# Patient Record
Sex: MALE | Race: WHITE | NOT HISPANIC OR LATINO | Employment: OTHER | URBAN - METROPOLITAN AREA
[De-identification: names, ages, dates, MRNs, and addresses within clinical notes are randomized per-mention and may not be internally consistent; named-entity substitution may affect disease eponyms.]

---

## 2020-02-28 ENCOUNTER — HOSPITAL ENCOUNTER (EMERGENCY)
Facility: CLINIC | Age: 40
Discharge: HOME | End: 2020-02-28
Attending: EMERGENCY MEDICINE | Admitting: EMERGENCY MEDICINE
Payer: MEDICAID

## 2020-02-28 VITALS
RESPIRATION RATE: 16 BRPM | BODY MASS INDEX: 29.16 KG/M2 | TEMPERATURE: 97.6 F | HEART RATE: 85 BPM | SYSTOLIC BLOOD PRESSURE: 131 MMHG | WEIGHT: 220 LBS | DIASTOLIC BLOOD PRESSURE: 83 MMHG | OXYGEN SATURATION: 99 % | HEIGHT: 73 IN

## 2020-02-28 DIAGNOSIS — S39.012A BACK STRAIN, INITIAL ENCOUNTER: ICD-10-CM

## 2020-02-28 PROCEDURE — 25000132 ZZH RX MED GY IP 250 OP 250 PS 637: Performed by: EMERGENCY MEDICINE

## 2020-02-28 PROCEDURE — 99283 EMERGENCY DEPT VISIT LOW MDM: CPT

## 2020-02-28 RX ORDER — LIDOCAINE 4 G/G
1 PATCH TOPICAL ONCE
Status: DISCONTINUED | OUTPATIENT
Start: 2020-02-28 | End: 2020-02-28 | Stop reason: HOSPADM

## 2020-02-28 RX ORDER — OXYCODONE HYDROCHLORIDE 5 MG/1
5 TABLET ORAL ONCE
Status: COMPLETED | OUTPATIENT
Start: 2020-02-28 | End: 2020-02-28

## 2020-02-28 RX ORDER — LIDOCAINE 50 MG/G
1 PATCH TOPICAL EVERY 24 HOURS
Qty: 10 PATCH | Refills: 0 | Status: SHIPPED | OUTPATIENT
Start: 2020-02-28 | End: 2020-03-09

## 2020-02-28 RX ORDER — TRAMADOL HYDROCHLORIDE 50 MG/1
50 TABLET ORAL EVERY 6 HOURS PRN
Qty: 10 TABLET | Refills: 0 | Status: SHIPPED | OUTPATIENT
Start: 2020-02-28 | End: 2020-03-02

## 2020-02-28 RX ADMIN — LIDOCAINE 1 PATCH: 560 PATCH PERCUTANEOUS; TOPICAL; TRANSDERMAL at 13:02

## 2020-02-28 RX ADMIN — OXYCODONE HYDROCHLORIDE 5 MG: 5 TABLET ORAL at 13:02

## 2020-02-28 ASSESSMENT — ENCOUNTER SYMPTOMS
ARTHRALGIAS: 1
BACK PAIN: 1
MYALGIAS: 1

## 2020-02-28 ASSESSMENT — MIFFLIN-ST. JEOR: SCORE: 1976.79

## 2020-02-28 NOTE — ED TRIAGE NOTES
Pt report back pain since he hurt it at the gym last week. Pt has hx of dislocated shoulder. Pt tried a new machine and he could feel it was uncomfortable. Pain was worse the next day. Pain is in his upper left back and shoulder and it radiates down his left arm. Pt went to urgent care on Saturday and Sunday. Pt was given a muscle relaxer and Tylenol #3 which did not help. Pt also tried Naproxen with no relief. Nothing taken today. Pt also saw his chiropractor 3 times this week, chiropractor told him to come in and get an MRI.

## 2020-02-28 NOTE — DISCHARGE INSTRUCTIONS
Recommend you take 1000 mg of Tylenol every 6 hours for pain as well as 600 mg of ibuprofen every 6 hours for pain.  You should apply warm pack to your left shoulder.  You can use the tramadol as needed for more severe pain.  Shoulder exercises including hanging over and shoulder circles to  prevent tightness.  He should make an appointment to follow-up in primary care clinic for recheck.  I see no indication for an MRI at this time however should you have worsening pain or weakness this should be revisited.

## 2020-02-28 NOTE — ED AVS SNAPSHOT
[facility] Emergency Department  [address]  [facility] [address]  Phone:  [phone]  Fax:  [phone]                                    Rex Negrete   MRN: [id number]    Department:  [facility] Emergency Department   Date of Visit:  2/28/2020           After Visit Summary Signature Page    I have received my discharge instructions, and my questions have been answered. I have discussed any challenges I see with this plan with the nurse or doctor.    ..........................................................................................................................................  Patient/Patient Representative Signature      ..........................................................................................................................................  Patient Representative Print Name and Relationship to Patient    ..................................................               ................................................  Date                                   Time    ..........................................................................................................................................  Reviewed by Signature/Title    ...................................................              ..............................................  Date                                               Time          22EPIC Rev 08/18

## 2020-02-28 NOTE — ED PROVIDER NOTES
"  History     Chief Complaint:    Back Pain      HPI   Rex Negrete is a 39 year old male who presents with back pain. Patient states that he was at the gym using a squat machine, where he put his arm in a weird position. The exercise felt uncomfortable, so he stopped. However, afterwards he started feeling back pain and left shoulder pain. He notes the shoulder pain shoots down his arm and hurts in his elbow also. Furthermore, he notes the tip of his left pointer finger is numb. He notes the numbness has stayed the same, but the pain has been getting worse. He states that over the past week, the pain has stopped him from getting a full night's sleep. He went to the urgent care 6 days ago and was given flexeril and tylenol, but that hasn't helped him during the night. He came back a day later and was given Vicodin, but that hasn't helped. He also has tried taking Naprosyn, with little to no relief. Additionally, he saw his chiropractor, who recommended he come to the ED.       Allergies:  The patient has no known drug allergies.    Medications:    Elavil  Buspar  Naprosyn  Inderal  Carafate    Past Medical History:    Depression  Alcohol use disorder  Mild TBI  Dysthymic disorder    Past Surgical History:    Shoulder surgery, left  Nasal septum surgery    Family History:    Father: Alcohol/Drug  Mother: Skin cancer    Social History:  Current some day smoker  Denies alcohol and drug use  Marital Status:  Single [1]    Review of Systems   Musculoskeletal: Positive for arthralgias, back pain and myalgias.   All other systems reviewed and are negative.      Physical Exam   First Vitals:  BP: 131/83  Pulse: 85  Temp: 97.6  F (36.4  C)  Resp: 16  Height: 185.4 cm (6' 1\")  Weight: 99.8 kg (220 lb)  SpO2: 99 %      Physical Exam  Constitutional: Alert, attentive, GCS 15  HENT:    Nose: Nose normal.    Mouth/Throat: Oropharynx is clear, mucous membranes are moist  Neck: Midline tenderness, full range of motion, negative " Spurling sign.  Eyes: EOM are normal, anicteric, conjugate gaze  CV: regular rate and rhythm; no murmurs  Chest: Effort normal and breath sounds clear without wheezing or rales, symmetric bilaterally   GI:  non tender. No distension. No guarding or rebound.  Back: Ecchymosis over left medial scapular border with point tenderness of trapezoid and medial subscapular musculature.  MSK: No LE edema, no tenderness to palpation of BLE.  Neurological: Alert, attentive, moving all extremities equally.  5 out of 5  strength, elbow flexion extension, shoulder abduction abduction.  Skin: Skin is warm and dry.     Medications   Lidocaine (LIDOCARE) 4 % Patch 1 patch (1 patch Transdermal Patch/Med Applied 20 130)   oxyCODONE (ROXICODONE) tablet 5 mg (5 mg Oral Given 20)       Impression & Plan    Medical Decision Makin-year-old male presenting for evaluation of left backslash arm plain after weightlifting injury 1 week prior in which he awkwardly reached behind his head to  a squat bar.  He has seen his chiropractor without significant adjustment but has had cupping treatment and was referred to the emergency department for possible MRI.  On exam, he has intact distal sensation and  strength in his hand and his exam is most consistent with musculoskeletal strain likely subscapularis given focal tenderness with reproducibility of his pain on palpation of his mid thoracic back.  I do not see any red flag symptoms including weakness or sensation loss and his pain is far below the nerve roots of his arm.  I recommended continued conservative management heat packs, Tylenol, ibuprofen Lidoderm patch and short course of pain medication.  Recommended follow-up in primary clinic for recheck and return precautions were reviewed.    Diagnosis:    ICD-10-CM    1. Back strain, initial encounter S39.012A        Disposition:  discharged to home    Discharge Medications:  Discharge Medication List as of  2/28/2020  1:13 PM      START taking these medications    Details   lidocaine (LIDODERM) 5 % patch Place 1 patch onto the skin every 24 hours for 10 daysDisp-10 patch, R-0Local Print      traMADol (ULTRAM) 50 MG tablet Take 1 tablet (50 mg) by mouth every 6 hours as needed for severe pain, Disp-10 tablet, R-0, Local Print           Isidro Tariq MD  [employer]  1:48 PM 02/28/20     Scribe Disclosure:  I, Wes Coello, am serving as a scribe on 2/28/2020 at 12:16 PM to personally document services performed by Isidro Tariq MD based on my observations and the provider's statements to me.     Wes Coello  2/28/2020   [facility] EMERGENCY DEPARTMENT       Isidro Tariq MD  02/28/20 1902

## 2020-03-26 ENCOUNTER — NURSE TRIAGE (OUTPATIENT)
Dept: NURSING | Facility: CLINIC | Age: 40
End: 2020-03-26

## 2020-03-27 ENCOUNTER — APPOINTMENT (OUTPATIENT)
Dept: CT IMAGING | Facility: CLINIC | Age: 40
End: 2020-03-27
Attending: EMERGENCY MEDICINE
Payer: COMMERCIAL

## 2020-03-27 ENCOUNTER — HOSPITAL ENCOUNTER (EMERGENCY)
Facility: CLINIC | Age: 40
Discharge: HOME | End: 2020-03-27
Attending: EMERGENCY MEDICINE | Admitting: EMERGENCY MEDICINE
Payer: COMMERCIAL

## 2020-03-27 VITALS
HEART RATE: 83 BPM | BODY MASS INDEX: 28.49 KG/M2 | OXYGEN SATURATION: 97 % | SYSTOLIC BLOOD PRESSURE: 140 MMHG | WEIGHT: 215 LBS | DIASTOLIC BLOOD PRESSURE: 77 MMHG | HEIGHT: 73 IN | RESPIRATION RATE: 20 BRPM | TEMPERATURE: 98.6 F

## 2020-03-27 DIAGNOSIS — K57.32 DIVERTICULITIS OF COLON: ICD-10-CM

## 2020-03-27 LAB
ALBUMIN SERPL-MCNC: 3.4 G/DL (ref 3.4–5)
ALP SERPL-CCNC: 55 U/L (ref 40–150)
ALT SERPL W P-5'-P-CCNC: 34 U/L (ref 0–70)
ANION GAP SERPL CALCULATED.3IONS-SCNC: 1 MMOL/L (ref 3–14)
AST SERPL W P-5'-P-CCNC: 15 U/L (ref 0–45)
BASOPHILS # BLD AUTO: 0 10E9/L (ref 0–0.2)
BASOPHILS NFR BLD AUTO: 0.2 %
BILIRUB SERPL-MCNC: 2.1 MG/DL (ref 0.2–1.3)
BUN SERPL-MCNC: 16 MG/DL (ref 7–30)
CALCIUM SERPL-MCNC: 8.5 MG/DL (ref 8.5–10.1)
CHLORIDE SERPL-SCNC: 105 MMOL/L (ref 94–109)
CO2 SERPL-SCNC: 30 MMOL/L (ref 20–32)
CREAT SERPL-MCNC: 1.18 MG/DL (ref 0.66–1.25)
DIFFERENTIAL METHOD BLD: ABNORMAL
EOSINOPHIL # BLD AUTO: 0 10E9/L (ref 0–0.7)
EOSINOPHIL NFR BLD AUTO: 0.1 %
ERYTHROCYTE [DISTWIDTH] IN BLOOD BY AUTOMATED COUNT: 12.7 % (ref 10–15)
GFR SERPL CREATININE-BSD FRML MDRD: 78 ML/MIN/{1.73_M2}
GLUCOSE SERPL-MCNC: 100 MG/DL (ref 70–99)
HCT VFR BLD AUTO: 45.3 % (ref 40–53)
HGB BLD-MCNC: 14.9 G/DL (ref 13.3–17.7)
IMM GRANULOCYTES # BLD: 0 10E9/L (ref 0–0.4)
IMM GRANULOCYTES NFR BLD: 0.3 %
LIPASE SERPL-CCNC: 81 U/L (ref 73–393)
LYMPHOCYTES # BLD AUTO: 1.8 10E9/L (ref 0.8–5.3)
LYMPHOCYTES NFR BLD AUTO: 14.3 %
MCH RBC QN AUTO: 30.3 PG (ref 26.5–33)
MCHC RBC AUTO-ENTMCNC: 32.9 G/DL (ref 31.5–36.5)
MCV RBC AUTO: 92 FL (ref 78–100)
MONOCYTES # BLD AUTO: 0.8 10E9/L (ref 0–1.3)
MONOCYTES NFR BLD AUTO: 6.8 %
NEUTROPHILS # BLD AUTO: 9.7 10E9/L (ref 1.6–8.3)
NEUTROPHILS NFR BLD AUTO: 78.3 %
NRBC # BLD AUTO: 0 10*3/UL
NRBC BLD AUTO-RTO: 0 /100
PLATELET # BLD AUTO: 270 10E9/L (ref 150–450)
POTASSIUM SERPL-SCNC: 3.9 MMOL/L (ref 3.4–5.3)
PROT SERPL-MCNC: 6.9 G/DL (ref 6.8–8.8)
RBC # BLD AUTO: 4.91 10E12/L (ref 4.4–5.9)
SODIUM SERPL-SCNC: 136 MMOL/L (ref 133–144)
WBC # BLD AUTO: 12.3 10E9/L (ref 4–11)

## 2020-03-27 PROCEDURE — 74177 CT ABD & PELVIS W/CONTRAST: CPT

## 2020-03-27 PROCEDURE — 25000132 ZZH RX MED GY IP 250 OP 250 PS 637: Performed by: EMERGENCY MEDICINE

## 2020-03-27 PROCEDURE — 25000128 H RX IP 250 OP 636: Performed by: EMERGENCY MEDICINE

## 2020-03-27 PROCEDURE — 85025 COMPLETE CBC W/AUTO DIFF WBC: CPT | Performed by: EMERGENCY MEDICINE

## 2020-03-27 PROCEDURE — 83690 ASSAY OF LIPASE: CPT | Performed by: EMERGENCY MEDICINE

## 2020-03-27 PROCEDURE — 96375 TX/PRO/DX INJ NEW DRUG ADDON: CPT

## 2020-03-27 PROCEDURE — 25000125 ZZHC RX 250: Performed by: EMERGENCY MEDICINE

## 2020-03-27 PROCEDURE — 96374 THER/PROPH/DIAG INJ IV PUSH: CPT | Mod: 59

## 2020-03-27 PROCEDURE — 96361 HYDRATE IV INFUSION ADD-ON: CPT

## 2020-03-27 PROCEDURE — 99285 EMERGENCY DEPT VISIT HI MDM: CPT | Mod: 25

## 2020-03-27 PROCEDURE — 25800030 ZZH RX IP 258 OP 636: Performed by: EMERGENCY MEDICINE

## 2020-03-27 PROCEDURE — 80053 COMPREHEN METABOLIC PANEL: CPT | Performed by: EMERGENCY MEDICINE

## 2020-03-27 RX ORDER — IOPAMIDOL 755 MG/ML
500 INJECTION, SOLUTION INTRAVASCULAR ONCE
Status: COMPLETED | OUTPATIENT
Start: 2020-03-27 | End: 2020-03-27

## 2020-03-27 RX ORDER — ONDANSETRON 2 MG/ML
4 INJECTION INTRAMUSCULAR; INTRAVENOUS ONCE
Status: COMPLETED | OUTPATIENT
Start: 2020-03-27 | End: 2020-03-27

## 2020-03-27 RX ORDER — KETOROLAC TROMETHAMINE 15 MG/ML
15 INJECTION, SOLUTION INTRAMUSCULAR; INTRAVENOUS ONCE
Status: COMPLETED | OUTPATIENT
Start: 2020-03-27 | End: 2020-03-27

## 2020-03-27 RX ORDER — OXYCODONE HYDROCHLORIDE 5 MG/1
5 TABLET ORAL ONCE
Status: COMPLETED | OUTPATIENT
Start: 2020-03-27 | End: 2020-03-27

## 2020-03-27 RX ORDER — OXYCODONE HYDROCHLORIDE 5 MG/1
5 TABLET ORAL EVERY 6 HOURS PRN
Qty: 8 TABLET | Refills: 0 | Status: SHIPPED | OUTPATIENT
Start: 2020-03-27 | End: 2020-03-28

## 2020-03-27 RX ORDER — SODIUM CHLORIDE 9 MG/ML
1000 INJECTION, SOLUTION INTRAVENOUS CONTINUOUS
Status: DISCONTINUED | OUTPATIENT
Start: 2020-03-27 | End: 2020-03-27 | Stop reason: HOSPADM

## 2020-03-27 RX ORDER — HYDROMORPHONE HYDROCHLORIDE 1 MG/ML
0.5 INJECTION, SOLUTION INTRAMUSCULAR; INTRAVENOUS; SUBCUTANEOUS
Status: COMPLETED | OUTPATIENT
Start: 2020-03-27 | End: 2020-03-27

## 2020-03-27 RX ADMIN — KETOROLAC TROMETHAMINE 15 MG: 15 INJECTION, SOLUTION INTRAMUSCULAR; INTRAVENOUS at 09:11

## 2020-03-27 RX ADMIN — IOPAMIDOL 100 ML: 755 INJECTION, SOLUTION INTRAVENOUS at 08:26

## 2020-03-27 RX ADMIN — OXYCODONE HYDROCHLORIDE 5 MG: 5 TABLET ORAL at 12:25

## 2020-03-27 RX ADMIN — SODIUM CHLORIDE 65 ML: 9 INJECTION, SOLUTION INTRAVENOUS at 08:27

## 2020-03-27 RX ADMIN — HYDROMORPHONE HYDROCHLORIDE 0.5 MG: 1 INJECTION, SOLUTION INTRAMUSCULAR; INTRAVENOUS; SUBCUTANEOUS at 07:56

## 2020-03-27 RX ADMIN — OXYCODONE HYDROCHLORIDE 5 MG: 5 TABLET ORAL at 09:54

## 2020-03-27 RX ADMIN — SODIUM CHLORIDE 1000 ML: 9 INJECTION, SOLUTION INTRAVENOUS at 07:53

## 2020-03-27 RX ADMIN — ONDANSETRON 4 MG: 2 INJECTION INTRAMUSCULAR; INTRAVENOUS at 07:53

## 2020-03-27 ASSESSMENT — MIFFLIN-ST. JEOR: SCORE: 1954.11

## 2020-03-27 NOTE — ED TRIAGE NOTES
Arrives with abd pain starting yesterday morning.  Has been having stools as usual. Has taken stool softeners and flexeril yesterday.  No blood in urine.  Pain to medial abd down to suprapubic region.  Pain increases to lower abd. ABCD's intact; A/O x 4.

## 2020-03-27 NOTE — ED AVS SNAPSHOT
[facility] Emergency Department  [address]  [facility] [address]  Phone:  [phone]  Fax:  [phone]                                    Rex Negrete   MRN: [id number]    Department:  [facility] Emergency Department   Date of Visit:  3/27/2020           After Visit Summary Signature Page    I have received my discharge instructions, and my questions have been answered. I have discussed any challenges I see with this plan with the nurse or doctor.    ..........................................................................................................................................  Patient/Patient Representative Signature      ..........................................................................................................................................  Patient Representative Print Name and Relationship to Patient    ..................................................               ................................................  Date                                   Time    ..........................................................................................................................................  Reviewed by Signature/Title    ...................................................              ..............................................  Date                                               Time          22EPIC Rev 08/18

## 2020-03-27 NOTE — DISCHARGE INSTRUCTIONS
An Emergency Dept follow up phone appointment has been made for you on 3/31/2020  At 2:25 PM  , if you would like to make any changes, please phone them at:   Telephone Visit with Steve Garcia MD   [facility] [place] ([facility])  [address] [place]   [address]   [place] [address] [phone]          Follow-up:  Please follow-up with your primary care provider in 2-3 days for re-evaluation and discussion of your visit to the emergency department today.    Home treatments:  Recommended home therapies include:    1.  Antibiotics as directed    2.  Pain medications as directed    3.  Clear liquid diet    4.  Avoidance of seeds / nuts    5.  Close follow-up with primary care provider    New prescriptions:  Ciprofloxacin   Metronidazole    Return precautions:  Warning signs which should prompt you to return to the ER include worsening pain, fever, inability to keep down fluids or foods, bloody stool, recurrent vomiting, or any other new or troubling symptoms.  We are always happy to see you again.    Discharge Instructions  Diverticulitis  Your doctor has diagnosed you with diverticulitis.  Diverticulitis is an infection of a diverticulum, which is a tiny sack-like structure that protrudes off the wall of the colon.  These sacks are created over years of increased pressure in the colon - usually as a result of a diet without enough fruits, vegetables and whole grains. Because these sacks are small, bacteria can get trapped inside them and cause an infection.  This infection often causes abdominal pain, fever, nausea and vomiting. Diverticulitis is usually treated at home.  However, sometimes diverticulitis needs treatment in the hospital and may even need surgery.    Return to the Emergency Department if:     You get an oral temperature above 102oF or as directed by your doctor.    You have blood in your stools (bright red or black, tarry stools), or have blood in your  "vomit.    You keep throwing up or can t drink liquids or can t keep your medicine down.    You can t have a bowel movement or you can t pass gas.    Your stomach gets bloated or bigger.    You faint or become very weak.    Your pain is too bad to tolerate.    You have new symptoms or anything that worries you.    What can I do to help myself?    Fill any antibiotic prescriptions the doctor gave you and take them right away. Be sure to finish the whole antibiotic prescription.    For the first day or two at home drink only clear liquids.  This lets your intestines rest.    If your pain has improved after one or two days, you may start eating mild foods. Soda crackers, toast, plain noodles, gelatin, applesauce and bananas are good first choices.  Avoid foods that have acid, are spicy, fatty or fibrous (such as meats, coarse grains, vegetables). You may start eating these foods again in about 3-4 days when you are better.    Once you are back to normal, eat a high fiber diet of fruits, vegetables and whole grains. Some people think you should avoid eating nuts, seeds, and corn, but there is no definite proof this makes any difference in whether you will get diverticulitis again.     Pain and fever can be treated with Tylenol  (acetaminophen) or with the prescription pain medication given to you by your doctor.  If the prescription pain medication has acetaminophen in it, don t use acetaminophen with it. If you have been given a narcotic pain medication, you should not drive for 4 hours after taking it.    Probiotics: If you have been given an antibiotic, you may want to also take a probiotic pill or eat yogurt with live cultures. Probiotics have \"good bacteria\" to help your intestines stay healthy. Studies have shown that probiotics help prevent diarrhea and other intestine problems (including C. diff infection) when you take antibiotics. You can buy these without a prescription in the pharmacy section of the store. "   FOLLOW UP WITH YOUR REGULAR DOCTOR IN 2 - 3 DAYS.  This is important as further testing may be needed to determine how to treat your diverticulitis in the future.  If you were given a prescription for medicine here today, be sure to read all of the information (including the package insert) that comes with your prescription.  This will include important information about the medicine, its side effects, and any warnings that you need to know about.  The pharmacist who fills the prescription can provide more information and answer questions you may have about the medicine.  If you have questions or concerns that the pharmacist cannot address, please call or return to the Emergency Department.     Opioid Medication Information    Pain medications are among the most commonly prescribed medicines, so we are including this information for all our patients. If you did not receive pain medication or get a prescription for pain medicine, you can ignore it.     You may have been given a prescription for an opioid (narcotic) pain medicine and/or have received a pain medicine while here in the Emergency Department. These medicines can make you drowsy or impaired. You must not drive, operate dangerous equipment, or engage in any other dangerous activities while taking these medications. If you drive while taking these medications, you could be arrested for DUI, or driving under the influence. Do not drink any alcohol while you are taking these medications.     Opioid pain medications can cause addiction. If you have a history of chemical dependency of any type, you are at a higher risk of becoming addicted to pain medications.  Only take these prescribed medications to treat your pain when all other options have been tried. Take it for as short a time and as few doses as possible. Store your pain pills in a secure place, as they are frequently stolen and provide a dangerous opportunity for children or visitors in your house to  start abusing these powerful medications. We will not replace any lost or stolen medicine.  As soon as your pain is better, you should flush all your remaining medication.     Many prescription pain medications contain Tylenol  (acetaminophen), including Vicodin , Tylenol #3 , Norco , Lortab , and Percocet .  You should not take any extra pills of Tylenol  if you are using these prescription medications or you can get very sick.  Do not ever take more than 3000 mg of acetaminophen in any 24 hour period.    All opioids tend to cause constipation. Drink plenty of water and eat foods that have a lot of fiber, such as fruits, vegetables, prune juice, apple juice and high fiber cereal.  Take a laxative if you don t move your bowels at least every other day. Miralax , Milk of Magnesia, Colace , or Senna  can be used to keep you regular.      Remember that you can always come back to the Emergency Department if you are not able to see your regular doctor in the amount of time listed above, if you get any new symptoms, or if there is anything that worries you.

## 2020-03-27 NOTE — TELEPHONE ENCOUNTER
"Patient reports abdominal pain 9am this morning.  Patient says he thought it was constipation and took a stool softner.  Patient says he has had BM but still has the pain.  Current pain is 8-9/10 that is constant and tender to touch.  Reviewed care advice with caller per RN triage protocol.  FNA advised that patient should be evaluated at the ED.    FNA advised to call back with any concerns.   Caller verbalized understanding and agrees with plan.      Reason for Disposition    [1] SEVERE pain (e.g., excruciating) AND [2] present > 1 hour    Additional Information    Negative: Shock suspected (e.g., cold/pale/clammy skin, too weak to stand, low BP, rapid pulse)    Negative: Difficult to awaken or acting confused (e.g., disoriented, slurred speech)    Negative: Passed out (i.e., lost consciousness, collapsed and was not responding)    Negative: Sounds like a life-threatening emergency to the triager    Negative: Chest pain    Negative: Pain is mainly in upper abdomen  (if needed ask: \"is it mainly above the belly button?\")    Negative: Followed an abdomen (stomach) injury    Protocols used: ABDOMINAL PAIN - MALE-A-AH      "

## 2020-03-27 NOTE — ED PROVIDER NOTES
History     Chief Complaint:  Abdominal Pain       HPI   Rex Negrete is a 39 year old male who presents with abdominal pain.  Patient reports development of abdominal pain beginning yesterday morning around 9 AM.  Pain is located maximal to his lower abdomen, including both right and left lower quadrants.  He notes associated anorexia.  He initially thought this may be related to constipation so took a stool softener.  After multiple subsequent bowel movements, his pain persisted.  He denies associated fevers, no vomiting.  No hematochezia or bright red blood per rectum.  Denies experiencing pain similar to this in the past.  No previous abdominal surgeries.  He is a non-smoker, denies alcohol use and does not use NSAIDs regularly.  Symptoms do exacerbate slightly with consumption of food/liquids.    Allergies:  No Known Drug Allergies    Medications:    Medications reviewed. No current medications.     Past Medical History:    Medical history reviewed. No pertinent medical history.    Past Surgical History:    Surgical history reviewed. No pertinent surgical history.    Family History:    Family history reviewed. No pertinent family history.      Family History:    Alcohol/drug   Cancer     Social History:  Smoking Status: current  Alcohol Use: No  Drug Use: No    Review of Systems  10 point ROS negative aside from that mentioned in HPI    Physical Exam     Patient Vitals for the past 24 hrs:   BP Temp Temp src Pulse Heart Rate Resp SpO2 Height Weight   03/27/20 1200 (!) 140/77 -- -- 83 -- -- 97 % -- --   03/27/20 1130 (!) 141/78 -- -- 84 -- -- 96 % -- --   03/27/20 1100 (!) 140/78 -- -- 80 -- -- 96 % -- --   03/27/20 1045 -- -- -- -- -- -- 95 % -- --   03/27/20 1030 139/73 -- -- 80 -- -- 100 % -- --   03/27/20 1015 -- -- -- -- -- -- 95 % -- --   03/27/20 1000 94/52 -- -- 84 -- -- 96 % -- --   03/27/20 0945 -- -- -- -- -- -- 97 % -- --   03/27/20 0930 113/74 -- -- 87 -- -- 94 % -- --   03/27/20 0915 120/74 --  "-- 86 -- -- 95 % -- --   03/27/20 0815 113/61 -- -- 85 -- -- 94 % -- --   03/27/20 0800 122/83 -- -- 87 -- -- 99 % -- --   03/27/20 0745 122/80 -- -- 88 -- -- 96 % -- --   03/27/20 0738 137/82 98.6  F (37  C) Oral -- 94 20 97 % 1.854 m (6' 1\") 97.5 kg (215 lb)   03/27/20 0730 137/82 -- -- 85 -- -- 99 % -- --        Physical Exam  General:   Well-nourished   Speaking in full sentences  Eyes:   Conjunctiva without injection or scleral icterus  ENT:   Moist mucous membranes   Nares patent   Pinnae normal  Neck:   Full ROM   No stiffness appreciated  Resp:   Lungs CTAB   No crackles, wheezing or audible rubs   Good air movement  CV:    Normal rate, regular rhythm   S1 and S2 present   No murmur, gallop or rub  GI:   BS present   Abdomen soft without distention   Tenderness to palpation to lower abdomen bilaterally R>L   Mild upper abd pain   No guarding or rebound tenderness  Skin:   Warm, dry, well perfused   No rashes or open wounds on exposed skin  MSK:   Moves all extremities   No focal deformities or swelling  Neuro:   Alert   Answers questions appropriately   Moves all extremities equally   Gait stable  Psych:   Normal affect, normal mood        Emergency Department Course     Imaging:  Radiology findings were communicated with the patient who voiced understanding of the findings.    CT Abdomen Pelvis w Contrast  Final Result  IMPRESSION:   1.  Findings of sigmoid diverticulitis without evidence for abscess  formation or perforation.  2.  Normal appendix.  THERON LANG MD  Reading per radiology       Laboratory:  Laboratory findings were communicated with the patient who voiced understanding of the findings.    CBC:  WBC 12.3 (H), HGB 14.9, , o/w WNL     CMP: Glucose 100 (H), anion gap 1 (L), bilirubin total 2.1 (H), o/w WNL (Creatinine: 1.18)     Lipase: 81      Interventions:  0753 0.9% sodium chloride BOLUS 1000 mL IV   0753 zofran 4 mg IV   0756 Dilaudid 0.5 mg IV  0911 Toradol 15 mg IV "   0954 Oxycodone 5 mg oral     Emergency Department Course:  Past medical records, nursing notes, and vitals reviewed.    0735 I performed an exam of the patient as documented above.    IV was inserted and blood was drawn for laboratory testing, results above.     The patient was sent for imaging while in the emergency department, results above.       0915 Patient rechecked and updated.      1130 Patient rechecked and updated.      Findings and plan explained to the Patient. Patient discharged home with instructions regarding supportive care, medications, and reasons to return. The importance of close follow-up was reviewed. The patient was prescribed Augmentin.       Impression & Plan     Medical Decision Making:  Rex Negrete is a 39 year old male who presents with abdominal pain.  Vital signs on presentation reveal elevated BP, which improved during ED course.  Differential diagnosis is broad, including but not limited to, diverticulitis, ischemic colitis, bacterial colitis, acute appendicitis with perforation, inflammatory bowel disease, IBS, pancreatic disease, renal colic, peptic ulcer disease, referred aortic pathology, among others.  Given presenting history and examination findings, advanced imaging was obtained as above. CT confirms diverticulitis without abscess or perforation.  Laboratory studies reveal WBC of 12.3. He was provided the above interventions, noting some improvement in pain.  On recheck, he has a non-surgical exam. I discussed indications for admission for symptom control versus discharge and patient is preferring discharge home, which I do feel to be reasonable.  He is not exhibiting intractable pain, high fevers, peritonitis, severe disease on CT, nor is the current episode complicated by immunocompromised state, significant comorbid disease (CKD, DM, COPD), nor prior failure of outpatient therapy.   I will prescribe augmentin and supportive medications as per below.  Opiate  precautions were discussed including sedating, constipating, and addictive potential.  Patient is understanding of the need not to drive or operate heavy machinery while taking these stronger pain medications.  We discussed the natural history of this problem, and I discussed dietary precautions. I recommended he return to the ED for worsening pain, fever, vomiting, bloody or black stools, or for any other concerning symptoms. I recommended he follow up with his primary care physician in 2-3 days.        Discharge Diagnosis:    ICD-10-CM    1. Diverticulitis of colon  K57.32        Disposition:  Discharged to home.    Discharge Medications:  New Prescriptions    AMOXICILLIN-CLAVULANATE (AUGMENTIN) 875-125 MG TABLET    Take 1 tablet by mouth 3 times daily for 10 days    OXYCODONE (ROXICODONE) 5 MG TABLET    Take 1 tablet (5 mg) by mouth every 6 hours as needed for pain No driving or operating heavy machinery while taking this medication.  You may take a stool softener with this medication as it may cause constipation.       Scribe Disclosure:  I, Moise Gutierrez, am serving as a scribe at 7:34 AM on 3/27/2020 to document services personally performed by Gavin Nina MD based on my observations and the provider's statements to me.      3/27/2020   Gavin Nina MD Roach, Brian Donald, MD  03/27/20 4157

## 2020-03-28 ENCOUNTER — HOSPITAL ENCOUNTER (EMERGENCY)
Facility: CLINIC | Age: 40
Discharge: HOME | End: 2020-03-28
Attending: EMERGENCY MEDICINE | Admitting: EMERGENCY MEDICINE
Payer: COMMERCIAL

## 2020-03-28 VITALS
OXYGEN SATURATION: 96 % | BODY MASS INDEX: 27.72 KG/M2 | SYSTOLIC BLOOD PRESSURE: 131 MMHG | DIASTOLIC BLOOD PRESSURE: 77 MMHG | RESPIRATION RATE: 16 BRPM | TEMPERATURE: 97.9 F | WEIGHT: 210.1 LBS | HEART RATE: 96 BPM

## 2020-03-28 DIAGNOSIS — N28.9 RENAL INSUFFICIENCY: ICD-10-CM

## 2020-03-28 DIAGNOSIS — K57.32 SIGMOID DIVERTICULITIS: ICD-10-CM

## 2020-03-28 LAB
ANION GAP SERPL CALCULATED.3IONS-SCNC: 3 MMOL/L (ref 3–14)
BASOPHILS # BLD AUTO: 0 10E9/L (ref 0–0.2)
BASOPHILS NFR BLD AUTO: 0.2 %
BUN SERPL-MCNC: 14 MG/DL (ref 7–30)
CALCIUM SERPL-MCNC: 8.6 MG/DL (ref 8.5–10.1)
CHLORIDE SERPL-SCNC: 102 MMOL/L (ref 94–109)
CO2 SERPL-SCNC: 29 MMOL/L (ref 20–32)
CREAT SERPL-MCNC: 1.26 MG/DL (ref 0.66–1.25)
DIFFERENTIAL METHOD BLD: NORMAL
EOSINOPHIL # BLD AUTO: 0 10E9/L (ref 0–0.7)
EOSINOPHIL NFR BLD AUTO: 0 %
ERYTHROCYTE [DISTWIDTH] IN BLOOD BY AUTOMATED COUNT: 12.5 % (ref 10–15)
GFR SERPL CREATININE-BSD FRML MDRD: 72 ML/MIN/{1.73_M2}
GLUCOSE SERPL-MCNC: 99 MG/DL (ref 70–99)
HCT VFR BLD AUTO: 47 % (ref 40–53)
HGB BLD-MCNC: 15.3 G/DL (ref 13.3–17.7)
IMM GRANULOCYTES # BLD: 0 10E9/L (ref 0–0.4)
IMM GRANULOCYTES NFR BLD: 0.4 %
LYMPHOCYTES # BLD AUTO: 1 10E9/L (ref 0.8–5.3)
LYMPHOCYTES NFR BLD AUTO: 10.1 %
MCH RBC QN AUTO: 30.3 PG (ref 26.5–33)
MCHC RBC AUTO-ENTMCNC: 32.6 G/DL (ref 31.5–36.5)
MCV RBC AUTO: 93 FL (ref 78–100)
MONOCYTES # BLD AUTO: 0.8 10E9/L (ref 0–1.3)
MONOCYTES NFR BLD AUTO: 8.5 %
NEUTROPHILS # BLD AUTO: 7.6 10E9/L (ref 1.6–8.3)
NEUTROPHILS NFR BLD AUTO: 80.8 %
NRBC # BLD AUTO: 0 10*3/UL
NRBC BLD AUTO-RTO: 0 /100
PLATELET # BLD AUTO: 264 10E9/L (ref 150–450)
POTASSIUM SERPL-SCNC: 3.6 MMOL/L (ref 3.4–5.3)
RBC # BLD AUTO: 5.05 10E12/L (ref 4.4–5.9)
SODIUM SERPL-SCNC: 134 MMOL/L (ref 133–144)
WBC # BLD AUTO: 9.4 10E9/L (ref 4–11)

## 2020-03-28 PROCEDURE — 25000128 H RX IP 250 OP 636: Performed by: EMERGENCY MEDICINE

## 2020-03-28 PROCEDURE — 25800030 ZZH RX IP 258 OP 636: Performed by: EMERGENCY MEDICINE

## 2020-03-28 PROCEDURE — 85025 COMPLETE CBC W/AUTO DIFF WBC: CPT | Performed by: EMERGENCY MEDICINE

## 2020-03-28 PROCEDURE — 99285 EMERGENCY DEPT VISIT HI MDM: CPT | Mod: 25

## 2020-03-28 PROCEDURE — 96367 TX/PROPH/DG ADDL SEQ IV INF: CPT

## 2020-03-28 PROCEDURE — 96375 TX/PRO/DX INJ NEW DRUG ADDON: CPT

## 2020-03-28 PROCEDURE — 96365 THER/PROPH/DIAG IV INF INIT: CPT

## 2020-03-28 PROCEDURE — 25000132 ZZH RX MED GY IP 250 OP 250 PS 637: Performed by: EMERGENCY MEDICINE

## 2020-03-28 PROCEDURE — 80048 BASIC METABOLIC PNL TOTAL CA: CPT | Performed by: EMERGENCY MEDICINE

## 2020-03-28 RX ORDER — CEFTRIAXONE 2 G/1
2 INJECTION, POWDER, FOR SOLUTION INTRAMUSCULAR; INTRAVENOUS ONCE
Status: COMPLETED | OUTPATIENT
Start: 2020-03-28 | End: 2020-03-28

## 2020-03-28 RX ORDER — OXYCODONE HYDROCHLORIDE 5 MG/1
5 TABLET ORAL EVERY 6 HOURS PRN
Qty: 12 TABLET | Refills: 0 | Status: SHIPPED | OUTPATIENT
Start: 2020-03-28 | End: 2021-07-06

## 2020-03-28 RX ORDER — HYDROMORPHONE HYDROCHLORIDE 1 MG/ML
0.5 INJECTION, SOLUTION INTRAMUSCULAR; INTRAVENOUS; SUBCUTANEOUS
Status: DISCONTINUED | OUTPATIENT
Start: 2020-03-28 | End: 2020-03-28 | Stop reason: HOSPADM

## 2020-03-28 RX ORDER — OXYCODONE HYDROCHLORIDE 5 MG/1
10 TABLET ORAL ONCE
Status: COMPLETED | OUTPATIENT
Start: 2020-03-28 | End: 2020-03-28

## 2020-03-28 RX ADMIN — HYDROMORPHONE HYDROCHLORIDE 0.5 MG: 1 INJECTION, SOLUTION INTRAMUSCULAR; INTRAVENOUS; SUBCUTANEOUS at 14:46

## 2020-03-28 RX ADMIN — CEFTRIAXONE 2 G: 2 INJECTION, POWDER, FOR SOLUTION INTRAMUSCULAR; INTRAVENOUS at 14:43

## 2020-03-28 RX ADMIN — OXYCODONE HYDROCHLORIDE 10 MG: 5 TABLET ORAL at 16:26

## 2020-03-28 RX ADMIN — METRONIDAZOLE 500 MG: 500 INJECTION, SOLUTION INTRAVENOUS at 15:20

## 2020-03-28 RX ADMIN — SODIUM CHLORIDE 1000 ML: 9 INJECTION, SOLUTION INTRAVENOUS at 14:43

## 2020-03-28 ASSESSMENT — ENCOUNTER SYMPTOMS
DIFFICULTY URINATING: 1
ABDOMINAL PAIN: 1
FEVER: 0
VOMITING: 0

## 2020-03-28 NOTE — ED PROVIDER NOTES
History     Chief Complaint:  Abdominal Pain    HPI   Rex Negrete is a 39 year old male who presents to the Emergency Department for evaluation of abdominal pain. The patient was seen here in the ED yesterday for evaluation of lower abdominal pain which started 2 days ago. CT scan revealed sigmoid diverticulitis without evidence for abscess formation or perforation. He was discharged home on Augmentin and prescribed Oxycodone to take as needed for pain. However, the patient reports no improvement in his abdominal discomfort. He has taken 3-4 doses of the Augmentin, and he states he has been taking the Oxycodone every 4-6 hours without significant relief, prompting his presentation to the ED for pain management. The abdominal pain is low, constant and non-radiating. Additionally, he endorses having pressure when urinating. He denies having any recent fevers or episodes of vomiting. He has no history of diverticulitis prior to this bout.    Allergies:  No known drug allergies    Medications:    Augmentin  Carafate  Depakote  Oxycodone  Propranolol  Protonix    Past Medical History:    Alcohol use disorder  Anxiety  Depression  Diverticulitis  Erosive esophagitis  Mild traumatic brain injury   Post-traumatic stress disorder    Past Surgical History:    Left shoulder surgery  Nasal septum surgery    Family History:    Alcohol/drug  Cancer    Social History:  The patient presents to the ED alone.  Marital status: Single  Smoking status: Current Some Day Smoker  Alcohol use: No  Drug use: No  PCP: No Ref-Primary, Physician    Review of Systems   Constitutional: Negative for fever.   Gastrointestinal: Positive for abdominal pain. Negative for vomiting.   Genitourinary: Positive for difficulty urinating (pressure).   All other systems reviewed and are negative.    Physical Exam     Patient Vitals for the past 24 hrs:   BP Temp Temp src Pulse Heart Rate Resp SpO2 Weight   03/28/20 1400 -- -- -- -- -- 16 -- --    20 1359 (!) 142/90 97.9  F (36.6  C) Temporal 88 88 -- 97 % 95.3 kg (210 lb 1.6 oz)         Physical Exam  Constitutional:  Pleasant, age appropriate male.  HEENT:    Oropharynx is moist.  Eyes:    Conjunctiva normal  Neck:    Supple, no meningismus.     CV:     Regular rate and rhythm.      No murmurs, rubs or gallops.     No lower extremity edema.  PULM:    Clear to auscultation bilateral.       No respiratory distress.      Good air exchange.  ABD:    Soft, non-distended.       Moderate tenderness in the lower abdomen.      Mild moderate diffuse upper abdominal tenderness     Bowel sounds normal.     No pulsatile masses.       No rebound, guarding or rigidity.     No CVA tenderness.      No hepatosplenomegaly.  MSK:     No gross deformity to all four extremities.   LYMPH:   No cervical lymphadenopathy.  NEURO:   Alert.  Good muscular tone, no atrophy.   Skin:    Warm, dry and intact.    Psych:    Mood is good and affect is appropriate.    Emergency Department Course     Laboratory:  CBC: WNL (WBC 9.4, HGB 15.3, )  BMP: Creatinine 1.26 (H), o/w WNL     Interventions:  1443: 0.9% Sodium Chloride Bolus 1L IV  1443: Rocephin 2 g IV  1446: Dilaudid 0.5 mg IV  1520: Flagyl 500 mg IV  Oxycodone 10 g x 2 PO    Emergency Department Course:  Past medical records, nursing notes, and vitals reviewed.  1414: I performed an exam of the patient and obtained history, as documented above.   IV inserted and blood samples were collected and sent for laboratory testing, findings above.    1531: I rechecked the patient and explained the findings. I discussed admission versus outpatient management and the patient states he would prefer to go home.    Findings and plan explained to the patient. Patient discharged home with instructions regarding supportive care and reasons to return. The importance of close follow-up was reviewed.    Impression & Plan      Medical Decision Makin-year-old male seen in the ED for  persistent abdominal pain after diagnosis of acute uncomplicated sigmoid diverticulitis by CT scan during ED visit yesterday.  His symptoms have not improved and pain control has been an issue.  He continues to appear well today with no clinical findings to suggest interval development of perforation or abscess.  Laboratory studies reveal mild renal insufficiency but no other significant findings.  Symptoms improved in the ED.  Given his lack of improvement with oral Augmentin and oxycodone, patient was given IV Rocephin and Flagyl in the ED.  I recommendrf observation stay for which the patient declined.  I informed him that he is at increased risk of failure of outpatient management particularly with pain control.  He would like to be discharged home with another trial of outpatient management.  Patient to continue use of Augmentin.  I will give him additional supply of oxycodone and will return to the ED for any worsening symptoms.    Diagnosis:    ICD-10-CM    1. Sigmoid diverticulitis  K57.32        Disposition:  discharged to home    Discharge Medications:  New Prescriptions    OXYCODONE (ROXICODONE) 5 MG TABLET    Take 1 tablet (5 mg) by mouth every 6 hours as needed for pain       Juana Norris  3/28/2020   [facility] EMERGENCY DEPARTMENT    Scribe Disclosure:  I, Juana Norris, am serving as a scribe at 2:14 PM on 3/28/2020 to document services personally performed by Robert Bahena MD based on my observations and the provider's statements to me.        Robert Bahena MD  03/28/20 3366

## 2020-03-28 NOTE — ED AVS SNAPSHOT
[facility] Emergency Department  [address]  [facility] [address]  Phone:  [phone]  Fax:  [phone]                                    Rex Negrete   MRN: [id number]    Department:  [facility] Emergency Department   Date of Visit:  3/28/2020           After Visit Summary Signature Page    I have received my discharge instructions, and my questions have been answered. I have discussed any challenges I see with this plan with the nurse or doctor.    ..........................................................................................................................................  Patient/Patient Representative Signature      ..........................................................................................................................................  Patient Representative Print Name and Relationship to Patient    ..................................................               ................................................  Date                                   Time    ..........................................................................................................................................  Reviewed by Signature/Title    ...................................................              ..............................................  Date                                               Time          22EPIC Rev 08/18

## 2020-03-28 NOTE — DISCHARGE INSTRUCTIONS
Your kidney tests were mildly elevated today likely due to dehydration.  Please have this rechecked through your primary care doctor within the next 1 week.    Discharge Instructions  Diverticulitis  Your provider has diagnosed you with diverticulitis.  Diverticulitis is an infection of a diverticulum, which is a tiny sack-like structure that protrudes off the wall of the colon (large intestine).  These sacks are created over years of increased pressure in the colon (this is diverticulosis), usually as a result of a diet without enough fruits, vegetables, and whole grains. Because these sacks are small, bacteria can get trapped inside them and cause an infection (this is divericulitis).  This infection often causes abdominal (belly) pain, fever, nausea (sick to stomach), and vomiting (throwing up). Diverticulitis is usually treated at home.  However, sometimes diverticulitis needs treatment in the hospital and may even need surgery.    Generally, every Emergency Department visit should have a follow-up clinic visit with either a primary or a specialty clinic/provider. Please follow-up as instructed by your emergency provider today.    Return to the Emergency Department if:   You get an oral temperature above 102oF or as directed by your provider.  You have blood in your stools (bright red or black, tarry stools), or have blood in your vomit.  You keep vomiting or cannot drink liquids or cannot keep your medicine down.  You cannot have a bowel movement or you cannot pass gas.  Your stomach gets bloated or bigger.  You faint or become very weak.  Your pain is too bad to tolerate.  You have new symptoms or anything that worries you.    What can I do to help myself?  Fill any antibiotic prescriptions the provider gave you and take them right away. Be sure to finish the whole antibiotic prescription.  For the first day or two at home drink only clear liquids.  This lets your intestines rest.  If your pain has improved  "after one or two days, you may start eating mild foods. Soda crackers, toast, plain noodles, gelatin, applesauce and bananas are good first choices.  Avoid foods that have acid, are spicy, fatty or fibrous (such as meats, coarse grains, vegetables). You may start eating these foods again in about 3-4 days when you are better.  Once you are back to normal, eat a high fiber diet of fruits, vegetables and whole grains. Some people think you should avoid eating nuts, seeds, and corn, but there is no definite proof this makes any difference in whether you will get diverticulitis again.   Pain and fever can be treated with Tylenol  (acetaminophen) or you might have been prescribed a pain medication.    Probiotics: If you have been given an antibiotic, you may want to also take a probiotic pill or eat yogurt with live cultures. Probiotics have \"good bacteria\" to help your intestines stay healthy. Studies have shown that probiotics help prevent diarrhea and other intestine problems (including C. diff infection) when you take antibiotics. You can buy these without a prescription in the pharmacy section of the store.  If you were given a prescription for medicine here today, be sure to read all of the information (including the package insert) that comes with your prescription.  This will include important information about the medicine, its side effects, and any warnings that you need to know about.  The pharmacist who fills the prescription can provide more information and answer questions you may have about the medicine.  If you have questions or concerns that the pharmacist cannot address, please call or return to the Emergency Department.     Remember that you can always come back to the Emergency Department if you are not able to see your regular provider in the amount of time listed above, if you get any new symptoms, or if there is anything that worries you.    "

## 2020-03-28 NOTE — ED TRIAGE NOTES
Pt diagnosed with diverticulitis yesterday and sent home with pain meds. Pt says meds aren't helping and has used all the pain meds. Pt says he doesn't want to be admitted. ABCs intact. A&Ox3.

## 2020-09-28 ENCOUNTER — OFFICE VISIT (OUTPATIENT)
Dept: FAMILY MEDICINE | Facility: CLINIC | Age: 40
End: 2020-09-28
Payer: COMMERCIAL

## 2020-09-28 VITALS
WEIGHT: 196 LBS | HEART RATE: 102 BPM | HEIGHT: 73 IN | SYSTOLIC BLOOD PRESSURE: 134 MMHG | TEMPERATURE: 95.8 F | DIASTOLIC BLOOD PRESSURE: 84 MMHG | OXYGEN SATURATION: 98 % | BODY MASS INDEX: 25.98 KG/M2

## 2020-09-28 DIAGNOSIS — M77.9 TENDONITIS: Primary | ICD-10-CM

## 2020-09-28 PROCEDURE — 99203 OFFICE O/P NEW LOW 30 MIN: CPT | Performed by: NURSE PRACTITIONER

## 2020-09-28 RX ORDER — NAPROXEN SODIUM 550 MG/1
550 TABLET ORAL 2 TIMES DAILY WITH MEALS
Qty: 14 TABLET | Refills: 0 | Status: SHIPPED | OUTPATIENT
Start: 2020-09-28 | End: 2020-10-05

## 2020-09-28 RX ORDER — HYDROXYZINE PAMOATE 100 MG
50 CAPSULE ORAL
COMMUNITY
End: 2021-07-07

## 2020-09-28 RX ORDER — CYCLOBENZAPRINE HCL 5 MG
5 TABLET ORAL
Qty: 20 TABLET | Refills: 0 | Status: SHIPPED | OUTPATIENT
Start: 2020-09-28 | End: 2021-07-06

## 2020-09-28 RX ORDER — NAPROXEN 500 MG/1
500 TABLET ORAL
COMMUNITY
End: 2021-07-07

## 2020-09-28 ASSESSMENT — MIFFLIN-ST. JEOR: SCORE: 1862.93

## 2020-09-28 NOTE — PROGRESS NOTES
"Subjective   Rex Negrete is a 40 year old male who presents to clinic today for the following health issues:    HPI   Musculoskeletal problem/pain  Onset/Duration: x 2 days   Description  Location: shin  - left  Joint Swelling: YES- mild   Redness: YES- mild   Pain: YES, hard to move foot up and down.   Warmth: no  Intensity:  Moderate to severe   Progression of Symptoms:  worsening  Accompanying signs and symptoms:   Fevers: no  Numbness/tingling/weakness: no  History  Trauma to the area: no  Recent illness:  no  Previous similar problem: no  Previous evaluation:  no  Precipitating or alleviating factors:  Aggravating factors include: standing and walking  Therapies tried and outcome: Ibuprofen and muscle creams    Reviewed and updated as needed this visit by provider:  Problems         Review of Systems   Constitutional, HEENT, cardiovascular, pulmonary, GI, , musculoskeletal, neuro, skin, endocrine and psych systems are negative, except as otherwise noted per HPI.      Objective   /84   Pulse 102   Temp 95.8  F (35.4  C) (Tympanic)   Ht 1.854 m (6' 1\")   Wt 88.9 kg (196 lb)   SpO2 98%   BMI 25.86 kg/m   Body mass index is 25.86 kg/m .  Physical Exam   GENERAL: healthy, alert, well nourished, well hydrated, no distress  MS: extremities- no gross deformities noted, no edema. In area of redness noted below there is some creptius as well as pain with flexion of foot.Pain with waking.  SKIN left shin tender to touch, anterior mid shin, lateral aspect slightly erythematous.     Diagnostic Test Results  Pending      Assessment & Plan   Rex was seen today for musculoskeletal problem.    Diagnoses and all orders for this visit:    Tendonitis  -     naproxen sodium (ANAPROX) 550 MG tablet; Take 1 tablet (550 mg) by mouth 2 times daily (with meals) for 7 days  -     cyclobenzaprine (FLEXERIL) 5 MG tablet; Take 1 tablet (5 mg) by mouth nightly as needed for muscle spasms  -     CRUTCHES, UNDERARM " "WOOD    likely tendonitis. Discussed RICE, naproxen, limit weight bearing as needed. Follow up 2 weeks if not improving.        Tobacco Cessation:   reports that he has been smoking cigarettes. He does not have any smokeless tobacco history on file.        BMI:   Estimated body mass index is 27.72 kg/m  as calculated from the following:    Height as of 3/27/20: 1.854 m (6' 1\").    Weight as of 3/28/20: 95.3 kg (210 lb 1.6 oz).           See Patient Instructions    No follow-ups on file.            CARMEN Nicole     [address] [address]  [email or web address]   Office: [phone]           "

## 2021-05-17 ENCOUNTER — TELEPHONE (OUTPATIENT)
Dept: INTERNAL MEDICINE | Facility: CLINIC | Age: 41
End: 2021-05-17

## 2021-05-17 NOTE — TELEPHONE ENCOUNTER
Lynne at [facility] calls stating pt is admitted there. She states that pt has Barbi Holguin NP listed as their Restricted Provider and they need a referral to see pt.     Advised Lynne that we have never seen this patient in [facility], so would be unable to provide referral. She agrees with this and had no further questions.

## 2021-05-26 ENCOUNTER — OFFICE VISIT (OUTPATIENT)
Dept: FAMILY MEDICINE | Facility: CLINIC | Age: 41
End: 2021-05-26
Payer: COMMERCIAL

## 2021-05-26 ENCOUNTER — TELEPHONE (OUTPATIENT)
Dept: NURSING | Facility: CLINIC | Age: 41
End: 2021-05-26

## 2021-05-26 VITALS
BODY MASS INDEX: 24.01 KG/M2 | DIASTOLIC BLOOD PRESSURE: 62 MMHG | OXYGEN SATURATION: 98 % | RESPIRATION RATE: 16 BRPM | HEART RATE: 82 BPM | TEMPERATURE: 98.7 F | WEIGHT: 182 LBS | SYSTOLIC BLOOD PRESSURE: 110 MMHG

## 2021-05-26 DIAGNOSIS — I46.9 PEA (PULSELESS ELECTRICAL ACTIVITY) (H): Primary | ICD-10-CM

## 2021-05-26 DIAGNOSIS — Z87.820 HX OF TRAUMATIC BRAIN INJURY: ICD-10-CM

## 2021-05-26 DIAGNOSIS — Z86.69 HX OF SEIZURE DISORDER: ICD-10-CM

## 2021-05-26 DIAGNOSIS — F15.20 METHAMPHETAMINE USE DISORDER, SEVERE (H): ICD-10-CM

## 2021-05-26 PROCEDURE — 99204 OFFICE O/P NEW MOD 45 MIN: CPT | Performed by: FAMILY MEDICINE

## 2021-05-26 NOTE — PROGRESS NOTES
Assessment & Plan     PEA (Pulseless electrical activity) (H)  Reviewed his records via care everywhere. He will be having follow up ECHO which is scheduled and may need cardiology follow up pending result.   I am seeing him for the first time but I suspect he has some brain injury vs delirum from above. He can certainly follow up with neurology and as per records he is scheduled at [facility]. Wife understood.     Methamphetamine use disorder, severe (H)  Was advised outpatient addiction medication specialist referral. Offered them. Wife declined - stated they already have resources.     Hx of seizure disorder  As per notes and possible seizures in hospital due to above.     Hx of traumatic brain injury  Old history and I suspect worsening of it due to problem 1. TBI clinic referral info discussed but appropriate to wait for neuro input first. They are planning to continue all speciality care at [facility] for now.     Offered repeat hgb which is low and bmp but patient declined.     Dick Concepcion MD, MD  [facility] DAVID Goodman is a 40 year old who presents for the following health issues     HPI     Hospital Follow-up Visit:    Hospital/Nursing Home/IP Rehab Facility: [facility]  Date of Admission: 5/14/21  Date of Discharge: 5/24/21  Reason(s) for Admission: heart attack    Want to see what plan of care is.    Was your hospitalization related to COVID-19? No   Problems taking medications regularly:  None  Medication changes since discharge: None  Problems adhering to non-medication therapy:  None    Summary of hospitalization:  CareEverywhere information obtained and reviewed  Diagnostic Tests/Treatments reviewed.  Follow up needed: labs  Other Healthcare Providers Involved in Patient s Care:         Specialist appointment -  neurology  Update since discharge: stable.       Post Discharge Medication Reconciliation: discharge medications reconciled, continue medications without  change.  Plan of care communicated with patient and family              Here with his wife.   His parents also lives with him.    Not using any drug since discharge.     Got discharged 2 days ago.     Does also have ECHO scheduled.     As per hospital discharge summary   Rex Negrete is a 40 y.o. male with PMH of daily IV meth use, TBI, and seizures (not on Keppra), who was admitted on 5/14/2021 for out of hospital PEA likely secondary to methamphetamine overdose and catacholamine toxicity now post-cardiac arrest cooling protocol, extubated 5/20 and transferred to floor 5/21/2021, initially planning for TTE to evaluate heart function. On the inpatient medicine floor he had hypoactive delirium, which improved with physical therapy and low-dose Haldol. Patient agitated in AM 5/24 and requesting to remove feeding tube, tolerating p.o. diet, he displayed logical thought process regarding follow-up given his significantly improved cognitive function, ambulatory without assistance, independence with cares he was discharged to his mother's home with his significant other Jarredh    Had hypoactive delirium, encephalopathy, methamphetamine intoxication, acute hear failure, PEA,  - all resolved as per discharge summary.          Review of Systems         Objective    /62 (BP Location: Right arm, Patient Position: Sitting, Cuff Size: Adult Regular)   Pulse 82   Temp 98.7  F (37.1  C) (Tympanic)   Resp 16   Wt 82.6 kg (182 lb)   SpO2 98%   BMI 24.01 kg/m    Body mass index is 24.01 kg/m .  Physical Exam   Sitting and not talking but following all commands.   Nodding head in yes or no.   Wife speaks behalf of him  Heart and lungs clear.   Gait mildly unsteady.

## 2021-05-26 NOTE — TELEPHONE ENCOUNTER
Wanted to establish care in [facility] after being hospitalized with heart issues.  Will establish care and have them refer him to cardiology care as needed.  Sent to .

## 2021-05-26 NOTE — PATIENT INSTRUCTIONS
Schedule your covid vaccine through [employer] if you have not done it already.   Please call [phone] to schedule your covid vaccine if you are unable to schedule it through [employer].           Did you know?      You can schedule a video visit for follow-up appointments as well as future appointments for certain conditions.  Please see the below link.     [email or web address]    If you have not already done so,  I encourage you to sign up for [employer] ([email or web address]).  This will allow you to review your results, securely communicate with a provider, and schedule virtual visits as well.

## 2021-07-07 ENCOUNTER — OFFICE VISIT (OUTPATIENT)
Dept: NEUROLOGY | Facility: CLINIC | Age: 41
End: 2021-07-07
Payer: COMMERCIAL

## 2021-07-07 ENCOUNTER — RECORDS - HEALTHEAST (OUTPATIENT)
Dept: ADMINISTRATIVE | Facility: OTHER | Age: 41
End: 2021-07-07

## 2021-07-07 ENCOUNTER — TRANSCRIBE ORDERS (OUTPATIENT)
Dept: NEUROLOGY | Facility: CLINIC | Age: 41
End: 2021-07-07

## 2021-07-07 ENCOUNTER — AMBULATORY - HEALTHEAST (OUTPATIENT)
Dept: NEUROLOGY | Facility: CLINIC | Age: 41
End: 2021-07-07

## 2021-07-07 VITALS — BODY MASS INDEX: 24.52 KG/M2 | HEIGHT: 72 IN | WEIGHT: 181 LBS

## 2021-07-07 DIAGNOSIS — R41.9 NEUROCOGNITIVE DISORDER: ICD-10-CM

## 2021-07-07 DIAGNOSIS — R20.2 PARESTHESIA: ICD-10-CM

## 2021-07-07 DIAGNOSIS — Z86.74 H/O CARDIAC ARREST: ICD-10-CM

## 2021-07-07 DIAGNOSIS — G44.329 CHRONIC POST-TRAUMATIC HEADACHE, NOT INTRACTABLE: Primary | ICD-10-CM

## 2021-07-07 PROBLEM — S06.9XAA MILD TBI (TRAUMATIC BRAIN INJURY) (H): Status: ACTIVE | Noted: 2017-08-29

## 2021-07-07 PROBLEM — F41.9 ANXIETY: Status: ACTIVE | Noted: 2018-03-07

## 2021-07-07 PROBLEM — F43.10 POSTTRAUMATIC STRESS DISORDER: Status: ACTIVE | Noted: 2019-01-28

## 2021-07-07 PROCEDURE — 99205 OFFICE O/P NEW HI 60 MIN: CPT | Performed by: PSYCHIATRY & NEUROLOGY

## 2021-07-07 SDOH — HEALTH STABILITY: MENTAL HEALTH: HOW OFTEN DO YOU HAVE 6 OR MORE DRINKS ON ONE OCCASION?: NOT ASKED

## 2021-07-07 SDOH — HEALTH STABILITY: MENTAL HEALTH: HOW MANY STANDARD DRINKS CONTAINING ALCOHOL DO YOU HAVE ON A TYPICAL DAY?: NOT ASKED

## 2021-07-07 SDOH — HEALTH STABILITY: MENTAL HEALTH: HOW OFTEN DO YOU HAVE A DRINK CONTAINING ALCOHOL?: NOT ASKED

## 2021-07-07 ASSESSMENT — MIFFLIN-ST. JEOR: SCORE: 1779.01
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
## 2021-07-07 NOTE — NURSING NOTE
"Chief Complaint   Patient presents with     TBI     H/o cardiac arrest, TBI, feels like a \"little kid\"     Numbness     BLE numbness     Elmira Frye, CMA on 7/7/2021 at 10:07 AM    "

## 2021-07-27 ENCOUNTER — TRANSFERRED RECORDS (OUTPATIENT)
Dept: HEALTH INFORMATION MANAGEMENT | Facility: CLINIC | Age: 41
End: 2021-07-27

## 2021-07-31 ENCOUNTER — HEALTH MAINTENANCE LETTER (OUTPATIENT)
Age: 41
End: 2021-07-31

## 2021-08-09 ENCOUNTER — OFFICE VISIT (OUTPATIENT)
Dept: FAMILY MEDICINE | Facility: CLINIC | Age: 41
End: 2021-08-09
Payer: COMMERCIAL

## 2021-08-09 VITALS
HEIGHT: 73 IN | TEMPERATURE: 98.4 F | SYSTOLIC BLOOD PRESSURE: 111 MMHG | OXYGEN SATURATION: 98 % | RESPIRATION RATE: 16 BRPM | WEIGHT: 199.8 LBS | HEART RATE: 85 BPM | BODY MASS INDEX: 26.48 KG/M2 | DIASTOLIC BLOOD PRESSURE: 74 MMHG

## 2021-08-09 DIAGNOSIS — G62.9 NEUROPATHY: ICD-10-CM

## 2021-08-09 DIAGNOSIS — R53.81 PHYSICAL DECONDITIONING: ICD-10-CM

## 2021-08-09 DIAGNOSIS — R40.0 DAYTIME SOMNOLENCE: ICD-10-CM

## 2021-08-09 DIAGNOSIS — Z76.89 ENCOUNTER TO ESTABLISH CARE: Primary | ICD-10-CM

## 2021-08-09 DIAGNOSIS — I50.41 ACUTE COMBINED SYSTOLIC AND DIASTOLIC HEART FAILURE (H): ICD-10-CM

## 2021-08-09 PROCEDURE — 99204 OFFICE O/P NEW MOD 45 MIN: CPT | Mod: GC | Performed by: STUDENT IN AN ORGANIZED HEALTH CARE EDUCATION/TRAINING PROGRAM

## 2021-08-09 RX ORDER — PREGABALIN 25 MG/1
25 CAPSULE ORAL 2 TIMES DAILY
Qty: 60 CAPSULE | Refills: 0 | Status: CANCELLED | OUTPATIENT
Start: 2021-08-09

## 2021-08-09 ASSESSMENT — MIFFLIN-ST. JEOR: SCORE: 1875.17

## 2021-08-09 NOTE — PATIENT INSTRUCTIONS
Patient Education     Here is the plan from today's visit    Sleep study: see below    There are no diagnoses linked to this encounter.    Please call or return to clinic if your symptoms don't go away.    Follow up plan  Return in about 2 weeks (around 8/23/2021) for Medication f/u.      Thank you for coming to [facility] today.  Lab Testing:  **If you had lab testing today and your results are reassuring or normal they will be mailed to you or sent through [employer] within 7 days.   **If the lab tests need quick action we will call you with the results.  The phone number we will call with results is # [phone] (home) . If this is not the best number please call our clinic and change the number.  Medication Refills:  If you need any refills please call your pharmacy and they will contact us.   If you need to  your refill at a new pharmacy, please contact the new pharmacy directly. The new pharmacy will help you get your medications transferred faster.   Scheduling:  If you have any concerns about today's visit or wish to schedule another appointment please call our office during normal business hours [phone] (8-5:00 M-F)  If a referral was made to a [facility] and you don't get a call from central scheduling please call [phone].  If a Mammogram was ordered for you at [facility] call [phone] to schedule or change your appointment.  If you had an XRay/CT/Ultrasound/MRI ordered the number is [phone] to schedule or change your radiology appointment.   Medical Concerns:  If you have urgent medical concerns please call [phone] at any time of the day.    Lionel Bahena MD

## 2021-08-09 NOTE — Clinical Note
Lion Carrizales. Thanks for your help with this one. It wouldn't let me sign the lyrica. I pended it for now if you don't mind signing off on it. Thanks again, Lionel

## 2021-08-09 NOTE — PROGRESS NOTES
"    Assessment & Plan     Encounter to establish care  Patient reports planning to establish with [facility]. Previously seen at [facility].    Daytime somnolence  Patient reports daytime fatigue and falling asleep. Denies snoring or difficulty with sleep. Referral for sleep study sent for further evaluation.  - SLEEP EVALUATION & MANAGEMENT REFERRAL - ADULT -    Neuropathy  Patient with neuropathy of feet after likely anoxic briain injury. Seeing neuro. Labs reviewed with patient, reassuring. Has EMG coming up next month.  Will trial lyrica with re-evaluation in 2 weeks for effectiveness.     Acute combined systolic and diastolic heart failure (H)  Physical deconditioning  Reviewed echo from 5/17 with patient. Follow-up scheduled with cards in 2 weeks. Dyspnea possibly related to heart failure vs deconditioning as patient did not have any rehab after hospitalization.   Will investigate referrals for brain injury vs PM&R for rehabilitation.        Return in about 2 weeks (around 8/23/2021) for Medication f/u.    Lionel Bahena MD  [facility] CHARLEY Goodman is a 41 year old who presents for the following health issues     HPI     Patient reports pain in soles of feet and numbness that starts below knees   when in senior care for few days last month was given Gabapentin 600 TID, but did not notice much difference started.   Tylenol does not help much  Saw neurology last month  EMG scheduled by neurology in Sept    Excessive daytime sleepiness.   Over the last year. Will randomly not be able to keep self awake  Will just fall asleep. Will not just fall over and collapse    Dizzy, fatigued even after going up a few stairs      Review of Systems   Constitutional, HEENT, cardiovascular, pulmonary, gi and gu systems are negative, except as otherwise noted.      Objective    /74   Pulse 85   Temp 98.4  F (36.9  C) (Oral)   Resp 16   Ht 1.854 m (6' 1\")   Wt 90.6 kg (199 lb 12.8 oz)   SpO2 " 98%   BMI 26.36 kg/m    Body mass index is 26.36 kg/m .  Physical Exam  Vitals reviewed.   Constitutional:       General: He is not in acute distress.     Appearance: Normal appearance. He is not ill-appearing.   HENT:      Head: Normocephalic and atraumatic.   Eyes:      Conjunctiva/sclera: Conjunctivae normal.   Cardiovascular:      Rate and Rhythm: Normal rate and regular rhythm.      Heart sounds: No murmur heard.     Pulmonary:      Effort: Pulmonary effort is normal. No respiratory distress.      Breath sounds: No wheezing.   Musculoskeletal:         General: No deformity. Normal range of motion.      Right lower leg: No edema.      Left lower leg: No edema.   Skin:     General: Skin is warm and dry.   Neurological:      General: No focal deficit present.      Mental Status: He is alert and oriented to person, place, and time.      Sensory: Sensory deficit (L>R gross sensation diminished) present.      Motor: No weakness.      Gait: Gait normal.   Psychiatric:         Behavior: Behavior normal.         Thought Content: Thought content normal.

## 2021-08-09 NOTE — PROGRESS NOTES
Preceptor Attestation:   Patient seen and discussed with the resident. Assessment and plan reviewed with resident and agreed upon.   Supervising Physician:  DO [facility]

## 2021-08-10 ENCOUNTER — TELEPHONE (OUTPATIENT)
Dept: FAMILY MEDICINE | Facility: CLINIC | Age: 41
End: 2021-08-10

## 2021-08-10 RX ORDER — PREGABALIN 50 MG/1
50 CAPSULE ORAL 2 TIMES DAILY
Qty: 60 CAPSULE | Refills: 0 | Status: SHIPPED | OUTPATIENT
Start: 2021-08-10 | End: 2021-08-13

## 2021-08-10 ASSESSMENT — ANXIETY QUESTIONNAIRES
5. BEING SO RESTLESS THAT IT IS HARD TO SIT STILL: NOT AT ALL
1. FEELING NERVOUS, ANXIOUS, OR ON EDGE: MORE THAN HALF THE DAYS
2. NOT BEING ABLE TO STOP OR CONTROL WORRYING: NOT AT ALL
3. WORRYING TOO MUCH ABOUT DIFFERENT THINGS: NOT AT ALL
6. BECOMING EASILY ANNOYED OR IRRITABLE: NOT AT ALL
IF YOU CHECKED OFF ANY PROBLEMS ON THIS QUESTIONNAIRE, HOW DIFFICULT HAVE THESE PROBLEMS MADE IT FOR YOU TO DO YOUR WORK, TAKE CARE OF THINGS AT HOME, OR GET ALONG WITH OTHER PEOPLE: VERY DIFFICULT

## 2021-08-10 ASSESSMENT — PATIENT HEALTH QUESTIONNAIRE - PHQ9
SUM OF ALL RESPONSES TO PHQ QUESTIONS 1-9: 10
5. POOR APPETITE OR OVEREATING: SEVERAL DAYS

## 2021-08-10 NOTE — TELEPHONE ENCOUNTER
Dr. Bahena,    We are unable to process the Lyrica prescription for Mr. Negrete due to insurance restrictions thru [facility] Restricted Recipient Program.  According to the plan he is currently locked into the following providers:    Restricted Recipient Program  Please call the [facility] unit for additional information. The telephone number is [phone] or [phone].  Provider number [id number], YANELI CANDELARIA is a provider for a Restricted Recipient for: Physician Services ,  Nurse Practitioner Services  Provider number [id number], [facility] is a provider for a Restricted Recipient for: Physician Services ,  Diagnostic Lab ,  Nurse Practitioner Services  Provider number [id number], [facility] is a provider for a Restricted Recipient for: Inpatient Hospital ,  Outpatient Hospital ,  Physician Services ,  Diagnostic Lab  Provider number [id number], [facility] #[id number] is a provider for a Restricted Recipient for: Pharmacy    Pharmacy provided him with a number to call to get these restrictions updated to a provider of his choice, as he is not familiar with any of the providers listed.  Unfortunately, until he has the providers updated OR gets a prescription from the above providers/pharmacy we will not be able to process rx's for him.      Please reach out to pharmacy with questions.    Thank you,    Anastasia Shannon, PharmD  [facility]  [phone]

## 2021-08-10 NOTE — LETTER
[facility]  [address]  [address]  [facility] [address]  [phone]          August 13, 2021    RE:  Rex Negrete                                                                                                                                                       [address]  [facility] [address]            To whom it may concern:    Rex Negrete has never been seen by me. I cannot provide care for him.             Sincerely,        Barbi Holguin CNP

## 2021-08-10 NOTE — LETTER
[facility]  [address] [place] [address]  [address]  [facility] [address]  [phone]          August 13, 2021    RE:  Rex Negrete                                                                                                                                                       [address]  [facility] [address]            To whom it may concern:    Rex Negrete     Sincerely,        Lionel Bahena {PROVIDER CREDENTIALS:[id number]}

## 2021-08-11 NOTE — TELEPHONE ENCOUNTER
Called restricted recipient program - unable to speak with anyone - message had to be left   Patient should have Yennifer Carrizales DO as primary - saw him 8/9/2021- and prescribed medication   I do not know, and have never seen this patient, and he does not even live in this area, so makes no sense for him to be assigned to me

## 2021-08-11 NOTE — TELEPHONE ENCOUNTER
Patient is calling because the pharmacy will not dispense the rx that Yennifer Carrizales sent yesterday because his insurance has him restricted to Barbi Holguin who he has never seen. Is there anything we can do to help this?

## 2021-08-12 NOTE — TELEPHONE ENCOUNTER
Gisella from [facility] called. She is the coordinator for this patient.  States if provider needs to dismiss patient it needs to be put in a formal dismissal letter and sent via certified mail to patient.  Patient needs to be given 30 days notice.  Copy of letter needs to also be sent to [facility] via fax [phone] same day as certified letter is sent out to patient.  Letter needs to include reason for dismissal.    Any questions Gisella can be reached at [phone]

## 2021-08-12 NOTE — TELEPHONE ENCOUNTER
I do not see how I dismiss a patient I have never seen     He needs to have the restricted provider changed to the provider he saw     Can they do this

## 2021-08-13 ENCOUNTER — HOSPITAL ENCOUNTER (EMERGENCY)
Facility: CLINIC | Age: 41
Discharge: HOME | End: 2021-08-13
Attending: PHYSICIAN ASSISTANT | Admitting: PHYSICIAN ASSISTANT
Payer: COMMERCIAL

## 2021-08-13 VITALS
TEMPERATURE: 97 F | RESPIRATION RATE: 18 BRPM | DIASTOLIC BLOOD PRESSURE: 79 MMHG | SYSTOLIC BLOOD PRESSURE: 127 MMHG | HEART RATE: 84 BPM | OXYGEN SATURATION: 94 %

## 2021-08-13 DIAGNOSIS — S06.9XAA TBI (TRAUMATIC BRAIN INJURY) (H): ICD-10-CM

## 2021-08-13 DIAGNOSIS — R51.9 HEADACHE: ICD-10-CM

## 2021-08-13 DIAGNOSIS — G62.9 NEUROPATHY: ICD-10-CM

## 2021-08-13 DIAGNOSIS — G62.9 PERIPHERAL NEUROPATHY: ICD-10-CM

## 2021-08-13 PROCEDURE — 99283 EMERGENCY DEPT VISIT LOW MDM: CPT

## 2021-08-13 RX ORDER — METOCLOPRAMIDE 10 MG/1
10 TABLET ORAL 4 TIMES DAILY PRN
Qty: 20 TABLET | Refills: 0 | Status: SHIPPED | OUTPATIENT
Start: 2021-08-13 | End: 2021-08-18

## 2021-08-13 RX ORDER — DIPHENHYDRAMINE HCL 50 MG
50 CAPSULE ORAL 4 TIMES DAILY PRN
Qty: 20 CAPSULE | Refills: 0 | Status: SHIPPED | OUTPATIENT
Start: 2021-08-13 | End: 2021-08-18

## 2021-08-13 RX ORDER — PREGABALIN 50 MG/1
50 CAPSULE ORAL 2 TIMES DAILY
Qty: 60 CAPSULE | Refills: 0 | Status: SHIPPED | OUTPATIENT
Start: 2021-08-13 | End: 2021-09-15

## 2021-08-13 ASSESSMENT — ENCOUNTER SYMPTOMS
NECK PAIN: 0
FEVER: 0
DIAPHORESIS: 0
BACK PAIN: 0
HEADACHES: 1

## 2021-08-13 NOTE — TELEPHONE ENCOUNTER
"Called again and left message with Gisella     I have never seen this man  He was \"assigned\" to me   I do not know how I fire a patient I have never seen   It was their error assigning him to me- he does not live near here  [facility] is more in his neighborhood and that provider who saw him could be his assigned provider    This needs to be fixed for this man to get his medication   "

## 2021-08-13 NOTE — ED PROVIDER NOTES
History   Chief Complaint:  Medication Refill       HPI   Rex Negrete is a 41 year old male who presents with nerve damage and headache for a week due to a heart attack and brain injury in May. The patient was prescribed Gabapentin but has not worked. The patient was then supposed to take Lyrica but was not able to get the medication because of  confusion and insurance limitations. The patient had some new headache for the past week that has affected his sleep. He denies fever, diaphoresis, neck pain, new back pain, vision changes, or lose of function of limbs. He is supposed to see a cardiologist on the . Of note, he describes that he  for 30 minutes back in May from a heart attack which then caused brain damage and a hard time talking.     Review of Systems   Constitutional: Negative for diaphoresis and fever.   Eyes: Negative for visual disturbance.   Musculoskeletal: Negative for back pain and neck pain.   Neurological: Positive for headaches.   All other systems reviewed and are negative.    Allergies:  Seafood  Hydrocodone-Acetaminophen    Medications:  Gabapentin  Roxicodone  Riboflavin  Inderal    Past Medical History:    Depression  PEA  Chronic posttraumatic headache  PTSD  Anxiety  Mild TBI  Erosive esophagitis  Dysthymic disorder    Past Surgical History:    Shoulder surgery  Nasal septum surgery    Family History:    Cancer  Alcohol/Drug    Social History:  The patient presents alone.    Physical Exam     Patient Vitals for the past 24 hrs:   BP Temp Temp src Pulse Resp SpO2   21 1052 127/79 97  F (36.1  C) Oral 84 18 94 %     Physical Exam  Vitals and nursing note reviewed.   Constitutional:       General: He is not in acute distress.     Appearance: He is not diaphoretic.   HENT:      Head: Normocephalic and atraumatic.   Eyes:      General: No scleral icterus.     Extraocular Movements: Extraocular movements intact.   Cardiovascular:      Rate and Rhythm: Normal rate.       Pulses: Normal pulses.   Pulmonary:      Effort: Pulmonary effort is normal. No respiratory distress.   Musculoskeletal:         General: No tenderness.   Skin:     General: Skin is warm.      Findings: No rash.   Neurological:      General: No focal deficit present.      Mental Status: He is alert and oriented to person, place, and time. Mental status is at baseline.      Cranial Nerves: No cranial nerve deficit.       Emergency Department Course     Emergency Department Course:    Reviewed:  I reviewed nursing notes, vitals, past medical history and care everywhere    Assessments:  1113 I obtained history and examined the patient as noted above.     1126 I rechecked the patient and explained findings.     Disposition:  The patient was discharged to home.       Impression & Plan     Medical Decision Making:  Presents for evaluation of two complaints. He denies symptoms of meningitis or encephalitis. He denies traumatic injury and low suspicion for intracranial hemorrhage given lack of anticoagulant usage. Denies new neurological symptoms ruling down mass effect. Plan for symptomatic management as an outpatient. He is a restricted patient and we were able to arrange for medications on his behalf. Discharged to outpatient care      Diagnosis:    ICD-10-CM    1. TBI (traumatic brain injury) (H)  S06.9X9A    2. Headache  R51.9    3. Peripheral neuropathy  G62.9    4. Neuropathy  G62.9 pregabalin (LYRICA) 50 MG capsule       Discharge Medications:  New Prescriptions    DIPHENHYDRAMINE (BENADRYL) 50 MG CAPSULE    Take 1 capsule (50 mg) by mouth 4 times daily as needed (take concurrently with Reglan for headache)    METOCLOPRAMIDE (REGLAN) 10 MG TABLET    Take 1 tablet (10 mg) by mouth 4 times daily as needed (headache)       Scribe Disclosure:  Rogerio MARQUIS, am serving as a scribe at 11:14 AM on 8/13/2021 to document services personally performed by Buck Coyne based on my observations and the  provider's statements to me.            Buck Coyne PA-C  08/13/21 [id number]

## 2021-08-13 NOTE — ED TRIAGE NOTES
"Pt arrives with c/o needing a medication refill. Pt is on a restricted plan through his insurance and hasn't been able to get his medications filled. Pt was instructed to follow up with the clinic in the [facility] building but presented to the ER because he couldn't get a hold of them. Pt was using gabapentin but that has been ineffective, a provider prescribed a medication that \"starts with an L\" but I can't seem to get it filled. ABCs intact.   "

## 2021-08-13 NOTE — TELEPHONE ENCOUNTER
Called again and left message    Apparently this man in ER as he could not get medication given him   This needs to be figured out and the person to call is not responding   This nichelle care is being affected      Call received from  Gisella and she said this patient was assigned to me May 2020, though I never have seen him, and we have no record in the chart of the assignment, though she says we were notified by letter   No letter scanned to chart     I need to do letter saying I have never seen him and cannot provide care. This needs to be faxed to her at [phone]. This was done     They will take a verbal authorization for ER medication and provider to prescribe medication today     A letter to be sent to patient, and she will also try calling him to inform him he can choose a provider to see     Letter has to be sent certified mail to patient   This will be done

## 2021-08-24 ENCOUNTER — OFFICE VISIT (OUTPATIENT)
Dept: CARDIOLOGY | Facility: CLINIC | Age: 41
End: 2021-08-24
Attending: PSYCHIATRY & NEUROLOGY
Payer: COMMERCIAL

## 2021-08-24 VITALS
HEIGHT: 73 IN | DIASTOLIC BLOOD PRESSURE: 70 MMHG | RESPIRATION RATE: 12 BRPM | SYSTOLIC BLOOD PRESSURE: 120 MMHG | BODY MASS INDEX: 26.17 KG/M2 | WEIGHT: 197.5 LBS | HEART RATE: 88 BPM

## 2021-08-24 DIAGNOSIS — I42.9 SECONDARY CARDIOMYOPATHY (H): ICD-10-CM

## 2021-08-24 DIAGNOSIS — G44.329 CHRONIC POST-TRAUMATIC HEADACHE, NOT INTRACTABLE: Primary | ICD-10-CM

## 2021-08-24 DIAGNOSIS — Z86.74 H/O CARDIAC ARREST: ICD-10-CM

## 2021-08-24 DIAGNOSIS — R07.9 CHEST PAIN, UNSPECIFIED TYPE: ICD-10-CM

## 2021-08-24 PROCEDURE — 99204 OFFICE O/P NEW MOD 45 MIN: CPT | Performed by: INTERNAL MEDICINE

## 2021-08-24 RX ORDER — METOCLOPRAMIDE 10 MG/1
10 TABLET ORAL
COMMUNITY
End: 2021-11-19

## 2021-08-24 RX ORDER — DIPHENHYDRAMINE HCL 25 MG
25 CAPSULE ORAL EVERY 6 HOURS PRN
COMMUNITY
End: 2021-12-22

## 2021-08-24 RX ORDER — FOLIC ACID/MULTIVIT,IRON,MINER 0.4MG-18MG
1 TABLET ORAL
COMMUNITY
End: 2021-12-22

## 2021-08-24 ASSESSMENT — MIFFLIN-ST. JEOR: SCORE: 1864.73
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
## 2021-08-24 NOTE — PATIENT INSTRUCTIONS
Mr. Rex Negrete,  It certainly was nice to meet you today.  Per our conversation you had a cardiac arrest and the heart weak.  This could represent heart failure or an abnormal heartbeat and the reason I am checking the ultrasound of the heart and the heart monitor and the stress test.  We will call you the results of these tests.   I will plan on seeing you in 1-2 months or sooner if need be.  Ryne Oconnor
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
## 2021-09-03 ENCOUNTER — HOSPITAL ENCOUNTER (OUTPATIENT)
Dept: CARDIOLOGY | Facility: CLINIC | Age: 41
End: 2021-09-03
Attending: INTERNAL MEDICINE
Payer: COMMERCIAL

## 2021-09-03 DIAGNOSIS — Z86.74 H/O CARDIAC ARREST: ICD-10-CM

## 2021-09-03 LAB — LVEF ECHO: NORMAL

## 2021-09-03 PROCEDURE — 93306 TTE W/DOPPLER COMPLETE: CPT

## 2021-09-03 PROCEDURE — 93017 CV STRESS TEST TRACING ONLY: CPT

## 2021-09-03 PROCEDURE — 93350 STRESS TTE ONLY: CPT | Mod: TC

## 2021-09-03 PROCEDURE — 93306 TTE W/DOPPLER COMPLETE: CPT | Mod: 26 | Performed by: INTERNAL MEDICINE

## 2021-09-03 PROCEDURE — 93270 REMOTE 30 DAY ECG REV/REPORT: CPT

## 2021-09-03 PROCEDURE — 93321 DOPPLER ECHO F-UP/LMTD STD: CPT | Mod: 26 | Performed by: INTERNAL MEDICINE

## 2021-09-03 PROCEDURE — 93018 CV STRESS TEST I&R ONLY: CPT | Performed by: INTERNAL MEDICINE

## 2021-09-03 PROCEDURE — 93325 DOPPLER ECHO COLOR FLOW MAPG: CPT | Mod: 26 | Performed by: INTERNAL MEDICINE

## 2021-09-03 PROCEDURE — 93016 CV STRESS TEST SUPVJ ONLY: CPT | Performed by: INTERNAL MEDICINE

## 2021-09-03 PROCEDURE — 93272 ECG/REVIEW INTERPRET ONLY: CPT | Performed by: INTERNAL MEDICINE

## 2021-09-03 PROCEDURE — 93350 STRESS TTE ONLY: CPT | Mod: 26 | Performed by: INTERNAL MEDICINE

## 2021-09-14 ENCOUNTER — TELEPHONE (OUTPATIENT)
Dept: CARDIOLOGY | Facility: CLINIC | Age: 41
End: 2021-09-14

## 2021-09-14 NOTE — TELEPHONE ENCOUNTER
----- Message from GLORIA Waddell sent at 9/14/2021  2:24 PM CDT -----  Regarding: MD notify  MD notify for patients monitor posted in his sharepoint folder for your review. When I spoke with [employer] they have attempted 3 times to reach patient to verify with him as his symptom states he had a syncopal episode.    Thanks!  Lianna        Tried calling patient x2- LVM to assess symptom of fainting on [employer] monitor. Rhythm appeared to have missing some strips and some timelines. Able to get a hold of patient on alternate number. He states that when strip occurred, he was sitting on the edge of his bed writing on his whiteboard for therapy. He did not completely faint but rather felt like his vision was a little spotty and then it passed quickly. He has since felt fine. No one was home with him when it occurred. Pt has a hx of TBI and seizures in the past. He states the lead did come off for a bit today and he was able to speak with [facility] and was reassured. He was encouraged to seek out medical attention if this occurs again. Denied needing ER evaluation at this time. Will route to LBF for official review, but pt was again encouraged to discuss these symptoms of syncope and visual changes with his primary care doctor right away. He has an appt with Neuro on 9/21 to follow up on TBI/seizure disorder. Pt denies any chest pain, palpitations or shortness of breath. He denied dizziness. Will update LBF. -[facility]      Dr. Oconnor,  **Alert included 14 pages- I copied a few into encounter but rest is on [employer]**  Pt alert that was sent to me late this afternoon- episode labeled as fainting in NSR and perhaps artifact? (pt reports patch came off for a bit and he had some difficulties getting it hooked back up correctly). [facility] did call the patient and was able to reach him after a few attempts. They told him if he was not symptomatic, he could await cardiologist input. I called him and he states that he was sitting  "writing on his white board on his bed and his vision went \"black and spotty\" and he felt a little out of it but came to right away and just continued with what he was doing. He denied cardiac symptoms or dizziness. I asked if he thought he had a seizure but he denied being completely out or any other seizure like movements but he was unwitnessed and he says he has since been fine. He refused ER eval as this occurred around 2 pm. There looks like a break in his rhythm but he says that the patch came off for a while and then he had to replace it and it wasn't on right so it took some adjustments. I told him to update PMD and neuro and go to ER should it happen again. Next neuro appt is 9/21 with Dr. Abel. I called him back twice to make sure he really didn't want ER eval and he refused politely again. Said that he appreciates the call which means we are doing our job but he was out doing some work at the moment.  Thanks,  Aleksandr                           "

## 2021-09-15 DIAGNOSIS — G62.9 NEUROPATHY: ICD-10-CM

## 2021-09-15 RX ORDER — PREGABALIN 50 MG/1
50 CAPSULE ORAL 2 TIMES DAILY
Qty: 60 CAPSULE | Refills: 0 | Status: SHIPPED | OUTPATIENT
Start: 2021-09-15 | End: 2021-09-21

## 2021-09-15 NOTE — TELEPHONE ENCOUNTER
Patient presented to clinic- on restricted recipient program (see details below). Was originally assigned to provider in [place] however, was dismissed from their at his request and Dr. Carrizales assigned as new prescribing provider.     Clinic SW working to add Dr. Bahena to patient's approved list as he has follow up scheduled for 9/22/21 with him. Patient states he has 1 lyrica left and takes 2x daily. Routing high priority to Dr. Carrizales to send limited supply if appropriate.   Sarahi Figueroa RN        Restricted Recipient Program (9/15/21 KI)  PCP:  Yennifer Carrizales DO  Primary Clinic:  [facility] ([address] [place] [address])  Hospital: [facility] ([address], [place] [address])  Pharmacy: [id number] ([address] [place] [address])

## 2021-09-15 NOTE — TELEPHONE ENCOUNTER
Verify that the refill encounter hasn't been started Yes    [facility] phone call message- patient requesting a refill:    Full Medication Name: pregabalin (LYRICA) 50 MG capsule pregabalin      Dose: Sig - Route: Take 1 capsule (50 mg) by mouth 2 times daily - Oral     Pharmacy confirmed as   [facility] - [place], [place] - [address] [facility] [address]  Phone: [phone] Fax: [phone]    : Yes    Medication tab checked to see if medication has been sent  Yes - looks like the switch to Dr Kendrick and [facility] did not get switched over    Additional Comments:  Spoke with Sarahi who consulted with Tara TUBBS to leave a message on voice mail? Yes    Advised patient refill may take up to 2 business days? Yes but he is out of the medication    Primary language: English      needed? No    Call taken on September 15, 2021 at 2:51 PM by Rogerio Bunch    Route to P [facility] MED REFILL

## 2021-09-15 NOTE — TELEPHONE ENCOUNTER
Rn went to inform patient but he had already left clinic. LM with call back number- please transfer to any available RN.   Sarahi Figueroa, RN

## 2021-09-15 NOTE — TELEPHONE ENCOUNTER
Call back received from Rex to get a status update. Pt states he is feeling well today and has had no symptoms similar to yesterday. We discussed yesterday's event again and as he thinks back on it, he feels his symptoms were very similar to when he has had seizure-like activity in the past. He reiterated that he was kneeling on his bed and writing on a whiteboard. He felt like his vision went black and spotty but he reports feeling aware of it. He said he just laid over on his back and the symptoms subsided. He has not called his PMD or neuro yet- he was advised to please do so. He requests records from [facility] be released to him as he says they are making it difficult for him. Writer explained that we cannot release records from another institution but will see if we can track down the number for BENJAM[facility] within their system. He also wonders if he can see Dr. Oconnor sooner than 9/28- writer is fairly confident that this will be the soonest appt but will see if writer can find anything else out for him. -[facility]      Dr. Oconnor,  Just an update on Rex- he is feeling well today- no near syncope or fainting/events today. He denies anything out of the ordinary yesterday as far as a trigger- he reports he was well hydrated. Again, I advised that he speak with his PMD or neuro regarding his symptoms and be seen today if able. He thought about it more and believes that his episode yesterday was similar to what happens to him when he is about to have a seizure. I reiterated why it is so important then that he follow up with their offices. Let me know if you have any additional recommendations.   Aleksandr

## 2021-09-15 NOTE — TELEPHONE ENCOUNTER
RN relayed message below: prescription sent to [facility] pharmacy in [place]. Patient confirmed appointment with Dr. Bahena on 9/22. Updated that SW working on updating approved providers with patient's insurance.     Gudelia Davis RN

## 2021-09-15 NOTE — TELEPHONE ENCOUNTER
Called patient to check in on how he is doing today; left direct line to call back to discuss at [facility]. -[facility]

## 2021-09-15 NOTE — TELEPHONE ENCOUNTER
PDMP Reviewed - last filled 30 day supply on 8/14/21.     Refill appropriate. Keep appointment on 9/22 with PCP (Lionel Bahena). We will work to ensure Dr. Bahena is able to be on his restricted list of providers.     Evan was not working, manually faxed to pharmacy.     Yennifer Carrizales, DO

## 2021-09-16 NOTE — TELEPHONE ENCOUNTER
Fax    9/16/2021    KHUSHI faxed [facility] Prescribing Privileges Referral Form for Rex Negrete to [facility] Restricted Recipient Program (Fax# [phone]). A successful send confirmation was received on 9/16/2021 at 8:51am.     LD Pablo  Pronouns: She/Her/Hers  , Care Coordination  [facility]  [phone]

## 2021-09-21 ENCOUNTER — OFFICE VISIT (OUTPATIENT)
Dept: NEUROLOGY | Facility: CLINIC | Age: 41
End: 2021-09-21
Attending: PSYCHIATRY & NEUROLOGY
Payer: COMMERCIAL

## 2021-09-21 VITALS
WEIGHT: 198 LBS | HEIGHT: 73 IN | DIASTOLIC BLOOD PRESSURE: 55 MMHG | SYSTOLIC BLOOD PRESSURE: 113 MMHG | BODY MASS INDEX: 26.24 KG/M2 | HEART RATE: 71 BPM

## 2021-09-21 DIAGNOSIS — R56.9 SEIZURE-LIKE ACTIVITY (H): ICD-10-CM

## 2021-09-21 DIAGNOSIS — G44.329 CHRONIC POST-TRAUMATIC HEADACHE, NOT INTRACTABLE: ICD-10-CM

## 2021-09-21 DIAGNOSIS — M54.16 LUMBAR RADICULOPATHY: ICD-10-CM

## 2021-09-21 DIAGNOSIS — R20.2 PARESTHESIA: Primary | ICD-10-CM

## 2021-09-21 DIAGNOSIS — R20.2 PARESTHESIA: ICD-10-CM

## 2021-09-21 DIAGNOSIS — R41.9 NEUROCOGNITIVE DISORDER: ICD-10-CM

## 2021-09-21 DIAGNOSIS — G62.9 NEUROPATHY: ICD-10-CM

## 2021-09-21 PROBLEM — F15.10 METHAMPHETAMINE USE (H): Status: ACTIVE | Noted: 2021-07-15

## 2021-09-21 PROBLEM — F14.90 COCAINE USE: Status: ACTIVE | Noted: 2021-07-15

## 2021-09-21 PROCEDURE — 99214 OFFICE O/P EST MOD 30 MIN: CPT | Mod: 25 | Performed by: PSYCHIATRY & NEUROLOGY

## 2021-09-21 PROCEDURE — 95910 NRV CNDJ TEST 7-8 STUDIES: CPT | Mod: 26 | Performed by: PSYCHIATRY & NEUROLOGY

## 2021-09-21 PROCEDURE — 95886 MUSC TEST DONE W/N TEST COMP: CPT | Mod: 26 | Performed by: PSYCHIATRY & NEUROLOGY

## 2021-09-21 RX ORDER — PREGABALIN 50 MG/1
50 CAPSULE ORAL 3 TIMES DAILY
Qty: 90 CAPSULE | Refills: 5 | Status: SHIPPED | OUTPATIENT
Start: 2021-09-21 | End: 2021-09-22

## 2021-09-21 ASSESSMENT — MIFFLIN-ST. JEOR: SCORE: 1867

## 2021-09-21 NOTE — PROCEDURES
ELECTROMYOGRAPHY (EMG) REPORT       [facility] NEUROLOGY [place]  [address] [place] [address]  Tel: [phone]  Fax: [phone]  [email or web address]     Rex Negrete,  1980, MRN [id number]  PCP: No Ref-Primary, Physician  Date: 2021     Principal Diagnosis:    Paresthesia  Lumbar radiculopathy     Height: 6 feet 1 inch  Reason for referral: Evaluate bilateral lowers. c/o pain, numbness in both legs/feet > 3 months. h/o cardiac arrest.      Motor NCS      Nerve / Sites Lat Amp Dist Wilbert    ms mV cm m/s   L Peroneal - EDB      Ankle 5.31 2.0 8       Fib head 12.81 1.8 33 44      Pop fossa 15.00 1.7 10 46   R Peroneal - EDB      Ankle 4.74 0.5 8       Fib head 12.50 0.4 31.5 41      Pop fossa 14.90 0.5 10 42   L Tibial - AH      Ankle 4.22 6.9 8       Pop fossa 13.75 6.1 41 43   R Tibial - AH      Ankle 4.38 6.5 8       Pop fossa 13.02 5.0 40 46       F  Wave      Nerve Fmin    ms   L Tibial - AH 55.05   R Tibial - AH 55.78       Sensory NCS      Nerve / Sites Onset Lat Pk Lat Amp.2-3 Dist Wilbert    ms ms  V cm m/s   L Sural - Ankle (Calf)      Calf 2.66 3.54 12.4 14 53   R Sural - Ankle (Calf)      Calf 2.45 3.23 9.6 14 57   L Superficial peroneal - Ankle      Lat leg 2.19 3.28 9.2 12 55   R Superficial peroneal - Ankle      Lat leg 2.14 2.92 8.7 12 56       EMG Summary Table     Spontaneous MUAP Rcmt Note   Muscle Fib PSW Fasc IA # Amp Dur PPP Rate Type   L. Gluteus medius None None None N N N N N N N   L. Gluteus aria None None None N N N N N N N   L. L3 paraspinal None None None N N N N N N N   L. L4 paraspinal None None None N N N N N N N   L. L5 paraspinal None None None N N N N N N N   L. S1 paraspinal  None None None N N N N N N N   L. Adductor hua None None None N N N N N N N   L. Quadriceps None None None N N N N N N N   L. Tibialis anterior None None None N N N N N N N   L. Gastrocnemius (Medial head) 1+ 1+ None N N N N N N N   L. Gastrocnemius (Lateral  head) 1+ 1+ None N N N N N N N   L. Tibialis posterior 1+ 1+ None N N N N N N N   R. Adductor hua None None None N N N N N N N   R. Quadriceps None None None N N N N N N N   R. Tibialis anterior 1+ 1+ None N N N N N N N   R. Peroneus longus 1+ 1+ None N N N N N N N   R. Gastrocnemius (Medial head) 1+ 1+ None N N N N N N N   R. Gastrocnemius (Lateral head) 1+ 1+ None N N N N N N N   R. Tibialis posterior 1+ 1+ None N N N N N N N        Summary: Nerve conduction and EMG study of lower extremity shows:  1. Low amplitude bilateral peroneal and tibial compound motor action potentials with normal latencies and borderline conduction velocities  2. Normal bilateral tibial F latencies  3. Normal bilateral sural peak sensory latencies with normal sensory nerve conduction velocities.  Needle exam showed changes as above    Impression:   This is a abnormal nerve conduction and EMG study of lower extremities that suggests bilateral S1 and left L5 radiculopathy.  Clinical correlation is recommended      Ion Abel MD  [facility]  (Formerly, [facility]      This note was dictated using voice recognition software.  Any grammatical or context distortions are unintentional and inherent to the software.
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
## 2021-09-21 NOTE — PATIENT INSTRUCTIONS
"  Patient Education   Concussion Discharge Instructions  You were seen today for signs of a concussion. The symptoms will vary, depending on the nature of your injury and your health. You may have: headache, confusion, nausea (feel sick to your stomach), vomiting (throwing up) and problems with memory, concentrating or sleep. You may feel dizzy, irritable, and tired.   Children and teens may need help from their parents, teachers and coaches to watch for symptoms as they recover.  Follow-up  It is important for you to see a doctor for follow-up care to see how you are recovering. Please see your primary doctor within the next 5 to 7 days. You may have also been referred to the Concussion  service. They will contact you and arrange a follow-up visit if needed. If you need help sooner, you may call them at [phone].  Warning signs  Call your doctor or come back to Emergency if you suddenly have any of these symptoms:    Headaches that get worse    Feeling more and more drowsy    You keep repeating yourself    Strange behavior    Seizures    Repeat vomiting (throwing up)    Trouble walking    Growing confusion    Feeling more irritable    Neck pain that gets worse    Slurred speech    Weakness or numbness    Loss of consciousness    Fluid or blood coming from ears or nose  Self-care    Get lots of rest and get enough sleep at night. Take daytime naps or rest if you feel tired.    Limit physical activity and \"thinking\" activities. These can make symptoms worse.  ? Physical activity includes gym, sports, weight training, running, exercise and heavy lifting.  ? Thinking activities include homework, class work, job-related work and screen time (phone, computer, tablet, TV and video games).    Stick to a healthy diet and drink lots of fluids.    As symptoms improve, you may slowly return to your daily activities. If symptoms get worse   or return, reduce your activity.    Know that it is normal to feel sad and " frustrated when you do not feel right and are less active.  Going back to work    Your care team will tell you when you are ready to return to work.    Limit the amount of work you do soon after your injury. This may speed healing. Take breaks if your symptoms get worse. You should also reduce your physical activity as well as activities that require a lot of thinking until you see your doctor.    You may need shorter work days and a lighter workload.    Avoid heavy lifting, working with machinery, driving and working at heights until your symptoms are gone or you are cleared by a doctor.  Returning to sports    Never return to play if you have any symptoms. A full recovery will reduce the chances of getting hurt again. Remember, it is better to miss one or two games than a whole season.    You should rest from all physical activity until you see your doctor. Generally, if all symptoms have completely cleared, your doctor can help guide you to slowly return to sports. If symptoms return or worsen, stop the activity and see your doctor.    Important: If you are in an organized sport and under age 18, you will need written consent from a healthcare provider before you return to sports. Typically, this will be your primary care or sports medicine doctor. Please make an appointment.  Going back to school    If you are still having symptoms, you may need extra help at school.    Tell your teachers and school nurse about your injury and symptoms. Ask them to watch for problems with learning, memory and concentrating. Symptoms may get worse when you do schoolwork, and you may become more irritable.    You may need shorter school days, a reduced workload, and to postpone testing.    Do not drive or take gym class (physical activity) until cleared by a doctor.    For informational purposes only. Not to replace the advice of your health care provider.   2009 [employer]. Used with permission.  "This form is adapted from the \"Heads Up: Brain Injury in Your Practice\" tool kit developed by the Centers for Disease Control and Prevention (CDC). All rights reserved. [facility]. Clinically reviewed by [employer]. [employer] 223643 - Rev 11/20.       "

## 2021-09-21 NOTE — Clinical Note
9/21/2021         RE: Rex Negrete  [address]  [place] [address]        Dear Colleague,    Thank you for referring your patient, Rex Negrete, to the [facility] NEUROLOGY CLINIC [place]. Please see a copy of my visit note below.    See procedure note for EMG report      Again, thank you for allowing me to participate in the care of your patient.        Sincerely,        Ion Abel MD
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
## 2021-09-21 NOTE — TELEPHONE ENCOUNTER
Monitor period completed and already sent to EP for reading. Pt previously was strongly encouraged to seek out medical attention for episodes of fainting and what he described as somewhat seizure like activity. He had refused at that time and has neuro follow up. Pt will see [facility]BF on 9/28. EP reviewed monitor strips and noted documented symptoms as sinus tach and nsr. Encounter closed. -[facility]

## 2021-09-21 NOTE — NURSING NOTE
Chief Complaint   Patient presents with     Numbness     Discuss EMG result and lab results     Seizure-like activity     EEG pending     Neurocognitive disorder     Neuropsych pending      Elmira Frye CMA on 9/21/2021 at 9:17 AM

## 2021-09-22 ENCOUNTER — OFFICE VISIT (OUTPATIENT)
Dept: FAMILY MEDICINE | Facility: CLINIC | Age: 41
End: 2021-09-22
Payer: COMMERCIAL

## 2021-09-22 VITALS
DIASTOLIC BLOOD PRESSURE: 74 MMHG | BODY MASS INDEX: 26.88 KG/M2 | TEMPERATURE: 97.7 F | SYSTOLIC BLOOD PRESSURE: 133 MMHG | RESPIRATION RATE: 16 BRPM | WEIGHT: 202.8 LBS | HEART RATE: 77 BPM | OXYGEN SATURATION: 100 % | HEIGHT: 73 IN

## 2021-09-22 DIAGNOSIS — Z87.820 HX OF TRAUMATIC BRAIN INJURY: Primary | ICD-10-CM

## 2021-09-22 DIAGNOSIS — G62.9 NEUROPATHY: ICD-10-CM

## 2021-09-22 DIAGNOSIS — Z02.89 ENCOUNTER FOR COMPLETION OF FORM WITH PATIENT: ICD-10-CM

## 2021-09-22 DIAGNOSIS — F41.9 ANXIETY: ICD-10-CM

## 2021-09-22 PROCEDURE — 99214 OFFICE O/P EST MOD 30 MIN: CPT | Mod: GC | Performed by: STUDENT IN AN ORGANIZED HEALTH CARE EDUCATION/TRAINING PROGRAM

## 2021-09-22 RX ORDER — PREGABALIN 50 MG/1
50 CAPSULE ORAL 3 TIMES DAILY
Qty: 90 CAPSULE | Refills: 5 | Status: ON HOLD | OUTPATIENT
Start: 2021-09-22 | End: 2021-12-17

## 2021-09-22 RX ORDER — PREGABALIN 50 MG/1
50 CAPSULE ORAL 3 TIMES DAILY
Qty: 90 CAPSULE | Refills: 5 | Status: CANCELLED | OUTPATIENT
Start: 2021-09-22

## 2021-09-22 ASSESSMENT — MIFFLIN-ST. JEOR: SCORE: 1892.38

## 2021-09-22 NOTE — PROGRESS NOTES
"Assessment & Plan     Hx of traumatic brain injury  Encounter for completion of form with patient  Patient with several sequela from TBI related to PEA in May. Requesting form completion for potential social security. Patient currently restricted to [facility]. Integrated with neurology and cardiology. Limited functioning as a sequela including issues with concentration, memory, and physcially limiting pain from neuropathy.    Neuropathy  Refill sent. There was an issue due to patient being restricted to [facility] in [place]. Phone # provided to patient to change this to Richard  - pregabalin (LYRICA) 50 MG capsule  Dispense: 90 capsule; Refill: 5    Anxiety  Patient with ongoing anxiety/ depression related to medical conditions and possibility of incarceration. Would like therapy referral, not interested in medication at this time.  - Behavioral Health Referral ([facility] internal and external)      Return in about 4 weeks (around 10/20/2021).    Lionel Bahena MD  [facility] CHARLEY Goodman is a 41 year old who presents for the following health issues     HPI   [facility].    Anxiety. Depression patient reports this has been on ongoign issue, becoming more of an issue and affecting relationships. Lot of it surrounding current health condition, possibility of facing [facility] time.    Patient curious of potential side effect of lyrica of urinary hesitancy?     Review of Systems   Constitutional, HEENT, cardiovascular, pulmonary, gi and gu systems are negative, except as otherwise noted.      Objective    /74   Pulse 77   Temp 97.7  F (36.5  C) (Oral)   Resp 16   Ht 1.86 m (6' 1.23\")   Wt 92 kg (202 lb 12.8 oz)   SpO2 100%   BMI 26.59 kg/m    Body mass index is 26.59 kg/m .  Physical Exam  Vitals reviewed.   Constitutional:       General: He is not in acute distress.     Appearance: Normal appearance. He is not " ill-appearing.   HENT:      Head: Normocephalic and atraumatic.   Eyes:      Conjunctiva/sclera: Conjunctivae normal.   Cardiovascular:      Rate and Rhythm: Normal rate and regular rhythm.   Pulmonary:      Effort: Pulmonary effort is normal. No respiratory distress.      Breath sounds: Normal breath sounds. No wheezing.   Musculoskeletal:         General: No deformity. Normal range of motion.   Skin:     General: Skin is warm and dry.   Neurological:      General: No focal deficit present.      Mental Status: He is alert.      Motor: No weakness.      Gait: Gait normal.   Psychiatric:         Behavior: Behavior normal.         Thought Content: Thought content normal.

## 2021-09-22 NOTE — PATIENT INSTRUCTIONS
[facility] Restricted Recipient Phone Number - [phone]        Patient Education     Here is the plan from today's visit    1. Neuropathy  - pregabalin (LYRICA) 50 MG capsule; Take 1 capsule (50 mg) by mouth 3 times daily  Dispense: 90 capsule; Refill: 5  -potential side effect of urinary hesitancy?       Please call or return to clinic if your symptoms don't go away.    Follow up plan  No follow-ups on file.      Thank you for coming to [facility] today.  Lab Testing:  **If you had lab testing today and your results are reassuring or normal they will be mailed to you or sent through [facility] within 7 days.   **If the lab tests need quick action we will call you with the results.  The phone number we will call with results is # [phone] (home) . If this is not the best number please call our clinic and change the number.  Medication Refills:  If you need any refills please call your pharmacy and they will contact us.   If you need to  your refill at a new pharmacy, please contact the new pharmacy directly. The new pharmacy will help you get your medications transferred faster.   Scheduling:  If you have any concerns about today's visit or wish to schedule another appointment please call our office during normal business hours [phone] (8-5:00 M-F)  If a referral was made to a [facility] and you don't get a call from central scheduling please call [phone].  If a Mammogram was ordered for you at [facility] call [phone] to schedule or change your appointment.  If you had an XRay/CT/Ultrasound/MRI ordered the number is [phone] to schedule or change your radiology appointment.   Medical Concerns:  If you have urgent medical concerns please call [phone] at any time of the day.    Lionel Bahena MD

## 2021-09-23 ENCOUNTER — TELEPHONE (OUTPATIENT)
Dept: PSYCHOLOGY | Facility: CLINIC | Age: 41
End: 2021-09-23

## 2021-09-23 NOTE — TELEPHONE ENCOUNTER
Mental Health Referral:  Please schedule with Reji Curiel, or nury    Please let patient know this is for primary care behavioral health services which means we provide short-term therapy services.  Therapists at our clinic are able to see patients for 8-12 sessions. If further assistance is needed, they will help the patient connect with ongoing services in the community.    Check with the patient if prefer an in person, video or telephone visit.  If they choose a video visit, they will need access to either a smartphone or a laptop with camera/microphone.  If they can do a video visit, please schedule as a video visit.  If they do not have the technology to do a video visit, please schedule as a telephone visit. For either visit, please put the phone number or email in the appt notes that the provider is supposed to call or send the visit invite.  There are some instructions regarding how the video visits work for patients below. This can be copied/pasted into a [facility] message if the patient would like more information.      If you are unable to reach the patient after two phone attempts, please send a letter and close the encounter.      Thank you!    Maricarmen Parker PsyD

## 2021-09-25 ENCOUNTER — HEALTH MAINTENANCE LETTER (OUTPATIENT)
Age: 41
End: 2021-09-25

## 2021-09-27 NOTE — PROGRESS NOTES
Preceptor Attestation:   Patient seen, evaluated and discussed with the resident. I have verified the content of the note, which accurately reflects my assessment of the patient and the plan of care.   Supervising Physician:  Sebastien Forbes MD

## 2021-09-28 ENCOUNTER — OFFICE VISIT (OUTPATIENT)
Dept: CARDIOLOGY | Facility: CLINIC | Age: 41
End: 2021-09-28
Attending: INTERNAL MEDICINE
Payer: COMMERCIAL

## 2021-09-28 ENCOUNTER — ANCILLARY PROCEDURE (OUTPATIENT)
Dept: NEUROLOGY | Facility: CLINIC | Age: 41
End: 2021-09-28
Attending: PSYCHIATRY & NEUROLOGY
Payer: COMMERCIAL

## 2021-09-28 VITALS
WEIGHT: 205.1 LBS | HEIGHT: 73 IN | RESPIRATION RATE: 12 BRPM | DIASTOLIC BLOOD PRESSURE: 80 MMHG | HEART RATE: 67 BPM | BODY MASS INDEX: 27.18 KG/M2 | SYSTOLIC BLOOD PRESSURE: 112 MMHG

## 2021-09-28 DIAGNOSIS — R41.9 NEUROCOGNITIVE DISORDER: ICD-10-CM

## 2021-09-28 DIAGNOSIS — I42.9 SECONDARY CARDIOMYOPATHY (H): ICD-10-CM

## 2021-09-28 DIAGNOSIS — G44.329 CHRONIC POST-TRAUMATIC HEADACHE, NOT INTRACTABLE: ICD-10-CM

## 2021-09-28 DIAGNOSIS — Z86.74 H/O CARDIAC ARREST: Primary | ICD-10-CM

## 2021-09-28 DIAGNOSIS — R07.1 CHEST PAIN ON BREATHING: ICD-10-CM

## 2021-09-28 PROCEDURE — 99214 OFFICE O/P EST MOD 30 MIN: CPT | Performed by: INTERNAL MEDICINE

## 2021-09-28 PROCEDURE — 95816 EEG AWAKE AND DROWSY: CPT | Performed by: PSYCHIATRY & NEUROLOGY

## 2021-09-28 ASSESSMENT — MIFFLIN-ST. JEOR: SCORE: 1899.21

## 2021-09-28 NOTE — PATIENT INSTRUCTIONS
Mr Rex Negrete,  I enjoyed visiting with you again today.  I am not sure what is causing the light headedness nor the chest pain but want to continue seeing you for this.  Per our conversation if they continue I will consider more invasive testing.  I agree with continuing with chemical dependency treatment.  I will plan on seeing you 4 months or so.  Ryne Oconnor

## 2021-09-28 NOTE — LETTER
9/28/2021    Lionel Bahena MD  Residency Prog [address]  [facility] [address]    RE: Rex Negrete       Dear Colleague,    I had the pleasure of seeing Rex Negrete in the [facility].        [facility]  Heart Care Clinic Follow-up Note    Assessment & Plan        (Z86.74) H/O cardiac arrest  (primary encounter diagnosis)  Comment:  Appears to be PEA in the face of a mild cardiomyopathy.    Troponin was significantly elevated at [facility], subsequent stress echo shows no ischemia and subsequent echo shows preserved ejection fraction without any significant valvular issues.      (R07.1) Chest pain on breathing  Comment: Normal stress test as above, uncertain etiology.  If continues may need to pursue angiography.    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    (G44.329) Chronic post-traumatic headache, not intractable  Comment: Has a history of traumatic brain injury as well as seizure disorder as well as substance abuse and is in treatment which I applaud.    Lightheaded spells-suspect this could be low blood pressure.  No true syncope.  Event monitor showed no arrhythmias.  Continue to monitor but stay well-hydrated.    Plan  1.  Stay well-hydrated.  2.  See me in several months and if chest discomfort continues consider invasive testing.    Subjective  CC: 41-year-old white gentleman here to discuss results of test.  He tells me he still has lightheaded spells, most recently when he was in bed writing inspirational message on the white board kneeling, became extremely lightheaded.  He needed to lie down this past.  He states he has had maybe 3 or 4 these episodes he saw me but no true blackouts.  He does tell me when he is just at rest he does get a tightness or discomfort in his chest, it is different from his fractured ribs from his CPR.  It is not associated  "with activity.  He admits to shortness of breath and activity but this is getting better when he is going for walks.  No PND, orthopnea or peripheral edema.  He requests if I could write him a letter that would help him legally, on the basis of heart issues.    Medications  Current Outpatient Medications   Medication Sig Dispense Refill     diphenhydrAMINE (BENADRYL) 25 MG capsule Take 25 mg by mouth every 6 hours as needed       metoclopramide (REGLAN) 10 MG tablet Take 10 mg by mouth 4 times daily (before meals and nightly)       Omega-3 Fatty Acids (FISH OIL MAXIMUM STRENGTH) 1200 MG CPDR Take 1 capsule by mouth        pregabalin (LYRICA) 50 MG capsule Take 1 capsule (50 mg) by mouth 3 times daily 90 capsule 5       Objective  /80 (BP Location: Right arm, Patient Position: Sitting, Cuff Size: Adult Regular)   Pulse 67   Resp 12   Ht 1.854 m (6' 1\")   Wt 93 kg (205 lb 1.6 oz)   BMI 27.06 kg/m      General Appearance:    Alert, cooperative, no distress, appears stated age   Head:    Normocephalic, without obvious abnormality, atraumatic   Throat:   Lips, mucosa, and tongue normal; teeth and gums normal   Neck:   Supple, symmetrical, trachea midline, no adenopathy;        thyroid:  No enlargement/tenderness/nodules; no carotid    bruit or JVD   Back:     Symmetric, no curvature, ROM normal, no CVA tenderness   Lungs:     Clear to auscultation bilaterally, respirations unlabored   Chest wall:    No tenderness or deformity   Heart:    Regular rate and rhythm, S1 and S2 normal, no murmur, rub   or gallop   Abdomen:     Soft, non-tender, bowel sounds active all four quadrants,     no masses, no organomegaly   Extremities:   Normal, atraumatic, no cyanosis or edema   Pulses:   2+ and symmetric all extremities   Skin:   Skin color, texture, turgor normal, no rashes or lesions     Results    Lab Results personally reviewed No results found for: CHOL  No results found for: HDL  No components found for: " LDLCALC  No results found for: TRIG  Lab Results   Component Value Date    WBC 9.4 03/28/2020    HGB 15.3 03/28/2020    HCT 47.0 03/28/2020     03/28/2020     Lab Results   Component Value Date    BUN 14 03/28/2020     03/28/2020    CO2 29 03/28/2020                 Thank you for allowing me to participate in the care of your patient.      Sincerely,     TOPHER OCONNOR MD     [facility]  cc:   Topher Oconnor MD  [address] [place] [id number]
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
## 2021-10-06 ENCOUNTER — TELEPHONE (OUTPATIENT)
Dept: NEUROLOGY | Facility: CLINIC | Age: 41
End: 2021-10-06

## 2021-10-06 DIAGNOSIS — G40.209 PARTIAL SYMPTOMATIC EPILEPSY WITH COMPLEX PARTIAL SEIZURES, NOT INTRACTABLE, WITHOUT STATUS EPILEPTICUS (H): ICD-10-CM

## 2021-10-06 RX ORDER — LEVETIRACETAM 500 MG/1
500 TABLET ORAL 2 TIMES DAILY
Qty: 60 TABLET | Refills: 11 | Status: SHIPPED | OUTPATIENT
Start: 2021-10-06 | End: 2021-10-14

## 2021-10-06 NOTE — TELEPHONE ENCOUNTER
EEG is abnormal due to left temporal sharp activity that suggest a low threshold for seizures.  This likely is due to head trauma in the past.  Recommendation:     #1.  Start Keppra 500 mg twice daily.  I have sent a prescription to patient pharmacy   #2.  Check Keppra level after 2 weeks   #3.  Follow-up as scheduled

## 2021-10-07 NOTE — TELEPHONE ENCOUNTER
Deandra Murillo from [facility] called state patient family is wanting patient to have Hospice care and wanted to know if this was ok with Dr. Marie Godfrey.   Please advise     Adrienne Baez [phone]) Pt contacted and scheduled 1013 at 1:20pm with Dr. Burnham.     LD Montano  Pronouns: She/Her/Hers  , Care Coordination  [facility]  [phone]

## 2021-10-08 ENCOUNTER — HOSPITAL ENCOUNTER (OUTPATIENT)
Dept: MRI IMAGING | Facility: CLINIC | Age: 41
Discharge: HOME | End: 2021-10-08
Attending: PSYCHIATRY & NEUROLOGY | Admitting: PSYCHIATRY & NEUROLOGY
Payer: COMMERCIAL

## 2021-10-08 DIAGNOSIS — M54.16 LUMBAR RADICULOPATHY: ICD-10-CM

## 2021-10-08 DIAGNOSIS — R20.2 PARESTHESIA: ICD-10-CM

## 2021-10-08 PROCEDURE — 72148 MRI LUMBAR SPINE W/O DYE: CPT

## 2021-10-08 NOTE — TELEPHONE ENCOUNTER
Patient informed and verbalized understanding.  Reminder placed in chart to do Keppra level in 2 weeks  Elmira Frye CMA on 10/8/2021 at 2:40 PM

## 2021-10-13 ENCOUNTER — OFFICE VISIT (OUTPATIENT)
Dept: PSYCHOLOGY | Facility: CLINIC | Age: 41
End: 2021-10-13
Payer: COMMERCIAL

## 2021-10-13 DIAGNOSIS — F15.21: ICD-10-CM

## 2021-10-13 DIAGNOSIS — F43.22 ADJUSTMENT DISORDER WITH ANXIOUS MOOD: Primary | ICD-10-CM

## 2021-10-13 PROCEDURE — 90834 PSYTX W PT 45 MINUTES: CPT | Mod: HN | Performed by: PSYCHOLOGIST

## 2021-10-13 NOTE — PATIENT INSTRUCTIONS
[facility]  Behavioral Health and Addiction Services  [address] [place] [address]  [phone]      First Session Patient Information Sheet    My name is Giuliano Burnham, Ph.D. and I look forward to working with you! I earned my Ph.D. in Counseling Psychology at [employer]. I am currently in a postdoctoral fellowship at [facility] to receive specialized training in primary care behavioral health. I am also working to complete my supervised hours to become a licensed psychologist in the [facility].     My direct supervisor at the [facility] is licensed psychologist, Maricarmen Parker Psy.D. She and I meet individually each week to discuss my training and clinical caseload. Just like the residents you may have worked with, the work you and I complete together will be billed under my supervisor's name. Therefore, you will likely see reports from your insurance provider with Dr. Parker's name rather than mine. Dr. Parker can be reached at [phone] if you have any questions or concerns.       Here is some general information about behavioral health services. Please note that your exact experience may be different based on our discussion today.      Your first 1-2 sessions are focused on assessment.This means that I will be exploring what brings you in today and what you are hoping to get out of behavioral health services. I will likely ask you a lot of questions about your current symptoms, health history, as well as information about your relationships, emotions, behaviors, experiences, childhood, family of origin, etc. I may also ask you to complete several questionnaires and checklists as part of that assessment process.      When the assessment is completed, we will review the results and work together to develop a plan for treatment. This discussion will include recommendations for services that are most appropriate for you  based on available research and practice guidelines. Many times, I will continue with your care unless it becomes clear that we need to refer you to another provider or organization better suited to meet your specific needs. We may also discuss other services that you may benefit from engaging with, such as the social work team or the clinic's legal partnership.      Most patients attend appointments once a week or once every other week at the beginning of treatment. However, we will discuss a schedule that best fits your needs.      If you get primary care services here at the clinic, I will also update your provider about your diagnosis and treatment plan to coordinate your care.      Due to confidentiality, I cannot exchange e-mail with patients. If you need to reach me, please leave a message through the  [phone].    I am committed to providing my patients with the best care possible. That means it is important to me that I provider services that fit for your unique needs and preferences. If there is anything you would like me to do differently, please let me know at any time!    Thank you, and I look forward to working with you!    Giuliano Burnham, PhD  She/her/hers  Primary Care Behavioral Health Fellow
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
## 2021-10-14 ENCOUNTER — TELEPHONE (OUTPATIENT)
Dept: FAMILY MEDICINE | Facility: CLINIC | Age: 41
End: 2021-10-14

## 2021-10-14 DIAGNOSIS — G40.209 PARTIAL SYMPTOMATIC EPILEPSY WITH COMPLEX PARTIAL SEIZURES, NOT INTRACTABLE, WITHOUT STATUS EPILEPTICUS (H): ICD-10-CM

## 2021-10-14 RX ORDER — LEVETIRACETAM 500 MG/1
500 TABLET ORAL 2 TIMES DAILY
Qty: 60 TABLET | Refills: 11 | Status: SHIPPED | OUTPATIENT
Start: 2021-10-14 | End: 2021-11-11

## 2021-10-14 NOTE — TELEPHONE ENCOUNTER
[facility] phone call message- patient requesting a refill:    Full Medication Name: Keppra    Dose: 500 mg    Pharmacy confirmed as   [id number] - [place], [place] - [address] [facility] [address]  [address]  [facility] [address]  Phone: [phone] Fax: [phone]  : Yes    Additional Comments: Patient is out of medication  Please call when RX has been sent.    OK to leave a message on voice mail? Yes    Primary language: English      needed? No    Call taken on October 14, 2021 at 1:55 PM by Shanti De CMA

## 2021-10-14 NOTE — PROGRESS NOTES
Preceptor Attestation:  I have reviewed and agree with the behavioral health fellow's documentation for this visit.  I did not see the patient.  Supervising Clinical Psychologist:  Maricarmen Parker PSYD LP

## 2021-10-14 NOTE — TELEPHONE ENCOUNTER
Patient unable to fill Keppra prescription due to provider restrictions. RN will route to approved provider to refill and send prescription.     Gudelia Davis RN

## 2021-10-14 NOTE — TELEPHONE ENCOUNTER
Patient called in requesting assistance coordinating his restricted refills and his neurologist. Patient has a head injury and is having difficulty getting his prescriptions filled in a timely manner due to the restriction to only see Dr. Bahena.    It is ok to leave a detailed message at [phone].    Shanti De [facility]

## 2021-10-14 NOTE — TELEPHONE ENCOUNTER
RN advised patient that prescription has been resent to pharmacy under covered restricted provider. Patient verbalized understanding.     Gudelia Davis RN

## 2021-10-18 ENCOUNTER — VIRTUAL VISIT (OUTPATIENT)
Dept: SLEEP MEDICINE | Facility: CLINIC | Age: 41
End: 2021-10-18
Attending: FAMILY MEDICINE
Payer: COMMERCIAL

## 2021-10-18 DIAGNOSIS — R40.0 DAYTIME SOMNOLENCE: ICD-10-CM

## 2021-10-25 NOTE — TELEPHONE ENCOUNTER
SW submitted the following referrals to the [facility] Restricted Recipient program. Send confirmation was received on 10/25/21 at 2:36pm.    Jose R Tom MD  ([facility])  Ion Abel MD ([facility])  Tika Lassiter, CARLEY ([facility])  Ryne Oconnor MD ([facility]    LD Montano  Pronouns: She/Her/Hers  , Care Coordination  [facility]  [phone]

## 2021-10-27 ENCOUNTER — TRANSFERRED RECORDS (OUTPATIENT)
Dept: HEALTH INFORMATION MANAGEMENT | Facility: CLINIC | Age: 41
End: 2021-10-27

## 2021-10-27 ENCOUNTER — OFFICE VISIT (OUTPATIENT)
Dept: FAMILY MEDICINE | Facility: CLINIC | Age: 41
End: 2021-10-27
Payer: COMMERCIAL

## 2021-10-27 VITALS
SYSTOLIC BLOOD PRESSURE: 100 MMHG | HEART RATE: 119 BPM | TEMPERATURE: 99.4 F | HEIGHT: 73 IN | RESPIRATION RATE: 16 BRPM | WEIGHT: 205.2 LBS | OXYGEN SATURATION: 97 % | BODY MASS INDEX: 27.2 KG/M2 | DIASTOLIC BLOOD PRESSURE: 58 MMHG

## 2021-10-27 DIAGNOSIS — G40.209 PARTIAL SYMPTOMATIC EPILEPSY WITH COMPLEX PARTIAL SEIZURES, NOT INTRACTABLE, WITHOUT STATUS EPILEPTICUS (H): ICD-10-CM

## 2021-10-27 DIAGNOSIS — F43.10 POSTTRAUMATIC STRESS DISORDER: ICD-10-CM

## 2021-10-27 DIAGNOSIS — T88.7XXA MEDICATION SIDE EFFECTS: Primary | ICD-10-CM

## 2021-10-27 DIAGNOSIS — Z87.820 HX OF TRAUMATIC BRAIN INJURY: ICD-10-CM

## 2021-10-27 DIAGNOSIS — R40.0 DAYTIME SOMNOLENCE: ICD-10-CM

## 2021-10-27 DIAGNOSIS — Z02.89 ENCOUNTER FOR COMPLETION OF FORM WITH PATIENT: ICD-10-CM

## 2021-10-27 DIAGNOSIS — F41.9 ANXIETY: ICD-10-CM

## 2021-10-27 LAB
ALBUMIN SERPL-MCNC: 2.6 G/DL (ref 3.4–5)
ALP SERPL-CCNC: 35 U/L (ref 40–150)
ALT SERPL W P-5'-P-CCNC: 64 U/L (ref 0–70)
ANION GAP SERPL CALCULATED.3IONS-SCNC: 10 MMOL/L (ref 3–14)
AST SERPL W P-5'-P-CCNC: 34 U/L (ref 0–45)
BASOPHILS # BLD AUTO: 0 10E3/UL (ref 0–0.2)
BASOPHILS NFR BLD AUTO: 0 %
BILIRUB SERPL-MCNC: 0.9 MG/DL (ref 0.2–1.3)
BUN SERPL-MCNC: 20 MG/DL (ref 7–30)
CALCIUM SERPL-MCNC: 8.4 MG/DL (ref 8.5–10.1)
CHLORIDE BLD-SCNC: 98 MMOL/L (ref 94–109)
CO2 SERPL-SCNC: 24 MMOL/L (ref 20–32)
CREAT SERPL-MCNC: 2.24 MG/DL (ref 0.66–1.25)
EOSINOPHIL # BLD AUTO: 0 10E3/UL (ref 0–0.7)
EOSINOPHIL NFR BLD AUTO: 0 %
ERYTHROCYTE [DISTWIDTH] IN BLOOD BY AUTOMATED COUNT: 12.5 % (ref 10–15)
GFR SERPL CREATININE-BSD FRML MDRD: 36 ML/MIN/1.73M2
GLUCOSE BLD-MCNC: 149 MG/DL (ref 70–99)
HCT VFR BLD AUTO: 33.5 %
HGB BLD-MCNC: 11.1 G/DL (ref 13.3–17.7)
IMM GRANULOCYTES # BLD: 0.1 10E3/UL
IMM GRANULOCYTES NFR BLD: 0 %
LYMPHOCYTES # BLD AUTO: 0.8 10E3/UL (ref 0.8–5.3)
LYMPHOCYTES NFR BLD AUTO: 5 %
MCH RBC QN AUTO: 30.5 PG (ref 26.5–33)
MCHC RBC AUTO-ENTMCNC: 33.1 G/DL (ref 31.5–36.5)
MCV RBC AUTO: 92 FL (ref 78–100)
MONOCYTES # BLD AUTO: 1.4 10E3/UL (ref 0–1.3)
MONOCYTES NFR BLD AUTO: 8 %
NEUTROPHILS # BLD AUTO: 15 10E3/UL (ref 1.6–8.3)
NEUTROPHILS NFR BLD AUTO: 87 %
PLATELET # BLD AUTO: 422 10E3/UL (ref 150–450)
POTASSIUM BLD-SCNC: 3.7 MMOL/L (ref 3.4–5.3)
PROT SERPL-MCNC: 7.5 G/DL (ref 6.8–8.8)
RBC # BLD AUTO: 3.64 10E6/UL (ref 4.4–5.9)
SODIUM SERPL-SCNC: 132 MMOL/L (ref 133–144)
TSH SERPL DL<=0.005 MIU/L-ACNC: 1.97 MU/L (ref 0.4–4)
WBC # BLD AUTO: 17.3 10E3/UL (ref 4–11)

## 2021-10-27 PROCEDURE — 36415 COLL VENOUS BLD VENIPUNCTURE: CPT | Performed by: STUDENT IN AN ORGANIZED HEALTH CARE EDUCATION/TRAINING PROGRAM

## 2021-10-27 PROCEDURE — 99214 OFFICE O/P EST MOD 30 MIN: CPT | Mod: GC | Performed by: STUDENT IN AN ORGANIZED HEALTH CARE EDUCATION/TRAINING PROGRAM

## 2021-10-27 PROCEDURE — 80177 DRUG SCRN QUAN LEVETIRACETAM: CPT | Mod: 90 | Performed by: STUDENT IN AN ORGANIZED HEALTH CARE EDUCATION/TRAINING PROGRAM

## 2021-10-27 PROCEDURE — 80050 GENERAL HEALTH PANEL: CPT | Performed by: STUDENT IN AN ORGANIZED HEALTH CARE EDUCATION/TRAINING PROGRAM

## 2021-10-27 ASSESSMENT — MIFFLIN-ST. JEOR: SCORE: 1899.66

## 2021-10-27 NOTE — PROGRESS NOTES
Assessment & Plan     Medication side effects  Partial symptomatic epilepsy with complex partial seizures, not intractable, without status epilepticus (H)  Hx of traumatic brain injury  Daytime somnolence  Patient with significant fatigue, appetite changes, worsened headaches/ nausea over the last 2 weeks after starting keppra. Also with ED visit to [facility] last week for palpitations and chest discomfort which patient reports felt like a seizure and was found to have negative PE, so was given fluids, pain medication. EEG 10/6 suggestive of low threshold focal seizure. Exam deficits unchanged from prior.  Given timing of this, possible medication side effect, however would be very cautious to stop keppra at this point. Less likely infectious etiology, lower concern for cardiac or pulmonary etiology given reassuring workup in ED last week.   Plan to evaluate with blood work and keppra level and discuss plan with neurologist, who he has an appointment with next week 11/2.  - Keppra (Levetiracetam) Level  - CBC with platelets differential  - Comprehensive metabolic panel  - TSH with free T4 reflex    Encounter for completion of form with patient  Form for summary of residual deficits filled out with patient today.    Anxiety  Posttraumatic stress disorder  Patient established with psychology. Reports this went well. Plans to follow-up with them, just missed last appt due to above sx over the last 2 weeks.      Return in about 2 weeks (around 11/10/2021) for Medication f/u.    Lionel Bahena MD  [facility] CHARLEY Goodman is a 41 year old who presents for the following health issues    HPI   Last 2 weeks:  Started right after first took keppra.   Feeling dehydrated, nauseous, loss of appetite, weak.   Lying in bed.  More headaches.  Denies fever, chills, weight loss.     Reports pain in limbs not as bad. Continues to have same residual numbness in extremities.     Met with psych,  "reports that went well. Missed follow-up due to not feeling well as above.    Review of Systems   Constitutional, HEENT, cardiovascular, pulmonary, GI, , musculoskeletal, neuro, skin, endocrine and psych systems are negative, except as otherwise noted.      Objective    /58   Pulse 119   Temp 99.4  F (37.4  C) (Oral)   Resp 16   Ht 1.854 m (6' 1\")   Wt 93.1 kg (205 lb 3.2 oz)   SpO2 97%   BMI 27.07 kg/m    Body mass index is 27.07 kg/m .  Physical Exam  Vitals reviewed.   Constitutional:       General: He is not in acute distress.     Appearance: Normal appearance. He is not ill-appearing.   HENT:      Head: Normocephalic and atraumatic.      Mouth/Throat:      Mouth: Mucous membranes are moist.   Eyes:      Extraocular Movements: Extraocular movements intact.      Conjunctiva/sclera: Conjunctivae normal.      Pupils: Pupils are equal, round, and reactive to light.   Cardiovascular:      Rate and Rhythm: Tachycardia present.   Pulmonary:      Effort: Pulmonary effort is normal. No respiratory distress.      Breath sounds: No wheezing.   Musculoskeletal:         General: No deformity. Normal range of motion.      Cervical back: Normal range of motion.   Skin:     General: Skin is warm and dry.   Neurological:      Mental Status: He is alert and oriented to person, place, and time.      Cranial Nerves: No cranial nerve deficit.      Sensory: Sensory deficit (decreased sensation of R side of face and L>R arm) present.      Motor: Weakness (4/5 strength in hands, 5/5 throughout shoulders, ankles) present.   Psychiatric:         Behavior: Behavior normal.         Thought Content: Thought content normal.            "

## 2021-10-28 LAB — LEVETIRACETAM SERPL-MCNC: 7 UG/ML

## 2021-11-02 ENCOUNTER — TELEPHONE (OUTPATIENT)
Dept: FAMILY MEDICINE | Facility: CLINIC | Age: 41
End: 2021-11-02

## 2021-11-02 NOTE — TELEPHONE ENCOUNTER
RN spoke to Mary Jo- patient being discharged from [facility] later today. Has colovesical fistula- will need colonoscopy and to see colon-rectal surgeon. RN provided [facility] clinic address and phone number for referral.     Advised Mary Jo that patient can see a few other providers at [facility] (other than Dr. Bahena)- scheduled him for hospital follow up on 11/4/21 at 0920 with Pharm D and 1000 with Dr. Carrizales.   Sarahi Figueroa, RN

## 2021-11-02 NOTE — TELEPHONE ENCOUNTER
Rex will not need a referral to [facility] as it is already approved by restricted Recipient Program. If a referral is needed for the provider please advise SW to submit the referral.    LD Montano  Pronouns: She/Her/Hers  , Care Coordination  [facility]  [phone]

## 2021-11-02 NOTE — TELEPHONE ENCOUNTER
Mary Jo calls back with update- patient being referred to Dr. Pastrana at [facility] for Colorectal surgery follow up.   Sarahi Figueroa RN

## 2021-11-02 NOTE — TELEPHONE ENCOUNTER
"[facility] phone call message - order or referral request for patient:     Order or referral being requested: RN from [place] called. Mary Jo @[phone]  Patient currently hospitalized at [facility] she is in need of help scheduling for patient as he is restricted to \"Dr. Bahena and LORRI Sharp\".  Patient needs labs drawn within 5 days of D/C(his disharged today @ 3pm).  Also needs to be seen by a colon rectal surgeon and needs colonoscopy.       Additional Comments: Please call Mary Jo @ [phone]      Primary language: English      needed? No    Call taken on November 2, 2021 at 1:01 PM by Addis Kelly    "

## 2021-11-04 ENCOUNTER — OFFICE VISIT (OUTPATIENT)
Dept: PHARMACY | Facility: CLINIC | Age: 41
End: 2021-11-04
Payer: COMMERCIAL

## 2021-11-04 ENCOUNTER — OFFICE VISIT (OUTPATIENT)
Dept: FAMILY MEDICINE | Facility: CLINIC | Age: 41
End: 2021-11-04
Payer: COMMERCIAL

## 2021-11-04 VITALS
HEART RATE: 60 BPM | HEIGHT: 73 IN | OXYGEN SATURATION: 100 % | TEMPERATURE: 98.1 F | SYSTOLIC BLOOD PRESSURE: 116 MMHG | RESPIRATION RATE: 16 BRPM | DIASTOLIC BLOOD PRESSURE: 72 MMHG | WEIGHT: 198.4 LBS | BODY MASS INDEX: 26.29 KG/M2

## 2021-11-04 DIAGNOSIS — G44.329 CHRONIC POST-TRAUMATIC HEADACHE, NOT INTRACTABLE: ICD-10-CM

## 2021-11-04 DIAGNOSIS — M79.2 NEUROPATHIC PAIN: ICD-10-CM

## 2021-11-04 DIAGNOSIS — G40.209 PARTIAL SYMPTOMATIC EPILEPSY WITH COMPLEX PARTIAL SEIZURES, NOT INTRACTABLE, WITHOUT STATUS EPILEPTICUS (H): ICD-10-CM

## 2021-11-04 DIAGNOSIS — N17.0 ATN (ACUTE TUBULAR NECROSIS) (H): ICD-10-CM

## 2021-11-04 DIAGNOSIS — N32.1 COLOVESICAL FISTULA: Primary | ICD-10-CM

## 2021-11-04 LAB
ANION GAP SERPL CALCULATED.3IONS-SCNC: 4 MMOL/L (ref 3–14)
BUN SERPL-MCNC: 25 MG/DL (ref 7–30)
CALCIUM SERPL-MCNC: 9 MG/DL (ref 8.5–10.1)
CHLORIDE BLD-SCNC: 105 MMOL/L (ref 94–109)
CO2 SERPL-SCNC: 26 MMOL/L (ref 20–32)
CREAT SERPL-MCNC: 1.5 MG/DL (ref 0.66–1.25)
GFR SERPL CREATININE-BSD FRML MDRD: 58 ML/MIN/1.73M2
GLUCOSE BLD-MCNC: 92 MG/DL (ref 70–99)
POTASSIUM BLD-SCNC: 4.3 MMOL/L (ref 3.4–5.3)
SODIUM SERPL-SCNC: 135 MMOL/L (ref 133–144)

## 2021-11-04 PROCEDURE — 36415 COLL VENOUS BLD VENIPUNCTURE: CPT | Performed by: FAMILY MEDICINE

## 2021-11-04 PROCEDURE — 80048 BASIC METABOLIC PNL TOTAL CA: CPT | Performed by: FAMILY MEDICINE

## 2021-11-04 PROCEDURE — 99605 MTMS BY PHARM NP 15 MIN: CPT | Performed by: PHARMACIST

## 2021-11-04 PROCEDURE — 99496 TRANSJ CARE MGMT HIGH F2F 7D: CPT | Performed by: FAMILY MEDICINE

## 2021-11-04 PROCEDURE — 99607 MTMS BY PHARM ADDL 15 MIN: CPT | Performed by: PHARMACIST

## 2021-11-04 RX ORDER — OXYCODONE HYDROCHLORIDE 5 MG/1
5-10 TABLET ORAL EVERY 6 HOURS PRN
Qty: 80 TABLET | Refills: 0 | Status: SHIPPED | OUTPATIENT
Start: 2021-11-04 | End: 2021-11-11

## 2021-11-04 RX ORDER — OXYCODONE HYDROCHLORIDE 5 MG/1
5-10 TABLET ORAL EVERY 6 HOURS PRN
Qty: 80 TABLET | Refills: 0 | Status: SHIPPED | OUTPATIENT
Start: 2021-11-04 | End: 2021-11-04

## 2021-11-04 RX ORDER — CEFUROXIME AXETIL 500 MG/1
500 TABLET ORAL 2 TIMES DAILY
COMMUNITY
Start: 2021-11-02 | End: 2021-11-22

## 2021-11-04 RX ORDER — METRONIDAZOLE 500 MG/1
500 TABLET ORAL 2 TIMES DAILY
COMMUNITY
Start: 2021-11-02 | End: 2021-11-22

## 2021-11-04 ASSESSMENT — MIFFLIN-ST. JEOR: SCORE: 1868.69

## 2021-11-04 NOTE — PROGRESS NOTES
Medication Therapy Management (MTM) Encounter    ASSESSMENT:                            Medication Adherence/Access: No issues identified    Colovesicular Fistula: Uncontrolled   Rex is taking antibiotics as prescribed and tolerating reasonably well. PharmD discussed taking with food to avoid nausea.   Antibiotic therapy to be completed ~ 11/22 (21 day duration)  Rex is requiring additional pain relief as he was unable to receive oxycodone outpatient due to restrictions from insurance and living facility.  Recommend low dose oxycodone prn until surgery (11/29)    Migraine/Post-TBI: Uncontrolled  Current regimen is not providing relief. Although Rex reports success with treatment in the past (likely triptans), due to risk of triptans exacerbating seizure disorder, recommending to defer treatment to neurology.     Seizures: Unable to assess  Diagnosis unclear after speaking with Rex and reading past medical notes. Reports indicate all historic seizure events may have been drug related.   Defer to neurology for further assessment    Neuropathic Pain: Controlled  Therapy appears safe and effective at this time.     PLAN:                            1. Restart oxycodone 5-10mg q6 hours prn pain. Use sparingly.  2. Continue all other medications as prescribed.  3. Follow-up with neurology regarding seizure diagnosis and headaches    Follow-up: with PCP in 2 weeks      SUBJECTIVE/OBJECTIVE:                          Rex Negrete is a 41 year old male coming in for a transitions of care visit. He was discharged from [facility] on 11/2/21 for a sepsis secondary to colovesicular fistula due to UTI. Patient is seeing provider after our visit today.     Reason for visit: [facility] [place]    Admission Date: 10/27/2021  Discharge Date: 11/2/2021  Diagnosis: Sepsis due to UTI due to colovesicular fistula        Allergies/ADRs: Reviewed in chart  Past Medical History: Reviewed in chart  Tobacco: He reports that he has been smoking  cigarettes. He has never used smokeless tobacco.Tobacco Cessation Action Plan:   Information offered: Patient not interested at this time    Alcohol: not currently using    Medication Adherence/Access: no issues reported    Colovesicular fistula:    Cefuroxime axetil 500mg BID x 21 days (end date: 11/22)    Metronidazole 500mg BID x 21 days (end date: 11/22)    Has lost weight recently due to nausea/loss of appetite. He states he is forcing himself to eat, but he has lost weight recently. He also complains of feeling thirsty all the time and very run-down.     Reports receiving oxycodone 10mg every 4 hours  in the hospital   Rex states he does not need quite as much pain control as before, but does need something for the pain. It sounds like his sober living facility and being restricted to a single provider created a barrier for Rex to get outpatient oxycodone. He states this has been cleared with his housing now.     Migraine/Post TBI:    Diphenhydramine 25mg prn headaches    Metocopramide 10mg prn headaches  Rex reports headaches nearly every day. He takes medications only once per week on average because he doesn't feel they are effective. He reports sensitivity to sounds, denies sensitivity to light. Describes head hurting all over (front/back/both sides).  He reports he was on another medication in the past that was helpful. Thinks it may have been sumatriptan.      Seizures:  Keppra was stopped during recent admission, Rex is unsure why.   He restarted Keppra around the same time his symptoms (fatigue, lethargy) started ~2 weeks ago. Believed these were related to Keppra, but in hindsight, were likely due to bacteremia.   Past medical notes also indicate history of seizures may have been drug related.   Rex has follow-up appt with neurology 11/10    Neuropathic pain:     pregabalin 50mg TID     No issues reported. Feels it is effective.       Today's Vitals: There were no vitals taken for this  visit.  ----------------  Post Discharge Medication Reconciliation Status: discharge medications reconciled and changed, per note/orders.    I spent 30 minutes with this patient today. All changes were made via collaborative practice agreement with Dr. Yennifer Carrizales. A copy of the visit note was provided to the patient's primary care provider.    The patient was given a summary of these recommendations. See Provider note/AVS from today.     Vanessa Holbrook, PharmD, [facility]       Medication Therapy Recommendations  Chronic post-traumatic headache, not intractable    Rationale: More effective medication available - Ineffective medication - Effectiveness   Recommendation: Referral to Service  - Refer to neurology   Status: Accepted - no CPA Needed         Colovesical fistula    Current Medication: oxyCODONE (ROXICODONE) 5 MG tablet   Rationale: Untreated condition - Needs additional medication therapy - Indication   Recommendation: Start Medication   Status: Accepted per Provider

## 2021-11-04 NOTE — PROGRESS NOTES
"      Hospitalization Follow-up Visit         [facility]       Hospital Follow-up Visit:    Hospital:  [facility]   Date of Admission: 10/27/21  Date of Discharge: 11/2/21  Reason(s) for Admission: Fever, Diverticulitis, colovesical fistuala            Problems taking medications regularly:  None       Post Discharge Medication Reconciliation: discharge medications reconciled, continue medications without change.       Problems adhering to non-medication therapy:  None - barriers include restricted insurance.        Medications reviewed by: by PharmD    Summary of hospitalization:  [facility] discharge summary reviewed  Diagnostic Tests/Treatments reviewed.  Follow up needed per 11/2 discharge summary:   \"Recommendations for outpatient provider   Specific recommendations to be addressed at the follow up visit:  1. Patient to remain on antibiotics for now, continue until fistula surgically treated  2. Colorectal surgery to likely arrange colonoscopy prior to surgical procedure  3. BMP within a week to track ATN (cr 1.9 at dc)  4. Follow with neurology as keppra has been discontinued\"    Other Healthcare Providers Involved in Patient s Care:         Surgical follow-up appointment - colorectal surgery - today  Update since discharge: stable. See below.   Plan of care communicated with patient             1) Headaches:   Patient reports history of headaches, has used triptans in the past.   Presents with an intermittent headache that has been present since his hospitalization.   Maybe less frequent, pt attributed it to being bacteremic when hospitalized.   Tylenol/ibuprofen not helping.     2) Colovesicular fistula:   Seeing colorectal today to discuss optimal timing for repair.   Taking antibiotics as prescribed - given 21 day at hospital discharge. Intended to continue until surgery per patient and discharge summary.   Patient has ongoing abdominal pain - was taking oxycodone 10mg every 4 hours in " "the hospital, wasn't able to get this at discharge because of insurance restrictions.   Agrees that he wants to start spacing this out, but also is anticipating being in pain until he is able to have surgery.     3) Seizures:   Keppra was discontinued during his hospital stay.   Video EEG was reviewed then - per patient neurologist said his episodes of seizure like activity were related to low blood pressure.   Pt concerned about this, but also acknowledges he has felt ill since starting Keppra, now wonders if it was his fistula/bacteremia making him ill.            Review of Systems:   Constitutional, HEENT, cardiovascular, pulmonary, gi and gu systems are negative, except as otherwise noted.              Physical Exam:     Vitals:    11/04/21 0945   BP: 116/72   Pulse: 60   Resp: 16   Temp: 98.1  F (36.7  C)   TempSrc: Oral   SpO2: 100%   Weight: 90 kg (198 lb 6.4 oz)   Height: 1.854 m (6' 0.99\")     Body mass index is 26.18 kg/m .    GENERAL:: healthy, alert and no distress  RESP: lungs clear to auscultation - no rales, no rhonchi, no wheezes  MS: extremities- no gross deformities noted, no edema  NEURO: strength and tone- normal, sensory exam- grossly normal, mentation- intact, speech- normal, reflexes- symmetric  PSYCH: Alert and oriented times 3; speech- coherent , normal rate and volume; able to articulate logical thoughts, able to abstract reason, no tangential thoughts, no hallucinations or delusions, affect- normal         Results:     Results from the last 24 hours   Results for orders placed or performed in visit on 11/04/21 (from the past 24 hour(s))   Basic metabolic panel   Result Value Ref Range    Sodium 135 133 - 144 mmol/L    Potassium 4.3 3.4 - 5.3 mmol/L    Chloride 105 94 - 109 mmol/L    Carbon Dioxide (CO2) 26 20 - 32 mmol/L    Anion Gap 4 3 - 14 mmol/L    Urea Nitrogen 25 7 - 30 mg/dL    Creatinine 1.50 (H) 0.66 - 1.25 mg/dL    Calcium 9.0 8.5 - 10.1 mg/dL    Glucose 92 70 - 99 mg/dL    GFR " Estimate 58 (L) >60 mL/min/1.73m2       Assessment and Plan      Rex was seen today for hospital f/u.    Diagnoses and all orders for this visit:    Colovesical fistula  Seeing colorectal today to make surgical plan.   We started oxycodone taper - plan for 5-10mg every 6 hours (previously every 4 hours).   2 weeks provided - appt on 11/18/21 already scheduled with PCP at which point we can determine needs.   Pt lives in supervised environment with medications distributed to him. UDS was negative when hospitalized, did not repeat today.   -     Discontinue: oxyCODONE (ROXICODONE) 5 MG tablet; Take 1-2 tablets (5-10 mg) by mouth every 6 hours as needed for pain  -     oxyCODONE (ROXICODONE) 5 MG tablet; Take 1-2 tablets (5-10 mg) by mouth every 6 hours as needed for pain    Partial symptomatic epilepsy with complex partial seizures, not intractable, without status epilepticus (H)  Will coordinate with neurology for follow-up given he has discontinued Keppra.     Chronic post-traumatic headache, not intractable  Did not prescribe triptans today as this can lower seizure threshold and given he is off his Keppra currently, did not think this would be safe.   No additional medications today, hope that they improve once his overall health improves.     ATN (acute tubular necrosis) (H)  Repeat BMP today to trend renal function.   -     Basic metabolic panel; Future  -     Basic metabolic panel            E&M code to be billed if TCM cannot be: 80652    Type of decision making: Moderate complexity (23833)    Options for treatment and follow-up care were reviewed with the patient  Rex Negrete   engaged in the decision making process and verbalized understanding of the options discussed and agreed with the final plan.      Yennifer Carrizales DO

## 2021-11-04 NOTE — PROGRESS NOTES
Benadryl for headaches   reglan   Almost every day for headaches, takes medications once per week   Sensitivity to sounds, head hurts all over   Migraines 2017 first TBI (maybe sumatriptan )     Keppra stopped discontinued   Didn't like how he felt (fatigued, lethargic)   Hasn't been on Keppra in the past   Has appt with neurology 11/10     Thirsty and run-down     Oxy every 4 hours (10mg) in the hospital   Has been cleared by housing     Has lost weight recently (loss of appetite)

## 2021-11-04 NOTE — Clinical Note
Lion Flowers,    I saw Rex for a hospital follow-up last week (colovesicular fistula) and wanted to keep you in the loop.   He's experiencing chronic headaches and there seems to be some confusion about his seizure disorder diagnosis and whether or not he needs to be on Keppra. Thankfully he already had neurology follow-up scheduled (for today actually!), so these issues were deferred to them for more accurate assessment.     Let me know if you have any questions!  Vanessa Holbrook, PharmD, BCACP

## 2021-11-04 NOTE — PATIENT INSTRUCTIONS
[facility] Restricted Recipient Phone Number - [phone]   - Call in order to ask for your Pharmacy to be changed to somewhere closer to where you are currently living.

## 2021-11-05 ENCOUNTER — HOSPITAL ENCOUNTER (INPATIENT)
Facility: CLINIC | Age: 41
Setting detail: SURGERY ADMIT
End: 2021-11-05
Attending: COLON & RECTAL SURGERY | Admitting: COLON & RECTAL SURGERY
Payer: COMMERCIAL

## 2021-11-05 DIAGNOSIS — N32.1 COLOVESICAL FISTULA: ICD-10-CM

## 2021-11-05 DIAGNOSIS — Z11.59 ENCOUNTER FOR SCREENING FOR OTHER VIRAL DISEASES: ICD-10-CM

## 2021-11-10 ENCOUNTER — OFFICE VISIT (OUTPATIENT)
Dept: NEUROLOGY | Facility: CLINIC | Age: 41
End: 2021-11-10
Attending: PSYCHIATRY & NEUROLOGY
Payer: COMMERCIAL

## 2021-11-10 DIAGNOSIS — R41.9 NEUROCOGNITIVE DISORDER: ICD-10-CM

## 2021-11-10 DIAGNOSIS — F43.10 POSTTRAUMATIC STRESS DISORDER: ICD-10-CM

## 2021-11-10 DIAGNOSIS — F43.23 ADJUSTMENT DISORDER WITH MIXED ANXIETY AND DEPRESSED MOOD: ICD-10-CM

## 2021-11-10 NOTE — PROGRESS NOTES
The patient was seen for a neuropsychological evaluation for the purposes of diagnostic clarification and treatment planning. 55 minutes of face-to-face testing were provided by this writer.The patient was cooperative with testing. No concerns were brought to my attention. Please see Dr. Mckeon's report for a detailed description of the charges and interpretation and integration of the findings.

## 2021-11-10 NOTE — PROGRESS NOTES
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 the pandemic and then he moved, so he left the job. Shortly thereafter he had his cardiac arrest and he has been unable to work since. He has a  who is helping him apply for SSDI. He is waiting to hear back about the decision. Prior to CD treatment, he lived in a town home with his wife.    TESTS ADMINISTERED:   Wechsler Memory Scale-III (WMS-III) select subtests, TOMM, Wechsler Adult Intelligence Scale-IV (WAIS-IV) select subtests, Wide Range Achievement Test-4 (WRAT-4) select subtests, Wechsler Memory Scale-IV (WMS-IV) select subtests, [place] Verbal Learning Test-II (CVLT-II), Trailmaking Test (Trails A & B), Helen Donovan Executive Functioning System (DKEFS) select subtests,  FAS and Animal Fluency, Freeman Naming Test-2 (BNT-2), Crhistophe-Osterrieth Complex Figure Test (RCFT),  [place] Card Sorting Test-1 deck (WCST-64), Generalized Anxiety Disorder-7 Assessment (JAIR-7) and Tapia Depression Inventory-II (BDI-II).    [facility] norms were used for BNT, FAS & Animal Fluency, Trail Making Test A & B     DESCRIPTIVE PERFORMANCE KEY:    Labels for tests with Normal Distributions  Score Label Standard Score %ile Rank   Exceptionally high score  > 130  > 98   Above average score 120-129 91-97   High average score 110-119 75-90   Average score  25-74   Low average score 80-89 9-24   Below average score 70-79 2-8   Exceptionally low score < 70 < 2     Labels for tests with Non-Normal Distributions  Score Label %ile Rank   Within normal expectations/ limits score (WNL) > 24   Low average score 9-24   Below average score 2-8   Exceptionally low score < 2     The following test results utilize score labels as adapted from Jovani Mendoza, Heath Duarte, Faye Sevilla, SHON Pollard, Jessi Lopez, Andrews Benson, Viktor Knight & Conference Participatnts (2020): American Academy of Clinical Neuropsychology consensus conference statement on uniform labeling of performance test scores, The Clinical Neuropsychologist, DOI: [email or web address]. All scores contain some measure of error; scores are reported here as they are obtained by the individual (without reference to the range of error). These are meant as labels and not interpretation of performance. While other relevant comments regarding task performance are provided below, please see the Summary, Impressions, and Diagnosis sections of this report for interpretation of the scores and the cognitive profile as a whole, including what does and does not constitute impairment for this particular individual.    COVID-19-ERA NEUROPSYCHOLOGY LIMITATIONS:  Due to the ongoing COVID-19 pandemic, this assessment was conducted with the examiner wearing [employer]-designated PPE and the patient wearing a face mask. The standard administration of these procedures involves direct face-to-face methods. The impact of applying non-standard administration methods has been evaluated only in part by scientific research. While every effort was made to simulate standard assessment practices, the diagnostic conclusions and recommendations for treatment provided in this report are being  advanced with these limitations in mind.    BEHAVIORAL OBSERVATIONS:   Mr. Negrete arrived on time and accompanied by his wife to today's appointment. He was appropriately dressed and groomed. He appeared alert and engaged. No vision or hearing difficulties were observed. Conversational speech was marked by an occasional stutter but otherwise of normal rate, volume, and prosody. No word-finding pauses or paraphasias were noted. His thought process appeared linear and goal-directed. No hallucinations or delusions were apparent. Judgment and insight appeared intact. His mood was dysthymic and anxious and his affect was appropriately reactive. Rapport was easily established and eye contact was appropriate.     During the testing session, Mr. Negrete was pleasant and cooperative throughout the evaluation. He became increasingly fatigued and the testing was ultimately discontinued for the day. He returned two days later to complete the testing. On both testing sessions, he understood test instructions without difficulty. No frontal signs were observed behaviorally. Mr. Negrete appeared adequately motivated and engaged easily during testing. His scores on both stand-alone and embedded measures of performance validity were in the valid range. Overall, the following results are considered a reasonably valid estimation of his current cognitive abilities.    OPTIMAL PREMORBID INTELLECT:  Optimal premorbid intellectual abilities were estimated as falling in the average range based on Mr. Negrete's educational and occupational histories and performance on tasks least likely to be affected by acquired brain dysfunction.    SUMMARY OF TEST RESULTS:  ORIENTATION. Performance on a mental status exam, assessing orientation to personal and current information, resulted in a within normal limits score. He was oriented to person, place, time, and date and was able to correctly name the current and previous  presidents.    INTELLECTUAL ABILITY. The patient performed in the average range of overall intellectual ability on the WAIS-IV (pro-rated FSIQ = 90), with evenly developed verbal vs. visuospatial skills.     ATTENTION/WORKING MEMORY. On the WAIS-IV Working Memory Index, the patient's score was average (WMI = 95). Specifically, his score on a measure sensitive to sustained auditory-verbal attention and concentration (WAIS-IV Digit Span) was classified as average, as he was able to recite up to 8 digits forward (a high average score), up to 4 digits backward (an average score), and up to 6 digits in sequence (an average score). On a test of mental arithmetic, his score was average (WAIS-IV Arithmetic).     PROCESSING SPEED. On the WAIS-IV Processing Speed Index, the patient's score was average (PSI = 94), with low average speeded digit-symbol coding (Coding), and average speeded visual scanning/cancellation (Symbol Search). On a test of complex concentration that requires speeded numeric sequencing (Trails A), the patient's score was low average for completion time and without error. On the DKEFS Color-Word Interference Test, speeded color naming was low average, and speeded word reading was also low average.     LANGUAGE PROCESSING. Language comprehension appeared intact. The WAIS-IV Verbal Comprehension Index was average (pro-rated VCI = 95), with an average score on a measure of verbal abstract reasoning (Similarities), and an average score on a fund of information task (Information). Confrontation naming was measured as a within normal limits score (55/60; BNT-2). The patient's performance on a test of phonemic fluency resulted in a low average score (FAS), and his semantic fluency was exceptionally low (Animals).     VISUOSPATIAL/CONSTRUCTIONAL SKILLS. The WAIS-IV Perceptual Reasoning Index was measured as an average score (pro-rated STEPHANIE = 94), with average nonverbal abstract reasoning (Matrix Reasoning), and  average visuoconstruction with three-dimensional blocks (Block Design). Copy of a complex geometric figure was below expectation (due to very minor imprecision) but well-organized in approach (RCFT Copy).       LEARNING/MEMORY. On the WMS-IV Logical Memory subtest, immediate memory for two paragraph-length stories resulted in a below average score. After a 20-minute delay, the patient's score on the delayed recall trial was exceptionally low with a 25% retention rate. On the recognition trial, the patient's score was classified as exceptionally low.    On a 16-item verbal list-learning task (CVLT-II), the patient acquired up to 11 words (69%) of the word list by the 5th and final learning trial (raw scores over trials = 6, 7, 9, 11, 9). Total learning acquisition was measured as a low average score. Following a distractor list, the patient recalled 6 items, which was below average. The patient benefited only slightly from semantic cues (8 items; below average). After a 20-minute delay, the patient recalled 3 items, which was exceptionally low. He did not benefit from semantic cues at that point (3 items; exceptionally low). Recognition discriminability was measured as  below average, as he recognized 11/16 items (exceptionally low) and made only 2 false-positive errors (average).    On a visual memory measure (WMS-IV Visual Reproduction), immediate recall of simple geometric figures was low average, while delayed recall of the figures was average (with a 79% retention rate). Delayed recognition of the figures was average.    EXECUTIVE FUNCTIONS. Concept identification and the ability to generate alternative problem solving strategies was within normal limits for age on the [place] Card Sorting Test. He completed 5 out of 3 categories (intact) with a total of only 12 errors, which is average. Only six of his errors were perseverative (average) and there were 0 failures to maintain set. On a test that requires  speeded alpha-numeric sequencing/cognitive set-shifting (Trails B), performance was low average and without error. Verbal and nonverbal abstract reasoning were both average (WAIS-IV Similarities and Matrix Reasoning). Phonemic fluency was low average (FAS).      MOOD. On the BDI-II a self report measure of depressive symptomatology, he obtained a score of 18, placing him in the range of mild depression. He denied suicidal ideation. On the JAIR-7, a self-report measure of anxiety, he obtained a score of 3,  placing him in the range of minimal anxiety.    ____________________________________________________________________________________    SERVICES PROVIDED & TIME:  On-site Office Visit Details   A clinical interview/neurobehavioral status examination was conducted with the patient and documented. I thoroughly reviewed the medical record, selected the neuropsychological test battery, provided supervision to the trained examiner/technician, interpreted/integrated patient data and test results, and engaged in clinical decision making, treatment planning, report writing/preparation and provision of interactive feedback of test results on 12/6/2021. A trained examiner/technician administered and scored the neuropsychological tests (2+ tests).  Please see below for a breakdown of time spent and the associated codes billed for these services.  Services   Time Spent  CPT Codes   Neurobehavioral Status Exam:  (e.g., clinical interview and neurobehavioral status exam, interpretation, report)   92 minutes   1 x 96116  1 x 96121   Neuropsychological Evaluation Services:   (e.g., integration, interpretation, treatment planning, clinical decision making, feedback via telehealth)   300 minutes   1 x 96132  4 x 96133   Neuropsychological Testing by Trained Examiner/Technician:  (e.g., test administration, scoring, 2+ tests administered)   225 minutes   1 x 96138  6 x 96139   For diagnostic and coding purposes, Mr. Negrete has a  history of complicated TBI in 2017, cardiac arrest with anoxia in May 2021, secondary cardiomyopathy, syncope, radiculopathy, colovesical fistula, and history of polysubstance abuse and seizures. He was referred for an evaluation of mild neurocognitive disorder. Please note, all charges are filed at the completion of the Episode of Care and associated with the final encounter date (feedback session on 12/6/2021).

## 2021-11-10 NOTE — LETTER
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 were denied. He worked various jobs, including HVAC, brick laying, and as a . From 2018 to 2020 he worked as a  for an [employer] company. Unfortunately, work slowed down during the pandemic and then he moved, so he left the job. Shortly thereafter he had his cardiac arrest and he has been unable to work since. He has a  who is helping him apply for SSDI. He is waiting to hear back about the decision. Prior to CD treatment, he lived in a town home with his wife.    TESTS ADMINISTERED:   Wechsler Memory Scale-III (WMS-III) select subtests, TOMM, Wechsler Adult Intelligence Scale-IV (WAIS-IV) select subtests, Wide Range Achievement Test-4 (WRAT-4) select subtests, Wechsler Memory Scale-IV (WMS-IV) select subtests, [place] Verbal Learning Test-II (CVLT-II), Trailmaking Test (Trails A & B), Helen Donovan Executive Functioning System (DKEFS) select subtests,  FAS and Animal Fluency, Morton Naming Test-2 (BNT-2), Christophe-Osterrieth Complex Figure Test (RCFT),  [place] Card Sorting Test-1 deck (WCST-64), Generalized Anxiety Disorder-7 Assessment (JAIR-7) and Tapia " Depression Inventory-II (BDI-II).    [facility] norms were used for BNT, FAS & Animal Fluency, Trail Making Test A & B     DESCRIPTIVE PERFORMANCE KEY:    Labels for tests with Normal Distributions  Score Label Standard Score %ile Rank   Exceptionally high score  > 130 > 98   Above average score 120-129 91-97   High average score 110-119 75-90   Average score  25-74   Low average score 80-89 9-24   Below average score 70-79 2-8   Exceptionally low score < 70 < 2     Labels for tests with Non-Normal Distributions  Score Label %ile Rank   Within normal expectations/ limits score (WNL) > 24   Low average score 9-24   Below average score 2-8   Exceptionally low score < 2     The following test results utilize score labels as adapted from Jovani Mendoza, Heath Duarte, Faye Sevilla, SHON Pollard, Jessi Lopez, Andrews Benson, Viktor Knight & Conference Participatnts (2020): American Academy of Clinical Neuropsychology consensus conference statement on uniform labeling of performance test scores, The Clinical Neuropsychologist, DOI: 10.1080/[email or web address]. All scores contain some measure of error; scores are reported here as they are obtained by the individual (without reference to the range of error). These are meant as labels and not interpretation of performance. While other relevant comments regarding task performance are provided below, please see the Summary, Impressions, and Diagnosis sections of this report for interpretation of the scores and the cognitive profile as a whole, including what does and does not constitute impairment for this particular individual.    COVID-19-ERA NEUROPSYCHOLOGY LIMITATIONS:  Due to the ongoing COVID-19 pandemic, this assessment was conducted with the examiner wearing Urlist Gilman-designated PPE and the patient wearing a face mask. The standard administration of these procedures involves direct face-to-face methods. The impact of  applying non-standard administration methods has been evaluated only in part by scientific research. While every effort was made to simulate standard assessment practices, the diagnostic conclusions and recommendations for treatment provided in this report are being advanced with these limitations in mind.    BEHAVIORAL OBSERVATIONS:   Mr. Negrete arrived on time and accompanied by his wife to today's appointment. He was appropriately dressed and groomed. He appeared alert and engaged. No vision or hearing difficulties were observed. Conversational speech was marked by an occasional stutter but otherwise of normal rate, volume, and prosody. No word-finding pauses or paraphasias were noted. His thought process appeared linear and goal-directed. No hallucinations or delusions were apparent. Judgment and insight appeared intact. His mood was dysthymic and anxious and his affect was appropriately reactive. Rapport was easily established and eye contact was appropriate.     During the testing session, Mr. Negrete was pleasant and cooperative throughout the evaluation. He became increasingly fatigued and the testing was ultimately discontinued for the day. He returned two days later to complete the testing. On both testing sessions, he understood test instructions without difficulty. No frontal signs were observed behaviorally. Mr. Negrete appeared adequately motivated and engaged easily during testing. His scores on both stand-alone and embedded measures of performance validity were in the valid range. Overall, the following results are considered a reasonably valid estimation of his current cognitive abilities.    OPTIMAL PREMORBID INTELLECT:  Optimal premorbid intellectual abilities were estimated as falling in the average range based on Mr. Negrete's educational and occupational histories and performance on tasks least likely to be affected by acquired brain dysfunction.    SUMMARY OF TEST RESULTS:  ORIENTATION.  Performance on a mental status exam, assessing orientation to personal and current information, resulted in a within normal limits score. He was oriented to person, place, time, and date and was able to correctly name the current and previous presidents.    INTELLECTUAL ABILITY. The patient performed in the average range of overall intellectual ability on the WAIS-IV (pro-rated FSIQ = 90), with evenly developed verbal vs. visuospatial skills.     ATTENTION/WORKING MEMORY. On the WAIS-IV Working Memory Index, the patient's score was average (WMI = 95). Specifically, his score on a measure sensitive to sustained auditory-verbal attention and concentration (WAIS-IV Digit Span) was classified as average, as he was able to recite up to 8 digits forward (a high average score), up to 4 digits backward (an average score), and up to 6 digits in sequence (an average score). On a test of mental arithmetic, his score was average (WAIS-IV Arithmetic).     PROCESSING SPEED. On the WAIS-IV Processing Speed Index, the patient's score was average (PSI = 94), with low average speeded digit-symbol coding (Coding), and average speeded visual scanning/cancellation (Symbol Search). On a test of complex concentration that requires speeded numeric sequencing (Trails A), the patient's score was low average for completion time and without error. On the DKEFS Color-Word Interference Test, speeded color naming was low average, and speeded word reading was also low average.     LANGUAGE PROCESSING. Language comprehension appeared intact. The WAIS-IV Verbal Comprehension Index was average (pro-rated VCI = 95), with an average score on a measure of verbal abstract reasoning (Similarities), and an average score on a fund of information task (Information). Confrontation naming was measured as a within normal limits score (55/60; BNT-2). The patient's performance on a test of phonemic fluency resulted in a low average score (FAS), and his semantic  fluency was exceptionally low (Animals).     VISUOSPATIAL/CONSTRUCTIONAL SKILLS. The WAIS-IV Perceptual Reasoning Index was measured as an average score (pro-rated STEPHANIE = 94), with average nonverbal abstract reasoning (Matrix Reasoning), and average visuoconstruction with three-dimensional blocks (Block Design). Copy of a complex geometric figure was below expectation (due to very minor imprecision) but well-organized in approach (RCFT Copy).       LEARNING/MEMORY. On the WMS-IV Logical Memory subtest, immediate memory for two paragraph-length stories resulted in a below average score. After a 20-minute delay, the patient's score on the delayed recall trial was exceptionally low with a 25% retention rate. On the recognition trial, the patient's score was classified as exceptionally low.    On a 16-item verbal list-learning task (CVLT-II), the patient acquired up to 11 words (69%) of the word list by the 5th and final learning trial (raw scores over trials = 6, 7, 9, 11, 9). Total learning acquisition was measured as a low average score. Following a distractor list, the patient recalled 6 items, which was below average. The patient benefited only slightly from semantic cues (8 items; below average). After a 20-minute delay, the patient recalled 3 items, which was exceptionally low. He did not benefit from semantic cues at that point (3 items; exceptionally low). Recognition discriminability was measured as  below average, as he recognized 11/16 items (exceptionally low) and made only 2 false-positive errors (average).    On a visual memory measure (WMS-IV Visual Reproduction), immediate recall of simple geometric figures was low average, while delayed recall of the figures was average (with a 79% retention rate). Delayed recognition of the figures was average.    EXECUTIVE FUNCTIONS. Concept identification and the ability to generate alternative problem solving strategies was within normal limits for age on the  [place] Card Sorting Test. He completed 5 out of 3 categories (intact) with a total of only 12 errors, which is average. Only six of his errors were perseverative (average) and there were 0 failures to maintain set. On a test that requires speeded alpha-numeric sequencing/cognitive set-shifting (Trails B), performance was low average and without error. Verbal and nonverbal abstract reasoning were both average (WAIS-IV Similarities and Matrix Reasoning). Phonemic fluency was low average (FAS).      MOOD. On the BDI-II a self report measure of depressive symptomatology, he obtained a score of 18, placing him in the range of mild depression. He denied suicidal ideation. On the JAIR-7, a self-report measure of anxiety, he obtained a score of 3,  placing him in the range of minimal anxiety.    ____________________________________________________________________________________    SERVICES PROVIDED & TIME:  On-site Office Visit Details   A clinical interview/neurobehavioral status examination was conducted with the patient and documented. I thoroughly reviewed the medical record, selected the neuropsychological test battery, provided supervision to the trained examiner/technician, interpreted/integrated patient data and test results, and engaged in clinical decision making, treatment planning, report writing/preparation and provision of interactive feedback of test results on 12/6/2021. A trained examiner/technician administered and scored the neuropsychological tests (2+ tests).  Please see below for a breakdown of time spent and the associated codes billed for these services.  Services   Time Spent  CPT Codes   Neurobehavioral Status Exam:  (e.g., clinical interview and neurobehavioral status exam, interpretation, report)   92 minutes   1 x 96116  1 x 96121   Neuropsychological Evaluation Services:   (e.g., integration, interpretation, treatment planning, clinical decision making, feedback via telehealth)   300 minutes    1 x 96132  4 x 96133   Neuropsychological Testing by Trained Examiner/Technician:  (e.g., test administration, scoring, 2+ tests administered)   225 minutes   1 x 96138  6 x 96139   For diagnostic and coding purposes, Mr. Negrete has a history of complicated TBI in 2017, cardiac arrest with anoxia in May 2021, secondary cardiomyopathy, syncope, radiculopathy, colovesical fistula, and history of polysubstance abuse and seizures. He was referred for an evaluation of mild neurocognitive disorder. Please note, all charges are filed at the completion of the Episode of Care and associated with the final encounter date (feedback session on 12/6/2021).         The patient was seen for a neuropsychological evaluation for the purposes of diagnostic clarification and treatment planning. 55 minutes of face-to-face testing were provided by this writer.The patient was cooperative with testing. No concerns were brought to my attention. Please see Dr. Mckeon's report for a detailed description of the charges and interpretation and integration of the findings.    The patient was seen for a neuropsychological evaluation for the purposes of diagnostic clarification and treatment planning. An additional 120 minutes were spent scoring and compiling test results. Please see Dr. Mckeon's report for a detailed description of the charges and interpretation and integration of the findings.      Again, thank you for allowing me to participate in the care of your patient.        Sincerely,        Carola Mckeon Psy.D, LP

## 2021-11-11 ENCOUNTER — OFFICE VISIT (OUTPATIENT)
Dept: NEUROLOGY | Facility: CLINIC | Age: 41
End: 2021-11-11
Payer: COMMERCIAL

## 2021-11-11 ENCOUNTER — TELEPHONE (OUTPATIENT)
Dept: NEUROLOGY | Facility: CLINIC | Age: 41
End: 2021-11-11

## 2021-11-11 VITALS
BODY MASS INDEX: 26.82 KG/M2 | WEIGHT: 198 LBS | HEIGHT: 72 IN | DIASTOLIC BLOOD PRESSURE: 67 MMHG | HEART RATE: 63 BPM | SYSTOLIC BLOOD PRESSURE: 102 MMHG

## 2021-11-11 DIAGNOSIS — N32.1 COLOVESICAL FISTULA: ICD-10-CM

## 2021-11-11 DIAGNOSIS — G40.209 PARTIAL SYMPTOMATIC EPILEPSY WITH COMPLEX PARTIAL SEIZURES, NOT INTRACTABLE, WITHOUT STATUS EPILEPTICUS (H): ICD-10-CM

## 2021-11-11 DIAGNOSIS — G62.9 NEUROPATHY: ICD-10-CM

## 2021-11-11 DIAGNOSIS — M54.16 LUMBAR RADICULOPATHY: Primary | ICD-10-CM

## 2021-11-11 PROBLEM — F19.11 HX OF SUBSTANCE ABUSE (H): Status: ACTIVE | Noted: 2021-10-27

## 2021-11-11 PROBLEM — N17.9 AKI (ACUTE KIDNEY INJURY) (H): Status: ACTIVE | Noted: 2021-10-27

## 2021-11-11 PROCEDURE — 99214 OFFICE O/P EST MOD 30 MIN: CPT | Performed by: PSYCHIATRY & NEUROLOGY

## 2021-11-11 RX ORDER — OXYCODONE HYDROCHLORIDE 5 MG/1
5-10 TABLET ORAL EVERY 6 HOURS PRN
Qty: 56 TABLET | Refills: 0 | Status: SHIPPED | OUTPATIENT
Start: 2021-11-11 | End: 2021-11-22

## 2021-11-11 RX ORDER — DIVALPROEX SODIUM 500 MG/1
500 TABLET, EXTENDED RELEASE ORAL AT BEDTIME
Qty: 30 TABLET | Refills: 11 | Status: SHIPPED | OUTPATIENT
Start: 2021-11-11 | End: 2021-11-11

## 2021-11-11 RX ORDER — PREGABALIN 50 MG/1
50 CAPSULE ORAL 3 TIMES DAILY
Qty: 90 CAPSULE | Refills: 5 | Status: CANCELLED | OUTPATIENT
Start: 2021-11-11

## 2021-11-11 RX ORDER — DIVALPROEX SODIUM 500 MG/1
500 TABLET, EXTENDED RELEASE ORAL AT BEDTIME
Qty: 30 TABLET | Refills: 11 | Status: SHIPPED | OUTPATIENT
Start: 2021-11-11 | End: 2022-01-13

## 2021-11-11 ASSESSMENT — MIFFLIN-ST. JEOR: SCORE: 1851.12

## 2021-11-11 NOTE — TELEPHONE ENCOUNTER
Rx sent for additional 7 day supply to last until appointment.   My hope is patient is self-weaning and not using full 8 tablets per day (as was our plan last visit).     Will route to PCP who is seeing patient on 11/18 to clarify and confirm Rex's opiate needs at next appointment.     Yennifer Carrizales,

## 2021-11-11 NOTE — TELEPHONE ENCOUNTER
New prescription for Depakote sent to pharmacy as patient is restricted to me as prescriber.     Yennifer Carrizales DO

## 2021-11-11 NOTE — TELEPHONE ENCOUNTER
----- Message from Ion Abel MD sent at 11/4/2021  6:46 PM CDT -----  Regarding: FW: [place] patient, anti-epiletpic discontinued during hospitalization  Please add him on to my schedule in the next 1 week. Thanks  ----- Message -----  From: Yennifer Carrizales DO  Sent: 11/4/2021   5:03 PM CDT  To: Ion Abel MD  Subject: [place] patient, anti-epiletpic discontinued #    Hi Dr. Abel     Rex Negrete is a mutual patient whom  you recently started on Keppra.   This was discontinued by neurology during a recent hospital stay at [facility] for acute renal failure and bacteremia due to colovesical fistula.    Can he get sooner follow-up than December with you or your team to discuss this further?     Thanks,   [facility]
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
## 2021-11-11 NOTE — TELEPHONE ENCOUNTER
RN attempted to reach patient to discuss request- LM with call back number. Please transfer to any available RN when he calls back.   Sarahi Figueroa, RN      Patient should have multiple refills of Lyrica at [facility] as Dr. Forbes sent in prescription on 9/22/21 with 5 refills. Patient was prescribed 80 tablets of oxycodone by Dr. Carrizales on 11/4/21- if taking as directed 1-2 tabs every 6 hours should not be out until 11/14/21. Has follow up scheduled with PCP 11/18/21.

## 2021-11-11 NOTE — TELEPHONE ENCOUNTER
Patient called back and stated that insurance only filled the oxycodone RX for 7 days. Patient also stated he needs the Depakote ER 500mg tablets filled by Dr. Carrizales. The patient would like a call back.  Shanti De CMA

## 2021-11-11 NOTE — TELEPHONE ENCOUNTER
Message reviewed.   We had decided together that he would self-wean his oxycodone and planned for the 80 tabs to last until his PCP appointment on 11/18/21.     Yennifer Carrizales DO

## 2021-11-11 NOTE — TELEPHONE ENCOUNTER
Rex Briceño Dr. was here today and Dr. Abel switched him to Depakote XR 500mg at bedtime but Rex states that this needs to be cleared from you. Is there anything you can do to help with this process so he can pick it up from [facility]?

## 2021-11-11 NOTE — PATIENT INSTRUCTIONS
Patient Education     Back Care Tips     Caring for your back  These are things you can do to prevent a recurrence of acute back pain and to reduce symptoms from chronic back pain:    Stay at a healthy weight. If you are overweight, losing weight will help most types of back pain.    Exercise is an important part of recovery from most types of back pain. The muscles behind and in front of the spine support the back. This means strengthening both the back muscles and the abdominal muscles will provide better support for your spine.     Swimming and brisk walking are good overall exercises to improve your fitness level.    Practice safe lifting methods (see below).    Practice good posture when sitting, standing, and walking. Don't sit for a long time. This puts more stress on the lower back than standing or walking.    Wear quality shoes with good arch support. Foot and ankle alignment can affect back symptoms. Don't wear high heels.    Therapeutic massage can help relax the back muscles without stretching them.    During the first 24 to 72 hours after an acute injury or flare-up of chronic back pain, put an ice pack on the painful area for 20 minutes and then remove it for 20 minutes. Do thisover a period of 60 to 90 minutes, or several times a day. As a safety precaution, don't use a heating pad at bedtime. Sleeping on a heating pad can lead to skin burns or tissue damage.    You can alternate using ice and heat.  Medicines  Talk with your healthcare provider before using medicines, especially if you have other health problems or are taking other medicines.    You may use over-the-counter medicines, such as acetaminophen, ibuprofen, or naprosyn to control pain, unless your healthcare provider prescribed other pain medicine. Talk with your healthcare provider before taking any medicines if you have a chronic condition such as diabetes, liver or kidney disease, stomach ulcers, or digestive bleeding, or are taking  blood thinners.    Be careful if you are given prescription pain medicines, opioids, or medicine for muscle spasm. They can cause drowsiness, and affect your coordination, reflexes, and judgment. Don't drive or operate heavy machinery while taking these types of medicines. Take prescription pain medicine only as prescribed by your healthcare provider.  Lumbar stretch  This simple stretch will help relax muscle spasm and keep your back more limber. If exercise makes your back pain worse, don t do it.    Lie on your back with your knees bent and both feet on the ground.    Slowly raise your left knee to your chest as you flatten your lower back against the floor. Hold for 5 seconds.    Relax and repeat the exercise with your right knee.    Do 10 of these exercises for each leg.  Safe lifting method    Don t bend over at the waist to lift an object off the floor.  Instead, bend your knees and hips in a squat.     Keep your back and head upright    Hold the object close to your body, directly in front of you.    Straighten your legs to lift the object.     Lower the object to the floor in the reverse fashion.    If you must slide something across the floor, push it.    Posture tips  Sitting  Sit in chairs with straight backs or low-back support. Keep your knees lower than your hips, with your feet flat on the floor.  When driving, sit up straight. Adjust the seat forward so you are not leaning toward the steering wheel.  A small pillow or rolled towel behind your lower back may help if you are driving long distances.   Standing  When standing for long periods, shift most of your weight to one leg at a time. Switch legs every few minutes.   Sleeping  The best way to sleep is on your side with your knees bent. Put a low pillow under your head to support your neck in a neutral spine position. Don't use thick pillows that bend your neck to one side. Put a pillow between your legs to further relax your lower back. If you  sleep on your back, put pillows under your knees to support your legs in a slightly flexed position. Use a firm mattress. If your mattress sags, replace it, or use a 1/2-inch plywood board under the mattress to add support.  Follow-up care  Follow up with your healthcare provider, or as advised.  If X-rays, a CT scan or an MRI scan were taken, they may be reviewed by a radiologist. You will be told of any new findings that may affect your care.  Call 911  Call 911 if any of the following occur:    Trouble breathing    Confusion    Very drowsy    Fainting or loss of consciousness    Rapid or very slow heart rate    Loss of  bowel or bladder control  When to seek medical advice  Call your healthcare provider right away if any of the following occur:    Pain becomes worse or spreads to your arms or legs    Weakness or numbness in one or both arms or legs    Numbness in the groin area  [employer] last reviewed this educational content on 11/1/2019 2000-2021 [employer]. All rights reserved. This information is not intended as a substitute for professional medical care. Always follow your healthcare professional's instructions.
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
## 2021-11-11 NOTE — NURSING NOTE
Chief Complaint   Patient presents with     Seizure-like activity     Discuss Keppra as was discontinued in hospital      Neurocognitive disorder     Neuropsych pending      Elmira Frye CMA on 11/11/2021 at 1:33 PM

## 2021-11-11 NOTE — TELEPHONE ENCOUNTER
RN updated patient regarding refill status, patient appreciative of callback..     Gudelia Davis RN

## 2021-11-11 NOTE — TELEPHONE ENCOUNTER
[facility] phone call message- patient requesting a refill:    Full Medication Name: Lyrica and oxycodone        Pharmacy confirmed as   [id number] - [place], [place] - [address]  [address]  [facility] [address]  Phone: [phone] Fax: [phone]  : Yes    Additional Comments: Patient is out of medication and would like to request the oxycodone script for 7 days due to restrictions on insurance. RX needs to be signed by Dr Carrizales due to insurance restrictions     OK to leave a message on voice mail? Yes    Primary language: English      needed? No    Call taken on November 11, 2021 at 1:22 PM by Shanti De CMA

## 2021-11-11 NOTE — TELEPHONE ENCOUNTER
Dr. Bahena verified in  patient only given 1 week supply of oxycodone per insurance. Dr. Carrizales has refilled the Depakote 500 mg. Lyrica should have multiple refill son file with pharmacy. Will route back to Dr. Carrizales to send additional 1 week supply of oxycodone to last unitl appointment on 11/18/21 and then RN can contact patient to inform him.   Sarahi Figueroa RN

## 2021-11-12 ENCOUNTER — OFFICE VISIT (OUTPATIENT)
Dept: NEUROLOGY | Facility: CLINIC | Age: 41
End: 2021-11-12
Payer: COMMERCIAL

## 2021-11-12 DIAGNOSIS — G40.209 PARTIAL SYMPTOMATIC EPILEPSY WITH COMPLEX PARTIAL SEIZURES, NOT INTRACTABLE, WITHOUT STATUS EPILEPTICUS (H): Primary | ICD-10-CM

## 2021-11-12 NOTE — LETTER
11/12/2021         RE: Rex Negrete  [address]  [place] [address]        Dear Colleague,    Thank you for referring your patient, Rex Negrete, to the [facility]. Please see a copy of my visit note below.    The patient was seen for a neuropsychological evaluation for the purposes of diagnostic clarification and treatment planning. 80 minutes of face-to-face testing were provided by this writer.The patient was cooperative with testing. No concerns were brought to my attention. Please see Dr. Mckeon's report for a detailed description of the charges and interpretation and integration of the findings.     NEUROPSYCHOLOGICAL PROGRESS NOTE    NAME: Rex Negrete  YOB: 1980     DATE OF EVALUATION: 11/12/2021      Rex Negrete is a 41 year old male who was referred for a cognitive evaluation by his neurologist. Mr. Negrete already completed the clinical interview and a portion of the neuropsychological testing on 11/10/2021. He returns for another visit today in order to complete the testing portion of the appointment. He was administered a reasonably comprehensive neuropsychological battery. He was cooperative during testing and test results appear valid. Please refer to the note by trained examiner/technician from today's date for further details of today's visit. Test results are currently still being compiled and scored; therefore, impressions and findings will be provided in a forthcoming report.(typically posted within 1-2 weeks from today's date).       Carola Mckeon PsyD, LP  Licensed Clinical Neuropsychologist  [facility] Neurology [address], [address]  Phone: [phone]          Again, thank you for allowing me to participate in the care of your patient.        Sincerely,        Tika Friedman.ALVIN, CARLEY

## 2021-11-12 NOTE — PROGRESS NOTES
The patient was seen for a neuropsychological evaluation for the purposes of diagnostic clarification and treatment planning. 80 minutes of face-to-face testing were provided by this writer.The patient was cooperative with testing. No concerns were brought to my attention. Please see Dr. Mckeon's report for a detailed description of the charges and interpretation and integration of the findings.

## 2021-11-15 NOTE — PROGRESS NOTES
NEUROPSYCHOLOGICAL PROGRESS NOTE    NAME: Rex Negrete  YOB: 1980     DATE OF EVALUATION: 11/12/2021      Rex Negrete is a 41 year old male who was referred for a cognitive evaluation by his neurologist. Mr. Negrete already completed the clinical interview and a portion of the neuropsychological testing on 11/10/2021. He returns for another visit today in order to complete the testing portion of the appointment. He was administered a reasonably comprehensive neuropsychological battery. He was cooperative during testing and test results appear valid. Please refer to the note by trained examiner/technician from today's date for further details of today's visit. Test results are currently still being compiled and scored; therefore, impressions and findings will be provided in a forthcoming report.(typically posted within 1-2 weeks from today's date).       Carola Mckeon PsyD, LP  Licensed Clinical Neuropsychologist  [facility] Neurology [place] [address]  Phone: [phone]

## 2021-11-18 ENCOUNTER — TELEPHONE (OUTPATIENT)
Dept: PSYCHOLOGY | Facility: CLINIC | Age: 41
End: 2021-11-18
Payer: COMMERCIAL

## 2021-11-18 ENCOUNTER — TELEPHONE (OUTPATIENT)
Dept: FAMILY MEDICINE | Facility: CLINIC | Age: 41
End: 2021-11-18
Payer: COMMERCIAL

## 2021-11-18 DIAGNOSIS — N32.1 COLOVESICAL FISTULA: Primary | ICD-10-CM

## 2021-11-18 RX ORDER — ONDANSETRON 4 MG/1
4 TABLET, ORALLY DISINTEGRATING ORAL EVERY 6 HOURS PRN
Qty: 30 TABLET | Refills: 0 | Status: ON HOLD | OUTPATIENT
Start: 2021-11-18 | End: 2021-12-17

## 2021-11-18 RX ORDER — NEOMYCIN SULFATE 500 MG/1
TABLET ORAL
Qty: 6 TABLET | Refills: 0 | Status: ON HOLD | OUTPATIENT
Start: 2021-11-26 | End: 2021-12-17

## 2021-11-18 NOTE — TELEPHONE ENCOUNTER
Placed outreach call to patient because he never rescheduled cancelled appt (due to conflict) with this provider on 10/20. He said he was interested in coming back in for another appointment, and noted that he has had a lot of health concerns and will have a surgery on 11/29/21. Despite health issues, Rex notes wife and [facility] as supportive and he expressed gratitude.     F/u with this provider scheduled for 12/15 at 9:40AM.

## 2021-11-18 NOTE — TELEPHONE ENCOUNTER
Pt has surgery at [facility] on 11/29. Received call from colo/recto nurse    Pt is restricted to Dr. Bahena and they need medication sent by him for pt before surgery    Need:  Zofran 4mg 1pill PO Q4-6hrs PRN during bowel prep dispense #6  Neomycin 500mg take 2 tables at 1pm 2pm and 11pm the day before surgery. Dispense #6    Sofiya Oquendo RN

## 2021-11-22 NOTE — PROGRESS NOTES
The patient was seen for a neuropsychological evaluation for the purposes of diagnostic clarification and treatment planning. An additional 120 minutes were spent scoring and compiling test results. Please see Dr. Mckeon's report for a detailed description of the charges and interpretation and integration of the findings.

## 2021-11-23 ENCOUNTER — TELEPHONE (OUTPATIENT)
Dept: FAMILY MEDICINE | Facility: CLINIC | Age: 41
End: 2021-11-23
Payer: COMMERCIAL

## 2021-11-23 DIAGNOSIS — N32.1 COLOVESICAL FISTULA: Primary | ICD-10-CM

## 2021-11-23 NOTE — TELEPHONE ENCOUNTER
[facility] phone call message - order or referral request for patient:     Order or referral being requested: Patient is on a restricted insurance and needs a referral for Dr. Carola Pastrana at [facility] for diagnosis and treatment before his surgery. Please fax to [phone]      Additional Comments: no    OK to leave a message on voice mail? No    Primary language: English      needed? No    Call taken on November 23, 2021 at 12:03 PM by Shanti De CMA

## 2021-11-23 NOTE — TELEPHONE ENCOUNTER
Referral faxed to the number listed below. Fax successful.     Amanda Barragan, Care Coordinator

## 2021-11-24 ENCOUNTER — OFFICE VISIT (OUTPATIENT)
Dept: FAMILY MEDICINE | Facility: CLINIC | Age: 41
End: 2021-11-24
Payer: COMMERCIAL

## 2021-11-24 VITALS
OXYGEN SATURATION: 99 % | RESPIRATION RATE: 16 BRPM | HEART RATE: 96 BPM | HEIGHT: 74 IN | DIASTOLIC BLOOD PRESSURE: 64 MMHG | WEIGHT: 204 LBS | SYSTOLIC BLOOD PRESSURE: 116 MMHG | TEMPERATURE: 98.1 F | BODY MASS INDEX: 26.18 KG/M2

## 2021-11-24 DIAGNOSIS — Z86.74 HISTORY OF CARDIAC ARREST: ICD-10-CM

## 2021-11-24 DIAGNOSIS — N32.1 COLOVESICAL FISTULA: ICD-10-CM

## 2021-11-24 DIAGNOSIS — Z01.818 PREOP GENERAL PHYSICAL EXAM: Primary | ICD-10-CM

## 2021-11-24 PROCEDURE — 93000 ELECTROCARDIOGRAM COMPLETE: CPT | Mod: GC | Performed by: STUDENT IN AN ORGANIZED HEALTH CARE EDUCATION/TRAINING PROGRAM

## 2021-11-24 PROCEDURE — 99214 OFFICE O/P EST MOD 30 MIN: CPT | Mod: GC | Performed by: STUDENT IN AN ORGANIZED HEALTH CARE EDUCATION/TRAINING PROGRAM

## 2021-11-24 ASSESSMENT — MIFFLIN-ST. JEOR: SCORE: 1905.96
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
## 2021-11-24 NOTE — PATIENT INSTRUCTIONS
Patient Education   Here is the plan from today's visit    1. Pre-op exam  2. Colovesical fistula  3. History of cardiac arrest  Overall your exam today was reassuring and your EKG was unchanged from the last one obtained in October. You should take all your medications the day of surgery, other than your oxycodone, which you should not take. Also avoid any aspirin or ibuprofen in the week prior to your surgery. Call your surgeon's office (Dr. Carola Pastrana) to have them send your colonoscopy prep and the surgical wash to your pharmacy.   - EKG 12-lead complete w/read - Clinics      Please call or return to clinic if your symptoms don't go away.    Follow up plan  No follow-ups on file.    Thank you for coming to [facility] today.  Lab Testing:  **If you had lab testing today and your results are reassuring or normal they will be mailed to you or sent through [employer] within 7 days.   **If the lab tests need quick action we will call you with the results.  **If you are having labs done on a different day, please call [phone] to schedule at [facility] or [phone] for other [facility] locations. Labs do not offer walk-in appointments.  The phone number we will call with results is # [phone] (home) . If this is not the best number please call our clinic and change the number.  Medication Refills:  If you need any refills please call your pharmacy and they will contact us.   If you need to  your refill at a new pharmacy, please contact the new pharmacy directly. The new pharmacy will help you get your medications transferred faster.   Scheduling:  If you have any concerns about today's visit or wish to schedule another appointment please call our office during normal business hours [phone] (8-5:00 M-F)  If a referral was made to a [facility] and you don't get a call from central scheduling please call [phone].  If a Mammogram was ordered for you at  [facility] call [phone] to schedule or change your appointment.  If you had an EKG/XRay/CT/Ultrasound/MRI ordered the number is [phone] to schedule or change your radiology appointment.   [facility] has limited ultrasound appointments available on , if you would like your ultrasound at [facility], please call [phone] to schedule.   Medical Concerns:  If you have urgent medical concerns please call [phone] at any time of the day.    Maria Isabel Duenas MD       Preparing for Your Surgery  Getting started  A nurse will call you to review your health history and instructions. They will give you an arrival time based on your scheduled surgery time.  Please be ready to share the following:    Your doctor's clinic name and phone number    Your medical, surgical and anesthesia history    A list of allergies and sensitivities    A list of medicines, including herbal treatments and over-the-counter drugs    Whether the patient has a legal guardian (ask how to send us the papers in advance)  If you have a child who's having surgery, please ask for a copy of Preparing for Your Child's Surgery.    Preparing for surgery    Within 30 days of surgery: Have a pre-op exam (sometimes called an H&P, or History and Physical). This can be done at a clinic or pre-operative center.  ? If you're having a , you may not need this exam. Talk to your care team    At your pre-op exam, talk to your care team about all medicines you take. If you need to stop any medicines before surgery, ask when to start taking them again.  ? We do this for your safety. Many medicines can make you bleed too much during surgery. Some change how well surgery (anesthesia) drugs work.    Call your insurance company to let them know you're having surgery. (If you don't have insurance, call [phone].)    Call your clinic if there's any change in your health. This includes signs of a cold or flu (sore throat,  runny nose, cough, rash, fever). It also includes a scrape or scratch near the surgery site.    If you have questions on the day of surgery, call your hospital or surgery center.  Eating and drinking guidelines  For your safety: Unless your surgeon tells you otherwise, follow the guidelines below.    Eat and drink as usual until 8 hours before surgery. After that, no food or milk.    Drink clear liquids until 2 hours before surgery. These are liquids you can see through, like water, Gatorade and Propel Water. You may also have black coffee and tea (no cream or milk).    Nothing by mouth within 2 hours of surgery. This includes gum, candy and breath mints.    If you drink, stop drinking alcohol the night before surgery.    If your care team tells you to take medicine on the morning of surgery, it's okay to take it with a sip of water.  Preventing infection    Shower or bathe the night before and morning of your surgery. Follow the instructions your clinic gave you. (If no instructions, use regular soap.)    Don't shave or clip hair near your surgery site. We'll remove the hair if needed.    Don't smoke or vape the morning of surgery. You may chew nicotine gum up to 2 hours before surgery. A nicotine patch is okay.  ? Note: Some surgeries require you to completely quit smoking and nicotine. Check with your surgeon.    Your care team will make every effort to keep you safe from infection. We will:  ? Clean our hands often with soap and water (or an alcohol-based hand rub).  ? Clean the skin at your surgery site with a special soap that kills germs.  ? Give you a special gown to keep you warm. (Cold raises the risk of infection.)  ? Wear special hair covers, masks, gowns and gloves during surgery.  ? Give antibiotic medicine, if prescribed. Not all surgeries need antibiotics.  What to bring on the day of surgery    Photo ID and insurance card    Copy of your health care directive, if you have one    Glasses and hearing  aides (bring cases)  ? You can't wear contacts during surgery    Inhaler and eye drops, if you use them (tell us about these when you arrive)    CPAP machine or breathing device, if you use them    A few personal items, if spending the night    If you have . . .  ? A pacemaker or ICD (cardiac defibrillator): Bring the ID card.  ? An implanted stimulator: Bring the remote control.  ? A legal guardian: Bring a copy of the certified (court-stamped) guardianship papers.  Please remove any jewelry, including body piercings. Leave jewelry and other valuables at home.  If you're going home the day of surgery  Important: If you don't follow the rules below, we must cancel your surgery.     Arrange for someone to drive you home after surgery. You may not drive, take a taxi or take public transportation by yourself (unless you'll have local anesthesia only).    Arrange for a responsible adult to stay with you overnight. If you don't, we may keep you in the hospital overnight, and you may need to pay the costs yourself.  Questions?   If you have any questions for your care team, list them here: _________________________________________________________________________________________________________________________________________________________________________________________________________________________________________________________________________________________________________________________  For informational purposes only. Not to replace the advice of your health care provider. Copyright   2003, 2019 [facility]. All rights reserved. Clinically reviewed by Mireya Ibrahim MD. [employer] [id number] - REV 4/20.

## 2021-11-24 NOTE — PHARMACY-ADMISSION MEDICATION HISTORY
Medication history and patient interview completed by pharmacy intern/student or pre-admitting RN.  Reviewed by pharmacist, including [employer] dispense records, [facility] Everywhere, and chart review.       Beltran Gonzalez, Pharm.D., BCPS      Nurse Complete Set By: Gisella Paul RN at 11/19/2021 4:00 PM      Prior to Admission medications    Medication Sig Last Dose Taking? Auth Provider   diphenhydrAMINE (BENADRYL) 25 MG capsule Take 25 mg by mouth every 6 hours as needed  Yes Reported, Patient   divalproex sodium extended-release (DEPAKOTE ER) 500 MG 24 hr tablet Take 1 tablet (500 mg) by mouth At Bedtime  Yes Yennifer Carrizales DO   neomycin (MYCIFRADIN) 500 MG tablet Take 2 tablets (1000mg) at 1pm, 2pm, and 11pm the day before surgery  Yes Sebastien Forbes MD   Omega-3 Fatty Acids (FISH OIL MAXIMUM STRENGTH) 1200 MG CPDR Take 1 capsule by mouth   Yes Reported, Patient   pregabalin (LYRICA) 50 MG capsule Take 1 capsule (50 mg) by mouth 3 times daily  Yes Sebastien Forbes MD   cefuroxime (CEFTIN) 500 MG tablet Take 1 tablet (500 mg) by mouth 2 times daily for 7 days   Lionel Bahena MD   metroNIDAZOLE (FLAGYL) 500 MG tablet Take 1 tablet (500 mg) by mouth 2 times daily for 7 days   Lionel Bahena MD   ondansetron (ZOFRAN-ODT) 4 MG ODT tab Take 1 tablet (4 mg) by mouth every 6 hours as needed for nausea  Patient not taking: Reported on 11/24/2021   Sebastien Forbes MD   oxyCODONE (ROXICODONE) 5 MG tablet Take 1-2 tablets (5-10 mg) by mouth every 6 hours as needed for pain   Yennifer Carrizales DO

## 2021-11-26 RX ORDER — CEFAZOLIN SODIUM 2 G/100ML
2 INJECTION, SOLUTION INTRAVENOUS
Status: CANCELLED | OUTPATIENT
Start: 2021-11-26

## 2021-11-26 RX ORDER — HEPARIN SODIUM 5000 [USP'U]/.5ML
5000 INJECTION, SOLUTION INTRAVENOUS; SUBCUTANEOUS
Status: CANCELLED | OUTPATIENT
Start: 2021-11-26

## 2021-11-26 RX ORDER — ACETAMINOPHEN 325 MG/1
975 TABLET ORAL ONCE
Status: CANCELLED | OUTPATIENT
Start: 2021-11-26 | End: 2021-11-26

## 2021-11-26 RX ORDER — CEFAZOLIN SODIUM 2 G/100ML
2 INJECTION, SOLUTION INTRAVENOUS SEE ADMIN INSTRUCTIONS
Status: CANCELLED | OUTPATIENT
Start: 2021-11-26

## 2021-11-27 ENCOUNTER — LAB (OUTPATIENT)
Dept: URGENT CARE | Facility: URGENT CARE | Age: 41
End: 2021-11-27
Payer: COMMERCIAL

## 2021-11-27 DIAGNOSIS — Z11.59 ENCOUNTER FOR SCREENING FOR OTHER VIRAL DISEASES: ICD-10-CM

## 2021-11-27 LAB — SARS-COV-2 RNA RESP QL NAA+PROBE: POSITIVE

## 2021-11-27 PROCEDURE — U0003 INFECTIOUS AGENT DETECTION BY NUCLEIC ACID (DNA OR RNA); SEVERE ACUTE RESPIRATORY SYNDROME CORONAVIRUS 2 (SARS-COV-2) (CORONAVIRUS DISEASE [COVID-19]), AMPLIFIED PROBE TECHNIQUE, MAKING USE OF HIGH THROUGHPUT TECHNOLOGIES AS DESCRIBED BY CMS-2020-01-R: HCPCS

## 2021-11-27 PROCEDURE — U0005 INFEC AGEN DETEC AMPLI PROBE: HCPCS

## 2021-11-28 ENCOUNTER — TELEPHONE (OUTPATIENT)
Dept: EMERGENCY MEDICINE | Facility: CLINIC | Age: 41
End: 2021-11-28
Payer: COMMERCIAL

## 2021-11-28 NOTE — TELEPHONE ENCOUNTER
"-Coronavirus (COVID-19) Notification    Caller Name (Patient, parent, daughter/son, grandparent, etc)  Patient   Patient has a planned procedure tomorrow and needs to know if he should continue with his prep for the procedure stating I need this surgery I am septic right now.  Patient was transferred to triage to address follow up plan.     Reason for call  Notify of Positive Coronavirus (COVID-19) lab results, assess symptoms,  review  [facility] recommendations    Lab Result    Lab test:  2019-nCoV rRt-PCR or SARS-CoV-2 PCR    Oropharyngeal AND/OR nasopharyngeal swabs is POSITIVE for 2019-nCoV RNA/SARS-COV-2 PCR (COVID-19 virus)    RN Recommendations/Instructions per [facility] Coronavirus COVID-19 recommendations    Brief introduction script  Introduce self then review script:  \"I am calling on behalf of [employer].  We were notified that your Coronavirus test (COVID-19) for was POSITIVE for the virus.  I have some information to relay to you but first I wanted to mention that the [place] Dept of Health will be contacting you shortly [it's possible MD already called Patient] to talk to you more about how you are feeling and other people you have had contact with who might now also have the virus.  Also, [facility] is Partnering with the [facility] for Covid-19 research, you may be contacted directly by research staff.\"    Assessment (Inquire about Patient's current symptoms)   Assessment   Current Symptoms at time of phone call: (if no symptoms, document No symptoms] No sx   Symptoms onset (if applicable) Tested 11/27/2021     If at time of call, Patients symptoms hare worsened, the Patient should contact 911 or have someone drive them to Emergency Dept promptly:      If Patient calling 911, inform 911 personal that you have tested positive for the Coronavirus (COVID-19).  Place mask on and await 911 to arrive.    If Emergency Dept, If possible, please have another adult drive you to " "the Emergency Dept but you need to wear mask when in contact with other people.      Monoclonal Antibody Administration    You may be eligible to receive a new treatment with a monoclonal antibody for preventing hospitalization in patients at high risk for complications from COVID-19.   This medication is still experimental and available on a limited basis; it is given through an IV and must be given at an infusion center. Please note that not all people who are eligible will receive the medication since it is in limited supply.     Are you interested in being considered for this medication?  No.   Does the patient fit the criteria: No    If patient qualifies based on above criteria:  \"You will be contacted if you are selected to receive this treatment in the next 1-2 business days.   This is time sensitive and if you are not selected in the next 1-2 business days, you will not receive the medication.  If you do not receive a call to schedule, you have not been selected.\"      Review information with Patient    Your result was positive. This means you have COVID-19 (coronavirus).  We have sent you a letter that reviews the information that I'll be reviewing with you now.    How can I protect others?    If you have symptoms: stay home and away from others (self-isolate) until:    You've had no fever--and no medicine that reduces fever--for 1 full day (24 hours). And       Your other symptoms have gotten better. For example, your cough or breathing has improved. And     At least 10 days have passed since your symptoms started. (If you've been told by a doctor that you have a weak immune system, wait 20 days.)     If you don't have symptoms: Stay home and away from others (self-isolate) until at least 10 days have passed since your first positive COVID-19 test. (Date test collected)    During this time:    Stay in your own room, including for meals. Use your own bathroom if you can.    Stay away from others in your " home. No hugging, kissing or shaking hands. No visitors.     Don't go to work, school or anywhere else.     Clean  high touch  surfaces often (doorknobs, counters, handles, etc.). Use a household cleaning spray or wipes. You'll find a full list on the EPA website at www.epa.gov/pesticide-registration/list-n-disinfectants-use-against-sars-cov-2.     Cover your mouth and nose with a mask, tissue or other face covering to avoid spreading germs.    Wash your hands and face often with soap and water.    Make a list of people you have been in close contact with recently, even if either of you wore a face covering.   ; Start your list from 2 days before you became ill or had a positive test.  ; Include anyone that was within 6 feet of you for a cumulative total of 15 minutes or more in 24 hours. (Example: if you sat next to Isidro for 5 minutes in the morning and 10 minutes in the afternoon, then you were in close contact for 15 minutes total that day. Isidro would be added to your list.)    A public health worker will call or text you. It is important that you answer. They will ask you questions about possible exposures to COVID-19, such as people you have been in direct contact with and places you have visited.    Tell the people on your list that you have COVID-19; they should stay away from others for 14 days starting from the last time they were in contact with you (unless you are told something different from a public health worker).     Caregivers in these groups are at risk for severe illness due to COVID-19:  o People 65 years and older  o People who live in a nursing home or long-term care facility  o People with chronic disease (lung, heart, cancer, diabetes, kidney, liver, immunologic)  o People who have a weakened immune system, including those who:  - Are in cancer treatment  - Take medicine that weakens the immune system, such as corticosteroids  - Had a bone marrow or organ transplant  - Have an immune  deficiency  - Have poorly controlled HIV or AIDS  - Are obese (body mass index of 40 or higher)  - Smoke regularly    Caregivers should wear gloves while washing dishes, handling laundry and cleaning bedrooms and bathrooms.    Wash and dry laundry with special caution. Don't shake dirty laundry, and use the warmest water setting you can.    If you have a weakened immune system, ask your doctor about other actions you should take.    For more tips, go to www.cdc.gov/coronavirus/2019-ncov/downloads/10Things.pdf.    You should not go back to work until you meet the guidelines above for ending your home isolation. You don't need to be retested for COVID-19 before going back to work--studies show that you won't spread the virus if it's been at least 10 days since your symptoms started (or 20 days, if you have a weak immune system).    Employers: This document serves as formal notice of your employee's medical guidelines for going back to work. They must meet the above guidelines before going back to work in person.    How can I take care of myself?    1. Get lots of rest. Drink extra fluids (unless a doctor has told you not to).    2. Take Tylenol (acetaminophen) for fever or pain. If you have liver or kidney problems, ask your family doctor if it's okay to take Tylenol.     Take either:     650 mg (two 325 mg pills) every 4 to 6 hours, or     1,000 mg (two 500 mg pills) every 8 hours as needed.     Note: Don't take more than 3,000 mg in one day. Acetaminophen is found in many medicines (both prescribed and over-the-counter medicines). Read all labels to be sure you don't take too much.    For children, check the Tylenol bottle for the right dose (based on their age or weight).    3. If you have other health problems (like cancer, heart failure, an organ transplant or severe kidney disease): Call your specialty clinic if you don't feel better in the next 2 days.    4. Know when to call 911: Emergency warning signs  include:    Trouble breathing or shortness of breath    Pain or pressure in the chest that doesn't go away    Feeling confused like you haven't felt before, or not being able to wake up    Bluish-colored lips or face    5. Sign up for [employer]. We know it's scary to hear that you have COVID-19. We want to track your symptoms to make sure you're okay over the next 2 weeks. Please look for an email from [employer]--this is a free, online program that we'll use to keep in touch. To sign up, follow the link in the email. Learn more at [email or web address].    Where can I get more information?    [facility]: [email or web address]    Coronavirus Basics: www.health.[facility].[place]/diseases/coronavirus/basics.html    What to Do If You're Sick: www.cdc.gov/coronavirus/2019-ncov/about/steps-when-sick.html    Ending Home Isolation: www.cdc.gov/coronavirus/2019-ncov/hcp/disposition-in-home-patients.html     Caring for Someone with COVID-19: www.cdc.gov/coronavirus/2019-ncov/if-you-are-sick/care-for-someone.html     [facility] clinical trials (COVID-19 research studies): [email or web address]     A Positive COVID-19 letter will be sent via [employer] or the mail. (Exception, no letters sent to Presurgerical/Preprocedure Patients)    Toyin Mckeon LPN

## 2021-11-29 ENCOUNTER — MEDICAL CORRESPONDENCE (OUTPATIENT)
Dept: SURGERY | Facility: CLINIC | Age: 41
End: 2021-11-29
Payer: COMMERCIAL

## 2021-11-29 ENCOUNTER — TELEPHONE (OUTPATIENT)
Dept: FAMILY MEDICINE | Facility: CLINIC | Age: 41
End: 2021-11-29
Payer: COMMERCIAL

## 2021-11-29 DIAGNOSIS — Z11.59 ENCOUNTER FOR SCREENING FOR OTHER VIRAL DISEASES: ICD-10-CM

## 2021-12-06 ENCOUNTER — VIRTUAL VISIT (OUTPATIENT)
Dept: NEUROLOGY | Facility: CLINIC | Age: 41
End: 2021-12-06
Payer: COMMERCIAL

## 2021-12-06 DIAGNOSIS — F43.10 POSTTRAUMATIC STRESS DISORDER: ICD-10-CM

## 2021-12-06 DIAGNOSIS — F43.23 ADJUSTMENT DISORDER WITH MIXED ANXIETY AND DEPRESSED MOOD: ICD-10-CM

## 2021-12-06 PROCEDURE — 96138 PSYCL/NRPSYC TECH 1ST: CPT | Performed by: CLINICAL NEUROPSYCHOLOGIST

## 2021-12-06 PROCEDURE — 96133 NRPSYC TST EVAL PHYS/QHP EA: CPT | Mod: 95 | Performed by: CLINICAL NEUROPSYCHOLOGIST

## 2021-12-06 PROCEDURE — 96121 NUBHVL XM PHY/QHP EA ADDL HR: CPT | Performed by: CLINICAL NEUROPSYCHOLOGIST

## 2021-12-06 PROCEDURE — 96116 NUBHVL XM PHYS/QHP 1ST HR: CPT | Performed by: CLINICAL NEUROPSYCHOLOGIST

## 2021-12-06 PROCEDURE — 96132 NRPSYC TST EVAL PHYS/QHP 1ST: CPT | Mod: 95 | Performed by: CLINICAL NEUROPSYCHOLOGIST

## 2021-12-06 PROCEDURE — 96139 PSYCL/NRPSYC TST TECH EA: CPT | Performed by: CLINICAL NEUROPSYCHOLOGIST

## 2021-12-06 NOTE — LETTER
"    12/6/2021         RE: Rex Negrete  [address]  [place] [address]        Dear Colleague,    Thank you for referring your patient, Rex Negrete, to the [facility]. Please see a copy of my visit note below.    NEUROPSYCHOLOGY TELE-HEALTH FEEDBACK VISIT  [facility]    Telephone Visit  Rex Negrete is a 41 year old male who is being evaluated via a billable telephone visit.     The patient has been notified of the following:      \"This telephone visit will be conducted via a call between you and your provider. We have found that certain health care needs can be provided without the need for a physical exam.  This service lets us provide the care you need with a phone conversation. If during the course of the call the provider feels a telephone visit is not appropriate, you will not be charged for this service.\"     Patient has given verbal consent to a Telephone visit? Yes  Consent has been obtained for this service by 1 care team member: yes.    Visit Summary  The purpose of today s appointment was to provide feedback regarding Mr. Negrete recent neuropsychological consult completed on 11/10 and 11/12/2021. We began the session by discussing his experience during the evaluation. I provided Mr. Negrete with detailed feedback regarding his performance on cognitive testing and his pattern of cognitive strengths and weaknesses.  I discussed my overall impressions and recommendations and provided the opportunity for Mr. Negrete to ask any questions that he had about the evaluation. At the end of the session, he indicated that he understood the results and that I had answered all of his questions.       Carola Mckeon PsyD, LP  Licensed Clinical Neuropsychologist  [facility]  [address], [address]  [place] [address]  Phone: [phone]      Telephone Visit Details    Type of service:  " Telephone Visit  Start Time: 3:37 PM  End Time: 4:33 PM    Originating Location (pt. Location): Home    Distant Location (provider location):  Remote work for [facility]    Mode of Communication:  Telephone    Rex Negrete was evaluated via a billable telephone visit. For diagnostic and coding purposes, Mr. Negrete was referred for an evaluation of Mild Neurocognitive Disorder. As this is the final date for this Episode of Care (initiated on 11/10/2021) all charges for the entire Episode of Care will be filed today. Please see the 11/10/2021 evaluation for a detailed description of codes and services, including services provided today.      In brief:   1 x 96116  1 x 96121  1 x 96132  4 x 96133  1 x 96138  6 x 96139        Again, thank you for allowing me to participate in the care of your patient.        Sincerely,        Carola Mckeon Psy.D, LP

## 2021-12-08 NOTE — PATIENT INSTRUCTIONS
SUMMARY OF FINDINGS:   Due to the ongoing COVID-19 pandemic, this assessment was conducted using PPE worn by the examiner and a face-mask for the patient. The standard administration of these tests involves direct face-to-face methods. The full impact of applying non-standard administration methods with PPE is not fully appreciated at this time. As such, the diagnostic conclusions and recommendations for treatment provided in this report are being advanced with caution.    With these limitations in mind, results of testing are considered valid and indicate that Mr. Negrete is of estimated average premorbid intellectual functioning; however, some of Mr. Negrete's performances fall well below that estimate. Specifically, he exhibits moderately impaired verbal memory encoding, along with mildly impaired semantic fluency (which is significantly lower than phonemic fluency). In addition, his nonverbal (visual) learning efficiency is a relative weakness, falling in the borderline impaired range. However, his memory for nonverbal material is fully intact.    With regard to verbal learning/memory more specifically, the patient's learning efficiency is slightly variable, with mildly impaired learning of two detailed stories but low average learning of a lengthy word list. This discrepancy may be due to the fact that the word list is repeated 5 times, while the stories are only presented once. His memory of the stories after a 20-minute delay is exceptionally low with a 25% retention rate over time, and his delayed recall of the word list is also exceptionally low with a 28% retention rate after 20 minutes. His recall does not benefit as much as expected from prompts/cues on recognition testing. Overall, his pattern of scores on verbal memory testing is suggestive of an encoding deficit due to hippocampal compromise.    All other cognitive domains are within normal limits, including attention/concentration, processing  speed, visusopatial/constructional abilities, executive functions (including novel problem solving/concept identification, mental flexibilty/set-shifting, and abstract reasoning), and all other aspects of language processing.    Emotionally, the patient endorses mild depressive symptoms and minimal anxiety symptoms on self-report questionnaires. This is fairly consistent with his reports during the clinical interview. Of note, he also endorses symptoms suggestive of posttraumatic stress disorder (PTSD) during the interview, including experiencing flashbacks and nightmares. Further evaluation for potential PTSD appears warranted.    IMPRESSIONS:    Overall, these results are mostly suggestive of fairly focal dysfunction in the dominant (presumably left) temporal lobe, including mesial temporal structures.     These results are consistent with the left temporal slowing and sharp activity noted on recent EEGs, which is likely secondary to the TBI the patient sustained in 2017. That TBI resulted in a left temporo-parietal skull fracture, left temporal parenchymal contusion, left frontotemporal hematoma, and a right frontal subdural hygroma (as noted on neuroimaging at that time).    Cognitive sequelae from cardiac arrest in May 2021 cannot be fully ruled out, but is less compelling given that his nonverbal memory remains fully intact. In addition, MRI after his cardiac arrest showed no obvious signs of an anoxic brain injury.    Despite his history of IV methamphetamine and other drug abuse, there is no compelling evidence of drug-induced cognitive impairment in the current profile, particularly given his intact executive/frontal functions.    There is also no evidence for medication side effects in the current profile.    DIAGNOSIS:  Mild Neurocognitive Disorder due to Traumatic Brain Injury     R/O Mild Neurocognitive Disorder due to Multiple Etiologies (TBI and potential left temporal seizures)    Adjustment Disorder  with Depressed Mood    Posttraumatic Stress Disorder (per history)    RECOMMENDATIONS:  1) Ongoing neurologic care and monitoring is recommended.     2) Mr. Negrete is encouraged to adhere closely to his medication regimen to help keep his potential seizure disorder well controlled.    3) Mr. Negrete is also strongly encouraged to maintain sobriety and engage in activities to help him continue to do so (e.g., comply with chemical dependency treatment, regular AA attendance, etc.). A relapse of substance abuse puts him at significant risk for additional and irreversible cognitive decline.     4) Given his history of sleep disturbance, Mr. Negrete may benefit from evaluating his current sleep hygiene behaviors and if need be, make changes to help facilitate sleep. I will provide him with a handout that discusses various sleep hygiene strategies. Relaxation exercises (e.g., listening to soothing music, deep breathing, progressive muscle relaxation) while trying to fall asleep might also help. If he tends to have difficulty returning to sleep in the night or in falling asleep due to worrying, other strategies could be discussed with his psychoherapist. If these techniques do not improve his sleep, he may wish to consult his physician to assess for any underlying physical issues that may be impacting his sleep and to determine if a formal sleep study is warranted.    5) Mr. Negrete is strongly encouraged to continue with psychotherapeutic interventions to help manage his mood symptoms. Further evaluation for PTSD is also indicated given his endorsement of some symptoms. A trial of antidepressant medication might also be warranted if not medically contraindicated.     6) Mr. Negrete may benefit from a referral to speech therapy for cognitive rehabilitation, in order to help him develop strategies to aid his memory. This recommendation will be deferred to his PCP or neurologist.    7) The patient is encouraged to  utilize cognitive compensatory strategies in daily life, including utilizing note pads, checklists, to-do lists, a calendar/planner, labeled alarm reminders, a GPS, a pillbox, and maintaining a daily morning and nighttime routine and an organized living/work environment.    8) It will be increasingly important for Mr. Negrete to have a strong support network in place. The [place] [employer] (https://www.braininjurymn.org/) is a great resource for finding support groups, other community resources, as well as offering a breadth of informational materials: https://www.braininjurymn.org/resource-facilitation/index.php    9) When and if Mr. Negrete returns to work, he should be aware that he will do best in a position that involves familiar tasks and a lot of routine. He will not do well in a job that requires him to frequently learn new skills or that requires a great deal of social interaction/communication. Remembering conversations and verbal information would be quite difficult without a great deal of compensatory strategies and supports in place. He will learn best by watching demonstrations, referring to illustrations/diagrams, and hands-on approaches. If he is able to return to work in the future, working with a vocational rehabilitation counselor may be beneficial ([place]://[place]deed/job-seekers/disabilities/counseling/)    10) Mr. Negrete is encouraged to remain physically, socially, and mentally active in order to optimize his brain health.    11) Neuropsychological follow-up is recommended in 18-24 months (or as clinically indicated) in order to monitor his cognitive status, help to clarify etiology, and update recommendations. The current test data can be used as a baseline to which future comparisons can be made.      Thank you for allowing me to participate in your care. Please contact me with any questions regarding the content of this report.        Carola Mckeon PsyD, LP  Licensed  Clinical Neuropsychologist  [facility]  [address], [address]  Phone: [phone]

## 2021-12-08 NOTE — PROGRESS NOTES
"NEUROPSYCHOLOGY TELE-HEALTH FEEDBACK VISIT  [facility]    Telephone Visit  Rex Negrete is a 41 year old male who is being evaluated via a billable telephone visit.     The patient has been notified of the following:      \"This telephone visit will be conducted via a call between you and your provider. We have found that certain health care needs can be provided without the need for a physical exam.  This service lets us provide the care you need with a phone conversation. If during the course of the call the provider feels a telephone visit is not appropriate, you will not be charged for this service.\"     Patient has given verbal consent to a Telephone visit? Yes  Consent has been obtained for this service by 1 care team member: yes.    Visit Summary  The purpose of today s appointment was to provide feedback regarding Mr. Negrete recent neuropsychological consult completed on 11/10 and 11/12/2021. We began the session by discussing his experience during the evaluation. I provided Mr. Negrete with detailed feedback regarding his performance on cognitive testing and his pattern of cognitive strengths and weaknesses.  I discussed my overall impressions and recommendations and provided the opportunity for Mr. Negrete to ask any questions that he had about the evaluation. At the end of the session, he indicated that he understood the results and that I had answered all of his questions.       Carola Mckeon PsyD, LP  Licensed Clinical Neuropsychologist  [address], [address] [place] [address]  Phone: [phone]      Telephone Visit Details    Type of service:  Telephone Visit  Start Time: 3:37 PM  End Time: 4:33 PM    Originating Location (pt. Location): Home    Distant Location (provider location):  Remote work for [facility]    Mode of Communication:  Telephone    Rex Negrete was " evaluated via a billable telephone visit. For diagnostic and coding purposes, Mr. Negrete was referred for an evaluation of Mild Neurocognitive Disorder. As this is the final date for this Episode of Care (initiated on 11/10/2021) all charges for the entire Episode of Care will be filed today. Please see the 11/10/2021 evaluation for a detailed description of codes and services, including services provided today.      In brief:   1 x 96116  1 x 96121  1 x 96132  4 x 96133  1 x 96138  6 x 96139

## 2021-12-09 RX ORDER — MAGNESIUM CARB/ALUMINUM HYDROX 105-160MG
TABLET,CHEWABLE ORAL
Status: ON HOLD | COMMUNITY
Start: 2021-11-26 | End: 2021-12-17

## 2021-12-09 NOTE — OR NURSING
Late entry 1720 pm Dr. Maxime FOURNIER notified of patient will be coming from a treatment facility for substance abuse and currently using opioids, hx of seizures.  Pt scheduled for surgery 12/13/2021. No new orders or follow up by RN needed.

## 2021-12-13 ENCOUNTER — ANESTHESIA EVENT (OUTPATIENT)
Dept: SURGERY | Facility: CLINIC | Age: 41
End: 2021-12-13
Payer: COMMERCIAL

## 2021-12-13 ENCOUNTER — ANESTHESIA (OUTPATIENT)
Dept: SURGERY | Facility: CLINIC | Age: 41
End: 2021-12-13
Payer: COMMERCIAL

## 2021-12-13 ENCOUNTER — HOSPITAL ENCOUNTER (INPATIENT)
Facility: CLINIC | Age: 41
LOS: 4 days | Discharge: PSYCHIATRIC OR SUBSTANCE USE FACILITY | End: 2021-12-17
Attending: COLON & RECTAL SURGERY | Admitting: COLON & RECTAL SURGERY
Payer: COMMERCIAL

## 2021-12-13 DIAGNOSIS — G89.18 POSTOPERATIVE PAIN: ICD-10-CM

## 2021-12-13 DIAGNOSIS — G62.9 NEUROPATHY: ICD-10-CM

## 2021-12-13 DIAGNOSIS — K59.00 CONSTIPATION, UNSPECIFIED CONSTIPATION TYPE: Primary | ICD-10-CM

## 2021-12-13 DIAGNOSIS — N32.1 COLOVESICAL FISTULA: ICD-10-CM

## 2021-12-13 LAB
ABO/RH(D): NORMAL
ABO/RH(D): NORMAL
ANTIBODY SCREEN: NEGATIVE
CREAT SERPL-MCNC: 1.07 MG/DL (ref 0.66–1.25)
CREAT SERPL-MCNC: 1.08 MG/DL (ref 0.66–1.25)
GFR SERPL CREATININE-BSD FRML MDRD: 86 ML/MIN/1.73M2
GFR SERPL CREATININE-BSD FRML MDRD: 87 ML/MIN/1.73M2
HGB BLD-MCNC: 12.5 G/DL (ref 13.3–17.7)
PLATELET # BLD AUTO: 329 10E3/UL (ref 150–450)
SPECIMEN EXPIRATION DATE: NORMAL
SPECIMEN EXPIRATION DATE: NORMAL

## 2021-12-13 PROCEDURE — 258N000003 HC RX IP 258 OP 636: Performed by: NURSE ANESTHETIST, CERTIFIED REGISTERED

## 2021-12-13 PROCEDURE — 0DJD8ZZ INSPECTION OF LOWER INTESTINAL TRACT, VIA NATURAL OR ARTIFICIAL OPENING ENDOSCOPIC: ICD-10-PCS | Performed by: COLON & RECTAL SURGERY

## 2021-12-13 PROCEDURE — 82565 ASSAY OF CREATININE: CPT | Performed by: ANESTHESIOLOGY

## 2021-12-13 PROCEDURE — 272N000001 HC OR GENERAL SUPPLY STERILE: Performed by: COLON & RECTAL SURGERY

## 2021-12-13 PROCEDURE — 250N000009 HC RX 250: Performed by: NURSE ANESTHETIST, CERTIFIED REGISTERED

## 2021-12-13 PROCEDURE — 85018 HEMOGLOBIN: CPT | Performed by: ANESTHESIOLOGY

## 2021-12-13 PROCEDURE — 250N000009 HC RX 250: Performed by: ANESTHESIOLOGY

## 2021-12-13 PROCEDURE — 360N000077 HC SURGERY LEVEL 4, PER MIN: Performed by: COLON & RECTAL SURGERY

## 2021-12-13 PROCEDURE — 250N000011 HC RX IP 250 OP 636: Performed by: NURSE ANESTHETIST, CERTIFIED REGISTERED

## 2021-12-13 PROCEDURE — 250N000011 HC RX IP 250 OP 636: Performed by: COLON & RECTAL SURGERY

## 2021-12-13 PROCEDURE — 86900 BLOOD TYPING SEROLOGIC ABO: CPT | Performed by: ANESTHESIOLOGY

## 2021-12-13 PROCEDURE — 258N000001 HC RX 258: Performed by: COLON & RECTAL SURGERY

## 2021-12-13 PROCEDURE — 120N000001 HC R&B MED SURG/OB

## 2021-12-13 PROCEDURE — 999N000141 HC STATISTIC PRE-PROCEDURE NURSING ASSESSMENT: Performed by: COLON & RECTAL SURGERY

## 2021-12-13 PROCEDURE — 36415 COLL VENOUS BLD VENIPUNCTURE: CPT | Performed by: COLON & RECTAL SURGERY

## 2021-12-13 PROCEDURE — 250N000009 HC RX 250: Performed by: COLON & RECTAL SURGERY

## 2021-12-13 PROCEDURE — 88307 TISSUE EXAM BY PATHOLOGIST: CPT | Mod: TC | Performed by: COLON & RECTAL SURGERY

## 2021-12-13 PROCEDURE — 86901 BLOOD TYPING SEROLOGIC RH(D): CPT | Performed by: COLON & RECTAL SURGERY

## 2021-12-13 PROCEDURE — 250N000013 HC RX MED GY IP 250 OP 250 PS 637: Performed by: ANESTHESIOLOGY

## 2021-12-13 PROCEDURE — 0D1M4ZP BYPASS DESCENDING COLON TO RECTUM, PERCUTANEOUS ENDOSCOPIC APPROACH: ICD-10-PCS | Performed by: COLON & RECTAL SURGERY

## 2021-12-13 PROCEDURE — 36415 COLL VENOUS BLD VENIPUNCTURE: CPT | Performed by: ANESTHESIOLOGY

## 2021-12-13 PROCEDURE — 370N000017 HC ANESTHESIA TECHNICAL FEE, PER MIN: Performed by: COLON & RECTAL SURGERY

## 2021-12-13 PROCEDURE — 82565 ASSAY OF CREATININE: CPT | Performed by: COLON & RECTAL SURGERY

## 2021-12-13 PROCEDURE — 250N000011 HC RX IP 250 OP 636: Performed by: ANESTHESIOLOGY

## 2021-12-13 PROCEDURE — 0DTN4ZZ RESECTION OF SIGMOID COLON, PERCUTANEOUS ENDOSCOPIC APPROACH: ICD-10-PCS | Performed by: COLON & RECTAL SURGERY

## 2021-12-13 PROCEDURE — 85049 AUTOMATED PLATELET COUNT: CPT | Performed by: COLON & RECTAL SURGERY

## 2021-12-13 PROCEDURE — 710N000009 HC RECOVERY PHASE 1, LEVEL 1, PER MIN: Performed by: COLON & RECTAL SURGERY

## 2021-12-13 PROCEDURE — 258N000003 HC RX IP 258 OP 636: Performed by: COLON & RECTAL SURGERY

## 2021-12-13 PROCEDURE — 250N000013 HC RX MED GY IP 250 OP 250 PS 637: Performed by: COLON & RECTAL SURGERY

## 2021-12-13 RX ORDER — ONDANSETRON 4 MG/1
4 TABLET, ORALLY DISINTEGRATING ORAL EVERY 6 HOURS PRN
Status: DISCONTINUED | OUTPATIENT
Start: 2021-12-13 | End: 2021-12-17 | Stop reason: HOSPADM

## 2021-12-13 RX ORDER — NALOXONE HYDROCHLORIDE 0.4 MG/ML
0.4 INJECTION, SOLUTION INTRAMUSCULAR; INTRAVENOUS; SUBCUTANEOUS
Status: DISCONTINUED | OUTPATIENT
Start: 2021-12-13 | End: 2021-12-17 | Stop reason: HOSPADM

## 2021-12-13 RX ORDER — KETOROLAC TROMETHAMINE 15 MG/ML
15 INJECTION, SOLUTION INTRAMUSCULAR; INTRAVENOUS EVERY 6 HOURS
Status: COMPLETED | OUTPATIENT
Start: 2021-12-13 | End: 2021-12-14

## 2021-12-13 RX ORDER — HEPARIN SODIUM 5000 [USP'U]/.5ML
5000 INJECTION, SOLUTION INTRAVENOUS; SUBCUTANEOUS
Status: COMPLETED | OUTPATIENT
Start: 2021-12-13 | End: 2021-12-13

## 2021-12-13 RX ORDER — ONDANSETRON 2 MG/ML
4 INJECTION INTRAMUSCULAR; INTRAVENOUS EVERY 30 MIN PRN
Status: DISCONTINUED | OUTPATIENT
Start: 2021-12-13 | End: 2021-12-13 | Stop reason: HOSPADM

## 2021-12-13 RX ORDER — ONDANSETRON 2 MG/ML
INJECTION INTRAMUSCULAR; INTRAVENOUS PRN
Status: DISCONTINUED | OUTPATIENT
Start: 2021-12-13 | End: 2021-12-13

## 2021-12-13 RX ORDER — CEFAZOLIN SODIUM/WATER 2 G/20 ML
2 SYRINGE (ML) INTRAVENOUS
Status: COMPLETED | OUTPATIENT
Start: 2021-12-13 | End: 2021-12-13

## 2021-12-13 RX ORDER — PROPOFOL 10 MG/ML
INJECTION, EMULSION INTRAVENOUS CONTINUOUS PRN
Status: DISCONTINUED | OUTPATIENT
Start: 2021-12-13 | End: 2021-12-13

## 2021-12-13 RX ORDER — ACETAMINOPHEN 325 MG/1
975 TABLET ORAL ONCE
Status: COMPLETED | OUTPATIENT
Start: 2021-12-13 | End: 2021-12-13

## 2021-12-13 RX ORDER — OXYCODONE HYDROCHLORIDE 5 MG/1
15 TABLET ORAL EVERY 4 HOURS PRN
Status: DISCONTINUED | OUTPATIENT
Start: 2021-12-13 | End: 2021-12-15

## 2021-12-13 RX ORDER — ACETAMINOPHEN 325 MG/1
975 TABLET ORAL
Status: DISCONTINUED | OUTPATIENT
Start: 2021-12-13 | End: 2021-12-17 | Stop reason: HOSPADM

## 2021-12-13 RX ORDER — BUPIVACAINE HYDROCHLORIDE AND EPINEPHRINE 2.5; 5 MG/ML; UG/ML
INJECTION, SOLUTION EPIDURAL; INFILTRATION; INTRACAUDAL; PERINEURAL PRN
Status: DISCONTINUED | OUTPATIENT
Start: 2021-12-13 | End: 2021-12-13 | Stop reason: HOSPADM

## 2021-12-13 RX ORDER — GLYCOPYRROLATE 0.2 MG/ML
INJECTION, SOLUTION INTRAMUSCULAR; INTRAVENOUS PRN
Status: DISCONTINUED | OUTPATIENT
Start: 2021-12-13 | End: 2021-12-13

## 2021-12-13 RX ORDER — PROPOFOL 10 MG/ML
INJECTION, EMULSION INTRAVENOUS PRN
Status: DISCONTINUED | OUTPATIENT
Start: 2021-12-13 | End: 2021-12-13

## 2021-12-13 RX ORDER — HYDROMORPHONE HYDROCHLORIDE 1 MG/ML
.5-1 INJECTION, SOLUTION INTRAMUSCULAR; INTRAVENOUS; SUBCUTANEOUS
Status: DISCONTINUED | OUTPATIENT
Start: 2021-12-13 | End: 2021-12-14

## 2021-12-13 RX ORDER — BUPIVACAINE HYDROCHLORIDE AND EPINEPHRINE 2.5; 5 MG/ML; UG/ML
30 INJECTION, SOLUTION EPIDURAL; INFILTRATION; INTRACAUDAL; PERINEURAL ONCE
Status: COMPLETED | OUTPATIENT
Start: 2021-12-13 | End: 2021-12-13

## 2021-12-13 RX ORDER — LIDOCAINE 40 MG/G
CREAM TOPICAL
Status: DISCONTINUED | OUTPATIENT
Start: 2021-12-13 | End: 2021-12-17 | Stop reason: HOSPADM

## 2021-12-13 RX ORDER — DEXAMETHASONE SODIUM PHOSPHATE 4 MG/ML
INJECTION, SOLUTION INTRA-ARTICULAR; INTRALESIONAL; INTRAMUSCULAR; INTRAVENOUS; SOFT TISSUE PRN
Status: DISCONTINUED | OUTPATIENT
Start: 2021-12-13 | End: 2021-12-13

## 2021-12-13 RX ORDER — NEOSTIGMINE METHYLSULFATE 1 MG/ML
VIAL (ML) INJECTION PRN
Status: DISCONTINUED | OUTPATIENT
Start: 2021-12-13 | End: 2021-12-13

## 2021-12-13 RX ORDER — SODIUM CHLORIDE, SODIUM LACTATE, POTASSIUM CHLORIDE, CALCIUM CHLORIDE 600; 310; 30; 20 MG/100ML; MG/100ML; MG/100ML; MG/100ML
INJECTION, SOLUTION INTRAVENOUS CONTINUOUS
Status: DISCONTINUED | OUTPATIENT
Start: 2021-12-13 | End: 2021-12-14

## 2021-12-13 RX ORDER — OXYCODONE HYDROCHLORIDE 5 MG/1
5 TABLET ORAL EVERY 4 HOURS PRN
Status: DISCONTINUED | OUTPATIENT
Start: 2021-12-13 | End: 2021-12-13 | Stop reason: HOSPADM

## 2021-12-13 RX ORDER — FENTANYL CITRATE 50 UG/ML
INJECTION, SOLUTION INTRAMUSCULAR; INTRAVENOUS PRN
Status: DISCONTINUED | OUTPATIENT
Start: 2021-12-13 | End: 2021-12-13

## 2021-12-13 RX ORDER — BUPIVACAINE HYDROCHLORIDE AND EPINEPHRINE 2.5; 5 MG/ML; UG/ML
30 INJECTION, SOLUTION EPIDURAL; INFILTRATION; INTRACAUDAL; PERINEURAL ONCE
Status: CANCELLED | OUTPATIENT
Start: 2021-12-13 | End: 2021-12-13

## 2021-12-13 RX ORDER — HYDROMORPHONE HYDROCHLORIDE 1 MG/ML
0.5 INJECTION, SOLUTION INTRAMUSCULAR; INTRAVENOUS; SUBCUTANEOUS
Status: DISCONTINUED | OUTPATIENT
Start: 2021-12-13 | End: 2021-12-13

## 2021-12-13 RX ORDER — SODIUM CHLORIDE, SODIUM LACTATE, POTASSIUM CHLORIDE, CALCIUM CHLORIDE 600; 310; 30; 20 MG/100ML; MG/100ML; MG/100ML; MG/100ML
INJECTION, SOLUTION INTRAVENOUS CONTINUOUS PRN
Status: DISCONTINUED | OUTPATIENT
Start: 2021-12-13 | End: 2021-12-13

## 2021-12-13 RX ORDER — DIVALPROEX SODIUM 500 MG/1
500 TABLET, EXTENDED RELEASE ORAL AT BEDTIME
Status: DISCONTINUED | OUTPATIENT
Start: 2021-12-13 | End: 2021-12-17 | Stop reason: HOSPADM

## 2021-12-13 RX ORDER — ONDANSETRON 4 MG/1
4 TABLET, ORALLY DISINTEGRATING ORAL EVERY 30 MIN PRN
Status: DISCONTINUED | OUTPATIENT
Start: 2021-12-13 | End: 2021-12-13 | Stop reason: HOSPADM

## 2021-12-13 RX ORDER — NALOXONE HYDROCHLORIDE 0.4 MG/ML
0.2 INJECTION, SOLUTION INTRAMUSCULAR; INTRAVENOUS; SUBCUTANEOUS
Status: DISCONTINUED | OUTPATIENT
Start: 2021-12-13 | End: 2021-12-17 | Stop reason: HOSPADM

## 2021-12-13 RX ORDER — ONDANSETRON 2 MG/ML
4 INJECTION INTRAMUSCULAR; INTRAVENOUS EVERY 6 HOURS PRN
Status: DISCONTINUED | OUTPATIENT
Start: 2021-12-13 | End: 2021-12-17 | Stop reason: HOSPADM

## 2021-12-13 RX ORDER — PREGABALIN 50 MG/1
50 CAPSULE ORAL 3 TIMES DAILY
Status: DISCONTINUED | OUTPATIENT
Start: 2021-12-13 | End: 2021-12-15

## 2021-12-13 RX ORDER — FENTANYL CITRATE 50 UG/ML
25 INJECTION, SOLUTION INTRAMUSCULAR; INTRAVENOUS EVERY 5 MIN PRN
Status: DISCONTINUED | OUTPATIENT
Start: 2021-12-13 | End: 2021-12-13 | Stop reason: HOSPADM

## 2021-12-13 RX ORDER — SODIUM CHLORIDE, SODIUM LACTATE, POTASSIUM CHLORIDE, CALCIUM CHLORIDE 600; 310; 30; 20 MG/100ML; MG/100ML; MG/100ML; MG/100ML
INJECTION, SOLUTION INTRAVENOUS CONTINUOUS
Status: DISCONTINUED | OUTPATIENT
Start: 2021-12-13 | End: 2021-12-13 | Stop reason: HOSPADM

## 2021-12-13 RX ORDER — LIDOCAINE 40 MG/G
CREAM TOPICAL
Status: DISCONTINUED | OUTPATIENT
Start: 2021-12-13 | End: 2021-12-13 | Stop reason: HOSPADM

## 2021-12-13 RX ORDER — CEFAZOLIN SODIUM/WATER 2 G/20 ML
2 SYRINGE (ML) INTRAVENOUS SEE ADMIN INSTRUCTIONS
Status: DISCONTINUED | OUTPATIENT
Start: 2021-12-13 | End: 2021-12-13 | Stop reason: HOSPADM

## 2021-12-13 RX ORDER — ALVIMOPAN 12 MG/1
12 CAPSULE ORAL ONCE
Status: DISCONTINUED | OUTPATIENT
Start: 2021-12-13 | End: 2021-12-13 | Stop reason: HOSPADM

## 2021-12-13 RX ORDER — OXYCODONE HYDROCHLORIDE 5 MG/1
10 TABLET ORAL EVERY 4 HOURS PRN
Status: DISCONTINUED | OUTPATIENT
Start: 2021-12-13 | End: 2021-12-15

## 2021-12-13 RX ORDER — IBUPROFEN 600 MG/1
600 TABLET, FILM COATED ORAL EVERY 6 HOURS
Status: DISCONTINUED | OUTPATIENT
Start: 2021-12-14 | End: 2021-12-15

## 2021-12-13 RX ORDER — HYDROMORPHONE HYDROCHLORIDE 1 MG/ML
0.5 INJECTION, SOLUTION INTRAMUSCULAR; INTRAVENOUS; SUBCUTANEOUS EVERY 5 MIN PRN
Status: DISCONTINUED | OUTPATIENT
Start: 2021-12-13 | End: 2021-12-13 | Stop reason: HOSPADM

## 2021-12-13 RX ADMIN — HYDROMORPHONE HYDROCHLORIDE 0.5 MG: 1 INJECTION, SOLUTION INTRAMUSCULAR; INTRAVENOUS; SUBCUTANEOUS at 18:57

## 2021-12-13 RX ADMIN — PREGABALIN 50 MG: 50 CAPSULE ORAL at 20:09

## 2021-12-13 RX ADMIN — HYDROMORPHONE HYDROCHLORIDE 1 MG: 1 INJECTION, SOLUTION INTRAMUSCULAR; INTRAVENOUS; SUBCUTANEOUS at 20:57

## 2021-12-13 RX ADMIN — PHENYLEPHRINE HYDROCHLORIDE 150 MCG: 10 INJECTION INTRAVENOUS at 13:12

## 2021-12-13 RX ADMIN — ROCURONIUM BROMIDE 50 MG: 50 INJECTION, SOLUTION INTRAVENOUS at 12:37

## 2021-12-13 RX ADMIN — FENTANYL CITRATE 25 MCG: 50 INJECTION INTRAMUSCULAR; INTRAVENOUS at 16:30

## 2021-12-13 RX ADMIN — HYDROMORPHONE HYDROCHLORIDE 1 MG: 1 INJECTION, SOLUTION INTRAMUSCULAR; INTRAVENOUS; SUBCUTANEOUS at 15:40

## 2021-12-13 RX ADMIN — DIVALPROEX SODIUM 500 MG: 500 TABLET, FILM COATED, EXTENDED RELEASE ORAL at 22:26

## 2021-12-13 RX ADMIN — ROCURONIUM BROMIDE 20 MG: 50 INJECTION, SOLUTION INTRAVENOUS at 15:06

## 2021-12-13 RX ADMIN — PHENYLEPHRINE HYDROCHLORIDE 200 MCG: 10 INJECTION INTRAVENOUS at 15:21

## 2021-12-13 RX ADMIN — PHENYLEPHRINE HYDROCHLORIDE 200 MCG: 10 INJECTION INTRAVENOUS at 13:42

## 2021-12-13 RX ADMIN — Medication 2 G: at 12:49

## 2021-12-13 RX ADMIN — OXYCODONE HYDROCHLORIDE 5 MG: 5 TABLET ORAL at 16:40

## 2021-12-13 RX ADMIN — KETOROLAC TROMETHAMINE 15 MG: 15 INJECTION, SOLUTION INTRAMUSCULAR; INTRAVENOUS at 18:30

## 2021-12-13 RX ADMIN — LIDOCAINE HYDROCHLORIDE 50 MG: 10 INJECTION, SOLUTION EPIDURAL; INFILTRATION; INTRACAUDAL; PERINEURAL at 12:37

## 2021-12-13 RX ADMIN — ACETAMINOPHEN 975 MG: 325 TABLET, FILM COATED ORAL at 20:12

## 2021-12-13 RX ADMIN — PHENYLEPHRINE HYDROCHLORIDE 150 MCG: 10 INJECTION INTRAVENOUS at 13:23

## 2021-12-13 RX ADMIN — DEXAMETHASONE SODIUM PHOSPHATE 4 MG: 4 INJECTION, SOLUTION INTRA-ARTICULAR; INTRALESIONAL; INTRAMUSCULAR; INTRAVENOUS; SOFT TISSUE at 15:37

## 2021-12-13 RX ADMIN — FENTANYL CITRATE 150 MCG: 50 INJECTION, SOLUTION INTRAMUSCULAR; INTRAVENOUS at 12:37

## 2021-12-13 RX ADMIN — HEPARIN SODIUM 5000 UNITS: 10000 INJECTION, SOLUTION INTRAVENOUS; SUBCUTANEOUS at 12:21

## 2021-12-13 RX ADMIN — PROPOFOL 25 MCG/KG/MIN: 10 INJECTION, EMULSION INTRAVENOUS at 12:46

## 2021-12-13 RX ADMIN — FENTANYL CITRATE 25 MCG: 50 INJECTION INTRAMUSCULAR; INTRAVENOUS at 16:40

## 2021-12-13 RX ADMIN — HYDROMORPHONE HYDROCHLORIDE 0.5 MG: 1 INJECTION, SOLUTION INTRAMUSCULAR; INTRAVENOUS; SUBCUTANEOUS at 17:01

## 2021-12-13 RX ADMIN — PHENYLEPHRINE HYDROCHLORIDE 200 MCG: 10 INJECTION INTRAVENOUS at 13:32

## 2021-12-13 RX ADMIN — ONDANSETRON HYDROCHLORIDE 4 MG: 2 INJECTION, SOLUTION INTRAVENOUS at 15:37

## 2021-12-13 RX ADMIN — FENTANYL CITRATE 25 MCG: 50 INJECTION INTRAMUSCULAR; INTRAVENOUS at 16:35

## 2021-12-13 RX ADMIN — MIDAZOLAM 2 MG: 1 INJECTION INTRAMUSCULAR; INTRAVENOUS at 12:33

## 2021-12-13 RX ADMIN — ROCURONIUM BROMIDE 20 MG: 50 INJECTION, SOLUTION INTRAVENOUS at 13:09

## 2021-12-13 RX ADMIN — GLYCOPYRROLATE 0.6 MG: 0.2 INJECTION, SOLUTION INTRAMUSCULAR; INTRAVENOUS at 15:49

## 2021-12-13 RX ADMIN — HYDROMORPHONE HYDROCHLORIDE 0.5 MG: 1 INJECTION, SOLUTION INTRAMUSCULAR; INTRAVENOUS; SUBCUTANEOUS at 16:50

## 2021-12-13 RX ADMIN — SODIUM CHLORIDE, POTASSIUM CHLORIDE, SODIUM LACTATE AND CALCIUM CHLORIDE: 600; 310; 30; 20 INJECTION, SOLUTION INTRAVENOUS at 14:27

## 2021-12-13 RX ADMIN — FENTANYL CITRATE 100 MCG: 50 INJECTION, SOLUTION INTRAMUSCULAR; INTRAVENOUS at 13:47

## 2021-12-13 RX ADMIN — HYDROMORPHONE HYDROCHLORIDE 1 MG: 1 INJECTION, SOLUTION INTRAMUSCULAR; INTRAVENOUS; SUBCUTANEOUS at 14:11

## 2021-12-13 RX ADMIN — GLYCOPYRROLATE 0.2 MG: 0.2 INJECTION, SOLUTION INTRAMUSCULAR; INTRAVENOUS at 12:37

## 2021-12-13 RX ADMIN — SODIUM CHLORIDE, POTASSIUM CHLORIDE, SODIUM LACTATE AND CALCIUM CHLORIDE: 600; 310; 30; 20 INJECTION, SOLUTION INTRAVENOUS at 11:53

## 2021-12-13 RX ADMIN — FENTANYL CITRATE 25 MCG: 50 INJECTION INTRAMUSCULAR; INTRAVENOUS at 16:22

## 2021-12-13 RX ADMIN — NEOSTIGMINE METHYLSULFATE 3 MG: 1 INJECTION, SOLUTION INTRAVENOUS at 15:49

## 2021-12-13 RX ADMIN — METRONIDAZOLE 500 MG: 500 INJECTION, SOLUTION INTRAVENOUS at 12:10

## 2021-12-13 RX ADMIN — PROPOFOL 200 MG: 10 INJECTION, EMULSION INTRAVENOUS at 12:37

## 2021-12-13 RX ADMIN — HYDROMORPHONE HYDROCHLORIDE 1 MG: 1 INJECTION, SOLUTION INTRAMUSCULAR; INTRAVENOUS; SUBCUTANEOUS at 23:29

## 2021-12-13 RX ADMIN — ACETAMINOPHEN 975 MG: 325 TABLET, FILM COATED ORAL at 12:18

## 2021-12-13 RX ADMIN — SODIUM CHLORIDE, POTASSIUM CHLORIDE, SODIUM LACTATE AND CALCIUM CHLORIDE: 600; 310; 30; 20 INJECTION, SOLUTION INTRAVENOUS at 13:11

## 2021-12-13 RX ADMIN — SODIUM CHLORIDE, POTASSIUM CHLORIDE, SODIUM LACTATE AND CALCIUM CHLORIDE: 600; 310; 30; 20 INJECTION, SOLUTION INTRAVENOUS at 20:10

## 2021-12-13 RX ADMIN — ROCURONIUM BROMIDE 30 MG: 50 INJECTION, SOLUTION INTRAVENOUS at 13:47

## 2021-12-13 RX ADMIN — ROCURONIUM BROMIDE 20 MG: 50 INJECTION, SOLUTION INTRAVENOUS at 14:24

## 2021-12-13 ASSESSMENT — ACTIVITIES OF DAILY LIVING (ADL)
ADLS_ACUITY_SCORE: 12
ADLS_ACUITY_SCORE: 3
ADLS_ACUITY_SCORE: 12
ADLS_ACUITY_SCORE: 3
ADLS_ACUITY_SCORE: 12
ADLS_ACUITY_SCORE: 12

## 2021-12-13 ASSESSMENT — ENCOUNTER SYMPTOMS
SEIZURES: 1
DYSRHYTHMIAS: 1

## 2021-12-13 ASSESSMENT — MIFFLIN-ST. JEOR: SCORE: 1888.27

## 2021-12-13 NOTE — ANESTHESIA PREPROCEDURE EVALUATION
Anesthesia Pre-Procedure Evaluation    Patient: Rex Negrete   MRN: [id number] : 1980        Preoperative Diagnosis: Colovesical fistula [N32.1]    Procedure : Procedure(s):  COLONOSCOPY intraoperative  Laparoscopic sigmoid resection with takedown of colovesical fistula          Past Medical History:   Diagnosis Date     Colovesical fistula      Depression     in the past (not being medicated for sx's)     Diverticulitis of sigmoid colon      Dysthymic disorder      Hx of substance abuse (H)     meth, cocaine     NONSPECIFIC MEDICAL HISTORY      PEA (Pulseless electrical activity) (H) cardiac arrest 2021    felt to be secondary to methamphetamine overdose and catacholamine toxicity     Seizures (H)       Past Surgical History:   Procedure Laterality Date     C ORAL SURGERY PROCEDURE      wisdom teeth     COLONOSCOPY       ENT SURGERY      sinus surgery     GI SURGERY      upper GI     ORTHOPEDIC SURGERY Right 2019    knee scope torn meniscus     OTHER SURGICAL HISTORY      scope to the left shoulder      No Known Allergies   Social History     Tobacco Use     Smoking status: Current Some Day Smoker     Packs/day: 0.50     Types: Cigarettes     Smokeless tobacco: Never Used     Tobacco comment: less than a pack   Substance Use Topics     Alcohol use: Not Currently      Wt Readings from Last 1 Encounters:   21 90.8 kg (200 lb 1.6 oz)        Anesthesia Evaluation            ROS/MED HX  ENT/Pulmonary:  - neg pulmonary ROS     Neurologic:     (+) seizures,     Cardiovascular: Comment: Name: REX NEGRETE  MRN: [id number]  : 1980  Study Date: 2021 09:51 AM  Age: 41 yrs  Gender: Male  Patient Location: [facility]  Reason For Study: H/O cardiac arrest  Ordering Physician: TOPHER GUTIERREZ  Referring Physician: TOPHER GUTIERREZ  Performed By: VIJI     BSA: 2.2 m2  Height: 73 in  Weight: 200 lb  HR: 80  BP: 133/84  mmHg  ______________________________________________________________________________  Procedure  Complete Echo Adult.  ______________________________________________________________________________  Interpretation Summary     Left ventricular size, wall motion and function are normal. The ejection  fraction is 55-60%.  Normal right ventricle size and systolic function.  No hemodynamically significant valvular abnormalities on 2D or color flow  imaging.  ______________________________________________________________________________    (+) -----dysrhythmias (Cardiac Arrest due to Meth OD), Previous cardiac testing   Echo: Date: Results:    Stress Test: Date: 8/21 Results:  Neg  ECG Reviewed: Date: Results:    Cath: Date: Results:   (-) hypertension, CAD, CHF and dyslipidemia   METS/Exercise Tolerance:     Hematologic:       Musculoskeletal:       GI/Hepatic:    (-) GERD and hepatitis   Renal/Genitourinary:     (+) renal disease,     Endo:  - neg endo ROS     Psychiatric/Substance Use:     (+) psychiatric history depression Recreational drug usage: Cocaine and Meth.    Infectious Disease:  - neg infectious disease ROS     Malignancy:       Other:            Physical Exam    Airway        Mallampati: II   TM distance: > 3 FB   Neck ROM: full   Mouth opening: > 3 cm    Respiratory Devices and Support         Dental           Cardiovascular   cardiovascular exam normal          Pulmonary   pulmonary exam normal            Other findings: Lab Test        10/27/21     03/28/20     03/27/20                       0949          1440          0757          WBC          17.3*        9.4          12.3*         HGB          11.1*        15.3         14.9          MCV          92           93           92            PLT          422          264          270            Lab Test        11/04/21     10/27/21     03/28/20                       1100          0949          1440          NA           135          132*         134            POTASSIUM    4.3          3.7          3.6           CHLORIDE     105          98           102           CO2          26           24           29            BUN          25           20           14            CR           1.50*        2.24*        1.26*         ANIONGAP     4            10           3             RANDALL          9.0          8.4*         8.6           GLC          92           149*         99              OUTSIDE LABS:  CBC:   Lab Results   Component Value Date    WBC 17.3 (H) 10/27/2021    WBC 9.4 03/28/2020    HGB 11.1 (L) 10/27/2021    HGB 15.3 03/28/2020    HCT 33.5 10/27/2021    HCT 47.0 03/28/2020     10/27/2021     03/28/2020     BMP:   Lab Results   Component Value Date     11/04/2021     (L) 10/27/2021    POTASSIUM 4.3 11/04/2021    POTASSIUM 3.7 10/27/2021    CHLORIDE 105 11/04/2021    CHLORIDE 98 10/27/2021    CO2 26 11/04/2021    CO2 24 10/27/2021    BUN 25 11/04/2021    BUN 20 10/27/2021    CR 1.50 (H) 11/04/2021    CR 2.24 (H) 10/27/2021    GLC 92 11/04/2021     (H) 10/27/2021     COAGS: No results found for: PTT, INR, FIBR  POC: No results found for: BGM, HCG, HCGS  HEPATIC:   Lab Results   Component Value Date    ALBUMIN 2.6 (L) 10/27/2021    PROTTOTAL 7.5 10/27/2021    ALT 64 10/27/2021    AST 34 10/27/2021    ALKPHOS 35 (L) 10/27/2021    BILITOTAL 0.9 10/27/2021     OTHER:   Lab Results   Component Value Date    RANDALL 9.0 11/04/2021    LIPASE 81 03/27/2020    TSH 1.97 10/27/2021       Anesthesia Plan    ASA Status:  3      Anesthesia Type: General.     - Airway: ETT   Induction: Propofol.   Maintenance: Balanced.        Consents    Anesthesia Plan(s) and associated risks, benefits, and realistic alternatives discussed. Questions answered and patient/representative(s) expressed understanding.    - Discussed:     - Discussed with:  Patient      - Extended Intubation/Ventilatory Support Discussed: No.      - Patient is DNR/DNI Status: No    Use of  blood products discussed: No .     Postoperative Care    Pain management: IV analgesics, Oral pain medications.   PONV prophylaxis: Ondansetron (or other 5HT-3), Background Propofol Infusion, Dexamethasone or Solumedrol     Comments:                Isidro Sorto MD

## 2021-12-13 NOTE — ANESTHESIA POSTPROCEDURE EVALUATION
Patient: Rex Negrete    Procedure: Procedure(s):  COLONOSCOPY intraoperative  Laparoscopic sigmoid resection with takedown of colovesical fistula       Diagnosis:Colovesical fistula [N32.1]  Diagnosis Additional Information: No value filed.    Anesthesia Type:  General    Note:  Disposition: Inpatient   Postop Pain Control: Uneventful            Sign Out: Well controlled pain   PONV: No   Neuro/Psych: Uneventful            Sign Out: Acceptable/Baseline neuro status   Airway/Respiratory: Uneventful            Sign Out: Acceptable/Baseline resp. status   CV/Hemodynamics: Uneventful            Sign Out: Acceptable CV status; No obvious hypovolemia; No obvious fluid overload   Other NRE: NONE   DID A NON-ROUTINE EVENT OCCUR? No           Last vitals:  Vitals Value Taken Time   /83 12/13/21 1640   Temp 96.5  F (35.8  C) 12/13/21 1615   Pulse 71 12/13/21 1642   Resp 10 12/13/21 1642   SpO2 98 % 12/13/21 1642   Vitals shown include unvalidated device data.    Electronically Signed By: Isidro Sorto MD  December 13, 2021  4:44 PM

## 2021-12-13 NOTE — BRIEF OP NOTE
[facility]    Brief Operative Note    Pre-operative diagnosis: Colovesical fistula [N32.1]  Post-operative diagnosis Same as pre-operative diagnosis    Procedure: Procedure(s):  COLONOSCOPY intraoperative  Laparoscopic sigmoid resection with takedown of colovesical fistula  Surgeon: Surgeon(s) and Role:     * Carloa Pastrana MD - Primary   nAahi Ye MD - Fellow  Anesthesia: General   Estimated Blood Loss: Less than 10 ml    Drains: None  Specimens:   ID Type Source Tests Collected by Time Destination   1 : Sigmoid colon and anastomotic rings Tissue Large Intestine, Colon, Sigmoid SURGICAL PATHOLOGY EXAM Carola Pastrana MD 12/13/2021  3:34 PM      Findings:   sigmoid diverticulitis with loop adherent to bladder.  Complications: None.  Implants: * No implants in log *    ADDENDUM:    PATIENT DATA  Indicate Y or N:  Home O2 No  Hemodialysis No  Transplant patient No  Cirrhosis No  Steroids in last 30 days No  Immunomodulators in last 30 days No  Anticoagulation at time of surgery No   List medication na  Prior abdominal surgery No  Pelvic irradiation No    Albumin within 30 days if known na  Hgb within 30 days if known 12.5  Cr within 30 days if known 1.08  Body mass index is 25.87 kg/m .    OR DATA  Emergent No   <24 hours No   <1 week No  Bowel Prep (Y or N) Yes  Antibiotics (Y or N) Yes  DVT prophylaxis    Heparin Yes   SCD Yes   None No  Drain No  ASA (1,2,3,4,5 if unknown) 3  OR time (min) 135  Stents No  Transfuse >/= 2U No  Anastomosis   Stapled Yes   Handsewn No  Leak Test (pos, neg, not done) negative

## 2021-12-13 NOTE — ANESTHESIA CARE TRANSFER NOTE
Patient: Rex Negrete    Procedure: Procedure(s):  COLONOSCOPY intraoperative  Laparoscopic sigmoid resection with takedown of colovesical fistula       Diagnosis: Colovesical fistula [N32.1]  Diagnosis Additional Information: No value filed.    Anesthesia Type:   General     Note:      Level of Consciousness: awake  Oxygen Supplementation: face mask    Independent Airway: airway patency satisfactory and stable        Patient transferred to: PACU    Handoff Report: Identifed the Patient, Identified the Reponsible Provider, Reviewed the pertinent medical history, Discussed the surgical course, Reviewed Intra-OP anesthesia mangement and issues during anesthesia, Set expectations for post-procedure period and Allowed opportunity for questions and acknowledgement of understanding      Vitals:  Vitals Value Taken Time   /83 12/13/21 1640   Temp 96.5  F (35.8  C) 12/13/21 1615   Pulse 76 12/13/21 1643   Resp 12 12/13/21 1643   SpO2 96 % 12/13/21 1643   Vitals shown include unvalidated device data.    Electronically Signed By: Isidro Sorto MD  December 13, 2021  4:44 PM

## 2021-12-14 LAB
ANION GAP SERPL CALCULATED.3IONS-SCNC: 6 MMOL/L (ref 3–14)
BUN SERPL-MCNC: 9 MG/DL (ref 7–30)
CALCIUM SERPL-MCNC: 8.2 MG/DL (ref 8.5–10.1)
CHLORIDE BLD-SCNC: 105 MMOL/L (ref 94–109)
CO2 SERPL-SCNC: 25 MMOL/L (ref 20–32)
CREAT SERPL-MCNC: 0.93 MG/DL (ref 0.66–1.25)
GFR SERPL CREATININE-BSD FRML MDRD: >90 ML/MIN/1.73M2
GLUCOSE BLD-MCNC: 156 MG/DL (ref 70–99)
GLUCOSE BLDC GLUCOMTR-MCNC: 117 MG/DL (ref 70–99)
HGB BLD-MCNC: 11.3 G/DL (ref 13.3–17.7)
PLATELET # BLD AUTO: 313 10E3/UL (ref 150–450)
POTASSIUM BLD-SCNC: 3.5 MMOL/L (ref 3.4–5.3)
SODIUM SERPL-SCNC: 136 MMOL/L (ref 133–144)

## 2021-12-14 PROCEDURE — 250N000013 HC RX MED GY IP 250 OP 250 PS 637: Performed by: COLON & RECTAL SURGERY

## 2021-12-14 PROCEDURE — 36415 COLL VENOUS BLD VENIPUNCTURE: CPT | Performed by: COLON & RECTAL SURGERY

## 2021-12-14 PROCEDURE — 85049 AUTOMATED PLATELET COUNT: CPT | Performed by: COLON & RECTAL SURGERY

## 2021-12-14 PROCEDURE — 85018 HEMOGLOBIN: CPT | Performed by: COLON & RECTAL SURGERY

## 2021-12-14 PROCEDURE — 120N000001 HC R&B MED SURG/OB

## 2021-12-14 PROCEDURE — 250N000011 HC RX IP 250 OP 636: Performed by: COLON & RECTAL SURGERY

## 2021-12-14 PROCEDURE — 82374 ASSAY BLOOD CARBON DIOXIDE: CPT | Performed by: COLON & RECTAL SURGERY

## 2021-12-14 PROCEDURE — 258N000003 HC RX IP 258 OP 636: Performed by: COLON & RECTAL SURGERY

## 2021-12-14 RX ORDER — OXYCODONE HYDROCHLORIDE 5 MG/1
7.5 TABLET ORAL
Status: ON HOLD | COMMUNITY
End: 2021-12-17

## 2021-12-14 RX ORDER — HYDROMORPHONE HYDROCHLORIDE 1 MG/ML
0.5 INJECTION, SOLUTION INTRAMUSCULAR; INTRAVENOUS; SUBCUTANEOUS
Status: DISCONTINUED | OUTPATIENT
Start: 2021-12-14 | End: 2021-12-15

## 2021-12-14 RX ADMIN — KETOROLAC TROMETHAMINE 15 MG: 15 INJECTION, SOLUTION INTRAMUSCULAR; INTRAVENOUS at 06:32

## 2021-12-14 RX ADMIN — HYDROMORPHONE HYDROCHLORIDE 1 MG: 1 INJECTION, SOLUTION INTRAMUSCULAR; INTRAVENOUS; SUBCUTANEOUS at 09:43

## 2021-12-14 RX ADMIN — OXYCODONE HYDROCHLORIDE 15 MG: 5 TABLET ORAL at 01:48

## 2021-12-14 RX ADMIN — HYDROMORPHONE HYDROCHLORIDE 1 MG: 1 INJECTION, SOLUTION INTRAMUSCULAR; INTRAVENOUS; SUBCUTANEOUS at 04:54

## 2021-12-14 RX ADMIN — OXYCODONE HYDROCHLORIDE 15 MG: 5 TABLET ORAL at 17:10

## 2021-12-14 RX ADMIN — PREGABALIN 50 MG: 50 CAPSULE ORAL at 17:10

## 2021-12-14 RX ADMIN — PREGABALIN 50 MG: 50 CAPSULE ORAL at 21:17

## 2021-12-14 RX ADMIN — OXYCODONE HYDROCHLORIDE 15 MG: 5 TABLET ORAL at 21:17

## 2021-12-14 RX ADMIN — OXYCODONE HYDROCHLORIDE 15 MG: 5 TABLET ORAL at 12:11

## 2021-12-14 RX ADMIN — HYDROMORPHONE HYDROCHLORIDE 0.5 MG: 1 INJECTION, SOLUTION INTRAMUSCULAR; INTRAVENOUS; SUBCUTANEOUS at 20:31

## 2021-12-14 RX ADMIN — HYDROMORPHONE HYDROCHLORIDE 0.5 MG: 1 INJECTION, SOLUTION INTRAMUSCULAR; INTRAVENOUS; SUBCUTANEOUS at 23:17

## 2021-12-14 RX ADMIN — ACETAMINOPHEN 975 MG: 325 TABLET, FILM COATED ORAL at 07:45

## 2021-12-14 RX ADMIN — OXYCODONE HYDROCHLORIDE 15 MG: 5 TABLET ORAL at 07:46

## 2021-12-14 RX ADMIN — HYDROMORPHONE HYDROCHLORIDE 0.5 MG: 1 INJECTION, SOLUTION INTRAMUSCULAR; INTRAVENOUS; SUBCUTANEOUS at 18:29

## 2021-12-14 RX ADMIN — SODIUM CHLORIDE, POTASSIUM CHLORIDE, SODIUM LACTATE AND CALCIUM CHLORIDE: 600; 310; 30; 20 INJECTION, SOLUTION INTRAVENOUS at 06:03

## 2021-12-14 RX ADMIN — KETOROLAC TROMETHAMINE 15 MG: 15 INJECTION, SOLUTION INTRAMUSCULAR; INTRAVENOUS at 12:10

## 2021-12-14 RX ADMIN — IBUPROFEN 600 MG: 600 TABLET ORAL at 23:59

## 2021-12-14 RX ADMIN — IBUPROFEN 600 MG: 600 TABLET ORAL at 17:10

## 2021-12-14 RX ADMIN — ACETAMINOPHEN 975 MG: 325 TABLET, FILM COATED ORAL at 20:01

## 2021-12-14 RX ADMIN — PREGABALIN 50 MG: 50 CAPSULE ORAL at 09:43

## 2021-12-14 RX ADMIN — ACETAMINOPHEN 975 MG: 325 TABLET, FILM COATED ORAL at 01:49

## 2021-12-14 RX ADMIN — ENOXAPARIN SODIUM 40 MG: 40 INJECTION SUBCUTANEOUS at 09:43

## 2021-12-14 RX ADMIN — ONDANSETRON 4 MG: 4 TABLET, ORALLY DISINTEGRATING ORAL at 06:07

## 2021-12-14 RX ADMIN — ACETAMINOPHEN 975 MG: 325 TABLET, FILM COATED ORAL at 14:37

## 2021-12-14 RX ADMIN — HYDROMORPHONE HYDROCHLORIDE 0.5 MG: 1 INJECTION, SOLUTION INTRAMUSCULAR; INTRAVENOUS; SUBCUTANEOUS at 14:37

## 2021-12-14 RX ADMIN — KETOROLAC TROMETHAMINE 15 MG: 15 INJECTION, SOLUTION INTRAMUSCULAR; INTRAVENOUS at 00:36

## 2021-12-14 RX ADMIN — DIVALPROEX SODIUM 500 MG: 500 TABLET, FILM COATED, EXTENDED RELEASE ORAL at 21:17

## 2021-12-14 ASSESSMENT — ACTIVITIES OF DAILY LIVING (ADL)
ADLS_ACUITY_SCORE: 3
ADLS_ACUITY_SCORE: 5
ADLS_ACUITY_SCORE: 3
ADLS_ACUITY_SCORE: 5
ADLS_ACUITY_SCORE: 3
ADLS_ACUITY_SCORE: 5
ADLS_ACUITY_SCORE: 3
ADLS_ACUITY_SCORE: 5
DEPENDENT_IADLS:: TRANSPORTATION;MEDICATION MANAGEMENT;MEAL PREPARATION
ADLS_ACUITY_SCORE: 3
ADLS_ACUITY_SCORE: 5
ADLS_ACUITY_SCORE: 3
ADLS_ACUITY_SCORE: 5

## 2021-12-14 NOTE — CONSULTS
CLINICAL NUTRITION SERVICES - EDUCATION NOTE    Received diet education consult for Low residue diet teaching     NUTRITION HISTORY:  Information obtained from patient and chart:    Diet:  Full Liquid  ?  Living situation:   Treatment Facility  ?  Grocery shopping:  Self-when can afford food  ?  Meal preparation:  Self-when can afford food  ?  Patient stated he tries to eat healthier than the high carb meals the treatment facility serves. Prefers white rice, tuna, chicken, broccoli and asparagus.  ?  ?  NUTRITION DIAGNOSIS:  Food- and nutrition-related knowledge deficit related to s/p lap sigmoid resection with takedown of colovesical fistula as evidenced by unaware of fiber content of foods.      INTERVENTIONS:  Provided instruction on Low Fiber diet    Provided  the following handouts: Low Fiber Nutrition Therapy    Goals:  Patient verbalizes understanding of diet by adhering to diet recommendations     Follow Up:  Patient to ask any further nutrition-related questions before discharge. In addition, pt may request outpatient RD appointment.     Please place additional consult if further nutrition concerns arise

## 2021-12-14 NOTE — PHARMACY-ADMISSION MEDICATION HISTORY
Admission medication history interview status for this patient is complete. See [facility] admission navigator for allergy information, prior to admission medications and immunization status.     Medication history interview done, indicate source(s): Patient  Medication history resources (including written lists, pill bottles, clinic record):Sure Scripts  Pharmacy: [facility]    Changes made to PTA medication list:  Added: none  Changed: oxycodone to 7.5 mg TID scheduled  Reported as Not Taking: none  Removed: none    Actions taken by pharmacist (provider contacted, etc):None     Additional medication history information:None    Medication reconciliation/reorder completed by provider prior to medication history?  Y   (Y/N)     Prior to Admission medications    Medication Sig Last Dose Taking? Auth Provider   diphenhydrAMINE (BENADRYL) 25 MG capsule Take 25 mg by mouth every 6 hours as needed More than a month at Unknown time Yes Reported, Patient   divalproex sodium extended-release (DEPAKOTE ER) 500 MG 24 hr tablet Take 1 tablet (500 mg) by mouth At Bedtime 12/9/2021 at Unknown time Yes Yennifer Carrizales,    Omega-3 Fatty Acids (FISH OIL MAXIMUM STRENGTH) 1200 MG CPDR Take 1 capsule by mouth  Past Week at Unknown time Yes Reported, Patient   ondansetron (ZOFRAN-ODT) 4 MG ODT tab Take 1 tablet (4 mg) by mouth every 6 hours as needed for nausea Past Week at Unknown time Yes Sebastien Forbes MD   oxyCODONE (ROXICODONE) 5 MG tablet Take 7.5 mg by mouth 3 times daily 12/13/2021 Yes Unknown, Entered By History   pregabalin (LYRICA) 50 MG capsule Take 1 capsule (50 mg) by mouth 3 times daily 12/13/2021 at Unknown time Yes Sebastien Forbes MD   magnesium citrate 1.745 GM/30ML solution 5 HOURS BEFORE PROCEDURE DRINK THE ENTIRE BOTTLE.   Reported, Patient   neomycin (MYCIFRADIN) 500 MG tablet Take 2 tablets (1000mg) at 1pm, 2pm, and 11pm the day before surgery   Sebastien Forbes MD

## 2021-12-14 NOTE — PLAN OF CARE
End of Shift Summary  For vital signs and complete assessments, please see documentation flowsheets.     Pertinent assessments:A&O x4. VSS. Reports constant abdominal/incisional pain 8-910, dilaudid, oxy, tylenol & cold therapy used for pain management. Reports nausea, zofran given. BS hypo, denies passing gas. Pandya patent, adequate UOP. Declined to dangle/ambulate till later in the morning. Reports LUE & LLE numbness - baseline per pt. B. Pt refused seizure pads & capno monitoring.     Major Shift Events: Increased pain, on call surgeon notified - orders rcvd for dilaudid 0.5 -1 mg every 2 hrs &  Tylenol 975 mg added.     Treatment Plan: Pain management, await ROBF, diet tolerance.

## 2021-12-14 NOTE — OP NOTE
Procedure Date: 12/13/2021    PREOPERATIVE DIAGNOSIS:  Colovesical fistula with diverticular disease.    POSTOPERATIVE DIAGNOSIS:  Colovesical fistula with diverticular disease.    PROCEDURE:  Intraoperative colonoscopy and laparoscopic sigmoid colectomy with takedown colovesical fistula and coloproctostomy at 16 cm from the anal verge with a 28 EEA stapler.    SURGEON:  Carola Pastrana MD.    ASSISTANT:  Dr. Anahi Ye, [facility] Colorectal Surgery Fellow.    INDICATIONS FOR PROCEDURE:  Rex is a 41-year-old man who had an admission to the hospital in early 11/2021 with sepsis.  He had a CT scan demonstrating inflammation of the sigmoid colon abutting the bladder, and there was some air in the bladder.  He began having symptoms of pneumatosis.  His renal function improved, and he has been treated with antibiotics with a presumed plan for takedown of the colovesical fistula.  He was previously scheduled for 2 weeks ago, however tested positive on his preoperative COVID, so he now returns for his scheduled procedure.  He has several factors that might increase his risk for complications, including smoking and a history of polysubstance use.  He also has a history of a cardiac arrest earlier this year.    I had an opportunity to meet the patient in the office along with his wife, and we reviewed the plan for a laparoscopic-assisted, possible open sigmoid resection with intraoperative colonoscopy.  We reviewed the risks of bleeding, infection, anastomotic leak, possible need for stoma, and other risks associated with major abdominal surgery and general anesthesia, including but not limited to DVT, PE, heart attack, stroke, urinary tract infections, need for further surgery, damage to surrounding structures, and even death.  He understood and wished to proceed.    FINDINGS:  He had a colonoscopy consistent with diverticular disease.  There was slight angulation of the sigmoid colon, but no evidence of  obstruction or active inflammation.  The terminal ileum was intubated several centimeters and did not reveal any evidence of inflammation there.  Circumferential evaluation on withdrawal did not reveal any abnormality on direct or retroflexed view other than the sigmoid diverticulum.    DESCRIPTION OF PROCEDURE:  After informed consent was obtained, the patient was taken to the operating room.  He was positioned on the operating table in supine position and was intubated.  A Pandya catheter was placed without difficulty, as was an orogastric tube.  The timeout was undertaken.  The colonoscope was advanced in through the anal opening and traversed to the cecum without difficulty.  Terminal ileum was intubated several centimeters and was found to be normal.  Circumferential evaluation on withdrawal did not reveal any abnormalities on direct or retroflex view, other than the diverticulum.  He was then positioned in modified lithotomy position, and the right arm was tucked.  He was secured to the bed with tape across his chest, and the abdomen was shaved, prepped and draped in the usual fashion.  After appropriate timeout, we began by making a 1 cm incision below the umbilicus.  Dissection was carried down through the fascia and the peritoneal cavity was entered without difficulty.  A 0 Vicryl suture was placed at the level of fascia, and a Kyle port was inserted.  Inspection did reveal the sigmoid colon adherent anteriorly, as suspected on the CT scan.  No other abnormality was noted within the abdomen.  Two 5 mm ports were placed in the right abdomen and one in the left lower quadrant.  With this, we mobilized the line of Toldt up to and around the splenic flexure, carrying the dissection down so that the mesentery was up off the retroperitoneum.  The ureter could be seen in the left pelvic sidewall, and this allowed us to bring the sigmoid colon up, and I used a combination of electrocautery and LigaSure to free up  the colon circumferentially off of the bladder.  There was about a 2 cm abscess, indicating infection present at the time of the procedure.  This was suctioned free of debris and was densely adherent to the bladder, really quite consistent with the CT scan.  Once we had freed this up, we could straighten out the rectum and identify where the rectum and sigmoid met.  A window was made in the mesentery, and this window was enlarged using the LigaSure.  We then switched to a 5/30 scope in the right upper quadrant and brought the stapling device through the umbilical port.  We were able to fire across the upper rectum.  There were a few millimeters left over, so a second fire was used.  We then divided the mesentery up towards the descending colon, staying somewhat close to the bowel, leaving the superior hemorrhoidal intact.  We then came to soft, compliant bowel in the descending colon and divided the mesentery up to the bowel wall.  We could see that this easily reached down to the upper rectum.  We placed a locking grasper on the stapled end and inspected for hemostasis, which was excellent.  We opened a roughly 4 cm Pfannenstiel incision, incising the skin and fascia transversely, raising flaps, and then entering the peritoneum by splitting the muscle in the midline.  A 5-9 Alfredo was placed, and the specimen was easily brought out.  We placed a Livan and Kocher across our bowel and divided.  There was minimal bleeding, but suggestive of nice perfusion.  A Prolene pursestring was placed, and the anvil of the 28 EEA stapler was secured.  A second Vicryl tie was used to secure this to the post.  The bowel itself appeared pink and well-perfused and nicely hemostatic.  This was dropped back into the abdomen.  We established pneumoperitoneum by occluding the Alfredo.  Dr. Ye then went below and advanced the sizer, then finally the stapling device with the spike deployed slightly posterior to the staple line in the  upper rectum.  The anvil was secured, and orientation was ensured.  The stapling device was closed and fired without difficulty.  Two intact anastomotic rings were retrieved.  We filled the pelvis with saline and occluded the bowel proximal, and a pneumatic leak test was performed and was negative.  The anastomosis was widely patent, well-perfused, tension-free, hemostatic and airtight at about 16 cm from the anal verge.  The air was desufflated from there, and the effluent was suctioned free, and we inspected for hemostasis, which was excellent.  A TAP block was then performed, injecting 10 mL of 0.25% Marcaine into each of the 4 quadrants.  The ports were removed under direct vision and were nicely hemostatic, as was the Alfredo.  We switched to a clean closure tray and reapproximated the fascia at the umbilicus with the previously placed 0 Vicryl.  The peritoneum was reapproximated with 3-0 Vicryl, and several simple interrupted sutures were placed to reapproximate the muscle.  The wounds were irrigated, and the fascia of the Pfannenstiel incision was reapproximated transversely using 0 looped PDS.  The skin edges of the 4 port sites and the extraction site were reapproximated with 4-0 Monocryl and Exofin.    ESTIMATED BLOOD LOSS:  10 mL.    SPECIMENS:  Sigmoid colon, anastomotic rings.    Carola Pastrana MD        D: 2021   T: 2021   MT: [facility]    Name:     MELANIE CRAMER  MRN:      [id number]        Account:        [id number]   :      1980           Procedure Date: 2021     Document: [id number]    cc:  Caorla Pastrana MD

## 2021-12-14 NOTE — PROGRESS NOTES
[facility]  PROGRESS NOTE    December 14, 2021  Post-op Day # 1    SUBJECTIVE:  Patient reports pain.  Somewhat improved with analgesics.  Tolerating clears.  He is hungry for more.  Not out of bed yet.  Clear straw colored urine in alvarez.    OBJECTIVE:  Temp:  [96.5  F (35.8  C)-98.2  F (36.8  C)] 97.2  F (36.2  C)  Pulse:  [] 72  Resp:  [7-24] 20  BP: (109-136)/(59-87) 132/72  SpO2:  [59 %-100 %] 97 %    Intake/Output Summary (Last 24 hours) at 12/14/2021 0901  Last data filed at 12/14/2021 0858  Gross per 24 hour   Intake 4054 ml   Output 4210 ml   Net -156 ml       GENERAL:  Awake, alert, no acute distress, lying in bed.  HEAD: Nomocephalic atraumatic  SCLERA: anicteric  EXTREMITIES: warm and well perfused  ABDOMEN:  Soft, appropriately tender, non-distended, no rebound or guarding, no peritoneal signs.  INCISION:  C/d/i    LABS:  Lab Results   Component Value Date    WBC 17.3 10/27/2021    WBC 9.4 03/28/2020     Lab Results   Component Value Date    HGB 11.3 12/14/2021    HGB 15.3 03/28/2020     Lab Results   Component Value Date    HCT 33.5 10/27/2021    HCT 47.0 03/28/2020     Lab Results   Component Value Date     12/14/2021     03/28/2020     Last Basic Metabolic Panel:  Lab Results   Component Value Date     11/04/2021     03/28/2020      Lab Results   Component Value Date    POTASSIUM 4.3 11/04/2021    POTASSIUM 3.6 03/28/2020     Lab Results   Component Value Date    CHLORIDE 105 11/04/2021    CHLORIDE 102 03/28/2020     Lab Results   Component Value Date    RANDALL 9.0 11/04/2021    RANDALL 8.6 03/28/2020     Lab Results   Component Value Date    CO2 26 11/04/2021    CO2 29 03/28/2020     Lab Results   Component Value Date    BUN 25 11/04/2021    BUN 14 03/28/2020     Lab Results   Component Value Date    CR 1.07 12/13/2021    CR 1.26 03/28/2020     Lab Results   Component Value Date     12/14/2021    GLC 92 11/04/2021    GLC 99 03/28/2020        ASSESSMENT/PLAN:  41 year old man POD#1 s/p lap sigmoid resection with takedown of colovesical fistula.  Afebrile, vitals stable.    - Full liquid diet  - Saline lock IV  - Pain management as needed, minimize narcotics  - Continue Pandya catheter  - Cystogram on Thursday  - Encourage ambulation, at least 5x per day  - Lovenox for ppx, will not need at discharge      Disposition: Expected discharge in 2-3 days.  Barriers to discharge: Tolerating low fiber diet, pain controlled with oral meds, return of bowel function.      For questions/paging, please contact the CRS office at [phone].    Lilliam Joseph PA-C  [facility]  Phone: [phone]

## 2021-12-14 NOTE — DISCHARGE INSTRUCTIONS
You can set up rides to medical appointments using [employer] (through your [facility] [place] insurance). Call them 3 days prior to your appointment to schedule the ride at [phone]. Have your insurance card available when you call.

## 2021-12-14 NOTE — CONSULTS
Care Management Initial Consult    General Information  Assessment completed with: Patient,    Type of CM/SW Visit: Initial Assessment    Primary Care Provider verified and updated as needed:     Readmission within the last 30 days:        Reason for Consult: discharge planning  Advance Care Planning:            Communication Assessment  Patient's communication style: spoken language (English or Bilingual)    Hearing Difficulty or Deaf: no   Wear Glasses or Blind: no    Cognitive  Cognitive/Neuro/Behavioral: WDL                      Living Environment:   People in home: other (see comments) (treatment center)     Current living Arrangements: residential facility  Name of Facility: [facility]   Able to return to prior arrangements: yes       Family/Social Support:  Care provided by: self  Provides care for: no one  Marital Status:   Wife          Description of Support System: Supportive,Involved    Support Assessment: Adequate family and caregiver support,Adequate social supports    Current Resources:   Patient receiving home care services: No     Community Resources: Chemical Dependency Services  Equipment currently used at home: none  Supplies currently used at home: None      Lifestyle & Psychosocial Needs:  Social Determinants of Health     Tobacco Use: High Risk     Smoking Tobacco Use: Current Some Day Smoker     Smokeless Tobacco Use: Never Used   Alcohol Use: Not on file   Financial Resource Strain: Not on file   Food Insecurity: Not on file   Transportation Needs: Not on file   Physical Activity: Not on file   Stress: Not on file   Social Connections: Not on file   Intimate Partner Violence: Not on file   Depression: Not at risk     PHQ-2 Score: 1   Housing Stability: Not on file       Functional Status:  Prior to admission patient needed assistance:   Dependent ADLs:: Independent  Dependent IADLs:: Transportation,Medication Management,Meal Preparation       Chemical Dependency Status:  Chemical  Dependency Status: Other (see comment)  Chemical Dependency Management: Other (see comment) (In treatment currently)          Additional Information:  SW met with pt for initial assessment. He confirms that he currently lives at [facility], a chemical dependency treatment facility. He reports that he will be there for an extended time, likely another nine months. He reports plan to return there at discharge. As SW began asking questions pt informs SW that he has had two brain injuries and so needs SW to ask specific questions. Pt reports he is typically independent with all ADLs. He reports that he does not drive and utilizes the facility transport to get where he needs to go. He reports that the facility prepares his meals and administers his medications. Pt confirms that he is [facility] and was on oxycodone prior to admission. He confirms his primary care provider is Yennifer Carrizales.     Pt reports that he will need to do his recovery from surgery at the hospital because there is not nursing care at [facility] and they make one meal which cannot be adjusted for his diet needs. He reports that he picks up his medications from his pharmacy and provides them to the facility to administer. Discussed that pt will be discharged from the hospital when the doctor feels he is medically stable. Pt reports understanding. SW asked if pt can have his own food at the facility and he reports that he can but then he has to pay for it and sometimes it gets taken. Discussed rides and pt reports he has not used [employer] ride in the past. SW offered to provide number to assist him with getting to medical appointments if he is unable to use the facility for transport. Pt reports that he would like this information. SW added this to pts AVS. SW offered to coordinate care with [facility] for pts needs at his return and to assist in advocating for him. Pt reports that he does not feel SW needs to contact the facility at  this time and does not want SW to reach out to them currently.    Pt denies any discharge needs but is agreeable to SW following his care for increased needs and discussing with him closer to discharge. He reports he may be more aware of what he needs at that time. He also requests SW check back when his wife is visiting as she may have other questions. SW will continue to follow.    TAMMIE Joseph

## 2021-12-14 NOTE — PROGRESS NOTES
Pt is restricted:    Provider number [id number], [id number] is a provider for a Restricted Recipient for: Pharmacy  Provider number [id number], DIPTI ROSALES is a provider for a Restricted Recipient for: Physician Services ,  Nurse Practitioner Services  Provider number [id number], [facility] is a provider for a Restricted Recipient for: Physician Services ,  Diagnostic Lab ,  Nurse Practitioner Services  If you are providing services other than the restricted services listed above, you may bill [employer]. If you have questions, please call [phone] or [phone].    Khadijah Alva RN BSN   Inpatient Care Coordination  [facility]  [phone]

## 2021-12-15 ENCOUNTER — TELEPHONE (OUTPATIENT)
Dept: PSYCHOLOGY | Facility: CLINIC | Age: 41
End: 2021-12-15
Payer: COMMERCIAL

## 2021-12-15 PROCEDURE — 250N000013 HC RX MED GY IP 250 OP 250 PS 637: Performed by: COLON & RECTAL SURGERY

## 2021-12-15 PROCEDURE — 120N000001 HC R&B MED SURG/OB

## 2021-12-15 PROCEDURE — 250N000013 HC RX MED GY IP 250 OP 250 PS 637: Performed by: PHYSICIAN ASSISTANT

## 2021-12-15 PROCEDURE — 250N000011 HC RX IP 250 OP 636: Performed by: NURSE PRACTITIONER

## 2021-12-15 PROCEDURE — 99223 1ST HOSP IP/OBS HIGH 75: CPT | Performed by: NURSE PRACTITIONER

## 2021-12-15 PROCEDURE — 250N000011 HC RX IP 250 OP 636: Performed by: COLON & RECTAL SURGERY

## 2021-12-15 PROCEDURE — 250N000013 HC RX MED GY IP 250 OP 250 PS 637: Performed by: NURSE PRACTITIONER

## 2021-12-15 RX ORDER — KETOROLAC TROMETHAMINE 15 MG/ML
15 INJECTION, SOLUTION INTRAMUSCULAR; INTRAVENOUS EVERY 6 HOURS
Status: COMPLETED | OUTPATIENT
Start: 2021-12-15 | End: 2021-12-16

## 2021-12-15 RX ORDER — CYCLOBENZAPRINE HCL 5 MG
5 TABLET ORAL 3 TIMES DAILY PRN
Status: DISCONTINUED | OUTPATIENT
Start: 2021-12-15 | End: 2021-12-16

## 2021-12-15 RX ORDER — PREGABALIN 50 MG/1
50 CAPSULE ORAL ONCE
Status: COMPLETED | OUTPATIENT
Start: 2021-12-15 | End: 2021-12-15

## 2021-12-15 RX ORDER — LIDOCAINE 4 G/G
1-2 PATCH TOPICAL
Status: DISCONTINUED | OUTPATIENT
Start: 2021-12-15 | End: 2021-12-15

## 2021-12-15 RX ORDER — LIDOCAINE 4 G/G
1 PATCH TOPICAL
Status: DISCONTINUED | OUTPATIENT
Start: 2021-12-15 | End: 2021-12-15

## 2021-12-15 RX ORDER — HYDROXYZINE HYDROCHLORIDE 10 MG/1
20 TABLET, FILM COATED ORAL 3 TIMES DAILY PRN
Status: DISCONTINUED | OUTPATIENT
Start: 2021-12-15 | End: 2021-12-16

## 2021-12-15 RX ORDER — PREGABALIN 75 MG/1
75 CAPSULE ORAL 3 TIMES DAILY
Status: DISCONTINUED | OUTPATIENT
Start: 2021-12-15 | End: 2021-12-17

## 2021-12-15 RX ORDER — AMOXICILLIN 250 MG
1 CAPSULE ORAL 2 TIMES DAILY
Status: DISCONTINUED | OUTPATIENT
Start: 2021-12-15 | End: 2021-12-17 | Stop reason: HOSPADM

## 2021-12-15 RX ORDER — AMOXICILLIN 250 MG
2 CAPSULE ORAL 2 TIMES DAILY
Status: DISCONTINUED | OUTPATIENT
Start: 2021-12-15 | End: 2021-12-17 | Stop reason: HOSPADM

## 2021-12-15 RX ORDER — HYDROXYZINE HYDROCHLORIDE 10 MG/1
10 TABLET, FILM COATED ORAL 3 TIMES DAILY PRN
Status: DISCONTINUED | OUTPATIENT
Start: 2021-12-15 | End: 2021-12-16

## 2021-12-15 RX ORDER — POLYETHYLENE GLYCOL 3350 17 G/17G
17 POWDER, FOR SOLUTION ORAL DAILY PRN
Status: DISCONTINUED | OUTPATIENT
Start: 2021-12-15 | End: 2021-12-17 | Stop reason: HOSPADM

## 2021-12-15 RX ORDER — OXYCODONE HYDROCHLORIDE 5 MG/1
15 TABLET ORAL
Status: DISCONTINUED | OUTPATIENT
Start: 2021-12-15 | End: 2021-12-17

## 2021-12-15 RX ORDER — LIDOCAINE 4 G/G
1-2 PATCH TOPICAL
Status: DISCONTINUED | OUTPATIENT
Start: 2021-12-16 | End: 2021-12-17 | Stop reason: HOSPADM

## 2021-12-15 RX ORDER — OXYCODONE HYDROCHLORIDE 5 MG/1
20 TABLET ORAL
Status: DISCONTINUED | OUTPATIENT
Start: 2021-12-15 | End: 2021-12-16

## 2021-12-15 RX ADMIN — ACETAMINOPHEN 975 MG: 325 TABLET, FILM COATED ORAL at 15:30

## 2021-12-15 RX ADMIN — KETOROLAC TROMETHAMINE 15 MG: 15 INJECTION, SOLUTION INTRAMUSCULAR; INTRAVENOUS at 20:07

## 2021-12-15 RX ADMIN — IBUPROFEN 600 MG: 600 TABLET ORAL at 13:41

## 2021-12-15 RX ADMIN — OXYCODONE HYDROCHLORIDE 15 MG: 5 TABLET ORAL at 09:42

## 2021-12-15 RX ADMIN — IBUPROFEN 600 MG: 600 TABLET ORAL at 06:34

## 2021-12-15 RX ADMIN — SENNOSIDES AND DOCUSATE SODIUM 1 TABLET: 50; 8.6 TABLET ORAL at 20:07

## 2021-12-15 RX ADMIN — ACETAMINOPHEN 975 MG: 325 TABLET, FILM COATED ORAL at 09:22

## 2021-12-15 RX ADMIN — OXYCODONE HYDROCHLORIDE 15 MG: 5 TABLET ORAL at 01:16

## 2021-12-15 RX ADMIN — PREGABALIN 50 MG: 50 CAPSULE ORAL at 09:22

## 2021-12-15 RX ADMIN — HYDROMORPHONE HYDROCHLORIDE 0.5 MG: 1 INJECTION, SOLUTION INTRAMUSCULAR; INTRAVENOUS; SUBCUTANEOUS at 04:33

## 2021-12-15 RX ADMIN — PREGABALIN 50 MG: 50 CAPSULE ORAL at 17:53

## 2021-12-15 RX ADMIN — PREGABALIN 50 MG: 50 CAPSULE ORAL at 15:30

## 2021-12-15 RX ADMIN — DIVALPROEX SODIUM 500 MG: 500 TABLET, FILM COATED, EXTENDED RELEASE ORAL at 21:34

## 2021-12-15 RX ADMIN — CYCLOBENZAPRINE HYDROCHLORIDE 5 MG: 5 TABLET, FILM COATED ORAL at 10:43

## 2021-12-15 RX ADMIN — HYDROMORPHONE HYDROCHLORIDE 0.5 MG: 1 INJECTION, SOLUTION INTRAMUSCULAR; INTRAVENOUS; SUBCUTANEOUS at 06:53

## 2021-12-15 RX ADMIN — ACETAMINOPHEN 975 MG: 325 TABLET, FILM COATED ORAL at 20:08

## 2021-12-15 RX ADMIN — LIDOCAINE 1 PATCH: 246 PATCH TOPICAL at 10:43

## 2021-12-15 RX ADMIN — ENOXAPARIN SODIUM 40 MG: 40 INJECTION SUBCUTANEOUS at 09:46

## 2021-12-15 RX ADMIN — PREGABALIN 75 MG: 75 CAPSULE ORAL at 21:34

## 2021-12-15 RX ADMIN — OXYCODONE HYDROCHLORIDE 15 MG: 5 TABLET ORAL at 05:42

## 2021-12-15 RX ADMIN — HYDROMORPHONE HYDROCHLORIDE 0.5 MG: 1 INJECTION, SOLUTION INTRAMUSCULAR; INTRAVENOUS; SUBCUTANEOUS at 02:16

## 2021-12-15 RX ADMIN — OXYCODONE HYDROCHLORIDE 20 MG: 5 TABLET ORAL at 21:35

## 2021-12-15 RX ADMIN — OXYCODONE HYDROCHLORIDE 20 MG: 5 TABLET ORAL at 17:53

## 2021-12-15 RX ADMIN — ACETAMINOPHEN 975 MG: 325 TABLET, FILM COATED ORAL at 02:16

## 2021-12-15 RX ADMIN — OXYCODONE HYDROCHLORIDE 15 MG: 5 TABLET ORAL at 13:41

## 2021-12-15 ASSESSMENT — ACTIVITIES OF DAILY LIVING (ADL)
ADLS_ACUITY_SCORE: 5
ADLS_ACUITY_SCORE: 5
ADLS_ACUITY_SCORE: 7
ADLS_ACUITY_SCORE: 5
ADLS_ACUITY_SCORE: 7
ADLS_ACUITY_SCORE: 5
ADLS_ACUITY_SCORE: 7
ADLS_ACUITY_SCORE: 5
ADLS_ACUITY_SCORE: 7
ADLS_ACUITY_SCORE: 5
ADLS_ACUITY_SCORE: 5
ADLS_ACUITY_SCORE: 7

## 2021-12-15 NOTE — PROGRESS NOTES
Care Management Follow Up    Length of Stay (days): 2    Expected Discharge Date: 12/17/2021     Concerns to be Addressed:       Patient plan of care discussed at interdisciplinary rounds: Yes      Additional Information:  SW met with patient and wife at bedside. Patient wanted SW to speak with his wife about what was explained to him yesterday as he has trouble retaining information. SW explained that he will be provided education on nutrition and then once he his medically ready, he will discharge. SW also explained how to access medical transportation. Patient's wife had no other questions and declined other needs.     ANTONIETA Diego, [facility]  , ED Care Management   [phone]      Amanda Vieira

## 2021-12-15 NOTE — PROGRESS NOTES
COLON & RECTAL SURGERY  PROGRESS NOTE    December 15, 2021  Post-op Day # 2    SUBJECTIVE:  Patient complains of abdominal pain.  Tolerating full liquids.  No BM or flatus yet.  Up walking already this morning.    OBJECTIVE:  Temp:  [97.7  F (36.5  C)-98.5  F (36.9  C)] 98.4  F (36.9  C)  Pulse:  [68-74] 68  Resp:  [16-18] 18  BP: (122-123)/(64-67) 122/64  Cuff Mean (mmHg):  [79] 79  SpO2:  [95 %-96 %] 95 %    Intake/Output Summary (Last 24 hours) at 12/15/2021 0856  Last data filed at 12/15/2021 0830  Gross per 24 hour   Intake 640 ml   Output 7800 ml   Net -7160 ml       GENERAL:  Awake, alert, no acute distress, lying in bed.  HEAD: Nomocephalic atraumatic  SCLERA: anicteric  EXTREMITIES: warm and well perfused  ABDOMEN:  Soft, appropriately tender, non-distended, no rebound or guarding, no peritoneal signs.  INCISION:  C/d/i    LABS:  Lab Results   Component Value Date    WBC 17.3 10/27/2021    WBC 9.4 03/28/2020     Lab Results   Component Value Date    HGB 11.3 12/14/2021    HGB 15.3 03/28/2020     Lab Results   Component Value Date    HCT 33.5 10/27/2021    HCT 47.0 03/28/2020     Lab Results   Component Value Date     12/14/2021     03/28/2020     Last Basic Metabolic Panel:  Lab Results   Component Value Date     12/14/2021     03/28/2020      Lab Results   Component Value Date    POTASSIUM 3.5 12/14/2021    POTASSIUM 3.6 03/28/2020     Lab Results   Component Value Date    CHLORIDE 105 12/14/2021    CHLORIDE 102 03/28/2020     Lab Results   Component Value Date    RANDALL 8.2 12/14/2021    RANDALL 8.6 03/28/2020     Lab Results   Component Value Date    CO2 25 12/14/2021    CO2 29 03/28/2020     Lab Results   Component Value Date    BUN 9 12/14/2021    BUN 14 03/28/2020     Lab Results   Component Value Date    CR 0.93 12/14/2021    CR 1.26 03/28/2020     Lab Results   Component Value Date     12/14/2021     12/14/2021    GLC 99 03/28/2020       ASSESSMENT/PLAN:  41 year old  man POD#2 s/p lap sigmoid resection with takedown of colovesical fistula.  Afebrile, vitals stable.     - Low fiber diet  - Pain management as needed, minimize narcotics  - Continue Pandya catheter  - Cystogram tomorrow  - Encourage ambulation, at least 5x per day  - Lovenox for ppx, will not need at discharge        Disposition: Expected discharge in 1-2 days.  Barriers to discharge: Tolerating low fiber diet, pain controlled with oral meds, return of bowel function.        For questions/paging, please contact the CRS office at [phone].    Lilliam Joseph PA-C  [facility]  Phone: [phone]    [facility] Attending Note    Patient seen and examined independently.  Agree with above assessment and plan.  Feeling better. No BM or flatus. Tolerating fulls  abd soft, incision CDI  Plan  Low fiber.   Abdominal binder.   Cystogram tomorrow.  Oral multimodal pain management.   Okay shower.     Carola Pastrana MD  [facility]  [address], [address]  [place] [address]  T: [phone]  F: [phone]   [email or web address]

## 2021-12-15 NOTE — TELEPHONE ENCOUNTER
Called Rex at 9:50AM when he did not arrive for 9:40AM in person counseling session. This is his first no show. Rex reported  he was in the hospital, just having had surgery yesterday. He said he was doing better now that he had had the surgery but was unsure of his recovery time.     Plan   Provider will give call in 2 weeks to see where things are at and possibly reschedule.     Giuliano Burnham, PhD  she/her/hers  Primary Care Behavioral Health Fellow

## 2021-12-15 NOTE — PLAN OF CARE
To Do:  End of Shift Summary  For vital signs and complete assessments, please see documentation flowsheets.     Pertinent assessments: A&O x4. VSS. Reports constant abdominal/incisional pain 8-9/10, dilaudid, oxy, and tylenol therapy used for pain management. Reports nausea, zofran given. BS hypo, denies passing gas. Pandya patent, adequate UOP.     Major Shift Events: Advanced to full liquid diet. Tolerating well. IVF discontinued. Out of bed for first time since surgery, SBA.      Treatment Plan: Pain management, await ROBF, and diet tolerance.     Bedside Nurse: Alexandra Jorge RN

## 2021-12-15 NOTE — PLAN OF CARE
End of Shift Summary  For vital signs and complete assessments, please see documentation flowsheets.     Pertinent assessments: VSS. Some mild cold symptoms noted. Reports constant abdominal/incisional pain 7-10/10, dilaudid, oxy, tylenol & cold therapy used for pain management. BS faint/hypo, denies passing gas, feels bloated. Pandya patent, adequate UOP. Ambulated in the hallways, Reports LUE & LLE numbness - baseline.Trace edema to BLE. Tolerating diet. Activity encouraged.    Major Shift Events: Uneventful.     Treatment Plan: Pain management, await KENNETH, and diet tolerance.

## 2021-12-16 ENCOUNTER — APPOINTMENT (OUTPATIENT)
Dept: CT IMAGING | Facility: CLINIC | Age: 41
End: 2021-12-16
Attending: PHYSICIAN ASSISTANT
Payer: COMMERCIAL

## 2021-12-16 ENCOUNTER — APPOINTMENT (OUTPATIENT)
Dept: PHYSICAL THERAPY | Facility: CLINIC | Age: 41
End: 2021-12-16
Attending: PHYSICIAN ASSISTANT
Payer: COMMERCIAL

## 2021-12-16 LAB
PATH REPORT.COMMENTS IMP SPEC: NORMAL
PATH REPORT.COMMENTS IMP SPEC: NORMAL
PATH REPORT.FINAL DX SPEC: NORMAL
PATH REPORT.GROSS SPEC: NORMAL
PATH REPORT.MICROSCOPIC SPEC OTHER STN: NORMAL
PATH REPORT.RELEVANT HX SPEC: NORMAL
PHOTO IMAGE: NORMAL

## 2021-12-16 PROCEDURE — 99233 SBSQ HOSP IP/OBS HIGH 50: CPT | Performed by: NURSE PRACTITIONER

## 2021-12-16 PROCEDURE — 250N000011 HC RX IP 250 OP 636: Performed by: COLON & RECTAL SURGERY

## 2021-12-16 PROCEDURE — BT101ZZ FLUOROSCOPY OF BLADDER USING LOW OSMOLAR CONTRAST: ICD-10-PCS | Performed by: RADIOLOGY

## 2021-12-16 PROCEDURE — 250N000013 HC RX MED GY IP 250 OP 250 PS 637: Performed by: NURSE PRACTITIONER

## 2021-12-16 PROCEDURE — 97116 GAIT TRAINING THERAPY: CPT | Mod: GP | Performed by: PHYSICAL THERAPIST

## 2021-12-16 PROCEDURE — 250N000011 HC RX IP 250 OP 636: Performed by: NURSE PRACTITIONER

## 2021-12-16 PROCEDURE — 97161 PT EVAL LOW COMPLEX 20 MIN: CPT | Mod: GP | Performed by: PHYSICAL THERAPIST

## 2021-12-16 PROCEDURE — 250N000009 HC RX 250: Performed by: COLON & RECTAL SURGERY

## 2021-12-16 PROCEDURE — 97530 THERAPEUTIC ACTIVITIES: CPT | Mod: GP | Performed by: PHYSICAL THERAPIST

## 2021-12-16 PROCEDURE — 72193 CT PELVIS W/DYE: CPT

## 2021-12-16 PROCEDURE — 120N000001 HC R&B MED SURG/OB

## 2021-12-16 PROCEDURE — 250N000013 HC RX MED GY IP 250 OP 250 PS 637: Performed by: COLON & RECTAL SURGERY

## 2021-12-16 RX ORDER — IBUPROFEN 400 MG/1
400 TABLET, FILM COATED ORAL EVERY 6 HOURS PRN
Status: DISCONTINUED | OUTPATIENT
Start: 2021-12-16 | End: 2021-12-17 | Stop reason: HOSPADM

## 2021-12-16 RX ORDER — HYDROXYZINE HYDROCHLORIDE 10 MG/1
20 TABLET, FILM COATED ORAL EVERY 6 HOURS
Status: DISCONTINUED | OUTPATIENT
Start: 2021-12-16 | End: 2021-12-17 | Stop reason: HOSPADM

## 2021-12-16 RX ORDER — IOPAMIDOL 755 MG/ML
25 INJECTION, SOLUTION INTRAVASCULAR ONCE
Status: COMPLETED | OUTPATIENT
Start: 2021-12-16 | End: 2021-12-16

## 2021-12-16 RX ORDER — HYDROXYZINE HYDROCHLORIDE 10 MG/1
10 TABLET, FILM COATED ORAL EVERY 6 HOURS
Status: DISCONTINUED | OUTPATIENT
Start: 2021-12-16 | End: 2021-12-17 | Stop reason: HOSPADM

## 2021-12-16 RX ORDER — METHOCARBAMOL 500 MG/1
500 TABLET, FILM COATED ORAL 4 TIMES DAILY
Status: DISCONTINUED | OUTPATIENT
Start: 2021-12-16 | End: 2021-12-17

## 2021-12-16 RX ADMIN — PREGABALIN 75 MG: 75 CAPSULE ORAL at 21:31

## 2021-12-16 RX ADMIN — IOPAMIDOL 25 ML: 755 INJECTION, SOLUTION INTRAVENOUS at 09:01

## 2021-12-16 RX ADMIN — ACETAMINOPHEN 975 MG: 325 TABLET, FILM COATED ORAL at 20:03

## 2021-12-16 RX ADMIN — SENNOSIDES AND DOCUSATE SODIUM 2 TABLET: 50; 8.6 TABLET ORAL at 08:11

## 2021-12-16 RX ADMIN — METHOCARBAMOL 500 MG: 500 TABLET ORAL at 10:16

## 2021-12-16 RX ADMIN — OXYCODONE HYDROCHLORIDE 20 MG: 5 TABLET ORAL at 01:02

## 2021-12-16 RX ADMIN — METHOCARBAMOL 500 MG: 500 TABLET ORAL at 14:17

## 2021-12-16 RX ADMIN — OXYCODONE HYDROCHLORIDE 20 MG: 5 TABLET ORAL at 08:14

## 2021-12-16 RX ADMIN — PREGABALIN 75 MG: 75 CAPSULE ORAL at 16:06

## 2021-12-16 RX ADMIN — KETOROLAC TROMETHAMINE 15 MG: 15 INJECTION, SOLUTION INTRAMUSCULAR; INTRAVENOUS at 16:06

## 2021-12-16 RX ADMIN — OXYCODONE HYDROCHLORIDE 15 MG: 5 TABLET ORAL at 11:49

## 2021-12-16 RX ADMIN — ENOXAPARIN SODIUM 40 MG: 40 INJECTION SUBCUTANEOUS at 10:16

## 2021-12-16 RX ADMIN — OXYCODONE HYDROCHLORIDE 15 MG: 5 TABLET ORAL at 17:51

## 2021-12-16 RX ADMIN — SENNOSIDES AND DOCUSATE SODIUM 1 TABLET: 50; 8.6 TABLET ORAL at 20:04

## 2021-12-16 RX ADMIN — SODIUM CHLORIDE 65 ML: 9 INJECTION, SOLUTION INTRAVENOUS at 09:01

## 2021-12-16 RX ADMIN — OXYCODONE HYDROCHLORIDE 15 MG: 5 TABLET ORAL at 14:53

## 2021-12-16 RX ADMIN — DIVALPROEX SODIUM 500 MG: 500 TABLET, FILM COATED, EXTENDED RELEASE ORAL at 21:31

## 2021-12-16 RX ADMIN — ACETAMINOPHEN 975 MG: 325 TABLET, FILM COATED ORAL at 14:17

## 2021-12-16 RX ADMIN — KETOROLAC TROMETHAMINE 15 MG: 15 INJECTION, SOLUTION INTRAMUSCULAR; INTRAVENOUS at 02:07

## 2021-12-16 RX ADMIN — METHOCARBAMOL 500 MG: 500 TABLET ORAL at 20:04

## 2021-12-16 RX ADMIN — LIDOCAINE 1 PATCH: 560 PATCH PERCUTANEOUS; TOPICAL; TRANSDERMAL at 20:07

## 2021-12-16 RX ADMIN — KETOROLAC TROMETHAMINE 15 MG: 15 INJECTION, SOLUTION INTRAMUSCULAR; INTRAVENOUS at 10:16

## 2021-12-16 RX ADMIN — ACETAMINOPHEN 975 MG: 325 TABLET, FILM COATED ORAL at 02:06

## 2021-12-16 RX ADMIN — OXYCODONE HYDROCHLORIDE 15 MG: 5 TABLET ORAL at 21:31

## 2021-12-16 RX ADMIN — PREGABALIN 75 MG: 75 CAPSULE ORAL at 10:16

## 2021-12-16 RX ADMIN — HYDROXYZINE HYDROCHLORIDE 10 MG: 10 TABLET ORAL at 08:17

## 2021-12-16 ASSESSMENT — ACTIVITIES OF DAILY LIVING (ADL)
ADLS_ACUITY_SCORE: 6
ADLS_ACUITY_SCORE: 7
ADLS_ACUITY_SCORE: 6
ADLS_ACUITY_SCORE: 7
ADLS_ACUITY_SCORE: 7
ADLS_ACUITY_SCORE: 6
ADLS_ACUITY_SCORE: 7
ADLS_ACUITY_SCORE: 6
ADLS_ACUITY_SCORE: 7
ADLS_ACUITY_SCORE: 7
ADLS_ACUITY_SCORE: 6
ADLS_ACUITY_SCORE: 7
ADLS_ACUITY_SCORE: 6
ADLS_ACUITY_SCORE: 7

## 2021-12-16 NOTE — PROGRESS NOTES
"   12/16/21 1320   Quick Adds   Type of Visit Initial PT Evaluation   Living Environment   People in home facility resident   Current Living Arrangements residential facility   Home Accessibility stairs to enter home;stairs within home   Living Environment Comments  Per SW \"He confirms that he currently lives at [facility], a chemical dependency treatment facility. He reports that he will be there for an extended time, likely another nine months. He reports plan to return there at discharge. As SW began asking questions pt informs SW that he has had two brain injuries and so needs SW to ask specific questions. Pt reports he is typically independent with all ADLs. He reports that he does not drive and utilizes the facility transport to get where he needs to go. He reports that the facility prepares his meals and administers his medications. \"   General Information   Onset of Illness/Injury or Date of Surgery 12/13/21   Referring Physician Lilliam Joseph, STARR   Patient/Family Therapy Goals Statement (PT) No stated goals   Pertinent History of Current Problem (include personal factors and/or comorbidities that impact the POC) Pt is status post Intraoperative colonoscopy and laparoscopic sigmoid colectomy with takedown colovesical fistula and coloproctostomy at 16 cm from the anal verge with a 28 EEA stapler. Pt also reports hx of CHI and COVID.    Existing Precautions/Restrictions abdominal  (abdominal binder)   Cognition   Orientation Status (Cognition) oriented x 4   Affect/Mental Status (Cognition) agitated   Follows Commands (Cognition) increased processing time needed   Behavioral Issues overwhelmed easily   Safety Deficit (Cognition) problem-solving;judgment   Pain Assessment   Patient Currently in Pain Yes, see Vital Sign flowsheet   Posture    Posture Comments WFL   Range of Motion (ROM)   ROM Quick Adds ROM WNL   Strength   Manual Muscle Testing Quick Adds Strength WNL   Bed Mobility   Comment " (Bed Mobility) needing cues for abd prec, SBA   Transfers   Transfer Safety Comments needing cues for safe abd prec, SBA   Gait/Stairs (Locomotion)   Comment (Gait/Stairs) light FWW use, good gait pattern with good speed, no veering, no LOB, declining trial without AD, dizziness reported   Balance   Balance Comments no LOB, But light use of AD   Clinical Impression   Criteria for Skilled Therapeutic Intervention yes, treatment indicated   PT Diagnosis (PT) decreased functional mobility   Influenced by the following impairments abdominal prec, use of FWW at this time/dizziness   Functional limitations due to impairments decreased ambulation   Clinical Presentation Stable/Uncomplicated   Clinical Presentation Rationale improving   Clinical Decision Making (Complexity) low complexity   Therapy Frequency (PT) Daily   Predicted Duration of Therapy Intervention (days/wks) 2 days   Planned Therapy Interventions (PT) bed mobility training;gait training;stair training;transfer training;progressive activity/exercise;risk factor education;home program guidelines   Anticipated Equipment Needs at Discharge (PT) walker, rolling  (pending progress with PT)   Risk & Benefits of therapy have been explained evaluation/treatment results reviewed;care plan/treatment goals reviewed;risks/benefits reviewed;current/potential barriers reviewed;participants voiced agreement with care plan;participants included;patient;spouse/significant other   PT Discharge Planning    PT Discharge Recommendation (DC Rec) home   PT Rationale for DC Rec Pt inde with bed mob, mod I with transfers and ambulation, likely to meet all inpt PT goals   PT Brief overview of current status  Pt able to tolerate 150 feet of ambulation, sit<>stand with mod I, Inde with cues for bed mob. Will attempt stairs and wean from walker tomorrow as declining both today.   Total Evaluation Time   Total Evaluation Time (Minutes) 5

## 2021-12-16 NOTE — PLAN OF CARE
End of Shift Summary  For vital signs and complete assessments, please see documentation flowsheets.     Pertinent assessments: A&O x4. VSS. Reports pain 6-7/10, managed per POC, encouraged to minimize opioids per POC. BS hypo, reports passing some gas, no BM yet, bowel med given. Up Ind w/walker, ambulation encouraged, walked x2 in the halls. Tolerating diet. Pandya patent, adequate UOP.     Major Shift Events: Uneventful    Treatment Plan: Pain management, await ROBF, and diet tolerance. CT cystogram planned for today.

## 2021-12-16 NOTE — PROGRESS NOTES
[facility]  Pain Management Progress Note  Text Page     Assessment & Plan   Rex Negrete is a 41 year old male who was admitted on 12/13/2021. I was asked by Lilliam Joseph PA-C  to see the patient for acute postoperative pain management in the setting of recent cocaine addiction, now in recovery program and recent excalation of oral opioids prior to admission.   Patient is restricted recipient to [facility]     Would then start taper of Oxycodone to discontinue. Would not recommend opioids at discharge.  Maximize multi modal analgesia, CAM therapies.  Aggressive bowel management. NO suppositories with colovesical fistula repair per general surgery team     PLAN:   1)  Opioids:  Oxycodone 15 mg every 3 hrs prn, re evaluate in am, plan to reduce to 10 mg every 4 hrs    No IV opioids   Opioids Treatment Goal:   -Improvement in function  -Participate in PT  -Management of acute pain during hospitalization only, not intended for outpatient     2)Non-opioid multimodal medication therapy  -Topical:Lidocaine Patch 4% ,menthol patch to abdomen tid cut to fit.   -N-SAIDS:Ibuprofen stop for now, Toradol IV 15 mg x 4 doses   -Muscle Relaxants: methocarbamol ( Robaxin) 500 mg qid   -Adjuvants:Acetaminophen 975 mg every 8 hrs, Pregabalin increase to 75 mg tid ( consider increase 12/17, Hydroxyzine 10-20 mg every 6 hrs    -Antidepresants/anxiolytics:Divalproate 500 mg ER at hs      3)  Non-medication interventions  Positioning, ICE, Relaxation, Meditation, Distraction, Essential oils per nursing     4)  Constipation Prophylaxis    Continue to Monitor, Bowel Meds PRN per Constipation Order Set, Senna-S 1-2 bid prn , Polyethylene Glycol every day prn      5) Medication Risk reduction strategies   -monitor for sedation   -Capnography per protocol   -narcan for opioid reversal      6)  Pain Education  -Opioid safe use, storage and disposal information included in DC AVS     7)  DC Planning   Discussed  goal of Opioid therapy as above with patient and spouse   taper prior to discharge.  Continued outpatient management of pain per primary care   Disposition: to treatment center   Support systems:  Spouse   Outpatient Referrals:  The following risk factors have been identified for unintentional overdose: patient is on multiple sedating medications , patient is taking a high amount of opioids in 24 hour period and patient has a history of substance abuse disorder. Discharge with intra-nasal naloxone if discharged to home with opioids  >40 mg MME/day.  Plan for education prior to discharge.     ASSESSMENT  1)  Acute on chronic abdominal pain post op Intraoperative colonoscopy and laparoscopic sigmoid colectomy with takedown colovesical fistula and coloproctostomy on 12/13/21  Appreciate case discussion with Carola Pastrana and pain management strategies.       2)  Patient with chronic pain, on chronic opioid therapy managed by Amrita Carrizales, DO   Baseline 42 mg Daily Morphine Equivalent as dispensed and as reported daily use. ( 5.5 tabs  Oxycodone 5 mg /day)   Patient has an expected opioid tolerance.      Patient's opioid use in past 24 hours: Oxycodone 90 mg  =  135 mg Daily Morphine Equivalent     3)  Risk factors for opioid related harms  -History of overdose  -History of substance abuse disorder, now noting medication reliant behavior   -High opioid dose (>50 MME/day)  -Anxiety/depression     4)  Opioid induced side-effects:  -Constipation  Yes   -Nausea/Vomit no   -Sedation no   -Urinary Retention alvarez      5)  Other/Related:    -Depression/anxiety  -Disease of addiction partner wife per care every where? also uses  -Seizure     Lary Chavira-Jun LEW CNP  Pain Management and Palliative Care   [facility]  Pgr: [phone]    Time Spent on this Encounter   Total unit/floor time 35  minutes, time consisted of the following, examination of the patient, reviewing the record and  completing documentation. >50% of time spent in counseling and coordination of care.  Time spend counseling with patient consisted of the following topics, care planning for discharge and symptom management.  Time spent in coordination of care with Bedside Nurse Alexandra Jorge RN , Carola Pastrana MD       Interval History    Tense, angry with talk of reducing opioids  Constipation,  CT for alvarez removal ok no fistula present  Pain 8/10   No nausea, tolerating low fiber diet     Review of Systems    CONSTITUTIONAL: NEGATIVE for fever, chills, change in weight  ENT/MOUTH: NEGATIVE for ear, mouth and throat problems  RESP: NEGATIVE for significant cough or SOB  CV: NEGATIVE for chest pain, palpitations or peripheral edema    Physical Exam   Temp:  [97.3  F (36.3  C)-98.2  F (36.8  C)] 97.3  F (36.3  C)  Pulse:  [72-82] 82  Resp:  [18-20] 20  BP: (125-129)/(62-70) 125/70  SpO2:  [95 %] 95 %  200 lbs 1.6 oz  GEN:  Alert, oriented x 3, appears comfortable, NAD.  HEENT:  Normocephalic/atraumatic, no scleral icterus, no nasal discharge, mouth moist.  CV:  RRR, S1, S2; no murmurs or other irregularities noted.  +3 DP/PT pulses bilatererally; no edema BLE.  RESP:  Clear to auscultation bilaterally without rales/rhonchi/wheezing/retractions.  Symmetric chest rise on inhalation noted.  Normal respiratory effort.  ABD:  Rounded, soft,generalized tenderness/non-distended.  +BS  EXT:  Edema & pulses as noted above.  CMS intact x 4.     M/S:   Nontender to palpation .    SKIN:  Dry to touch, no exanthems noted in the visualized areas.    NEURO: Symmetric strength +5/5.  Sensation to touch intact all extremities.   There is no area of allodynia or hyperesthesia.  PAIN BEHAVIOR: Cooperative, but angry poor eye contact, tense muscles throughuout   Psych:  Angry affect, cooperative, conversant appropriately.  Short with staff     Medications       acetaminophen  975 mg Oral Q6H     divalproex sodium extended-release  500 mg Oral At  Bedtime     enoxaparin ANTICOAGULANT  40 mg Subcutaneous Q24H     hydrOXYzine  10 mg Oral Q6H    Or     hydrOXYzine  20 mg Oral Q6H     lidocaine  1-2 patch Transdermal Q24H     lidocaine   Transdermal Q8H     methocarbamol  500 mg Oral 4x Daily     pregabalin  75 mg Oral TID     senna-docusate  1 tablet Oral BID    Or     senna-docusate  2 tablet Oral BID     sodium chloride (PF)  3 mL Intracatheter Q8H       Data   Results for orders placed or performed during the hospital encounter of 12/13/21 (from the past 24 hour(s))   CT Cystogram wo & w Contrast    Narrative    CT CYSTOGRAM WITHOUT AND WITH CONTRAST 12/16/2021 9:14 AM    CLINICAL HISTORY: Diverticulitis, complication suspected.    TECHNIQUE: CT images of the pelvis performed before and after  instillation of dilute contrast into the urinary bladder. No  intravenous contrast. Post emptying images also obtained. Multiplanar  reformats were obtained. Dose reduction techniques were used.    CONTRAST: None.    COMPARISON: None.    FINDINGS:   No extravasation of contrast from the urinary bladder following  instillation via Pandya catheter. No bladder diverticula or fistulas  are demonstrated.    There is a large volume of retained colonic stool. An anastomosis in  the sigmoid colon is visualized and is patent. There is no free  intraperitoneal air. There is trace ascites in the pelvis. Gas is  noted in the soft tissues in the lower anterior subcutaneous fat. No  destructive bone lesions, likely related to recent surgical procedure.      Impression    IMPRESSION:   No evidence of bladder extravasation or colovesicular fistula.    ERIC FUENTES MD         SYSTEM ID:  [id number]

## 2021-12-16 NOTE — CONSULTS
[facility]  Pain Service Consultation   Text Page    Date of Admission:  12/13/2021    Assessment & Plan   Rex Negrete is a 41 year old male who was admitted on 12/13/2021. I was asked by Lilliam Joseph PA-C  to see the patient for acute postoperative pain management in the setting of recent cocaine addiction, now in recovery program and recent excalation of oral opioids prior to admission.   Patient is restricted recipient to [facility]     It is reasonable in this setting that opioid need may be higher in this setting for day would recommend increasing opioids, if not helpful would then start taper it discontinue. Would not recommend opioids at discharge  Maximize multi modal analgesia, CAM therapies.  Reviewed mindfulness applications on phone.  Aggressive bowel management. NO suppositories with colovesical fistula per general surgery team      PLAN:   1)  Opioids:  Oxycodone 15- 20 mg every 3 hrs prn, re evaluate in am   No IV opioids   Opioids Treatment Goal:   -Improvement in function  -Participate in PT  -Management of acute pain during hospitalization only, not intended for outpatient    2)Non-opioid multimodal medication therapy  -Topical:Lidocaine Patch 4% ,menthol patch to abdomen tid cut to fit.   -N-SAIDS:Ibuprofen stop for now, Toradol IV 15 mg x 4 doses   -Muscle Relaxants:Cyclobenzaprine 7.5 mg tid   -Adjuvants:Acetaminophen 975 mg every 8 hrs, Pregabalin increase to 75 mg tid , Hydroxyzine 10-20 mg tid prn     -Antidepresants/anxiolytics:Divalproate 500 mg ER at hs     3)  Non-medication interventions  Positioning, ICE, Relaxation, Meditation, Distraction, Essential oils per nursing    4)  Constipation Prophylaxis    Continue to Monitor, Bowel Meds PRN per Constipation Order Set, Senna-S 1-2 bid prn , Polyethylene Glycol every day prn     5) Medication Risk reduction strategies   -monitor for sedation   -Capnography per protocol   -narcan for opioid reversal     6)   Pain Education  -Opioid safe use, storage and disposal information included in DC AVS    7)  DC Planning   Discussed goal of Opioid therapy as above with patient and spouse   taper prior to discharge.  Continued outpatient management of pain per primary care   Disposition: to treatment center   Support systems:  Spouse   Outpatient Referrals:  The following risk factors have been identified for unintentional overdose: patient is on multiple sedating medications , patient is taking a high amount of opioids in 24 hour period and patient has a history of substance abuse disorder. Discharge with intra-nasal naloxone if discharged to home with opioids  >40 mg MME/day.  Plan for education prior to discharge.    ASSESSMENT  1)  Acute on chronic abdominal pain post op Intraoperative colonoscopy and laparoscopic sigmoid colectomy with takedown colovesical fistula and coloproctostomy on 12/13/21    2)  Patient with chronic pain, on chronic opioid therapy managed by Amrita Carrizales, DO   Baseline 42 mg Daily Morphine Equivalent as dispensed and as reported daily use. ( 5.5 tabs  Oxycodone 5 mg /day)   Patient has an expected opioid tolerance.     Patient's opioid use in past 24 hours: Oxycodone 90 mg  =  135 mg Daily Morphine Equivalent    3)  Risk factors for opioid related harms  -History of overdose  -History of substance abuse disorder  -High opioid dose (>50 MME/day)  -Anxiety/depression    4)  Opioid induced side-effects:  -Constipation  Yes   -Nausea/Vomit no   -Sedation no   -Urinary Retention no     5)  Other/Related:    -Depression/anxiety  -Disease of addiction partner wife per care every where? also uses  -Seizure     Time Spent on this Encounter   Total unit/floor time 70  minutes, time consisted of the following, examination of the patient, reviewing the record and completing documentation. >50% of time spent in counseling and coordination of care.  Time spend counseling with patient and family consisted of the  following topics, symptom management and education of pain plan .  Time spent in coordination of care with Bedside Nurse Alexandra Jorge RN .     Lary Chavira-Jun LEW CNP  Pain Management and Palliative Care  [facility]  Pgr: [phone]      Reason for Consult   Reason for consult: I was  to evaluate this patient for acute postoperative pain management .    Primary Care Physician   Primary Care Physician:Lionel Bahena  Pain Specialist:  Amrita Carrizales DO     Chief Complaint    acute post operative pain     History is obtained from the patient, electronic health record and patient's significant other    History of Present Illness   Rex Negrete is a 41 year old male who presents with a past medical history of depression, diverticulitis of sigmoid colon, pulseless electrical activity ( 5/2021) from methamphetamine use, OLIVIA, now in treatment center   (Nov 2021) with most recent documented meth use  08/21, and seizure d/o. Presents with colovesicular fistula since Oct 21.21. He was followed by Dr. Carola Pastrana as out patient and now s/p sigmoid colectomy with takedown colovesical fistula and coloproctostomy on 12/13.  We have been consulted for concern with pain management optimization in the setting of OLIVIA.   He denies headache, fever, chills, chest pain, SOB, has not had BM since 12/12, passing some flatus, and tolerating full liquids. He has a alvarez catheter in place due to dysuria/ psot fistula repair.    He significant other is at the bedside, per chart review also use meth too.  She is helpful in contributing to assist isn non opioid and CAM therapies.        CURRENT PAIN:  His pain is located in the  Incisional pain at laparoscopic site x 4.,lower quadrant bilateral    It is described as Sharp, Stabbing, Tender and Unbearable  He rates it as ranging between 7/10 and 9/10  The average is 8/10 on a scale of 0-10  Currently it is rated as 8/10  It improves by   Medications, ice   It worsens by  Medications wearing off,movment   He  been compliant with the recommendations while in the hospital.      PAIN HISTORY:  The pain is mainly located in the  abdominal pain  It is described as Penetrating, Sharp, Shooting, Tender and Unbearable  Rates it as ranging between 8/10 and 10/10  Currently it is rated as 8/10  It improves by medication   It worsens by  Medication wearing off   He  been compliant with the recommendations while in the hospital.      PAST PAIN TREATMENT:   Medications: gabapentin, Lyrica, Oxycodone, Lidocaine patch   Non-phamacologic modalities: ice   Previous interventions/surgeries:     [place] Board of Pharmacy Data Base Reviewed:    YES; As expected, no concern for misuse/abuse of controlled medications based on this report.  OPIOID RISK WDHHI=145           D.I.R.E Score: Patient Selection for Chronic Opioid Analgesia    For each factor, rate the patient's score from 1 - 3 based on the explanations on the right.       Diagnosis             1         1 = Benign chronic condition with minimal objective findings or no definite medical diagnosis.  Examples:  fibromyalgia, migraine, headaches, non-specific back pain.  2 = Slowly progressive condition concordant with moderate pain, or fixed condition with moderate objective findings.  Examples: failed back surgery syndrome, back pain with moderate degenerative changes, neuropathic pain.  3 = Advanced condition concordant with severe pain with objective findings.  Examples: severe ischemic vascular disease, advanced neuropathy, severe spinal stenosis.    Intractability             2         1 = Few therapies have been tried and the patient takes a passive role in his/her pain management process.   2 = Most costomary treatments have been tried but the patient is not fully engaged in the pain management process, or barriers prevent (insurance, transportation, medical illness)  3 = Patient fully engaged in a  spectrum of appropriate treatments but with inadequate response.    Risk   (Risk = Total of P+C+R+S below)       Psychological             2         1 = Serious personality dysfunction or mental illness interfering with care.  Examples: personality disorder, severe affective disorder, significant personality issues.  2 = Personality or mental health interferes moderately.  Example: depression or anxiety disorder.  3 = Good communication with the clinic.  No significant personality dysfunction or mental illness.       Chemical      Health             1         1 = Active or very recent use of illicit drugs, excessive alcohol, or prescription drug abuse.  2 = Chemical coper (uses medications to cope with stress) or history of chemical dependency in remission.  3 = No CD history.  Not drug-focused or chemically reliant       Reliability             2         1 = History of numerous problems: medication misuse, missed appointments, rarely follows through.  2 = Occasional difficulties with compliance, but generally reliable.  3 = Highly reliable patient with medications, appointments and treatment.       Social      Support             2         1= Life in chaos.  Little family support and few close relationships.  Loss of most normal life roles.  2 = Reduction in some relationships and life roles.  3 = Supportive family/close relationships.  Involved in work or school and no social isolation.    Efficacy score             1         1 = Poor function or minimal pain relief despite moderate to high doses.  2 = Moderate benefit with function improved in a number of ways (or insufficient info - hasn't tried opioid yet or very low doses or too short a trial.  3 = Good improvement in pain and function and quality of life with stable doses over time.                                    11    Total score = D + I + R + E    Score 7-13: Not a suitable candidate for long-term opioid analgesia  Score 14-21: May be a good candidate  for long-term opioid analgesia    Copyright 2013 Danial Mcgarry MD, The DIRE Score: Predicting Outcomes of Opioid Prescribing for Chronic Pain. The Journal of Pain. 7(9) (September), 2006:671-681    Past Medical History   I have reviewed this patient's medical history and updated it with pertinent information if needed.   Past Medical History:   Diagnosis Date     Colovesical fistula      Depression     in the past (not being medicated for sx's)     Diverticulitis of sigmoid colon      Dysthymic disorder      Hx of substance abuse (H)     meth, cocaine     NONSPECIFIC MEDICAL HISTORY      PEA (Pulseless electrical activity) (H) cardiac arrest 05/2021    felt to be secondary to methamphetamine overdose and catacholamine toxicity     Seizures (H)        Past Surgical History   I have reviewed this patient's surgical history and updated it with pertinent information if needed.  Past Surgical History:   Procedure Laterality Date     C ORAL SURGERY PROCEDURE      wisdom teeth     COLONOSCOPY       COLONOSCOPY N/A 12/13/2021    Procedure: COLONOSCOPY intraoperative;  Surgeon: Carola Pastrana MD;  Location: [facility] OR     ENT SURGERY      sinus surgery     GI SURGERY      upper GI     LAPAROSCOPIC ASSISTED SIGMOID COLECTOMY N/A 12/13/2021    Procedure: Intraoperative colonoscopy and laparoscopic sigmoid colectomy with takedown colovesical fistula and coloproctostomy at 16 cm from the anal verge with a 28 EEA stapler.;  Surgeon: Carola Pastrana MD;  Location: [facility] OR     ORTHOPEDIC SURGERY Right 2019    knee scope torn meniscus     OTHER SURGICAL HISTORY      scope to the left shoulder         Prior to Admission Medications   Prior to Admission Medications   Prescriptions Last Dose Informant Patient Reported? Taking?   Omega-3 Fatty Acids (FISH OIL MAXIMUM STRENGTH) 1200 MG CPDR Past Week at Unknown time  Yes Yes   Sig: Take 1 capsule by mouth    cefuroxime (CEFTIN) 500 MG tablet 12/13/2021 at Unknown time  No Yes    Sig: Take 1 tablet (500 mg) by mouth 2 times daily for 14 days   diphenhydrAMINE (BENADRYL) 25 MG capsule More than a month at Unknown time  Yes Yes   Sig: Take 25 mg by mouth every 6 hours as needed   divalproex sodium extended-release (DEPAKOTE ER) 500 MG 24 hr tablet 2021 at Unknown time  No Yes   Sig: Take 1 tablet (500 mg) by mouth At Bedtime   magnesium citrate 1.745 GM/30ML solution   Yes No   Si HOURS BEFORE PROCEDURE DRINK THE ENTIRE BOTTLE.   metroNIDAZOLE (FLAGYL) 500 MG tablet 2021 at Unknown time  No Yes   Sig: Take 1 tablet (500 mg) by mouth 2 times daily for 14 days   neomycin (MYCIFRADIN) 500 MG tablet   No No   Sig: Take 2 tablets (1000mg) at 1pm, 2pm, and 11pm the day before surgery   ondansetron (ZOFRAN-ODT) 4 MG ODT tab Past Week at Unknown time  No Yes   Sig: Take 1 tablet (4 mg) by mouth every 6 hours as needed for nausea   oxyCODONE (ROXICODONE) 5 MG tablet 2021  Yes Yes   Sig: Take 7.5 mg by mouth 3 times daily   pregabalin (LYRICA) 50 MG capsule 2021 at Unknown time  No Yes   Sig: Take 1 capsule (50 mg) by mouth 3 times daily      Facility-Administered Medications: None     Allergies   No Known Allergies    Social History   I have reviewed this patient's social history and updated it with pertinent information if needed. Rex PELAYO Caden  reports that he has been smoking cigarettes. He has been smoking about 0.50 packs per day. He has never used smokeless tobacco. He reports previous alcohol use. He reports previous drug use. Drugs: Methamphetamines and Cocaine.    Family History   I have reviewed this patient's family history and updated it with pertinent information if needed.   Family History   Problem Relation Age of Onset     Cancer Mother         mole removed (cancerous)     Alcohol/Drug Father      Diabetes Maternal Grandmother      Arthritis Maternal Grandmother      Family history of addiction yes father     Review of Systems   The 10 point Review of  Systems is negative other than noted in the HPI or here.    Positive for Bowel and  bladder dysfunction    Physical Exam   Temp:  [97.6  F (36.4  C)-98.5  F (36.9  C)] 97.6  F (36.4  C)  Pulse:  [68-76] 76  Resp:  [16-18] 16  BP: (116-122)/(48-65) 116/48  Cuff Mean (mmHg):  [79] 79  SpO2:  [95 %-96 %] 96 %  200 lbs 1.6 oz  GEN:  Alert, oriented x 3, appears comfortable, No apparent distress. Pleasant   HEENT:  Normocephalic/atraumatic, no scleral icterus, no nasal discharge, mouth moist.  CV:  RRR, S1, S2; no murmurs or other irregularities noted.  +3 DP/PT pulses bilaterally; no edema bilateral lower extremeties.  RESP:  Clear to auscultation bilaterally without rales/rhonchi/wheezing/retractions.  Symmetric chest rise on inhalation noted.  Normal respiratory effort.  ABD:  Rounded, soft, tender/non-distended.  +BS, diminished  EXT:  Edema & pulses as noted above.  Color, moisture and sensation intact x 4.     M/S:   Tender to palpation throughout.    SKIN:  Dry to touch, no exanthems noted in the visualized areas.    NEURO: Symmetric strength +5/5.  Sensation to touch intact all extremities.   There is no area of allodynia or hyperesthesia.  PAIN BEHAVIOR: Cooperative  Psych:  Normal affect.  Calm, cooperative, conversant appropriately.       Data   No results found for this or any previous visit (from the past 24 hour(s)).

## 2021-12-16 NOTE — PROGRESS NOTES
COLON & RECTAL SURGERY  PROGRESS NOTE    December 16, 2021  Post-op Day # 3    SUBJECTIVE:  Tolerating low fiber diet.  No nausea/vomiting.  Abdominal pain controlled.  +Flatus.  No BM yet.  Hesitant to return to treatment facility.    OBJECTIVE:  Temp:  [97.3  F (36.3  C)-98.2  F (36.8  C)] 97.3  F (36.3  C)  Pulse:  [72-82] 82  Resp:  [16-20] 20  BP: (116-129)/(48-70) 125/70  SpO2:  [95 %-96 %] 95 %    Intake/Output Summary (Last 24 hours) at 12/16/2021 0925  Last data filed at 12/16/2021 0807  Gross per 24 hour   Intake 450 ml   Output 4500 ml   Net -4050 ml       GENERAL:  Awake, alert, no acute distress, sitting in chair  HEAD: Nomocephalic atraumatic  SCLERA: anicteric  EXTREMITIES: warm and well perfused  ABDOMEN:  Soft, appropriately tender, non-distended, no rebound or guarding, no peritoneal signs.  INCISION:  C/d/i, no sign of infection.    LABS:  Lab Results   Component Value Date    WBC 17.3 10/27/2021    WBC 9.4 03/28/2020     Lab Results   Component Value Date    HGB 11.3 12/14/2021    HGB 15.3 03/28/2020     Lab Results   Component Value Date    HCT 33.5 10/27/2021    HCT 47.0 03/28/2020     Lab Results   Component Value Date     12/14/2021     03/28/2020     Last Basic Metabolic Panel:  Lab Results   Component Value Date     12/14/2021     03/28/2020      Lab Results   Component Value Date    POTASSIUM 3.5 12/14/2021    POTASSIUM 3.6 03/28/2020     Lab Results   Component Value Date    CHLORIDE 105 12/14/2021    CHLORIDE 102 03/28/2020     Lab Results   Component Value Date    RANDALL 8.2 12/14/2021    RANDALL 8.6 03/28/2020     Lab Results   Component Value Date    CO2 25 12/14/2021    CO2 29 03/28/2020     Lab Results   Component Value Date    BUN 9 12/14/2021    BUN 14 03/28/2020     Lab Results   Component Value Date    CR 0.93 12/14/2021    CR 1.26 03/28/2020     Lab Results   Component Value Date     12/14/2021     12/14/2021    GLC 99 03/28/2020        ASSESSMENT/PLAN:  41 year old man POD#3 s/p lap sigmoid resection with takedown of colovesical fistula.  Cystogram with no evidence of bladder extravasation or colovesicular fistula.  Afebrile, vitals stable.     - Low fiber diet  - Discontinue alvarez catheter  - Miralax  - Pain management as needed, minimize narcotics  - Appreciate pain team assistance   - Encourage ambulation, at least 5x per day  - PT consult  - Lovenox for ppx, will not need at discharge      Disposition: Expected discharge later today vs tomorrow.  Barriers to discharge: Pain controlled with oral meds, return of bowel function.    For questions/paging, please contact the CRS office at [phone].    Lilliam Joseph PA-C  [facility]  Phone: [phone]    Colon and Rectal Surgery Attending Note    Patient seen and examined independently.  Agree with above assessment and plan.  No nausea, moving around.   + bm and flatus. Voiding after alvarez removal.  Tolerating LRD  abd soft, round, incisions CDI  Plan  Possible discharge tomorrow  Minimize narcotics.  Path pending     Carola Pastrana MD  [facility]  [address], [address]  [place] [address]  T: [phone]  F: [phone]   [email or web address]

## 2021-12-16 NOTE — PLAN OF CARE
To Do:  End of Shift Summary  For vital signs and complete assessments, please see documentation flowsheets.     Pertinent assessments: VSS. Denies nausea. Pain remains between 7-9/10. Ice pack applied. Incisions CDI. Pandya patent, adequate output. Cystogram 12/16.    Major Shift Events: Pain team consulted. Pain management regimen adjusted. Passing gas. Walking in halls.    Treatment Plan: Pain management, await KENNETH, and diet tolerance.       Bedside Nurse: Alexandra Jorge RN

## 2021-12-17 ENCOUNTER — TELEPHONE (OUTPATIENT)
Dept: FAMILY MEDICINE | Facility: CLINIC | Age: 41
End: 2021-12-17

## 2021-12-17 ENCOUNTER — APPOINTMENT (OUTPATIENT)
Dept: PHYSICAL THERAPY | Facility: CLINIC | Age: 41
End: 2021-12-17
Attending: COLON & RECTAL SURGERY
Payer: COMMERCIAL

## 2021-12-17 VITALS
BODY MASS INDEX: 25.68 KG/M2 | DIASTOLIC BLOOD PRESSURE: 69 MMHG | HEART RATE: 61 BPM | HEIGHT: 74 IN | OXYGEN SATURATION: 93 % | SYSTOLIC BLOOD PRESSURE: 108 MMHG | WEIGHT: 200.1 LBS | RESPIRATION RATE: 18 BRPM | TEMPERATURE: 98.1 F

## 2021-12-17 PROBLEM — G89.18 POSTOPERATIVE PAIN: Status: ACTIVE | Noted: 2021-12-17

## 2021-12-17 LAB — PLATELET # BLD AUTO: 263 10E3/UL (ref 150–450)

## 2021-12-17 PROCEDURE — 99232 SBSQ HOSP IP/OBS MODERATE 35: CPT | Performed by: NURSE PRACTITIONER

## 2021-12-17 PROCEDURE — 97116 GAIT TRAINING THERAPY: CPT | Mod: GP | Performed by: PHYSICAL THERAPIST

## 2021-12-17 PROCEDURE — 88307 TISSUE EXAM BY PATHOLOGIST: CPT | Mod: 26

## 2021-12-17 PROCEDURE — 250N000013 HC RX MED GY IP 250 OP 250 PS 637: Performed by: NURSE PRACTITIONER

## 2021-12-17 PROCEDURE — 250N000013 HC RX MED GY IP 250 OP 250 PS 637: Performed by: COLON & RECTAL SURGERY

## 2021-12-17 PROCEDURE — 36415 COLL VENOUS BLD VENIPUNCTURE: CPT | Performed by: COLON & RECTAL SURGERY

## 2021-12-17 PROCEDURE — 85049 AUTOMATED PLATELET COUNT: CPT | Performed by: COLON & RECTAL SURGERY

## 2021-12-17 PROCEDURE — 250N000011 HC RX IP 250 OP 636: Performed by: COLON & RECTAL SURGERY

## 2021-12-17 RX ORDER — POLYETHYLENE GLYCOL 3350 17 G/17G
17 POWDER, FOR SOLUTION ORAL DAILY PRN
Qty: 510 G | Refills: 1 | Status: SHIPPED | OUTPATIENT
Start: 2021-12-17 | End: 2021-12-17

## 2021-12-17 RX ORDER — PREGABALIN 100 MG/1
100 CAPSULE ORAL 2 TIMES DAILY
Qty: 60 CAPSULE | Refills: 0 | Status: SHIPPED | OUTPATIENT
Start: 2021-12-17 | End: 2022-01-12

## 2021-12-17 RX ORDER — OXYCODONE HYDROCHLORIDE 5 MG/1
10 TABLET ORAL EVERY 4 HOURS PRN
Status: DISCONTINUED | OUTPATIENT
Start: 2021-12-17 | End: 2021-12-17 | Stop reason: HOSPADM

## 2021-12-17 RX ORDER — OXYCODONE HYDROCHLORIDE 5 MG/1
7.5 TABLET ORAL EVERY 6 HOURS PRN
Qty: 8 TABLET | Refills: 0 | Status: SHIPPED | OUTPATIENT
Start: 2021-12-23 | End: 2022-01-12 | Stop reason: ALTCHOICE

## 2021-12-17 RX ORDER — ACETAMINOPHEN 500 MG
1000 TABLET ORAL EVERY 6 HOURS PRN
Qty: 100 TABLET | Refills: 0 | Status: SHIPPED | OUTPATIENT
Start: 2021-12-17 | End: 2021-12-17

## 2021-12-17 RX ORDER — OXYCODONE HYDROCHLORIDE 10 MG/1
TABLET ORAL
Qty: 30 TABLET | Refills: 0 | Status: SHIPPED | OUTPATIENT
Start: 2021-12-17 | End: 2021-12-23

## 2021-12-17 RX ORDER — METHOCARBAMOL 500 MG/1
500 TABLET, FILM COATED ORAL 4 TIMES DAILY PRN
Qty: 28 TABLET | Refills: 0 | Status: SHIPPED | OUTPATIENT
Start: 2021-12-17 | End: 2021-12-17

## 2021-12-17 RX ORDER — POLYETHYLENE GLYCOL 3350 17 G/17G
17 POWDER, FOR SOLUTION ORAL DAILY PRN
Qty: 510 G | Refills: 1 | Status: SHIPPED | OUTPATIENT
Start: 2021-12-17 | End: 2022-01-01

## 2021-12-17 RX ORDER — PREGABALIN 100 MG/1
100 CAPSULE ORAL 2 TIMES DAILY
Qty: 60 CAPSULE | Refills: 0 | Status: SHIPPED | OUTPATIENT
Start: 2021-12-17 | End: 2021-12-17

## 2021-12-17 RX ORDER — METHOCARBAMOL 500 MG/1
500 TABLET, FILM COATED ORAL 4 TIMES DAILY PRN
Status: DISCONTINUED | OUTPATIENT
Start: 2021-12-17 | End: 2021-12-17 | Stop reason: HOSPADM

## 2021-12-17 RX ORDER — METHOCARBAMOL 500 MG/1
500 TABLET, FILM COATED ORAL 4 TIMES DAILY PRN
Qty: 28 TABLET | Refills: 0 | Status: SHIPPED | OUTPATIENT
Start: 2021-12-17 | End: 2022-01-01

## 2021-12-17 RX ORDER — OXYCODONE HYDROCHLORIDE 10 MG/1
TABLET ORAL
Refills: 0 | Status: CANCELLED | OUTPATIENT
Start: 2021-12-17 | End: 2021-12-23

## 2021-12-17 RX ORDER — AMOXICILLIN 250 MG
1 CAPSULE ORAL 2 TIMES DAILY
Qty: 60 TABLET | Refills: 1 | Status: SHIPPED | OUTPATIENT
Start: 2021-12-17 | End: 2021-12-17

## 2021-12-17 RX ORDER — AMOXICILLIN 250 MG
1 CAPSULE ORAL 2 TIMES DAILY
Qty: 60 TABLET | Refills: 1 | Status: SHIPPED | OUTPATIENT
Start: 2021-12-17 | End: 2022-01-01

## 2021-12-17 RX ORDER — OXYCODONE HYDROCHLORIDE 5 MG/1
7.5 TABLET ORAL EVERY 6 HOURS PRN
Qty: 8 TABLET | Refills: 0 | Status: SHIPPED | OUTPATIENT
Start: 2021-12-23 | End: 2021-12-17

## 2021-12-17 RX ORDER — ACETAMINOPHEN 500 MG
1000 TABLET ORAL EVERY 6 HOURS PRN
Qty: 100 TABLET | Refills: 0 | Status: SHIPPED | OUTPATIENT
Start: 2021-12-17 | End: 2022-01-01

## 2021-12-17 RX ORDER — OXYCODONE HYDROCHLORIDE 10 MG/1
TABLET ORAL
Qty: 30 TABLET | Refills: 0 | Status: SHIPPED | OUTPATIENT
Start: 2021-12-17 | End: 2021-12-17

## 2021-12-17 RX ORDER — PREGABALIN 50 MG/1
100 CAPSULE ORAL 2 TIMES DAILY
Status: DISCONTINUED | OUTPATIENT
Start: 2021-12-17 | End: 2021-12-17 | Stop reason: HOSPADM

## 2021-12-17 RX ADMIN — OXYCODONE HYDROCHLORIDE 10 MG: 5 TABLET ORAL at 15:55

## 2021-12-17 RX ADMIN — METHOCARBAMOL 500 MG: 500 TABLET ORAL at 08:44

## 2021-12-17 RX ADMIN — OXYCODONE HYDROCHLORIDE 15 MG: 5 TABLET ORAL at 04:30

## 2021-12-17 RX ADMIN — OXYCODONE HYDROCHLORIDE 15 MG: 5 TABLET ORAL at 00:34

## 2021-12-17 RX ADMIN — SENNOSIDES AND DOCUSATE SODIUM 1 TABLET: 50; 8.6 TABLET ORAL at 08:44

## 2021-12-17 RX ADMIN — PREGABALIN 75 MG: 75 CAPSULE ORAL at 08:45

## 2021-12-17 RX ADMIN — ACETAMINOPHEN 975 MG: 325 TABLET, FILM COATED ORAL at 04:30

## 2021-12-17 RX ADMIN — OXYCODONE HYDROCHLORIDE 15 MG: 5 TABLET ORAL at 08:44

## 2021-12-17 RX ADMIN — ENOXAPARIN SODIUM 40 MG: 40 INJECTION SUBCUTANEOUS at 10:55

## 2021-12-17 RX ADMIN — ACETAMINOPHEN 975 MG: 325 TABLET, FILM COATED ORAL at 15:54

## 2021-12-17 RX ADMIN — ACETAMINOPHEN 975 MG: 325 TABLET, FILM COATED ORAL at 08:44

## 2021-12-17 RX ADMIN — OXYCODONE HYDROCHLORIDE 10 MG: 5 TABLET ORAL at 12:15

## 2021-12-17 ASSESSMENT — ACTIVITIES OF DAILY LIVING (ADL)
ADLS_ACUITY_SCORE: 6

## 2021-12-17 NOTE — PLAN OF CARE
Pertinent assessments: A&O x4. Up ind, VSS. RA. LS clear. BS active. Passing gas and stool. Soft BM this evening. Tolerating low fiber diet. Lap sites CDI. Taking oxy for pain q3 hours. Rates pain 9/10 but appears comfortable.     Major Shift Events: Uneventful    Treatment Plan: Pain management,     Bedside Nurse: Darlyn Frankel RN

## 2021-12-17 NOTE — PROGRESS NOTES
Care Management Discharge Note    Discharge Date: 12/17/2021       Discharge Disposition: Inpatient Chemical Dependency    Discharge Services: Chemical Dependency Resources    Discharge DME: None    Discharge Transportation: other (see comments) (IP CD treatment center's transportation)      Additional Information:  Pt identified as restricted recepient program. Spoke with Yennifer Carrizales (NPI [id number]) from [facility] [phone], she will write discharge medications and send them to the [facility] in [place] on [address].  Yennifer said she spoke with Lary from pain and palliative and they have a plan.  CM confirmed with Lary that she has been in communication with Yennifer and Dr. Pastrana regarding discharge medications.      Joyce Stevens RN, BSN, PHN, [facility]  Care Coordinator  [facility]  [phone]    Addendum 1300  CM received a call from Sarahi nurse at [facility] stating that they were only going to fill 7 days worth of medications and wanted to arrange a hospital follow up appointment.  VAL provided Sarahi with pt phone number to coordinate directly with him

## 2021-12-17 NOTE — PROGRESS NOTES
COLON & RECTAL SURGERY  PROGRESS NOTE    December 17, 2021  Post-op Day # 4    SUBJECTIVE:  Tolerating low fiber diet.  +flatus, +BM.  Taking oxycodone every 3-4 hours along with non opioid therapy.      OBJECTIVE:  Temp:  [97.1  F (36.2  C)-98.1  F (36.7  C)] 98.1  F (36.7  C)  Pulse:  [61-71] 61  Resp:  [16-20] 16  BP: (108-137)/(45-69) 108/69  SpO2:  [93 %-94 %] 93 %    Intake/Output Summary (Last 24 hours) at 12/17/2021 0921  Last data filed at 12/16/2021 1200  Gross per 24 hour   Intake --   Output 750 ml   Net -750 ml       GENERAL:  Awake, alert, no acute distress, lying in bed.  HEAD: Nomocephalic atraumatic  SCLERA: anicteric  EXTREMITIES: warm and well perfused  ABDOMEN:  Soft, appropriately tender, non-distended, no rebound or guarding, no peritoneal signs.  INCISION:  C/d/i    LABS:  Lab Results   Component Value Date    WBC 17.3 10/27/2021    WBC 9.4 03/28/2020     Lab Results   Component Value Date    HGB 11.3 12/14/2021    HGB 15.3 03/28/2020     Lab Results   Component Value Date    HCT 33.5 10/27/2021    HCT 47.0 03/28/2020     Lab Results   Component Value Date     12/14/2021     03/28/2020     Last Basic Metabolic Panel:  Lab Results   Component Value Date     12/14/2021     03/28/2020      Lab Results   Component Value Date    POTASSIUM 3.5 12/14/2021    POTASSIUM 3.6 03/28/2020     Lab Results   Component Value Date    CHLORIDE 105 12/14/2021    CHLORIDE 102 03/28/2020     Lab Results   Component Value Date    RANDALL 8.2 12/14/2021    RANDALL 8.6 03/28/2020     Lab Results   Component Value Date    CO2 25 12/14/2021    CO2 29 03/28/2020     Lab Results   Component Value Date    BUN 9 12/14/2021    BUN 14 03/28/2020     Lab Results   Component Value Date    CR 0.93 12/14/2021    CR 1.26 03/28/2020     Lab Results   Component Value Date     12/14/2021     12/14/2021    GLC 99 03/28/2020       ASSESSMENT/PLAN:  41 year old man POD#4 s/p lap sigmoid resection with  takedown of colovesical fistula.  Cystogram with no evidence of bladder extravasation or colovesicular fistula.  Pandya removed 12/16.  Afebrile, vitals stable.     - Low fiber diet  - Miralax  - Pain management as needed, minimize narcotics  - Appreciate pain team assistance   - Encourage ambulation, at least 5x per day  - PT  - Lovenox for ppx, will not need at discharge  - Ok to discharge from our perspective        Disposition: Expected discharge later today.  Barriers to discharge: Pain control.        For questions/paging, please contact the CRS office at [phone].    Lilliam Joseph PA-C  [facility]  Phone: [phone]    Colon and Rectal Surgery Attending Note    Patient seen and examined independently.  Agree with above assessment and plan.  Doing well. + bm and flatus.  Pandya removed and he is voiding.   Abd: soft, incisions CDI  Plan   Discharge form hospital on low fiber diet.   Pain plan per pain team.  No refills from from the colon and rectal team planned.     Carola Pastrana MD  [facility]  [address], [address]  [place] [address]  T: [phone]  F: [phone]   [email or web address]

## 2021-12-17 NOTE — TELEPHONE ENCOUNTER
P Family Medicine phone call message- general phone call:    Reason for call: cheyenne from [facility] RN calling want to speak  A nurse regarding states jacqueline was discharge hospital  today she want know since he is restrict patient haw they get his medication to discuss a nurse would you please contact  Action Desired:     Return call needed: Yes    OK to leave a message on voice mail? Yes    Advised patient response may take up to 2 business days: No asap    Primary language: English      needed? No    Call taken on December 17, 2021 at 11:10 AM by LD Franco    Route to [facility] TRIAGE     no

## 2021-12-17 NOTE — PROGRESS NOTES
[facility]  Pain Management Progress Note  Text Page     Assessment & Plan   Rex Negrete is a 41 year old male who was admitted on 12/13/2021. I was asked by Lilliam Joseph PA-C  to see the patient for acute postoperative pain management in the setting of recent cocaine addiction, now in recovery program and recent excalation of oral opioids prior to admission.   Patient is restricted recipient to [facility]     Would then start taper of Oxycodone to discontinue. Would not recommend opioids at discharge.  Maximize multi modal analgesia, CAM therapies.  Aggressive bowel management. NO suppositories with colovesical fistula repair per general surgery team     Discussed by phone with Amrita Carrizales DO.  She is write prescriptions at discharge and plans a taper     PLAN:   1)  Opioids:  Oxycodone 15 mg every 3 hrs prn, re evaluate in am, plan to reduce to 10 mg every 4 hrs    No IV opioids   Opioids Treatment Goal:   -Improvement in function  -Participate in PT  -Management of acute pain during hospitalization only, not intended for outpatient     2)Non-opioid multimodal medication therapy  -Topical:Lidocaine Patch 4% ,menthol patch to abdomen tid cut to fit.   -N-SAIDS:Ibuprofen 400 mg every 6 prn Toradol IV 15 mg x 4 doses   -Muscle Relaxants: methocarbamol ( Robaxin) 500 mg qid prn   -Adjuvants:Acetaminophen 975 mg every 8 hrs, Pregabalin increase to 100 mg bid Hydroxyzine 10-20 mg every 6 hrs   -Antidepresants/anxiolytics:Divalproate 500 mg ER at hs      3)  Non-medication interventions  Positioning, ICE, Relaxation, Meditation, Distraction, Essential oils per nursing     4)  Constipation Prophylaxis    Continue to Monitor, Bowel Meds PRN per Constipation Order Set, Senna-S 1-2 bid prn , Polyethylene Glycol every day prn      5) Medication Risk reduction strategies   -monitor for sedation   -Capnography per protocol   -narcan for opioid reversal      6)  Pain Education  -Opioid safe  use, storage and disposal information included in DC AVS     7)  DC Planning   Discussed goal of Opioid therapy as above with patient and spouse   taper prior to discharge.  Continued outpatient management of pain per primary care   Disposition: to treatment center   Support systems:  Spouse   Outpatient Referrals:  The following risk factors have been identified for unintentional overdose: patient is on multiple sedating medications , patient is taking a high amount of opioids in 24 hour period and patient has a history of substance abuse disorder. Discharge with intra-nasal naloxone if discharged to home with opioids  >40 mg MME/day.  Plan for education prior to discharge.   dicussed at length pain management plan and taper of opioids. Advised pain management referral once recovery program near completion and 6 months sobriety     ASSESSMENT  1)  Acute on chronic abdominal pain post op Intraoperative colonoscopy and laparoscopic sigmoid colectomy with takedown colovesical fistula and coloproctostomy on 12/13/21  Appreciate case discussion with Carola Pastrana and pain management strategies.       2)  Patient with chronic pain, on chronic opioid therapy managed by Amrita Carrizales, DO   Baseline 42 mg Daily Morphine Equivalent as dispensed and as reported daily use. ( 5.5 tabs  Oxycodone 5 mg /day)   Patient has an expected opioid tolerance.      Patient's opioid use in past 24 hours: Oxycodone 110 mg  =  170  mg Daily Morphine Equivalent ( increase form yesterday     3)  Risk factors for opioid related harms  -History of overdose  -History of substance abuse disorder, now noting medication reliant behavior   -High opioid dose (>50 MME/day)  -Anxiety/depression     4)  Opioid induced side-effects:  -Constipation  Yes   -Nausea/Vomit no   -Sedation no   -Urinary Retention alvarez      5)  Other/Related:    -Depression/anxiety  -Disease of addiction partner wife per care every where? also uses, concerns for crossover addiction    -Seizure     Lary LEW CNP  Pain Management and Palliative Care   [facility]  Pgr: [phone]    Time Spent on this Encounter   Total unit/floor time 35  minutes, time consisted of the following, examination of the patient, reviewing the record and completing documentation. >50% of time spent in counseling and coordination of care.  Time spend counseling with patient consisted of the following topics, care planning for discharge and symptom management.  Time spent in coordination of care with Bedside Nurse Samantha Correa RN,      Interval History    Constipation,  Improving   Pain 8/10   No nausea, tolerating low fiber diet voiding ok     Review of Systems    CONSTITUTIONAL: NEGATIVE for fever, chills, change in weight  ENT/MOUTH: NEGATIVE for ear, mouth and throat problems  RESP: NEGATIVE for significant cough or SOB  CV: NEGATIVE for chest pain, palpitations or peripheral edema    Physical Exam   Temp:  [97.1  F (36.2  C)-98.1  F (36.7  C)] 98.1  F (36.7  C)  Pulse:  [61-71] 61  Resp:  [16-20] 16  BP: (108-137)/(45-69) 108/69  SpO2:  [93 %-94 %] 93 %  200 lbs 1.6 oz  GEN:  Alert, oriented x 3, appears comfortable, NAD.  HEENT:  Normocephalic/atraumatic, no scleral icterus, no nasal discharge, mouth moist.  CV:  RRR, S1, S2; no murmurs or other irregularities noted.  +3 DP/PT pulses bilatererally; no edema BLE.  RESP:  Clear to auscultation bilaterally without rales/rhonchi/wheezing/retractions.  Symmetric chest rise on inhalation noted.  Normal respiratory effort.  ABD:  Rounded, soft,generalized tenderness/non-distended.  +BS  EXT:  Edema & pulses as noted above.  CMS intact x 4.     M/S:   Nontender to palpation .    SKIN:  Dry to touch, no exanthems noted in the visualized areas.    NEURO: Symmetric strength +5/5.  Sensation to touch intact all extremities.   There is no area of allodynia or hyperesthesia.+ cognitive processing delays   PAIN BEHAVIOR:  Cooperative,  Eye contact improved.   Psych:  Angry affect, cooperative, conversant appropriately.  Short with staff     Medications       acetaminophen  975 mg Oral Q6H     divalproex sodium extended-release  500 mg Oral At Bedtime     enoxaparin ANTICOAGULANT  40 mg Subcutaneous Q24H     hydrOXYzine  10 mg Oral Q6H    Or     hydrOXYzine  20 mg Oral Q6H     lidocaine  1-2 patch Transdermal Q24H     lidocaine   Transdermal Q8H     pregabalin  100 mg Oral BID     senna-docusate  1 tablet Oral BID    Or     senna-docusate  2 tablet Oral BID     sodium chloride (PF)  3 mL Intracatheter Q8H       Data   Results for orders placed or performed during the hospital encounter of 12/13/21 (from the past 24 hour(s))   Platelet count   Result Value Ref Range    Platelet Count 263 150 - 450 10e3/uL

## 2021-12-17 NOTE — PROGRESS NOTES
Care Management Discharge Note    Discharge Date: 12/17/2021       Discharge Disposition: Inpatient Chemical Dependency    Discharge Services: Chemical Dependency Resources    Discharge DME: None    Discharge Transportation: other (see comments) ( CD treatment center's transportation)    Private pay costs discussed: Not applicable    PAS Confirmation Code:  (N/A)  Patient/family educated on Medicare website which has current facility and service quality ratings:  (N/A)    Education Provided on the Discharge Plan:  yes  Persons Notified of Discharge Plans: Pt, pt's spouse, pt's PCP  Patient/Family in Agreement with the Plan: yes    Handoff Referral Completed: Yes    Additional Information:  Lloyd met with the pt and his wife Forrest who was at the bedside, to discuss the pt's discharge plan.  The pt confirmed that he was admitted from [facility].  He said that he was brought to the hospital by staff at [facility].  The staff at [facility] will be transporting the pt back when he is discharge ready.  He calls [facility] and they will come and get him.  He said that he needs enough notice to call them and arrange for the pickup.  Lloyd offered to call [facility] and confirm the pt's return and identify any needs, but he declined to have Sw call.  The pt said that any new meds that he will have at discharge need to be filled at the [facility] in [place].  He said that his medications cannot be simply filled through the hospital provider, they need to go through his PCP since he is on the restricted recipient program.  Once the pt's meds are sent to [facility], his ride will take him to the pharmacy to get the meds prior to taking him back to [facility].    Sw will continue to be available as needed until discharge.    ANTONIETA Brantley, [facility]  Inpatient Care Coordination  [facility]  [phone]

## 2021-12-17 NOTE — DISCHARGE SUMMARY
[facility] Discharge Summary      Rex Negrete MRN# [id number]   Age: 41 year old YOB: 1980     Date of Admission:  12/13/2021  Date of Discharge::  12/17/2021  Admitting Physician:  Carola Pastrana MD  Discharge Physician:  Carola Pastrana MD     PCP:  Lionel Bahena    Disposition: Patient discharged from [facility] to home in stable condition.        Primary Diagnosis:     Diverticular disease with colovesical fistula          Discharge Medications:     Current Discharge Medication List      START taking these medications    Details   acetaminophen (TYLENOL) 500 MG tablet Take 2 tablets (1,000 mg) by mouth every 6 hours as needed for mild pain  Qty: 100 tablet, Refills: 0    Associated Diagnoses: Colovesical fistula      methocarbamol (ROBAXIN) 500 MG tablet Take 1 tablet (500 mg) by mouth 4 times daily as needed for muscle spasms  Qty: 28 tablet, Refills: 0    Associated Diagnoses: Neuropathy; Postoperative pain      polyethylene glycol (MIRALAX) 17 GM/Dose powder Take 17 g by mouth daily as needed (for constipation)  Qty: 510 g, Refills: 1    Associated Diagnoses: Constipation, unspecified constipation type      senna-docusate (SENOKOT-S/PERICOLACE) 8.6-50 MG tablet Take 1 tablet by mouth 2 times daily  Qty: 60 tablet, Refills: 1    Associated Diagnoses: Constipation, unspecified constipation type         CONTINUE these medications which have CHANGED    Details   !! oxyCODONE (ROXICODONE) 5 MG tablet Take 1.5 tablets (7.5 mg) by mouth every 6 hours as needed for pain This prescription starts on 12/23/21.  Qty: 8 tablet, Refills: 0    Associated Diagnoses: Postoperative pain      !! oxyCODONE IR (ROXICODONE) 10 MG tablet Take 1 tablet (10 mg) by mouth every 4 hours as needed for moderate to severe pain for 3 days, THEN 1 tablet (10 mg) every 6 hours as needed for moderate to severe pain. For 12/17-12/22.  Qty: 30 tablet, Refills: 0    Associated Diagnoses:  Postoperative pain      pregabalin (LYRICA) 100 MG capsule Take 1 capsule (100 mg) by mouth 2 times daily  Qty: 60 capsule, Refills: 0    Associated Diagnoses: Neuropathy       !! - Potential duplicate medications found. Please discuss with provider.      CONTINUE these medications which have NOT CHANGED    Details   diphenhydrAMINE (BENADRYL) 25 MG capsule Take 25 mg by mouth every 6 hours as needed      divalproex sodium extended-release (DEPAKOTE ER) 500 MG 24 hr tablet Take 1 tablet (500 mg) by mouth At Bedtime  Qty: 30 tablet, Refills: 11    Comments: Restricted patient to me as prescriber; this Rx is to replace one sent by Dr. Abel earlier today. Yennifer Carrizales DO  Associated Diagnoses: Partial symptomatic epilepsy with complex partial seizures, not intractable, without status epilepticus (H)      Omega-3 Fatty Acids (FISH OIL MAXIMUM STRENGTH) 1200 MG CPDR Take 1 capsule by mouth          STOP taking these medications       cefuroxime (CEFTIN) 500 MG tablet Comments:   Reason for Stopping:         magnesium citrate 1.745 GM/30ML solution Comments:   Reason for Stopping:         metroNIDAZOLE (FLAGYL) 500 MG tablet Comments:   Reason for Stopping:         neomycin (MYCIFRADIN) 500 MG tablet Comments:   Reason for Stopping:         ondansetron (ZOFRAN-ODT) 4 MG ODT tab Comments:   Reason for Stopping:                      Follow Up, Special Instructions:     Discharge diet: Low residue diet    Discharge activity: No heavy lifting or straining.    Discharge follow-up: Follow up in 3 weeks in our clinic.              Procedures:     Procedure(s): Intraoperative colonoscopy and laparoscopic sigmoid colectomy with takedown colovesical fistula and coloproctostomy at 16 cm from the anal verge with a 28 EEA stapler.       Pathology:   Sigmoid colon and anastomotic rings, sigmoid colectomy:  -Sigmoid diverticulosis; focal features of perforation with abscess formation, pulse granulomas, serositis, and  adhesions.  -Colovesical fistula (clinical).  -Unremarkable anastomotic rings.  -Unremarkable resection margins.  -Negative for malignancy.           Brief Hospital Summary:     Patient is a 41 year old male who underwent a laparoscopic sigmoid colectomy with takedown of colovesical fistula on December 13, 2021 by Dr. Pastrana.   There were no immediate complications during this procedure.  Please refer to the full operative summary for details.  The patient's hospital course was unremarkable.  He did have some issues with pain control for which the Palliative Care team evaluated him and provided further recommendations for pain control. He recovered as anticipated and experienced no post-operative complications.  Cystogram was done prior to removal of Pandya and this was negative. Diet was advanced and he had return of bowel function.           Mary Jo Stone PA-C  [facility]  [phone]        ADDENDUM:  Length of stay: 4 days  Indicate Y or N for the following:  UTI n   C diff n  PNA n  SSI n  DVT n  PE n  CVA n  MI n  Enterocutaneous fistula n  Peripheral nerve injury n  Abscess (not adjacent to anastomosis) n  Leak n     Death within 30 days n  Reintubation n  Reoperation n

## 2021-12-17 NOTE — PROGRESS NOTES
[facility]  Primary Care Note    Rex Negrete MRN# [id number]   Age: 41 year old YOB: 1980/2021 11:33 AM    Patient admitted: 12/13/2021 10:28 AM  Reason for admission: Colovesical fistula [N32.1]  Primary inpatient team: Colorectal surgery    Home clinic: [facility]  Primary care provider: Yennifer Carrizales DO         Primary provider communication:     1. I have reviewed the patient s admission History & Physical: Yes  2. I have reviewed associated daily notes: Yes  3. Collaborated via phone with inpatient pain team provider (Lary) and am in agreement with opiate taper plan.   4. Given his restricted status, I will prescribe his discharge medications.   - Sent new RX for Senna, Methocarbamol and acetaminophen.   - Oxycodone - sent 10mg q4 hrs x3 days, then 10mg q6 hours x3 days, then 7.5mg q6 hrs x 3 days.     [facility] RN will work to get patient scheduled at [facility] in next 1 week to continue taper plan (goal is to taper off entirely as there is not a clear indication for ongoing opiate use).     I appreciate the contributions of the inpatient team to the care of my patient.  If there are questions regarding this patient, the best way to contact me is via pager at [phone]. If on evening or weekend, can speak to On-call [facility] resident by calling clinic at [phone].     Yennifer Carrizales DO      1:05 PM  Addendum  Prescriptions resent to 's restricted pharmacy. Confirmed with [facility] pharmacy that they will delete ones sent there in error.     Yennifer Carrizales DO

## 2021-12-17 NOTE — TELEPHONE ENCOUNTER
Rn reached out to patient- still in the hospital as of now. Advised that Dr. Carrizales was working with pain team to get his prescriptions ordered in compliance with the restricted recipient program. Advised she would be sending 1 week supply and we needed to get him in for follow up next week. Scheduled with Pharm D on 12/22/21 at 1020 followed by Dr. Torres at  1120.   Sarahi Figueroa RN

## 2021-12-17 NOTE — PLAN OF CARE
Patient discharged at 4pm via ride from [facility] living [facility] ([place]). Patient will  medications (oxycodone, lyrica, miralax, senna, robaxin and tylenol from the [facility] in [place] d/t restricted access with pain management program. Discharge instructions reviewed with patient including Oxycodone taper plan over the next week. Patient has PCP appointment on 12/22 and appt with Dr. Pastrana 01/13. Advised to follow low fiber diet. Bowel sounds active, passing gas &  Voiding. Oxycodone given x2 for abdominal pain. Incisions open to air, minimal bruising. VSS. Patient left hospital in stable condition, DME walker was given to patient by PT to use for long distances.

## 2021-12-17 NOTE — TELEPHONE ENCOUNTER
RN spoke to Joyce DAVE CC at [facility]- reports patient will be discharging soon after surgery and they want to make sure that discharged medications will be covered due to restricted recipient program. States they have the [facility] in [place] set up as pharmacy. Surgeon is Dr. Carola Patel NPI [id number].     RN spoke to  briefly who advised patient's designated hospital is [facility] and therefore if provider's NPI is tied to that hospital, medications should be covered. Joyce requesting that we check on this and call her back once we know for sure. RN routing to  to verify.   Sarahi Figueroa, RN

## 2021-12-17 NOTE — PLAN OF CARE
Physical Therapy Discharge Summary    Reason for therapy discharge:    Discharged to home.    Progress towards therapy goal(s). See goals on Care Plan in [facility] electronic health record for goal details.  Goals partially met.  Barriers to achieving goals:   discharge from facility.    Therapy recommendation(s):    No further therapy is recommended. Patient demonstrating safe mobility to return to home. Patient endorsing use of FWW for increased stability with ambulation.

## 2021-12-17 NOTE — PLAN OF CARE
To Do:  End of Shift Summary  For vital signs and complete assessments, please see documentation flowsheets.     Pertinent assessments: A&O x4. VSS. Pain management regimen in place. Abdominal incisions CDI w/dermabond.     Major Shift Events: Cystogram completed. Removed patients alvarez. Voiding, passing gas, and had bowel movement. Tolerating low fiber diet. Possible discharge back to sober house tomorrow.    Treatment Plan: Pain management, await ROBF, and diet tolerance.    Bedside Nurse: Alexandra Jorge RN

## 2021-12-17 NOTE — TELEPHONE ENCOUNTER
Received page from Pain team at [facility].   I will prescribe his discharge medications (this has been problematic in the past and since it is a Friday I want to be sure he gets his medication).     Yennifer Carrizales, DO

## 2021-12-17 NOTE — TELEPHONE ENCOUNTER
Patient is being discharged from [facility] today.     Plan for oxycodone taper (with goal of discontinuing).     Sent Rx for:   10mg q 4 hours x 3 days (12/17-12/19)  10mg q6 hours x 3 days (12/20-12/22)  7.5mg q6 hours x 3 day.  (12/23-12/25)    Will need clinic follow-up within 7 days for next fill and to continue taper plan. At that appointment, patient could choose to either go to 7.5mg q8 hours or 5mg q6 hours.     Message routed to triage RN to start post-discharge follow-up.     Yennifer Carrizales DO    Note addended to include the following clarification:  I did not directly discuss oxycodone taper with the patient but did discuss with Pain team CNP and this is in alignment with her plan for discharge (which I agree with). Per chart review, patient was upset over decision to wean pain medications.     Yennifer Carrizales DO

## 2021-12-18 DIAGNOSIS — Z71.89 OTHER SPECIFIED COUNSELING: ICD-10-CM

## 2021-12-20 ENCOUNTER — PATIENT OUTREACH (OUTPATIENT)
Dept: CARE COORDINATION | Facility: CLINIC | Age: 41
End: 2021-12-20
Payer: COMMERCIAL

## 2021-12-20 ENCOUNTER — TELEPHONE (OUTPATIENT)
Dept: NEUROLOGY | Facility: CLINIC | Age: 41
End: 2021-12-20
Payer: COMMERCIAL

## 2021-12-20 NOTE — PROGRESS NOTES
Clinic Care Coordination Contact  [facility]: Post-Discharge Note  SITUATION                                                      Admission:    Admission Date: 12/13/21   Reason for Admission: Diverticular disease with colovesical fistula  Discharge:   Discharge Date: 12/17/21  Discharge Diagnosis: Diverticular disease with colovesical fistula    BACKGROUND                                                      Patient is a 41 year old male who underwent a laparoscopic sigmoid colectomy with takedown of colovesical fistula on December 13, 2021 by Dr. Pastrana.   There were no immediate complications during this procedure.  Please refer to the full operative summary for details.  The patient's hospital course was unremarkable.  He did have some issues with pain control for which the Palliative Care team evaluated him and provided further recommendations for pain control    ASSESSMENT      Enrollment  Primary Care Care Coordination Status: Not a Candidate    Discharge Assessment  How are you doing now that you are home?: doing ok.  How are your symptoms? (Red Flag symptoms escalate to triage hotline per guidelines): Improved  Do you feel your condition is stable enough to be safe at home until your provider visit?: Yes  Does the patient have their discharge instructions? : Yes  Does the patient have questions regarding their discharge instructions? : No  Were you started on any new medications or were there changes to any of your previous medications? : No  Do you have all of your needed medical supplies or equipment (DME)?  (i.e. oxygen tank, CPAP, cane, etc.): Yes  Discharge follow-up appointment scheduled within 14 calendar days? : Yes  Discharge Follow Up Appointment Date: 12/22/21  Discharge Follow Up Appointment Scheduled with?: Specialty Care Provider    Post-op (CHW CTA Only)  If the patient had a surgery or procedure, do they have any questions for a nurse?: No         PLAN                                                       Outpatient Plan:   Discharge follow-up: Follow up in 3 weeks in our clinic         Future Appointments   Date Time Provider Department Center   12/22/2021 10:20 AM Minh Bartlett [facility]   12/22/2021 11:20 AM Radha Torres MD [facility]   2/15/2022 11:00 AM Ion Abel MD [facility]         For any urgent concerns, please contact our 24 hour nurse triage line: [phone] ([phone])         SONY Echeverria  [phone]  [facility]

## 2021-12-20 NOTE — TELEPHONE ENCOUNTER
Patient  called with requesting to talk with Dr Mckeon. He has some questions about the Feedback information.   ( Rex phone [phone])   Thanks

## 2021-12-20 NOTE — PROGRESS NOTES
Clinic Care Coordination Contact  Community Health Worker Initial Outreach     CHW called Pt to complete post discharge assessment, Pt requested to call him back after 2pm, today.        SONY Echeverria  [phone]  [facility]

## 2021-12-22 ENCOUNTER — OFFICE VISIT (OUTPATIENT)
Dept: PHARMACY | Facility: CLINIC | Age: 41
End: 2021-12-22
Payer: COMMERCIAL

## 2021-12-22 ENCOUNTER — OFFICE VISIT (OUTPATIENT)
Dept: FAMILY MEDICINE | Facility: CLINIC | Age: 41
End: 2021-12-22
Payer: COMMERCIAL

## 2021-12-22 VITALS
WEIGHT: 204.8 LBS | SYSTOLIC BLOOD PRESSURE: 104 MMHG | RESPIRATION RATE: 16 BRPM | DIASTOLIC BLOOD PRESSURE: 71 MMHG | OXYGEN SATURATION: 98 % | TEMPERATURE: 98 F | HEART RATE: 74 BPM | BODY MASS INDEX: 26.48 KG/M2

## 2021-12-22 DIAGNOSIS — G89.18 POSTOPERATIVE PAIN: ICD-10-CM

## 2021-12-22 DIAGNOSIS — S06.9X9S MILD TRAUMATIC BRAIN INJURY WITH LOSS OF CONSCIOUSNESS, SEQUELA (H): ICD-10-CM

## 2021-12-22 DIAGNOSIS — G40.209 PARTIAL SYMPTOMATIC EPILEPSY WITH COMPLEX PARTIAL SEIZURES, NOT INTRACTABLE, WITHOUT STATUS EPILEPTICUS (H): ICD-10-CM

## 2021-12-22 DIAGNOSIS — F19.11 HX OF SUBSTANCE ABUSE (H): ICD-10-CM

## 2021-12-22 DIAGNOSIS — N32.1 COLOVESICAL FISTULA: Primary | ICD-10-CM

## 2021-12-22 DIAGNOSIS — Z90.49 S/P LAPAROSCOPIC COLECTOMY: ICD-10-CM

## 2021-12-22 DIAGNOSIS — L76.82 INCISIONAL PAIN: ICD-10-CM

## 2021-12-22 PROCEDURE — 99207 PR NO CHARGE LOS: CPT | Performed by: PHARMACIST

## 2021-12-22 PROCEDURE — 99495 TRANSJ CARE MGMT MOD F2F 14D: CPT | Mod: GC | Performed by: STUDENT IN AN ORGANIZED HEALTH CARE EDUCATION/TRAINING PROGRAM

## 2021-12-22 RX ORDER — LIDOCAINE HYDROCHLORIDE 20 MG/ML
JELLY TOPICAL 2 TIMES DAILY PRN
Qty: 30 ML | Refills: 0 | Status: SHIPPED | OUTPATIENT
Start: 2021-12-22 | End: 2022-01-01

## 2021-12-22 NOTE — PROGRESS NOTES
Medication Therapy Management (MTM) Encounter    ASSESSMENT:                            Medication Adherence/Access: No issues identified    Pain:   Controlled, but patient is not satisfied with the PRN access to medication. Will discuss with his provider in today's appointment.     Constipation :   Controlled with the use of Miralax. Rex is willing to take this medication consistently to avoid any straining.  He has not needed to increase this therapy and does not use Senna tx.      Seizures:   Controlled     PLAN:                            No changes in therapy recommended today.     Follow-up: with PCP      SUBJECTIVE/OBJECTIVE:                          Rex Negrete is a 41 year old male coming in for a TCM visit.  He was discharged from [facility] on 12/17/21 for Colovesical fistula .      Reason for visit: hospital follow up.    Allergies/ADRs: Reviewed in chart  Past Medical History: Reviewed in chart  Tobacco: He reports that he has been smoking cigarettes. He has been smoking about 0.50 packs per day. He has never used smokeless tobacco.Tobacco Cessation Action Plan:   Information offered: Patient not interested at this time    Alcohol: not currently using      Medication Adherence/Access: no issues reported    Pain :   APAP 500 mg:Used twice a day.   Knows that he can take this more often, but usually uses this twice.      Robaxin, used twice a day    Oxycodone 10 mg: taking every 6 hours.  Changed recently from 4 hours to 6 hours. Frequency changed yeserday and it was a hard change.  Feels that the option to take the pain medication when needed is limited by the new directions for this medication.      lyrica 100 mg used twice a day.     Constipation :     Miralax - used daily.    Constipation controlled     Senna-docusate not used.  Patient does not like this medication and does not appreciate that it is referred to as a stimulant (laxative stimulant)    Seizures:  Takes Depakote  mg once  "daily.  Consistent with this medication. No interruptions in therapy.          Today's Vitals:   BP Readings from Last 1 Encounters:   12/22/21 104/71     Pulse Readings from Last 1 Encounters:   12/22/21 74     Wt Readings from Last 1 Encounters:   12/22/21 204 lb 12.8 oz (92.9 kg)     Ht Readings from Last 1 Encounters:   12/13/21 6' 1.74\" (1.873 m)     Estimated body mass index is 26.48 kg/m  as calculated from the following:    Height as of 12/13/21: 6' 1.74\" (1.873 m).    Weight as of an earlier encounter on 12/22/21: 204 lb 12.8 oz (92.9 kg).    Temp Readings from Last 1 Encounters:   12/22/21 98  F (36.7  C) (Oral)     ----------------  Post Discharge Medication Reconciliation Status: discharge medications reconciled and changed, per note/orders.    I spent 15 minutes with this patient today. Dr. Torres was provided the recommendations above  in clinic today and Dr. Forbes is the authorizing prescriber for this visit through the pharmacist collaborative practice agreement.    The patient was given a summary of these recommendations. See Provider note/AVS from today.     Minh Bartlett, Pharm.D.         Medication Therapy Recommendations  No medication therapy recommendations to display     "

## 2021-12-22 NOTE — LETTER
12/22/21    To Whom This May Concern.    I evaluated Rex Negrete in clinic today for post-op visit. We discussed his current pain management and I advised the following modifications:     - He needs to take tylenol every 6 hours. He can choose 500 or 1000mg, but must take it four times per day.   - He currently is prescribed Oxycodone 10mg q6hr needed from 12/20-12/22. Please change this to 10mg four times per day as needed. (If he needs to take it at 8AM, noon, 4PM, and 10PM, that's okay, but must wait until next day for next dose.)   - He currently is prescribed Oxycodone 7.5mg q6hr as needed from 12/23-12/25. Please change this to 7.5mg four times per day as needed.   - Please allow him to use lidocaine gel over his incision sites twice daily as needed.   - Encourage use of warm packs.   - Encourage miralax daily.       Please call with any questions or concerns.     Sincerely,      Radha Torres MD

## 2021-12-22 NOTE — PATIENT INSTRUCTIONS
Patient Education   Here is the plan from today's visit    - He needs to take tylenol every 6 hours. He can choose 500 or 1000mg, but must take it four times per day.   - He currently is prescribed Oxycodone 10mg q6hr needed from 12/20-12/22. Please change this to 10mg four times per day as needed. (If he needs to take it at 8AM, noon, 4PM, and 10PM, that's okay, but must wait until next day for next dose.)   - He currently is prescribed Oxycodone 7.5mg q6hr as needed from 12/23-12/25. Please change this to 7.5mg four times per day as needed.   - Please allow him to use lidocaine gel over his incision sites twice daily as needed.   - Encourage use of warm packs.   - Encourage miralax daily.     Thank you for coming to [facility] today.  Lab Testing:  **If you had lab testing today and your results are reassuring or normal they will be mailed to you or sent through [facility] within 7 days.   **If the lab tests need quick action we will call you with the results.  **If you are having labs done on a different day, please call [phone] to schedule at [facility] or [phone] for other [facility] locations. Labs do not offer walk-in appointments.  The phone number we will call with results is # [phone] (home) . If this is not the best number please call our clinic and change the number.  Medication Refills:  If you need any refills please call your pharmacy and they will contact us.   If you need to  your refill at a new pharmacy, please contact the new pharmacy directly. The new pharmacy will help you get your medications transferred faster.   Scheduling:  If you have any concerns about today's visit or wish to schedule another appointment please call our office during normal business hours [phone] (8-5:00 M-F)  If a referral was made to an [facility] specialty provider and you do not get a call from central scheduling, please refer to directions on your visit  summary or call our office during normal business hours for assistance.   If a Mammogram was ordered for you at the [facility] call [phone] to schedule or change your appointment.  If you had an XRay/CT/Ultrasound/MRI ordered the number is [phone] to schedule or change your radiology appointment.   [facility] has limited ultrasound appointments available on Wednesdays, if you would like your ultrasound at [facility], please call [phone] to schedule.   Medical Concerns:  If you have urgent medical concerns please call [phone] at any time of the day.    Radha Torres MD

## 2021-12-22 NOTE — PROGRESS NOTES
Hospitalization Follow-up Visit         HPI         Summary of hospitalization:  [facility] discharge summary reviewed     Diagnostic Tests/Treatments reviewed.  Follow up needed: none    Post Discharge Medication Reconciliation: discharge medications reconciled and changed, per note/orders.  Medications reviewed by: by myself and Dr. Bartlett  Problems taking medications regularly:  None  Problems adhering to non-medication therapy:  None    Other Healthcare Providers Involved in Patient s Care:         Surgical follow-up appointment - Feb 15 and Follow-up w/ PCP in 2-4 weeks re: sleep concerns  Update since discharge: improved.   Plan of care communicated with patient            Discharged on 12/17.  Dr. Carrizales prescribed the following opioid taper.   10mg q4 hours x 3 days (12/17-12/19)  10mg q6 hours x 3 days (12/20-12/22)  7.5mg q6 hours x 3 day.  (12/23-12/25)     Patient notices meds wearing out sooner than 6 hours. Living at Treatment [facility].   Pooped today -- pooping every day. Both soft and hard poops. Trying to avoid straining, but that is hard to do.   Sometimes feels pain in his stomach that gets better after pooping.   Taking miralax as needed, but not every day. (Told pharmacist it was every day.)   Pain is lower abdomen and sometimes over incisions. Yesterday lower abdominal pain was 7-8/10.          Review of Systems:   CONSTITUTIONAL: feeling tired  SKIN: no worrisome rashes, no worrisome moles, no worrisome lesions  EYES: no acute vision problems or changes  ENT: no ear problems, no mouth problems, no throat problems  RESP: no significant cough, no shortness of breath  CV: no chest pain, no palpitations, no new or worsening peripheral edema            Physical Exam:     /71 (BP Location: Left arm, Patient Position: Sitting, Cuff Size: Adult Large)   Pulse 74   Temp 98  F (36.7  C) (Oral)   Resp 16   Wt 92.9 kg (204 lb 12.8 oz)   SpO2 98%   BMI 26.48 kg/m      GENERAL:  healthy, alert, well nourished, well hydrated, no distress  Psych: Difficulty keeping train of thought. Very easily distracted and derailed from conversation. Well-groomed. Engaged appropriately.   Abdomen: Well-healing lap incisions. BS present. Mild TTP bilateral lower quadrants of abdomen. Otherwise no TTP. Some voluntary guarding w/ palpation throughout. Small bruise to left of umbilicus. Careful in laying down (following post-op precautions0.   RESP: breathing comfortably on room air. No increased effort.   Neuro: Gait appears normal.          Results:   Results from hospital discharge reviewed    Assessment and Plan     Rex Negrete is a 40 yo M POD9 s/p laparoscopic sigmoid colectomy with takedown of colovesical fistula (12/13/21) by Dr. Pastrana. No intraoperative or post-op complications. Is restricted to Dr. Carrizales for prescriptions of any medications. She prescribed opioid taper over 9d [10mg q4 hours x 3 days (12/17-12/19); 10mg q6 hours x 3 days (12/20-12/22); 7.5mg q6 hours x 3 day.  (12/23-12/25)] which he is tolerating fairly well, but notes the pain returns before next dose is due now that at q6h dosing. Wrote letter to treatment center requesting that patient get up to 4 doses of 10mg oxycodone per day (doesn't have to be q6hr PRN) through 12/22, then up to 4 doses of 7.5mg oxycodone per day from 12/23-12/25. He should also take scheduled tylenol. Can use warm packs and lidocaine gel for pain relief as well. Also discussed bowel regimen with him-- needs to take miralax and senna daily and hold only if several small stools in a row.    Routed to Dr. Carrizales to prescribe lidocaine gel for incisional pain management.     Follow-up w/ surgery post-op on 2/15 as scheduled.   Schedule follow-up with Dr. Carrizales or Dr. Bahena re: sleep concerns in 2-4 weeks, or as able.     Billing:   - SDOH: At treatment center which complicates dosing. Has h/o substance use disorder.   - Also addressed TBI (physical exam--  easily distracted  E&M code to be billed if TCM cannot be: 15435    Type of decision making: Moderate complexity (12302)      Options for treatment and follow-up care were reviewed with the patient.  Rex Negrete engaged in the decision making process and verbalized understanding of the options discussed and agreed with the final plan.      Radha Torres MD

## 2021-12-22 NOTE — ASSESSMENT & PLAN NOTE
S/p laparoscopic sigmoid colectomy with takedown of colovesical fistula (12/13/21) by Dr. Pastrana.

## 2021-12-22 NOTE — Clinical Note
Can you please sign the lidocaine gel I have pended? Patient says he's restricted to you for prescribing.   Carlos

## 2021-12-23 NOTE — PROGRESS NOTES
"Danielle Do is a 28 year old female who is being evaluated via a billable video visit.      The patient has been notified of following:     \"This video visit will be conducted via a call between you and your physician/provider. We have found that certain health care needs can be provided without the need for an in-person physical exam.  This service lets us provide the care you need with a video conversation.  If a prescription is necessary we can send it directly to your pharmacy.  If lab work is needed we can place an order for that and you can then stop by our lab to have the test done at a later time.    Video visits are billed at different rates depending on your insurance coverage.  Please reach out to your insurance provider with any questions.    If during the course of the call the physician/provider feels a video visit is not appropriate, you will not be charged for this service.\"    Patient has given verbal consent for Video visit? Yes  How would you like to obtain your AVS? MyChart  If you are dropped from the video visit, the video invite should be resent to: Text to cell phone: [phone]  Will anyone else be joining your video visit? No      Subjective     Danielle Do is a 28 year old female who presents today via video visit for the following health issues:    HPI     Medication Followup of Fluoxetine    Taking Medication as prescribed: yes    Side Effects:  None    Medication Helping Symptoms:  Yes but feels it is not helping as much as it did in the beginning     Not sleeping well, lots of work and home stress    Video Start Time: 400        Review of Systems   Constitutional, HEENT, cardiovascular, pulmonary, gi and gu systems are negative, except as otherwise noted.      Objective           Vitals:  No vitals were obtained today due to virtual visit.    Physical Exam     GENERAL: Healthy, alert and no distress  EYES: Eyes grossly normal to inspection.  No discharge or erythema, or " Preceptor Attestation:   Patient seen, evaluated and discussed with the resident. I have verified the content of the note, which accurately reflects my assessment of the patient and the plan of care.   Supervising Physician:  Sebastien Forbes MD    obvious scleral/conjunctival abnormalities.  RESP: No audible wheeze, cough, or visible cyanosis.  No visible retractions or increased work of breathing.    SKIN: Visible skin clear. No significant rash, abnormal pigmentation or lesions.  NEURO: Cranial nerves grossly intact.  Mentation and speech appropriate for age.  PSYCH: Mentation appears normal, affect normal/bright, judgement and insight intact, normal speech and appearance well-groomed.              Assessment & Plan     Moderate major depression (H)  Was helping, now not as much  Increase dose to  - FLUoxetine (PROZAC) 20 MG capsule; Take 2 capsules (40 mg) by mouth daily        Regular exercise  See therapist  Follow up by phone in 10 days if not improving.     No follow-ups on file.    Cruzito Edouard MD  [facility]      Video-Visit Details    Type of service:  Video Visit    Video End Time:4:10 PM    Originating Location (pt. Location): Home    Distant Location (provider location):  [facility]     Platform used for Video Visit: [employer]

## 2021-12-28 ENCOUNTER — TELEPHONE (OUTPATIENT)
Dept: FAMILY MEDICINE | Facility: CLINIC | Age: 41
End: 2021-12-28
Payer: COMMERCIAL

## 2021-12-28 DIAGNOSIS — N32.1 COLOVESICAL FISTULA: Primary | ICD-10-CM

## 2021-12-28 RX ORDER — OXYCODONE HYDROCHLORIDE 5 MG/1
TABLET ORAL
Qty: 51 TABLET | Refills: 0 | Status: SHIPPED | OUTPATIENT
Start: 2021-12-28 | End: 2022-01-12

## 2021-12-28 RX ORDER — OXYCODONE HYDROCHLORIDE 5 MG/1
TABLET ORAL
Qty: 18 TABLET | Refills: 0 | Status: SHIPPED | OUTPATIENT
Start: 2021-12-28 | End: 2021-12-28

## 2021-12-28 NOTE — TELEPHONE ENCOUNTER
New taper sent:     7.5mg 4x a day x 3 days  5mg 4x a day for 3 days  5mg 3x a day for 3 days  5mg 2x a day until appointment on 1/12/22.     Yennifer Carrizales DO

## 2021-12-28 NOTE — TELEPHONE ENCOUNTER
"RN spoke with [place] at [facility] where patient had surgery with Dr. Pastrana on 12/13/21. States patient called their office this morning reporting significant amount of pain today. Reported to her that he took last oxycodone on Sunday 12/26. Told her that he has only been taking 1 tab daily. He iss also using Tylenol and Robaxin. Has follow up scheduled with surgeon in 2 weeks and Dr. Bahena on 1/12/22.     Ekta states she was going to prescribe a small extension of his oxycodone for pain however, he told her that he is a restricted patient and therefore oxycodone needed to be sent by [facility] provider. Her plan was to send additional 8 tablets of 7.5 mg. States provider can call her directly to discuss.     RN also spoke with patient- confirmed that he took last dose of 7.5 mg on Sunday. States yesterday went \"okay\" but then abdominal pain returned at 8-9/10 last night and he wasn't able to sleep. Denies fever, N/V- reports he is having BMs that are soft, had one last night. Patient states [facility] was going to send new oxycodone but he told them his insurance would only cover [place] provider. Requesting limited supply be sent to [place] pharmacy. Routing to PCP to advise.   Sarahi Figueroa RN     "

## 2021-12-28 NOTE — TELEPHONE ENCOUNTER
[facility] phone call message- general phone call:    Reason for call: Ekta from [facility] called to discuss the patient's post op pain following surgery.    Return call needed: Yes    OK to leave a message on voice mail? Yes    Primary language: English      needed? No    Call taken on December 28, 2021 at 11:39 AM by Laina Browne

## 2021-12-28 NOTE — TELEPHONE ENCOUNTER
Since patient stated he was only taking oxycodone once daily - sent rx for 7.5mg once daily prn x 5 days and the taper to 5mg once daily prn to last until his appointment on 1/12/22.     If opiate needs are increasing or if he has other systemic symptoms, then patient needs to be seen sooner to determine the cause of his pain as I wouldn't expect pain to worsen this far after surgery. It is also possible his abdominal pain is related to opiate withdrawal (which is why I recommend continuing to taper off).     Yennifer Carrizales, DO

## 2021-12-28 NOTE — TELEPHONE ENCOUNTER
"RN spoke to patient- relayed taper plan below. He verbalized understanding- will plan on picking up medication today from pharmacy. Advised him to review instructions with pharmacist when he picks it up as well.     RN reiterated concern about his increased pain- patient states he spoke to the surgeon's office and \"they didn't seem too concerned\". Told him that bruising and swelling is normal and that although they would like him to have less pain, this amount of pain is to be expected. RN reiterated that if he develops any other symptoms, fever, N/V, inability to have BM/pass gas he needs to notify clinic and present to ED right away for assessment and treatment. He verbalized understanding. Also reminded him of upcoming follow up 1/12/22.   Sarahi Figueroa RN    "

## 2021-12-28 NOTE — TELEPHONE ENCOUNTER
"Patient called requesting to speak to nurse, in regards to his pain and needed something prescribed. States his last dose of pain medication was Sunday and is \"in a great deal of pain today\".  "

## 2021-12-28 NOTE — TELEPHONE ENCOUNTER
RN spoke to patient relayed message below from Dr. Carrizales- states there must have been a miscommunication. Reports he was taking every 6 hrs up until 12/26/21 when he only had one pill left. RN questioned this as per PA at surgery office he also told her that he was only really using 1 dose daily- patient states he misspoke.     He is requesting taper plan that starts at 7.5 mg q 6 hrs as that was managing his pain best. Patient again denies any systemic symptoms just the acute abdominal pain. Patient then had to take another call and requested message be sent back to Dr. Carrizales to update prescription if appropriate. He would then like a call back.   Sarahi Figueroa RN

## 2022-01-01 ENCOUNTER — TELEPHONE (OUTPATIENT)
Dept: PSYCHOLOGY | Facility: CLINIC | Age: 42
End: 2022-01-01
Payer: COMMERCIAL

## 2022-01-01 ENCOUNTER — TELEPHONE (OUTPATIENT)
Dept: NEUROLOGY | Facility: CLINIC | Age: 42
End: 2022-01-01
Payer: COMMERCIAL

## 2022-01-01 ENCOUNTER — HEALTH MAINTENANCE LETTER (OUTPATIENT)
Age: 42
End: 2022-01-01

## 2022-01-01 ENCOUNTER — VIRTUAL VISIT (OUTPATIENT)
Dept: PSYCHOLOGY | Facility: CLINIC | Age: 42
End: 2022-01-01
Payer: COMMERCIAL

## 2022-01-01 ENCOUNTER — HOSPITAL ENCOUNTER (OUTPATIENT)
Dept: ULTRASOUND IMAGING | Facility: CLINIC | Age: 42
Discharge: HOME | End: 2022-09-16
Attending: NURSE PRACTITIONER | Admitting: NURSE PRACTITIONER
Payer: COMMERCIAL

## 2022-01-01 ENCOUNTER — HOSPITAL ENCOUNTER (OUTPATIENT)
Dept: PHYSICAL THERAPY | Facility: CLINIC | Age: 42
Discharge: HOME | End: 2022-12-12
Payer: COMMERCIAL

## 2022-01-01 ENCOUNTER — MYC MEDICAL ADVICE (OUTPATIENT)
Dept: FAMILY MEDICINE | Facility: CLINIC | Age: 42
End: 2022-01-01

## 2022-01-01 ENCOUNTER — DOCUMENTATION ONLY (OUTPATIENT)
Dept: FAMILY MEDICINE | Facility: CLINIC | Age: 42
End: 2022-01-01

## 2022-01-01 ENCOUNTER — TELEPHONE (OUTPATIENT)
Dept: FAMILY MEDICINE | Facility: CLINIC | Age: 42
End: 2022-01-01

## 2022-01-01 ENCOUNTER — HOSPITAL ENCOUNTER (OUTPATIENT)
Dept: PHYSICAL THERAPY | Facility: CLINIC | Age: 42
Discharge: HOME | End: 2022-11-14
Payer: COMMERCIAL

## 2022-01-01 ENCOUNTER — PRE VISIT (OUTPATIENT)
Dept: ENDOCRINOLOGY | Facility: CLINIC | Age: 42
End: 2022-01-01

## 2022-01-01 ENCOUNTER — HOSPITAL ENCOUNTER (OUTPATIENT)
Dept: PHYSICAL THERAPY | Facility: CLINIC | Age: 42
Discharge: HOME | End: 2022-11-28
Payer: COMMERCIAL

## 2022-01-01 ENCOUNTER — LAB (OUTPATIENT)
Dept: LAB | Facility: CLINIC | Age: 42
End: 2022-01-01
Payer: COMMERCIAL

## 2022-01-01 ENCOUNTER — TELEPHONE (OUTPATIENT)
Dept: NEUROLOGY | Facility: CLINIC | Age: 42
End: 2022-01-01

## 2022-01-01 ENCOUNTER — ANCILLARY PROCEDURE (OUTPATIENT)
Dept: GENERAL RADIOLOGY | Facility: CLINIC | Age: 42
End: 2022-01-01
Attending: STUDENT IN AN ORGANIZED HEALTH CARE EDUCATION/TRAINING PROGRAM
Payer: COMMERCIAL

## 2022-01-01 ENCOUNTER — OFFICE VISIT (OUTPATIENT)
Dept: NEUROLOGY | Facility: CLINIC | Age: 42
End: 2022-01-01
Payer: COMMERCIAL

## 2022-01-01 ENCOUNTER — OFFICE VISIT (OUTPATIENT)
Dept: FAMILY MEDICINE | Facility: CLINIC | Age: 42
End: 2022-01-01
Attending: STUDENT IN AN ORGANIZED HEALTH CARE EDUCATION/TRAINING PROGRAM
Payer: COMMERCIAL

## 2022-01-01 ENCOUNTER — OFFICE VISIT (OUTPATIENT)
Dept: CARDIOLOGY | Facility: CLINIC | Age: 42
End: 2022-01-01
Attending: INTERNAL MEDICINE
Payer: COMMERCIAL

## 2022-01-01 ENCOUNTER — HOSPITAL ENCOUNTER (OUTPATIENT)
Dept: CARDIOLOGY | Facility: CLINIC | Age: 42
Discharge: HOME | End: 2022-05-24
Attending: INTERNAL MEDICINE | Admitting: INTERNAL MEDICINE
Payer: COMMERCIAL

## 2022-01-01 ENCOUNTER — PRE VISIT (OUTPATIENT)
Dept: UROLOGY | Facility: CLINIC | Age: 42
End: 2022-01-01
Payer: COMMERCIAL

## 2022-01-01 ENCOUNTER — TELEPHONE (OUTPATIENT)
Dept: DERMATOLOGY | Facility: CLINIC | Age: 42
End: 2022-01-01

## 2022-01-01 ENCOUNTER — OFFICE VISIT (OUTPATIENT)
Dept: FAMILY MEDICINE | Facility: CLINIC | Age: 42
End: 2022-01-01
Payer: COMMERCIAL

## 2022-01-01 ENCOUNTER — MYC MEDICAL ADVICE (OUTPATIENT)
Dept: CARE COORDINATION | Facility: CLINIC | Age: 42
End: 2022-01-01

## 2022-01-01 ENCOUNTER — DOCUMENTATION ONLY (OUTPATIENT)
Dept: FAMILY MEDICINE | Facility: CLINIC | Age: 42
End: 2022-01-01
Payer: COMMERCIAL

## 2022-01-01 ENCOUNTER — HOSPITAL ENCOUNTER (OUTPATIENT)
Dept: SPEECH THERAPY | Facility: CLINIC | Age: 42
Discharge: HOME | End: 2022-07-19
Payer: COMMERCIAL

## 2022-01-01 ENCOUNTER — HOSPITAL ENCOUNTER (OUTPATIENT)
Dept: MRI IMAGING | Facility: CLINIC | Age: 42
Discharge: HOME | End: 2022-03-19
Attending: STUDENT IN AN ORGANIZED HEALTH CARE EDUCATION/TRAINING PROGRAM
Payer: COMMERCIAL

## 2022-01-01 ENCOUNTER — HOSPITAL ENCOUNTER (OUTPATIENT)
Dept: PHYSICAL THERAPY | Facility: CLINIC | Age: 42
Discharge: HOME | End: 2022-12-26
Payer: COMMERCIAL

## 2022-01-01 ENCOUNTER — OFFICE VISIT (OUTPATIENT)
Dept: PSYCHOLOGY | Facility: CLINIC | Age: 42
End: 2022-01-01
Payer: COMMERCIAL

## 2022-01-01 ENCOUNTER — HOSPITAL ENCOUNTER (OUTPATIENT)
Dept: PHYSICAL THERAPY | Facility: CLINIC | Age: 42
Discharge: HOME | End: 2022-12-05
Payer: COMMERCIAL

## 2022-01-01 ENCOUNTER — PATIENT OUTREACH (OUTPATIENT)
Dept: FAMILY MEDICINE | Facility: CLINIC | Age: 42
End: 2022-01-01

## 2022-01-01 ENCOUNTER — TELEPHONE (OUTPATIENT)
Dept: UROLOGY | Facility: CLINIC | Age: 42
End: 2022-01-01
Payer: COMMERCIAL

## 2022-01-01 ENCOUNTER — HOSPITAL ENCOUNTER (OUTPATIENT)
Dept: PHYSICAL THERAPY | Facility: CLINIC | Age: 42
Discharge: HOME | End: 2022-11-07
Payer: COMMERCIAL

## 2022-01-01 ENCOUNTER — VIRTUAL VISIT (OUTPATIENT)
Dept: UROLOGY | Facility: CLINIC | Age: 42
End: 2022-01-01
Attending: FAMILY MEDICINE
Payer: COMMERCIAL

## 2022-01-01 ENCOUNTER — APPOINTMENT (OUTPATIENT)
Dept: LAB | Facility: CLINIC | Age: 42
End: 2022-01-01
Payer: COMMERCIAL

## 2022-01-01 ENCOUNTER — OFFICE VISIT (OUTPATIENT)
Dept: NEUROLOGY | Facility: CLINIC | Age: 42
End: 2022-01-01
Attending: FAMILY MEDICINE
Payer: COMMERCIAL

## 2022-01-01 ENCOUNTER — VIRTUAL VISIT (OUTPATIENT)
Dept: ENDOCRINOLOGY | Facility: CLINIC | Age: 42
End: 2022-01-01
Attending: PHYSICIAN ASSISTANT
Payer: COMMERCIAL

## 2022-01-01 ENCOUNTER — HOSPITAL ENCOUNTER (OUTPATIENT)
Dept: SPEECH THERAPY | Facility: CLINIC | Age: 42
Discharge: HOME | End: 2022-08-02
Payer: COMMERCIAL

## 2022-01-01 ENCOUNTER — TELEPHONE (OUTPATIENT)
Dept: FAMILY MEDICINE | Facility: CLINIC | Age: 42
End: 2022-01-01
Payer: COMMERCIAL

## 2022-01-01 ENCOUNTER — OFFICE VISIT (OUTPATIENT)
Dept: PHYSICAL MEDICINE AND REHAB | Facility: CLINIC | Age: 42
End: 2022-01-01
Payer: COMMERCIAL

## 2022-01-01 ENCOUNTER — TELEPHONE (OUTPATIENT)
Dept: ENDOCRINOLOGY | Facility: CLINIC | Age: 42
End: 2022-01-01

## 2022-01-01 ENCOUNTER — MYC MEDICAL ADVICE (OUTPATIENT)
Dept: FAMILY MEDICINE | Facility: CLINIC | Age: 42
End: 2022-01-01
Payer: COMMERCIAL

## 2022-01-01 VITALS
BODY MASS INDEX: 30.87 KG/M2 | TEMPERATURE: 98.4 F | DIASTOLIC BLOOD PRESSURE: 80 MMHG | HEART RATE: 87 BPM | SYSTOLIC BLOOD PRESSURE: 123 MMHG | WEIGHT: 234 LBS | OXYGEN SATURATION: 97 % | RESPIRATION RATE: 16 BRPM

## 2022-01-01 VITALS
BODY MASS INDEX: 30.11 KG/M2 | RESPIRATION RATE: 16 BRPM | DIASTOLIC BLOOD PRESSURE: 72 MMHG | HEART RATE: 74 BPM | SYSTOLIC BLOOD PRESSURE: 121 MMHG | OXYGEN SATURATION: 95 % | TEMPERATURE: 98.3 F | WEIGHT: 222 LBS

## 2022-01-01 VITALS
TEMPERATURE: 97.7 F | RESPIRATION RATE: 16 BRPM | BODY MASS INDEX: 30.34 KG/M2 | SYSTOLIC BLOOD PRESSURE: 132 MMHG | HEIGHT: 74 IN | OXYGEN SATURATION: 97 % | HEART RATE: 71 BPM | DIASTOLIC BLOOD PRESSURE: 81 MMHG | WEIGHT: 236.4 LBS

## 2022-01-01 VITALS
BODY MASS INDEX: 28.04 KG/M2 | HEART RATE: 71 BPM | SYSTOLIC BLOOD PRESSURE: 126 MMHG | DIASTOLIC BLOOD PRESSURE: 77 MMHG | HEIGHT: 72 IN | WEIGHT: 207 LBS

## 2022-01-01 VITALS — OXYGEN SATURATION: 97 % | SYSTOLIC BLOOD PRESSURE: 147 MMHG | HEART RATE: 85 BPM | DIASTOLIC BLOOD PRESSURE: 84 MMHG

## 2022-01-01 VITALS
BODY MASS INDEX: 30.63 KG/M2 | RESPIRATION RATE: 20 BRPM | DIASTOLIC BLOOD PRESSURE: 80 MMHG | WEIGHT: 236.1 LBS | SYSTOLIC BLOOD PRESSURE: 130 MMHG | HEART RATE: 87 BPM

## 2022-01-01 VITALS — SYSTOLIC BLOOD PRESSURE: 157 MMHG | HEART RATE: 93 BPM | DIASTOLIC BLOOD PRESSURE: 93 MMHG

## 2022-01-01 VITALS
HEART RATE: 78 BPM | WEIGHT: 225.4 LBS | OXYGEN SATURATION: 97 % | BODY MASS INDEX: 29.87 KG/M2 | TEMPERATURE: 97.7 F | HEIGHT: 73 IN | DIASTOLIC BLOOD PRESSURE: 84 MMHG | SYSTOLIC BLOOD PRESSURE: 126 MMHG

## 2022-01-01 VITALS
TEMPERATURE: 98.2 F | OXYGEN SATURATION: 97 % | RESPIRATION RATE: 16 BRPM | HEART RATE: 96 BPM | HEIGHT: 74 IN | SYSTOLIC BLOOD PRESSURE: 122 MMHG | BODY MASS INDEX: 30.29 KG/M2 | WEIGHT: 236 LBS | DIASTOLIC BLOOD PRESSURE: 80 MMHG

## 2022-01-01 VITALS — DIASTOLIC BLOOD PRESSURE: 81 MMHG | SYSTOLIC BLOOD PRESSURE: 139 MMHG | HEART RATE: 104 BPM

## 2022-01-01 VITALS — DIASTOLIC BLOOD PRESSURE: 88 MMHG | HEART RATE: 103 BPM | SYSTOLIC BLOOD PRESSURE: 142 MMHG

## 2022-01-01 VITALS
HEART RATE: 71 BPM | WEIGHT: 207 LBS | BODY MASS INDEX: 27.43 KG/M2 | OXYGEN SATURATION: 98 % | SYSTOLIC BLOOD PRESSURE: 130 MMHG | RESPIRATION RATE: 16 BRPM | TEMPERATURE: 98.2 F | DIASTOLIC BLOOD PRESSURE: 72 MMHG | HEIGHT: 73 IN

## 2022-01-01 VITALS
BODY MASS INDEX: 31.41 KG/M2 | HEART RATE: 100 BPM | DIASTOLIC BLOOD PRESSURE: 102 MMHG | WEIGHT: 237 LBS | HEIGHT: 73 IN | RESPIRATION RATE: 16 BRPM | SYSTOLIC BLOOD PRESSURE: 164 MMHG | TEMPERATURE: 98.4 F | OXYGEN SATURATION: 96 %

## 2022-01-01 VITALS — OXYGEN SATURATION: 98 % | HEART RATE: 90 BPM | DIASTOLIC BLOOD PRESSURE: 80 MMHG | SYSTOLIC BLOOD PRESSURE: 156 MMHG

## 2022-01-01 VITALS — SYSTOLIC BLOOD PRESSURE: 143 MMHG | HEART RATE: 105 BPM | DIASTOLIC BLOOD PRESSURE: 92 MMHG

## 2022-01-01 VITALS — SYSTOLIC BLOOD PRESSURE: 143 MMHG | HEART RATE: 96 BPM | DIASTOLIC BLOOD PRESSURE: 80 MMHG

## 2022-01-01 DIAGNOSIS — S06.9X9S MILD TRAUMATIC BRAIN INJURY WITH LOSS OF CONSCIOUSNESS, SEQUELA (H): ICD-10-CM

## 2022-01-01 DIAGNOSIS — I42.9 SECONDARY CARDIOMYOPATHY (H): ICD-10-CM

## 2022-01-01 DIAGNOSIS — M54.16 LUMBAR RADICULOPATHY: ICD-10-CM

## 2022-01-01 DIAGNOSIS — R51.9 NONINTRACTABLE HEADACHE, UNSPECIFIED CHRONICITY PATTERN, UNSPECIFIED HEADACHE TYPE: ICD-10-CM

## 2022-01-01 DIAGNOSIS — M79.10 TRIGGER POINT: Primary | ICD-10-CM

## 2022-01-01 DIAGNOSIS — F41.9 ANXIETY: Primary | ICD-10-CM

## 2022-01-01 DIAGNOSIS — R41.840 ATTENTION AND CONCENTRATION DEFICIT: ICD-10-CM

## 2022-01-01 DIAGNOSIS — R42 LIGHTHEADEDNESS: ICD-10-CM

## 2022-01-01 DIAGNOSIS — M79.18 MYOFASCIAL PAIN: Primary | ICD-10-CM

## 2022-01-01 DIAGNOSIS — R53.82 CHRONIC FATIGUE: ICD-10-CM

## 2022-01-01 DIAGNOSIS — F07.81 POST CONCUSSION SYNDROME: ICD-10-CM

## 2022-01-01 DIAGNOSIS — B35.4 TINEA CORPORIS: ICD-10-CM

## 2022-01-01 DIAGNOSIS — M79.2 NEUROPATHIC PAIN: ICD-10-CM

## 2022-01-01 DIAGNOSIS — Z28.21 INFLUENZA VACCINATION DECLINED: ICD-10-CM

## 2022-01-01 DIAGNOSIS — Z02.89 ENCOUNTER FOR COMPLETION OF FORM WITH PATIENT: ICD-10-CM

## 2022-01-01 DIAGNOSIS — E29.1 SECONDARY MALE HYPOGONADISM: Primary | ICD-10-CM

## 2022-01-01 DIAGNOSIS — F07.81 POST CONCUSSION SYNDROME: Primary | ICD-10-CM

## 2022-01-01 DIAGNOSIS — F41.9 ANXIETY: ICD-10-CM

## 2022-01-01 DIAGNOSIS — M54.2 NECK PAIN: ICD-10-CM

## 2022-01-01 DIAGNOSIS — M54.12 CERVICAL RADICULOPATHY: Primary | ICD-10-CM

## 2022-01-01 DIAGNOSIS — R26.89 BALANCE PROBLEMS: ICD-10-CM

## 2022-01-01 DIAGNOSIS — R94.6 ABNORMAL FINDING ON THYROID FUNCTION TEST: ICD-10-CM

## 2022-01-01 DIAGNOSIS — M54.12 CERVICAL RADICULOPATHY: ICD-10-CM

## 2022-01-01 DIAGNOSIS — F43.10 POSTTRAUMATIC STRESS DISORDER: ICD-10-CM

## 2022-01-01 DIAGNOSIS — R79.89 LOW TESTOSTERONE IN MALE: ICD-10-CM

## 2022-01-01 DIAGNOSIS — R20.0 NUMBNESS AND TINGLING: ICD-10-CM

## 2022-01-01 DIAGNOSIS — R42 DIZZINESS: Primary | ICD-10-CM

## 2022-01-01 DIAGNOSIS — R20.2 NUMBNESS AND TINGLING: ICD-10-CM

## 2022-01-01 DIAGNOSIS — S16.1XXD STRAIN OF NECK MUSCLE, SUBSEQUENT ENCOUNTER: ICD-10-CM

## 2022-01-01 DIAGNOSIS — N52.9 ERECTILE DYSFUNCTION, UNSPECIFIED ERECTILE DYSFUNCTION TYPE: ICD-10-CM

## 2022-01-01 DIAGNOSIS — E29.1 SECONDARY MALE HYPOGONADISM: ICD-10-CM

## 2022-01-01 DIAGNOSIS — G44.329 CHRONIC POST-CONCUSSION HEADACHE: Primary | ICD-10-CM

## 2022-01-01 DIAGNOSIS — G40.209 PARTIAL SYMPTOMATIC EPILEPSY WITH COMPLEX PARTIAL SEIZURES, NOT INTRACTABLE, WITHOUT STATUS EPILEPTICUS (H): Primary | ICD-10-CM

## 2022-01-01 DIAGNOSIS — R06.09 DYSPNEA ON EXERTION: ICD-10-CM

## 2022-01-01 DIAGNOSIS — K21.9 GASTROESOPHAGEAL REFLUX DISEASE, UNSPECIFIED WHETHER ESOPHAGITIS PRESENT: Primary | ICD-10-CM

## 2022-01-01 DIAGNOSIS — M79.18 MYOFASCIAL PAIN: ICD-10-CM

## 2022-01-01 DIAGNOSIS — G44.329 CHRONIC POST-TRAUMATIC HEADACHE, NOT INTRACTABLE: ICD-10-CM

## 2022-01-01 DIAGNOSIS — F43.23 ADJUSTMENT DISORDER WITH MIXED ANXIETY AND DEPRESSED MOOD: Primary | ICD-10-CM

## 2022-01-01 DIAGNOSIS — R42 DIZZINESS: ICD-10-CM

## 2022-01-01 DIAGNOSIS — F43.22 ADJUSTMENT DISORDER WITH ANXIOUS MOOD: Primary | ICD-10-CM

## 2022-01-01 DIAGNOSIS — F15.21: ICD-10-CM

## 2022-01-01 DIAGNOSIS — I46.9 PEA (PULSELESS ELECTRICAL ACTIVITY) (H): ICD-10-CM

## 2022-01-01 DIAGNOSIS — G44.329 CHRONIC POST-TRAUMATIC HEADACHE, NOT INTRACTABLE: Primary | ICD-10-CM

## 2022-01-01 DIAGNOSIS — E29.1 HYPOGONADISM MALE: ICD-10-CM

## 2022-01-01 DIAGNOSIS — G62.9 NEUROPATHY: ICD-10-CM

## 2022-01-01 DIAGNOSIS — Z28.21 COVID-19 VACCINATION DECLINED: ICD-10-CM

## 2022-01-01 DIAGNOSIS — R79.89 LOW T4: ICD-10-CM

## 2022-01-01 DIAGNOSIS — G40.209 PARTIAL SYMPTOMATIC EPILEPSY WITH COMPLEX PARTIAL SEIZURES, NOT INTRACTABLE, WITHOUT STATUS EPILEPTICUS (H): ICD-10-CM

## 2022-01-01 DIAGNOSIS — Z86.74 H/O CARDIAC ARREST: Primary | ICD-10-CM

## 2022-01-01 DIAGNOSIS — B35.4 TINEA CORPORIS: Primary | ICD-10-CM

## 2022-01-01 DIAGNOSIS — R40.0 DAYTIME SOMNOLENCE: ICD-10-CM

## 2022-01-01 DIAGNOSIS — Z86.74 H/O CARDIAC ARREST: ICD-10-CM

## 2022-01-01 DIAGNOSIS — R37 SEXUAL DYSFUNCTION: Primary | ICD-10-CM

## 2022-01-01 DIAGNOSIS — R37 SEXUAL DYSFUNCTION: ICD-10-CM

## 2022-01-01 DIAGNOSIS — G47.9 SLEEP DISORDER: ICD-10-CM

## 2022-01-01 DIAGNOSIS — Z09 HOSPITAL DISCHARGE FOLLOW-UP: ICD-10-CM

## 2022-01-01 DIAGNOSIS — F90.9 ATTENTION DEFICIT HYPERACTIVITY DISORDER (ADHD), UNSPECIFIED ADHD TYPE: ICD-10-CM

## 2022-01-01 DIAGNOSIS — A63.0 CONDYLOMA ACUMINATA: ICD-10-CM

## 2022-01-01 DIAGNOSIS — D48.9 NEOPLASM OF UNCERTAIN BEHAVIOR: ICD-10-CM

## 2022-01-01 DIAGNOSIS — N52.9 ERECTILE DYSFUNCTION, UNSPECIFIED ERECTILE DYSFUNCTION TYPE: Primary | ICD-10-CM

## 2022-01-01 DIAGNOSIS — B36.0 TINEA VERSICOLOR: Primary | ICD-10-CM

## 2022-01-01 DIAGNOSIS — R06.09 DYSPNEA ON EXERTION: Primary | ICD-10-CM

## 2022-01-01 LAB
ACTH PLAS-MCNC: 32 PG/ML
ALBUMIN SERPL BCG-MCNC: 4.4 G/DL (ref 3.5–5.2)
ALP SERPL-CCNC: 36 U/L (ref 40–129)
ALT SERPL W P-5'-P-CCNC: 78 U/L (ref 10–50)
ANION GAP SERPL CALCULATED.3IONS-SCNC: 14 MMOL/L (ref 7–15)
AST SERPL W P-5'-P-CCNC: 68 U/L (ref 10–50)
BASOPHILS # BLD AUTO: 0 10E3/UL (ref 0–0.2)
BASOPHILS NFR BLD AUTO: 0 %
BILIRUB SERPL-MCNC: 0.8 MG/DL
BUN SERPL-MCNC: 22.4 MG/DL (ref 6–20)
CALCIUM SERPL-MCNC: 9.3 MG/DL (ref 8.6–10)
CHLORIDE SERPL-SCNC: 98 MMOL/L (ref 98–107)
CHOLEST SERPL-MCNC: 162 MG/DL
CORTIS SERPL-MCNC: 12 UG/DL (ref 4–22)
CREAT SERPL-MCNC: 1.3 MG/DL (ref 0.67–1.17)
DEPRECATED HCO3 PLAS-SCNC: 25 MMOL/L (ref 22–29)
EOSINOPHIL # BLD AUTO: 0 10E3/UL (ref 0–0.7)
EOSINOPHIL NFR BLD AUTO: 1 %
ERYTHROCYTE [DISTWIDTH] IN BLOOD BY AUTOMATED COUNT: 12 % (ref 10–15)
ERYTHROCYTE [DISTWIDTH] IN BLOOD BY AUTOMATED COUNT: 13.2 % (ref 10–15)
FSH SERPL IRP2-ACNC: <0.3 MIU/ML (ref 1.5–12.4)
FSH SERPL-ACNC: 2.1 IU/L (ref 0.7–10.8)
FSH SERPL-ACNC: <0.2 IU/L (ref 0.7–10.8)
GFR SERPL CREATININE-BSD FRML MDRD: 71 ML/MIN/1.73M2
GLUCOSE SERPL-MCNC: 100 MG/DL (ref 70–99)
HBA1C MFR BLD: 5 % (ref 0–5.6)
HCT VFR BLD AUTO: 44.6 % (ref 40–53)
HCT VFR BLD AUTO: 48.4 % (ref 40–53)
HDLC SERPL-MCNC: 39 MG/DL
HGB BLD-MCNC: 15 G/DL (ref 13.3–17.7)
HGB BLD-MCNC: 16.3 G/DL (ref 13.3–17.7)
IGF-I BLD-MCNC: 202 NG/ML (ref 83–238)
IMM GRANULOCYTES # BLD: 0 10E3/UL
IMM GRANULOCYTES NFR BLD: 0 %
LDLC SERPL CALC-MCNC: 105 MG/DL
LH SERPL-ACNC: 0.8 IU/L (ref 1.5–9.3)
LH SERPL-ACNC: <0.2 IU/L (ref 1.5–9.3)
LH SERPL-ACNC: <0.3 MIU/ML (ref 1.7–8.6)
LVEF ECHO: NORMAL
LYMPHOCYTES # BLD AUTO: 1.3 10E3/UL (ref 0.8–5.3)
LYMPHOCYTES NFR BLD AUTO: 37 %
MCH RBC QN AUTO: 31.3 PG (ref 26.5–33)
MCH RBC QN AUTO: 31.7 PG (ref 26.5–33)
MCHC RBC AUTO-ENTMCNC: 33.6 G/DL (ref 31.5–36.5)
MCHC RBC AUTO-ENTMCNC: 33.7 G/DL (ref 31.5–36.5)
MCV RBC AUTO: 93 FL (ref 78–100)
MCV RBC AUTO: 94 FL (ref 78–100)
MONOCYTES # BLD AUTO: 0.3 10E3/UL (ref 0–1.3)
MONOCYTES NFR BLD AUTO: 9 %
NEUTROPHILS # BLD AUTO: 2 10E3/UL (ref 1.6–8.3)
NEUTROPHILS NFR BLD AUTO: 53 %
NONHDLC SERPL-MCNC: 123 MG/DL
PATH REPORT.COMMENTS IMP SPEC: NORMAL
PATH REPORT.COMMENTS IMP SPEC: NORMAL
PATH REPORT.FINAL DX SPEC: NORMAL
PATH REPORT.GROSS SPEC: NORMAL
PATH REPORT.MICROSCOPIC SPEC OTHER STN: NORMAL
PATH REPORT.RELEVANT HX SPEC: NORMAL
PLATELET # BLD AUTO: 248 10E3/UL (ref 150–450)
PLATELET # BLD AUTO: 249 10E3/UL (ref 150–450)
POTASSIUM SERPL-SCNC: 4.2 MMOL/L (ref 3.4–5.3)
PROLACTIN SERPL-MCNC: 5 UG/L (ref 2–18)
PROLACTIN SERPL-MCNC: 8 UG/L (ref 2–18)
PROT SERPL-MCNC: 7 G/DL (ref 6.4–8.3)
PSA SERPL-MCNC: 0.92 UG/L (ref 0–4)
RBC # BLD AUTO: 4.79 10E6/UL (ref 4.4–5.9)
RBC # BLD AUTO: 5.15 10E6/UL (ref 4.4–5.9)
SHBG SERPL-SCNC: 11 NMOL/L (ref 11–80)
SHBG SERPL-SCNC: 28 NMOL/L (ref 11–80)
SODIUM SERPL-SCNC: 137 MMOL/L (ref 136–145)
T4 FREE SERPL-MCNC: 0.69 NG/DL (ref 0.76–1.46)
T4 FREE SERPL-MCNC: 0.89 NG/DL (ref 0.9–1.7)
TESTOST FREE SERPL-MCNC: 0.38 NG/DL
TESTOST FREE SERPL-MCNC: 15.15 NG/DL
TESTOST SERPL-MCNC: 13 NG/DL (ref 240–950)
TESTOST SERPL-MCNC: 162 NG/DL (ref 240–950)
TESTOST SERPL-MCNC: 633 NG/DL (ref 240–950)
TESTOST SERPL-MCNC: 714 NG/DL (ref 240–950)
TRIGL SERPL-MCNC: 90 MG/DL
TSH SERPL DL<=0.005 MIU/L-ACNC: 1.83 UIU/ML (ref 0.3–4.2)
TSH SERPL DL<=0.005 MIU/L-ACNC: 1.87 MU/L (ref 0.4–4)
TSH SERPL DL<=0.005 MIU/L-ACNC: 2.25 MU/L (ref 0.4–4)
VIT B12 SERPL-MCNC: 686 PG/ML (ref 193–986)
WBC # BLD AUTO: 3.7 10E3/UL (ref 4–11)
WBC # BLD AUTO: 6.1 10E3/UL (ref 4–11)

## 2022-01-01 PROCEDURE — 97112 NEUROMUSCULAR REEDUCATION: CPT | Mod: GP

## 2022-01-01 PROCEDURE — 93321 DOPPLER ECHO F-UP/LMTD STD: CPT | Mod: 26 | Performed by: INTERNAL MEDICINE

## 2022-01-01 PROCEDURE — 88305 TISSUE EXAM BY PATHOLOGIST: CPT | Performed by: DERMATOLOGY

## 2022-01-01 PROCEDURE — 99417 PROLNG OP E/M EACH 15 MIN: CPT | Performed by: NURSE PRACTITIONER

## 2022-01-01 PROCEDURE — 97112 NEUROMUSCULAR REEDUCATION: CPT | Mod: GP | Performed by: PHYSICAL THERAPIST

## 2022-01-01 PROCEDURE — 83001 ASSAY OF GONADOTROPIN (FSH): CPT | Performed by: STUDENT IN AN ORGANIZED HEALTH CARE EDUCATION/TRAINING PROGRAM

## 2022-01-01 PROCEDURE — 84270 ASSAY OF SEX HORMONE GLOBUL: CPT | Performed by: STUDENT IN AN ORGANIZED HEALTH CARE EDUCATION/TRAINING PROGRAM

## 2022-01-01 PROCEDURE — 84270 ASSAY OF SEX HORMONE GLOBUL: CPT

## 2022-01-01 PROCEDURE — 93325 DOPPLER ECHO COLOR FLOW MAPG: CPT

## 2022-01-01 PROCEDURE — 80053 COMPREHEN METABOLIC PANEL: CPT

## 2022-01-01 PROCEDURE — 99214 OFFICE O/P EST MOD 30 MIN: CPT | Performed by: PSYCHIATRY & NEUROLOGY

## 2022-01-01 PROCEDURE — 84403 ASSAY OF TOTAL TESTOSTERONE: CPT

## 2022-01-01 PROCEDURE — 36415 COLL VENOUS BLD VENIPUNCTURE: CPT | Performed by: STUDENT IN AN ORGANIZED HEALTH CARE EDUCATION/TRAINING PROGRAM

## 2022-01-01 PROCEDURE — 99205 OFFICE O/P NEW HI 60 MIN: CPT | Mod: 25 | Performed by: PHYSICAL MEDICINE & REHABILITATION

## 2022-01-01 PROCEDURE — 84146 ASSAY OF PROLACTIN: CPT

## 2022-01-01 PROCEDURE — 99213 OFFICE O/P EST LOW 20 MIN: CPT | Performed by: INTERNAL MEDICINE

## 2022-01-01 PROCEDURE — 90832 PSYTX W PT 30 MINUTES: CPT | Mod: 95 | Performed by: PSYCHOLOGIST

## 2022-01-01 PROCEDURE — 99214 OFFICE O/P EST MOD 30 MIN: CPT | Mod: GC | Performed by: STUDENT IN AN ORGANIZED HEALTH CARE EDUCATION/TRAINING PROGRAM

## 2022-01-01 PROCEDURE — 84439 ASSAY OF FREE THYROXINE: CPT

## 2022-01-01 PROCEDURE — 99215 OFFICE O/P EST HI 40 MIN: CPT | Performed by: NURSE PRACTITIONER

## 2022-01-01 PROCEDURE — 255N000002 HC RX 255 OP 636: Performed by: STUDENT IN AN ORGANIZED HEALTH CARE EDUCATION/TRAINING PROGRAM

## 2022-01-01 PROCEDURE — 96125 COGNITIVE TEST BY HC PRO: CPT | Mod: GN | Performed by: SPEECH-LANGUAGE PATHOLOGIST

## 2022-01-01 PROCEDURE — 36415 COLL VENOUS BLD VENIPUNCTURE: CPT

## 2022-01-01 PROCEDURE — 93308 TTE F-UP OR LMTD: CPT | Mod: 26 | Performed by: INTERNAL MEDICINE

## 2022-01-01 PROCEDURE — 99205 OFFICE O/P NEW HI 60 MIN: CPT | Mod: 95 | Performed by: INTERNAL MEDICINE

## 2022-01-01 PROCEDURE — 54056 CRYOSURGERY PENIS LESION(S): CPT | Performed by: NURSE PRACTITIONER

## 2022-01-01 PROCEDURE — A9585 GADOBUTROL INJECTION: HCPCS | Performed by: STUDENT IN AN ORGANIZED HEALTH CARE EDUCATION/TRAINING PROGRAM

## 2022-01-01 PROCEDURE — 97129 THER IVNTJ 1ST 15 MIN: CPT | Mod: GN | Performed by: SPEECH-LANGUAGE PATHOLOGIST

## 2022-01-01 PROCEDURE — 76536 US EXAM OF HEAD AND NECK: CPT

## 2022-01-01 PROCEDURE — 97535 SELF CARE MNGMENT TRAINING: CPT | Mod: GP

## 2022-01-01 PROCEDURE — 97162 PT EVAL MOD COMPLEX 30 MIN: CPT | Mod: GP | Performed by: PHYSICAL THERAPIST

## 2022-01-01 PROCEDURE — 72040 X-RAY EXAM NECK SPINE 2-3 VW: CPT | Mod: FY | Performed by: RADIOLOGY

## 2022-01-01 PROCEDURE — 83002 ASSAY OF GONADOTROPIN (LH): CPT

## 2022-01-01 PROCEDURE — 83001 ASSAY OF GONADOTROPIN (FSH): CPT

## 2022-01-01 PROCEDURE — 82533 TOTAL CORTISOL: CPT

## 2022-01-01 PROCEDURE — 84443 ASSAY THYROID STIM HORMONE: CPT | Performed by: STUDENT IN AN ORGANIZED HEALTH CARE EDUCATION/TRAINING PROGRAM

## 2022-01-01 PROCEDURE — 84443 ASSAY THYROID STIM HORMONE: CPT

## 2022-01-01 PROCEDURE — 99203 OFFICE O/P NEW LOW 30 MIN: CPT | Mod: 25 | Performed by: NURSE PRACTITIONER

## 2022-01-01 PROCEDURE — 96372 THER/PROPH/DIAG INJ SC/IM: CPT | Performed by: PHYSICAL MEDICINE & REHABILITATION

## 2022-01-01 PROCEDURE — 99214 OFFICE O/P EST MOD 30 MIN: CPT | Performed by: NURSE PRACTITIONER

## 2022-01-01 PROCEDURE — 20552 NJX 1/MLT TRIGGER POINT 1/2: CPT | Mod: 59 | Performed by: PHYSICAL MEDICINE & REHABILITATION

## 2022-01-01 PROCEDURE — 70553 MRI BRAIN STEM W/O & W/DYE: CPT

## 2022-01-01 PROCEDURE — 72141 MRI NECK SPINE W/O DYE: CPT

## 2022-01-01 PROCEDURE — 11102 TANGNTL BX SKIN SINGLE LES: CPT | Performed by: NURSE PRACTITIONER

## 2022-01-01 PROCEDURE — 99203 OFFICE O/P NEW LOW 30 MIN: CPT | Mod: 95 | Performed by: PHYSICIAN ASSISTANT

## 2022-01-01 PROCEDURE — 83036 HEMOGLOBIN GLYCOSYLATED A1C: CPT

## 2022-01-01 PROCEDURE — 84153 ASSAY OF PSA TOTAL: CPT

## 2022-01-01 PROCEDURE — 90834 PSYTX W PT 45 MINUTES: CPT | Mod: HN | Performed by: PSYCHOLOGIST

## 2022-01-01 PROCEDURE — 84305 ASSAY OF SOMATOMEDIN: CPT

## 2022-01-01 PROCEDURE — 84146 ASSAY OF PROLACTIN: CPT | Performed by: STUDENT IN AN ORGANIZED HEALTH CARE EDUCATION/TRAINING PROGRAM

## 2022-01-01 PROCEDURE — 99215 OFFICE O/P EST HI 40 MIN: CPT | Performed by: PSYCHIATRY & NEUROLOGY

## 2022-01-01 PROCEDURE — 84403 ASSAY OF TOTAL TESTOSTERONE: CPT | Performed by: STUDENT IN AN ORGANIZED HEALTH CARE EDUCATION/TRAINING PROGRAM

## 2022-01-01 PROCEDURE — 99207 PR SC NO CHARGE VISIT: CPT | Performed by: NURSE PRACTITIONER

## 2022-01-01 PROCEDURE — 82607 VITAMIN B-12: CPT

## 2022-01-01 PROCEDURE — 11103 TANGNTL BX SKIN EA SEP/ADDL: CPT | Performed by: NURSE PRACTITIONER

## 2022-01-01 PROCEDURE — 85027 COMPLETE CBC AUTOMATED: CPT

## 2022-01-01 PROCEDURE — 83002 ASSAY OF GONADOTROPIN (LH): CPT | Performed by: STUDENT IN AN ORGANIZED HEALTH CARE EDUCATION/TRAINING PROGRAM

## 2022-01-01 PROCEDURE — 92522 EVALUATE SPEECH PRODUCTION: CPT | Mod: GN | Performed by: SPEECH-LANGUAGE PATHOLOGIST

## 2022-01-01 PROCEDURE — 80061 LIPID PANEL: CPT

## 2022-01-01 PROCEDURE — 85025 COMPLETE CBC W/AUTO DIFF WBC: CPT

## 2022-01-01 PROCEDURE — 82024 ASSAY OF ACTH: CPT

## 2022-01-01 PROCEDURE — 93325 DOPPLER ECHO COLOR FLOW MAPG: CPT | Mod: 26 | Performed by: INTERNAL MEDICINE

## 2022-01-01 RX ORDER — FLUCONAZOLE 200 MG/1
200 TABLET ORAL WEEKLY
Qty: 4 TABLET | Refills: 0 | Status: SHIPPED | OUTPATIENT
Start: 2022-01-01 | End: 2022-01-01

## 2022-01-01 RX ORDER — METHYLPHENIDATE HYDROCHLORIDE 5 MG/1
TABLET ORAL
Qty: 60 TABLET | Refills: 0 | Status: SHIPPED | OUTPATIENT
Start: 2022-01-01 | End: 2022-01-01 | Stop reason: DRUGHIGH

## 2022-01-01 RX ORDER — FLUCONAZOLE 200 MG/1
200 TABLET ORAL WEEKLY
Qty: 4 TABLET | Refills: 0 | Status: SHIPPED | OUTPATIENT
Start: 2022-01-01 | End: 2022-01-01 | Stop reason: ALTCHOICE

## 2022-01-01 RX ORDER — ACETAMINOPHEN 500 MG
1000 TABLET ORAL
COMMUNITY
Start: 2022-01-01 | End: 2022-01-01

## 2022-01-01 RX ORDER — NEEDLES, DISPOSABLE 25GX5/8"
1 NEEDLE, DISPOSABLE MISCELLANEOUS WEEKLY
Qty: 50 EACH | Refills: 0 | Status: SHIPPED | OUTPATIENT
Start: 2022-01-01 | End: 2022-01-01

## 2022-01-01 RX ORDER — GADOBUTROL 604.72 MG/ML
10 INJECTION INTRAVENOUS ONCE
Status: COMPLETED | OUTPATIENT
Start: 2022-01-01 | End: 2022-01-01

## 2022-01-01 RX ORDER — TERBINAFINE HYDROCHLORIDE 250 MG/1
250 TABLET ORAL DAILY
Qty: 14 TABLET | Refills: 0 | Status: CANCELLED | OUTPATIENT
Start: 2022-01-01 | End: 2022-01-01

## 2022-01-01 RX ORDER — METHYLPHENIDATE HYDROCHLORIDE 5 MG/1
5 TABLET ORAL 2 TIMES DAILY
Qty: 60 TABLET | Refills: 0 | Status: SHIPPED | OUTPATIENT
Start: 2022-01-01 | End: 2022-01-01 | Stop reason: DRUGHIGH

## 2022-01-01 RX ORDER — CALCIUM CARBONATE 500(1250)
500-1000 TABLET,CHEWABLE ORAL
COMMUNITY
Start: 2022-01-01 | End: 2022-01-01

## 2022-01-01 RX ORDER — TESTOSTERONE CYPIONATE 200 MG/ML
100 INJECTION, SOLUTION INTRAMUSCULAR WEEKLY
Qty: 10 ML | Refills: 0 | Status: SHIPPED | OUTPATIENT
Start: 2022-01-01 | End: 2022-01-01

## 2022-01-01 RX ORDER — LISDEXAMFETAMINE DIMESYLATE 60 MG/1
60 CAPSULE ORAL EVERY MORNING
Qty: 30 CAPSULE | Refills: 0 | Status: SHIPPED | OUTPATIENT
Start: 2022-01-01 | End: 2022-01-01 | Stop reason: DRUGHIGH

## 2022-01-01 RX ORDER — DULOXETIN HYDROCHLORIDE 60 MG/1
120 CAPSULE, DELAYED RELEASE ORAL DAILY
Qty: 60 CAPSULE | Refills: 1 | Status: SHIPPED | OUTPATIENT
Start: 2022-01-01 | End: 2022-01-01

## 2022-01-01 RX ORDER — METHYLPHENIDATE HYDROCHLORIDE 10 MG/1
10 TABLET ORAL 2 TIMES DAILY
Qty: 60 TABLET | Refills: 0 | Status: SHIPPED | OUTPATIENT
Start: 2022-01-01 | End: 2022-01-01

## 2022-01-01 RX ORDER — DULOXETIN HYDROCHLORIDE 60 MG/1
60 CAPSULE, DELAYED RELEASE ORAL DAILY
Qty: 30 CAPSULE | Refills: 0 | Status: SHIPPED | OUTPATIENT
Start: 2022-01-01 | End: 2022-01-01

## 2022-01-01 RX ORDER — TESTOSTERONE 4 MG/D
1 PATCH TRANSDERMAL DAILY
Qty: 30 PATCH | Refills: 0 | Status: SHIPPED | OUTPATIENT
Start: 2022-01-01 | End: 2022-01-01

## 2022-01-01 RX ORDER — LISDEXAMFETAMINE DIMESYLATE 50 MG/1
50 CAPSULE ORAL EVERY MORNING
Qty: 30 CAPSULE | Refills: 0 | Status: SHIPPED | OUTPATIENT
Start: 2022-01-01 | End: 2022-01-01 | Stop reason: DRUGHIGH

## 2022-01-01 RX ORDER — NEEDLES, DISPOSABLE 25GX5/8"
1 NEEDLE, DISPOSABLE MISCELLANEOUS WEEKLY
Qty: 30 EACH | Refills: 3 | Status: CANCELLED | OUTPATIENT
Start: 2022-01-01

## 2022-01-01 RX ORDER — LISDEXAMFETAMINE DIMESYLATE 30 MG/1
30 CAPSULE ORAL EVERY MORNING
Qty: 30 CAPSULE | Refills: 0 | OUTPATIENT
Start: 2022-01-01

## 2022-01-01 RX ORDER — DIVALPROEX SODIUM 500 MG/1
1000 TABLET, DELAYED RELEASE ORAL AT BEDTIME
COMMUNITY
Start: 2022-01-01 | End: 2022-01-01

## 2022-01-01 RX ORDER — TESTOSTERONE CYPIONATE 200 MG/ML
INJECTION, SOLUTION INTRAMUSCULAR
Qty: 4 ML | Refills: 3 | Status: SHIPPED | OUTPATIENT
Start: 2022-01-01

## 2022-01-01 RX ORDER — SYRINGE, DISPOSABLE, 1 ML
1 SYRINGE, EMPTY DISPOSABLE MISCELLANEOUS WEEKLY
Qty: 60 EACH | Refills: 0 | Status: SHIPPED | OUTPATIENT
Start: 2022-01-01

## 2022-01-01 RX ORDER — PANTOPRAZOLE SODIUM 40 MG/1
40 TABLET, DELAYED RELEASE ORAL
COMMUNITY
Start: 2022-01-01 | End: 2022-01-01

## 2022-01-01 RX ORDER — CONTAINER,EMPTY
1 EACH MISCELLANEOUS
Qty: 1 EACH | Refills: 1 | Status: SHIPPED | OUTPATIENT
Start: 2022-01-01

## 2022-01-01 RX ORDER — SYRINGE, DISPOSABLE, 1 ML
1 SYRINGE, EMPTY DISPOSABLE MISCELLANEOUS WEEKLY
Qty: 60 EACH | Refills: 0 | Status: SHIPPED | OUTPATIENT
Start: 2022-01-01 | End: 2022-01-01

## 2022-01-01 RX ORDER — LISDEXAMFETAMINE DIMESYLATE 40 MG/1
40 CAPSULE ORAL EVERY MORNING
Qty: 30 CAPSULE | Refills: 0 | Status: SHIPPED | OUTPATIENT
Start: 2022-01-01 | End: 2022-01-01 | Stop reason: DRUGHIGH

## 2022-01-01 RX ORDER — TRIAMCINOLONE ACETONIDE 40 MG/ML
40 INJECTION, SUSPENSION INTRA-ARTICULAR; INTRAMUSCULAR ONCE
Status: COMPLETED | OUTPATIENT
Start: 2022-01-01 | End: 2022-01-01

## 2022-01-01 RX ORDER — TADALAFIL 10 MG/1
10 TABLET ORAL DAILY PRN
Qty: 30 TABLET | Refills: 0 | Status: SHIPPED | OUTPATIENT
Start: 2022-01-01 | End: 2023-01-01

## 2022-01-01 RX ORDER — LISDEXAMFETAMINE DIMESYLATE 40 MG/1
40 CAPSULE ORAL 2 TIMES DAILY
Qty: 60 CAPSULE | Refills: 0 | Status: SHIPPED | OUTPATIENT
Start: 2022-01-01 | End: 2022-01-01 | Stop reason: DRUGHIGH

## 2022-01-01 RX ORDER — LISDEXAMFETAMINE DIMESYLATE 70 MG/1
70 CAPSULE ORAL EVERY MORNING
Qty: 30 CAPSULE | Refills: 0 | Status: SHIPPED | OUTPATIENT
Start: 2022-01-01 | End: 2022-01-01

## 2022-01-01 RX ORDER — DULOXETIN HYDROCHLORIDE 60 MG/1
60 CAPSULE, DELAYED RELEASE ORAL 2 TIMES DAILY
Qty: 120 CAPSULE | Refills: 1 | Status: SHIPPED | OUTPATIENT
Start: 2022-01-01

## 2022-01-01 RX ORDER — TADALAFIL 5 MG/1
5 TABLET ORAL DAILY PRN
Qty: 10 TABLET | Refills: 0 | Status: SHIPPED | OUTPATIENT
Start: 2022-01-01 | End: 2022-01-01

## 2022-01-01 RX ORDER — DICLOFENAC SODIUM 75 MG/1
75 TABLET, DELAYED RELEASE ORAL 2 TIMES DAILY PRN
Qty: 30 TABLET | Refills: 0 | Status: SHIPPED | OUTPATIENT
Start: 2022-01-01 | End: 2022-01-01

## 2022-01-01 RX ORDER — LISDEXAMFETAMINE DIMESYLATE 60 MG/1
60 CAPSULE ORAL EVERY MORNING
Qty: 30 CAPSULE | Refills: 0 | Status: SHIPPED | OUTPATIENT
Start: 2022-01-01 | End: 2022-01-01

## 2022-01-01 RX ORDER — TESTOSTERONE CYPIONATE 200 MG/ML
100 INJECTION, SOLUTION INTRAMUSCULAR WEEKLY
Qty: 10 ML | Refills: 3 | Status: SHIPPED | OUTPATIENT
Start: 2022-01-01 | End: 2022-01-01

## 2022-01-01 RX ORDER — TERBINAFINE HYDROCHLORIDE 250 MG/1
250 TABLET ORAL DAILY
Qty: 14 TABLET | Refills: 0 | Status: SHIPPED | OUTPATIENT
Start: 2022-01-01 | End: 2022-01-01

## 2022-01-01 RX ORDER — LISDEXAMFETAMINE DIMESYLATE 70 MG/1
70 CAPSULE ORAL EVERY MORNING
Qty: 30 CAPSULE | Refills: 0 | Status: SHIPPED | OUTPATIENT
Start: 2022-01-01

## 2022-01-01 RX ORDER — NEEDLES, DISPOSABLE 25GX5/8"
NEEDLE, DISPOSABLE MISCELLANEOUS
Qty: 90 EACH | Refills: 3 | Status: SHIPPED | OUTPATIENT
Start: 2022-01-01

## 2022-01-01 RX ORDER — TESTOSTERONE CYPIONATE 1000 MG/10ML
100 INJECTION, SOLUTION INTRAMUSCULAR WEEKLY
Qty: 4 ML | Refills: 0 | Status: CANCELLED | OUTPATIENT
Start: 2022-01-01

## 2022-01-01 RX ORDER — DONEPEZIL HYDROCHLORIDE 10 MG/1
10 TABLET, FILM COATED ORAL AT BEDTIME
Qty: 30 TABLET | Refills: 11 | Status: SHIPPED | OUTPATIENT
Start: 2022-01-01 | End: 2022-01-01

## 2022-01-01 RX ORDER — TESTOSTERONE CYPIONATE 200 MG/ML
100 INJECTION, SOLUTION INTRAMUSCULAR WEEKLY
Qty: 10 ML | Refills: 1 | Status: SHIPPED | OUTPATIENT
Start: 2022-01-01 | End: 2022-01-01

## 2022-01-01 RX ORDER — METHYLPHENIDATE HYDROCHLORIDE 5 MG/1
5 TABLET ORAL 2 TIMES DAILY
Qty: 60 TABLET | Refills: 0 | Status: SHIPPED | OUTPATIENT
Start: 2022-01-01 | End: 2022-01-01

## 2022-01-01 RX ORDER — PREGABALIN 100 MG/1
100 CAPSULE ORAL 3 TIMES DAILY
Qty: 90 CAPSULE | Refills: 1 | Status: SHIPPED | OUTPATIENT
Start: 2022-01-01 | End: 2022-01-01

## 2022-01-01 RX ORDER — DIVALPROEX SODIUM 500 MG/1
1000 TABLET, EXTENDED RELEASE ORAL AT BEDTIME
Qty: 60 TABLET | Refills: 11 | Status: SHIPPED | OUTPATIENT
Start: 2022-01-01 | End: 2022-01-01

## 2022-01-01 RX ORDER — METHYLPHENIDATE HYDROCHLORIDE 10 MG/1
10 TABLET ORAL 2 TIMES DAILY
Qty: 60 TABLET | Refills: 0 | Status: SHIPPED | OUTPATIENT
Start: 2022-01-01 | End: 2023-01-01

## 2022-01-01 RX ORDER — DIVALPROEX SODIUM 500 MG/1
500 TABLET, DELAYED RELEASE ORAL AT BEDTIME
Qty: 90 TABLET | Refills: 7 | Status: SHIPPED | OUTPATIENT
Start: 2022-01-01

## 2022-01-01 RX ORDER — LISDEXAMFETAMINE DIMESYLATE 30 MG/1
30 CAPSULE ORAL EVERY MORNING
Qty: 30 CAPSULE | Refills: 0 | Status: SHIPPED | OUTPATIENT
Start: 2022-01-01 | End: 2022-01-01 | Stop reason: DRUGHIGH

## 2022-01-01 RX ORDER — NEEDLES, DISPOSABLE 25GX5/8"
1 NEEDLE, DISPOSABLE MISCELLANEOUS WEEKLY
Qty: 50 EACH | Refills: 0 | Status: SHIPPED | OUTPATIENT
Start: 2022-01-01

## 2022-01-01 RX ADMIN — TRIAMCINOLONE ACETONIDE 40 MG: 40 INJECTION, SUSPENSION INTRA-ARTICULAR; INTRAMUSCULAR at 17:14

## 2022-01-01 RX ADMIN — GADOBUTROL 10 ML: 604.72 INJECTION INTRAVENOUS at 11:39

## 2022-01-01 ASSESSMENT — PAIN SCALES - GENERAL
PAINLEVEL: SEVERE PAIN (7)
PAINLEVEL: EXTREME PAIN (8)

## 2022-01-01 ASSESSMENT — ANXIETY QUESTIONNAIRES
GAD7 TOTAL SCORE: 8
5. BEING SO RESTLESS THAT IT IS HARD TO SIT STILL: NOT AT ALL
IF YOU CHECKED OFF ANY PROBLEMS ON THIS QUESTIONNAIRE, HOW DIFFICULT HAVE THESE PROBLEMS MADE IT FOR YOU TO DO YOUR WORK, TAKE CARE OF THINGS AT HOME, OR GET ALONG WITH OTHER PEOPLE: VERY DIFFICULT
1. FEELING NERVOUS, ANXIOUS, OR ON EDGE: MORE THAN HALF THE DAYS
6. BECOMING EASILY ANNOYED OR IRRITABLE: MORE THAN HALF THE DAYS
3. WORRYING TOO MUCH ABOUT DIFFERENT THINGS: SEVERAL DAYS
GAD7 TOTAL SCORE: 8
2. NOT BEING ABLE TO STOP OR CONTROL WORRYING: MORE THAN HALF THE DAYS
7. FEELING AFRAID AS IF SOMETHING AWFUL MIGHT HAPPEN: SEVERAL DAYS

## 2022-01-01 ASSESSMENT — ENCOUNTER SYMPTOMS
MUSCLE CRAMPS: 0
DECREASED APPETITE: 0
TINGLING: 1
PANIC: 0
SYNCOPE: 0
WEAKNESS: 0
LIGHT-HEADEDNESS: 1
MEMORY LOSS: 1
FATIGUE: 1
HEADACHES: 1
ARTHRALGIAS: 0
EXERCISE INTOLERANCE: 1
NUMBNESS: 1
WEIGHT LOSS: 0
ALTERED TEMPERATURE REGULATION: 0
SPEECH CHANGE: 1
DECREASED CONCENTRATION: 1
ORTHOPNEA: 0
HALLUCINATIONS: 0
TREMORS: 0
NIGHT SWEATS: 0
DEPRESSION: 0
NECK PAIN: 1
WEIGHT GAIN: 0
JOINT SWELLING: 0
STIFFNESS: 0
BACK PAIN: 1
MUSCLE WEAKNESS: 1
LEG PAIN: 1
NERVOUS/ANXIOUS: 0
SLEEP DISTURBANCES DUE TO BREATHING: 0
LOSS OF CONSCIOUSNESS: 0
PARALYSIS: 0
INCREASED ENERGY: 1
PALPITATIONS: 0
MYALGIAS: 0
POLYPHAGIA: 0
HYPOTENSION: 0
SEIZURES: 1
HYPERTENSION: 0
CHILLS: 0
POLYDIPSIA: 0
DIZZINESS: 1
INSOMNIA: 1
FEVER: 0
DISTURBANCES IN COORDINATION: 0

## 2022-01-01 ASSESSMENT — PATIENT HEALTH QUESTIONNAIRE - PHQ9
SUM OF ALL RESPONSES TO PHQ QUESTIONS 1-9: 11
SUM OF ALL RESPONSES TO PHQ QUESTIONS 1-9: 10
SUM OF ALL RESPONSES TO PHQ QUESTIONS 1-9: 12
5. POOR APPETITE OR OVEREATING: NOT AT ALL

## 2022-01-01 ASSESSMENT — MIFFLIN-ST. JEOR
SCORE: 1907.7
SCORE: 1891.95

## 2022-01-12 ENCOUNTER — OFFICE VISIT (OUTPATIENT)
Dept: FAMILY MEDICINE | Facility: CLINIC | Age: 42
End: 2022-01-12
Payer: COMMERCIAL

## 2022-01-12 VITALS
DIASTOLIC BLOOD PRESSURE: 81 MMHG | TEMPERATURE: 97.9 F | HEART RATE: 84 BPM | WEIGHT: 207 LBS | BODY MASS INDEX: 26.76 KG/M2 | SYSTOLIC BLOOD PRESSURE: 118 MMHG | RESPIRATION RATE: 16 BRPM | OXYGEN SATURATION: 96 %

## 2022-01-12 DIAGNOSIS — R40.0 DAYTIME SOMNOLENCE: ICD-10-CM

## 2022-01-12 DIAGNOSIS — G62.9 NEUROPATHY: ICD-10-CM

## 2022-01-12 DIAGNOSIS — N32.1 COLOVESICAL FISTULA: ICD-10-CM

## 2022-01-12 DIAGNOSIS — R41.3 MEMORY IMPAIRMENT: ICD-10-CM

## 2022-01-12 DIAGNOSIS — Z28.21 COVID-19 VACCINATION DECLINED: ICD-10-CM

## 2022-01-12 DIAGNOSIS — G40.209 PARTIAL SYMPTOMATIC EPILEPSY WITH COMPLEX PARTIAL SEIZURES, NOT INTRACTABLE, WITHOUT STATUS EPILEPTICUS (H): ICD-10-CM

## 2022-01-12 DIAGNOSIS — S06.9X9S MILD TRAUMATIC BRAIN INJURY WITH LOSS OF CONSCIOUSNESS, SEQUELA (H): Primary | ICD-10-CM

## 2022-01-12 DIAGNOSIS — Z00.00 ROUTINE HEALTH MAINTENANCE: ICD-10-CM

## 2022-01-12 LAB
CHOLEST SERPL-MCNC: 172 MG/DL
FASTING STATUS PATIENT QL REPORTED: ABNORMAL
HBA1C MFR BLD: 4.9 % (ref 0–5.6)
HDLC SERPL-MCNC: 45 MG/DL
LDLC SERPL CALC-MCNC: 92 MG/DL
NONHDLC SERPL-MCNC: 127 MG/DL
SHBG SERPL-SCNC: 32 NMOL/L (ref 11–80)
TRIGL SERPL-MCNC: 174 MG/DL

## 2022-01-12 PROCEDURE — 80164 ASSAY DIPROPYLACETIC ACD TOT: CPT | Performed by: STUDENT IN AN ORGANIZED HEALTH CARE EDUCATION/TRAINING PROGRAM

## 2022-01-12 PROCEDURE — 36415 COLL VENOUS BLD VENIPUNCTURE: CPT | Performed by: STUDENT IN AN ORGANIZED HEALTH CARE EDUCATION/TRAINING PROGRAM

## 2022-01-12 PROCEDURE — 80076 HEPATIC FUNCTION PANEL: CPT | Performed by: STUDENT IN AN ORGANIZED HEALTH CARE EDUCATION/TRAINING PROGRAM

## 2022-01-12 PROCEDURE — 99214 OFFICE O/P EST MOD 30 MIN: CPT | Mod: GC | Performed by: STUDENT IN AN ORGANIZED HEALTH CARE EDUCATION/TRAINING PROGRAM

## 2022-01-12 PROCEDURE — 84270 ASSAY OF SEX HORMONE GLOBUL: CPT | Performed by: STUDENT IN AN ORGANIZED HEALTH CARE EDUCATION/TRAINING PROGRAM

## 2022-01-12 PROCEDURE — 84403 ASSAY OF TOTAL TESTOSTERONE: CPT | Performed by: STUDENT IN AN ORGANIZED HEALTH CARE EDUCATION/TRAINING PROGRAM

## 2022-01-12 PROCEDURE — 83036 HEMOGLOBIN GLYCOSYLATED A1C: CPT | Performed by: STUDENT IN AN ORGANIZED HEALTH CARE EDUCATION/TRAINING PROGRAM

## 2022-01-12 PROCEDURE — 80165 DIPROPYLACETIC ACID FREE: CPT | Mod: 90 | Performed by: STUDENT IN AN ORGANIZED HEALTH CARE EDUCATION/TRAINING PROGRAM

## 2022-01-12 PROCEDURE — 80061 LIPID PANEL: CPT | Performed by: STUDENT IN AN ORGANIZED HEALTH CARE EDUCATION/TRAINING PROGRAM

## 2022-01-12 RX ORDER — PREGABALIN 100 MG/1
100 CAPSULE ORAL 3 TIMES DAILY
Qty: 90 CAPSULE | Refills: 0 | Status: SHIPPED | OUTPATIENT
Start: 2022-01-12 | End: 2022-01-01

## 2022-01-12 RX ORDER — OXYCODONE HYDROCHLORIDE 5 MG/1
5 TABLET ORAL DAILY PRN
Qty: 7 TABLET | Refills: 0 | Status: SHIPPED | OUTPATIENT
Start: 2022-01-12 | End: 2022-01-01

## 2022-01-12 NOTE — PROGRESS NOTES
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  Patient relates that this is ongoing and overall feels that it is potentially getting worse.  Patient's concerned that in a few several months when he gets out of current rehab program and tries to go home he will not be able to do anything or keep a job.  We discussed looking into home health to help him with this transition during that time.  Otherwise he is continuing to work with PT.  We will also continue to discuss speech therapy as a potential option. Otherwise we will reach out to neurology to follow-up on neuropsych testing recommendations.    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  Missed appointment with neurology due to being in the emergency room for abdominal pain.  Has rescheduled appointment with neurology in 1 month we will check him to see if he can be seen sooner.    Neuropathy  Patient with ongoing peripheral neuropathy that overall is improved with Lyrica however feels that there is still room for improvement and so we will increase Lyrica from 100 twice daily to 100 3 times daily and reassess in 1 month.  - Hemoglobin A1c  - pregabalin (LYRICA) 100 MG capsule  Dispense: 90 capsule; Refill: 0    Colovesical fistula  Overall patient is improving with well-healing scar.  Patient's ongoing abdominal pain is overall improving and oxycodone taper has been longer than ideal we will refill a very short course of oxycodone for breakthrough pain with plan for no further refills after this.  Patient has follow-up with surgery tomorrow.  He does have concern about a small bead-like sensitive area around the scar which I suspect is either a reactive lymph node or a palpable suture but overall does not  concerning on my exam.  - oxyCODONE (ROXICODONE) 5 MG tablet  Dispense: 7 tablet; Refill: 0    Daytime somnolence  Patient reports fatigue and daytime tiredness in addition to intermittent sleep issues.  I suspect that this is likely related to sequelae of patient's neurologic condition in addition to mental health surrounding his condition.  Patient is requesting we get testosterone levels which have been ordered today.  We discussed getting back into the gym for exercise slowly once cleared from surgery and will follow-up with more in-depth discussion about mood.  - Testosterone Free and Total    Routine health maintenance  Patient is due for cholesterol screening.  - Lipid panel    Declined flu and COVID vaccines    Return in about 4 weeks (around 2/9/2022).    Lionel Bahena MD  [facility]?       Maia Goodman is a 41 year old who presents for the following health issues    HPI   Having communication difficulty after TBI  Finishing program in June 2022.   Has daily schedule of same thing, but can't remember where supposed to be. Forgets to look at notes even when writes things down.   Some times worse than others, overall feels getting worse.   Feeling more depressed about it.   Social security claim got denied.     Also still having pain associated with paresthesias in extremities.  Overall feels that Lyrica is helping but still disturbing throughout the day.    Abdominal pain is overall improving after the surgery on a typical day pain is to hold for out of 10 but will still have bouts of severe pain that will last for minutes to hours.  Has been out of oxycodone for the last few days and feels that just over-the-counter pain medications are not controlling this pain during these flares.    Patient also would like testosterone checked as he feels fatigued and was talking to a friend who had low testosterone levels and if anything is reversible there.    Review of  Systems   Constitutional, HEENT, cardiovascular, pulmonary, GI, , musculoskeletal, neuro, skin, endocrine and psych systems are negative, except as otherwise noted.      Objective    /81 (BP Location: Left arm, Patient Position: Sitting, Cuff Size: Adult Large)   Pulse 84   Temp 97.9  F (36.6  C) (Oral)   Resp 16   Wt 93.9 kg (207 lb)   SpO2 96%   BMI 26.76 kg/m    Body mass index is 26.76 kg/m .  Physical Exam  Vitals reviewed.   Constitutional:       General: He is not in acute distress.     Appearance: Normal appearance. He is not ill-appearing.   HENT:      Head: Normocephalic and atraumatic.   Eyes:      Conjunctiva/sclera: Conjunctivae normal.   Cardiovascular:      Rate and Rhythm: Normal rate.   Pulmonary:      Effort: Pulmonary effort is normal. No respiratory distress.   Abdominal:      Palpations: Abdomen is soft.      Tenderness: There is no abdominal tenderness. There is no guarding or rebound.      Comments: Well healing scar in suprapubic abdomen with small <1cm mobile, nontender palpable bead-like lesion near incision   Musculoskeletal:         General: No deformity. Normal range of motion.   Skin:     General: Skin is warm and dry.   Neurological:      General: No focal deficit present.      Mental Status: He is alert.   Psychiatric:         Behavior: Behavior normal.         Thought Content: Thought content normal.

## 2022-01-12 NOTE — Clinical Note
Hi Dr. Carrizales,  I just saw Rex. Overall, his abd pain is doing better but he still has boughts here and there of pain. I discussed this with him and said I need to talk to you, but I think having 7-10 more pills of oxycodone 5mg once daily PRN would be reasonable and then no more. He also has follow-up with surgery tomorrow.   I also talked about increasing his pregabalin to TID since it's helping, but still having some pain throughout the day.   I've pended the orders. Let me know what you think.  Thanks, Lionel

## 2022-01-12 NOTE — PATIENT INSTRUCTIONS
1. We will increase pregabalin 100mg 3 times per day for arm and leg nerve pain  2. I willl talk to Dr. Carrizales about ongoing pain and potential oxycodone taper continuation  3. I will follow-up with your neurologist to see if there are any new updates to plan or if we can get you in sooner  4. Ask your surgeon about bead-like object near surgical scar.  5. Call  regarding social security forms or whatever else you need filled out and drop off. Please allow for some time for this to be processed.     Follow up in 4 weeks or sooner as needed

## 2022-01-12 NOTE — Clinical Note
Hi Dr. Abel, I am reaching out as I just saw Rex in our primary care clinic.  He missed your last appointment to review neuropsych testing as he was in the emergency room.  I see that he is rescheduled with you for 1 month.  He continues to be concerned about his memory issues.  I wanted to see if there is any potential to have him in sooner to review neuropsych testing with you or if there is anything we can do in the meantime.  I tried to reiterate that this is likely going to be an ongoing issue and that there is no easy fix for this. Let me know what your thoughts are when you get a chance.  Thanks, Lionel

## 2022-01-13 ENCOUNTER — TELEPHONE (OUTPATIENT)
Dept: NEUROLOGY | Facility: CLINIC | Age: 42
End: 2022-01-13
Payer: COMMERCIAL

## 2022-01-13 ENCOUNTER — OFFICE VISIT (OUTPATIENT)
Dept: NEUROLOGY | Facility: CLINIC | Age: 42
End: 2022-01-13
Payer: COMMERCIAL

## 2022-01-13 ENCOUNTER — TELEPHONE (OUTPATIENT)
Dept: NEUROLOGY | Facility: CLINIC | Age: 42
End: 2022-01-13

## 2022-01-13 VITALS
HEIGHT: 73 IN | DIASTOLIC BLOOD PRESSURE: 69 MMHG | HEART RATE: 75 BPM | BODY MASS INDEX: 27.43 KG/M2 | SYSTOLIC BLOOD PRESSURE: 110 MMHG | WEIGHT: 207 LBS

## 2022-01-13 DIAGNOSIS — M54.16 LUMBAR RADICULOPATHY: Primary | ICD-10-CM

## 2022-01-13 DIAGNOSIS — G40.209 PARTIAL SYMPTOMATIC EPILEPSY WITH COMPLEX PARTIAL SEIZURES, NOT INTRACTABLE, WITHOUT STATUS EPILEPTICUS (H): ICD-10-CM

## 2022-01-13 PROBLEM — F06.70 MILD NEUROCOGNITIVE DISORDER DUE TO TRAUMATIC BRAIN INJURY (H): Status: ACTIVE | Noted: 2022-01-13

## 2022-01-13 PROBLEM — S06.9XAS MILD NEUROCOGNITIVE DISORDER DUE TO TRAUMATIC BRAIN INJURY (H): Status: ACTIVE | Noted: 2022-01-13

## 2022-01-13 PROBLEM — R07.9 CHEST PAIN: Status: RESOLVED | Noted: 2021-08-24 | Resolved: 2022-01-13

## 2022-01-13 LAB
ALBUMIN SERPL-MCNC: 4 G/DL (ref 3.4–5)
ALP SERPL-CCNC: 52 U/L (ref 40–150)
ALT SERPL W P-5'-P-CCNC: 30 U/L (ref 0–70)
AST SERPL W P-5'-P-CCNC: 15 U/L (ref 0–45)
BILIRUB DIRECT SERPL-MCNC: 0.2 MG/DL (ref 0–0.2)
BILIRUB SERPL-MCNC: 0.8 MG/DL (ref 0.2–1.3)
PROT SERPL-MCNC: 8 G/DL (ref 6.8–8.8)
VALPROATE SERPL-MCNC: 23 MG/L

## 2022-01-13 PROCEDURE — 99214 OFFICE O/P EST MOD 30 MIN: CPT | Performed by: PSYCHIATRY & NEUROLOGY

## 2022-01-13 RX ORDER — DONEPEZIL HYDROCHLORIDE 5 MG/1
5 TABLET, FILM COATED ORAL AT BEDTIME
Qty: 30 TABLET | Refills: 11 | Status: SHIPPED | OUTPATIENT
Start: 2022-01-13 | End: 2022-01-01

## 2022-01-13 RX ORDER — DIVALPROEX SODIUM 500 MG/1
1000 TABLET, EXTENDED RELEASE ORAL AT BEDTIME
Qty: 60 TABLET | Refills: 11 | Status: SHIPPED | OUTPATIENT
Start: 2022-01-13 | End: 2022-01-01

## 2022-01-13 ASSESSMENT — MIFFLIN-ST. JEOR: SCORE: 1907.83
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
## 2022-01-13 NOTE — TELEPHONE ENCOUNTER
Per Dr. Abel, can add on today to be seen. I called Rex and ISAI  If he calls, please transfer to the clinic   Elmira Frye CMA on 1/13/2022 at 9:29 AM
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
## 2022-01-13 NOTE — PATIENT INSTRUCTIONS
Patient Education     Anxiety and Traumatic Brain Injury  A traumatic brain injury (TBI) is a brain injury that can change the way you think, act, and feel. A TBI could be caused by a blow to your head, falls, fights, sports, and car accidents.  Anxiety is fear and worry. Dealing with a TBI is stressful, so it s not surprising that anxiety is a common symptom of a TBI. But when fear and worry become so strong that they get in the way of your ability to live your life, you could have an anxiety disorder.  Spotting an anxiety disorder with a TBI is important. This is because an anxiety disorder can make it hard to do the things you need to do to get better. An anxiety disorder may also increase your risk for substance abuse and depression.  Symptoms of anxiety disorder  Like a TBI, an anxiety disorder can change the way you think, act, and feel. It can also cause physical symptoms. In extreme cases, it can even cause a seizure. Here are some common symptoms to watch for:    Extreme fear and worry that doesn't let up    Shortness of breath    Racing heartbeat    Trouble sleeping    Restlessness    Trembling    Dizziness    Nausea    Inability to think clearly    Panic attacks  Types of anxiety disorders  If you have common symptoms of anxiety that get in the way of your ability to live your life, it is called generalized anxiety disorder.  There are also these specific kinds of anxiety disorders:    Panic disorder causes fear that is more like terror. You may live in fear of having a panic attack. People with panic disorder sometimes become afraid to leave the house.    Phobias are intense fears of certain things or situations. If you have this type of anxiety, you may fear an activity like flying or you may be afraid of public places.    Obsessive compulsive disorder (OCD) causes you to have uncontrolled thoughts and feelings. People with OCD repeat behaviors, like cleaning or washing, over and over  again.    Post-traumatic stress disorder (PTSD) is a type of anxiety in which people relive a traumatic event in flashbacks and nightmares. About 1 in 4 people with a TBI have PTSD. This is common among veterans wounded during combat.   What to do for an anxiety disorder  Let your healthcare provider know about your anxiety symptoms. You are not alone. Your healthcare provider is aware of the risks of anxiety disorder and can help you. A mental health professional can treat an anxiety disorder with a type of counseling called cognitive behavioral therapy (CBT).  During CBT, you learn to figure out the sources of anxiety and manage your symptoms. CBT teaches you to change the thoughts that lead to anxiety. It also teaches you to deal with symptoms in healthy ways. Relaxation techniques and deep-breathing exercises may be part of the treatment. Antianxiety medicines are sometimes used along with CBT.  You can also take steps on your own to cope with anxiety:    Share your fears and worries with others.    Stay active and spend time with friends and loved ones.    Don't use alcohol or drugs to relieve anxiety.    Don t smoke or drink too much coffee.    Eat a healthy diet, get regular exercise, and keep regular hours for sleep.    Reduce stress by taking part in activities you enjoy.  TBI symptoms get better with time. Everybody s brain heals at a different pace. Be patient and give yourself the time you need. Don t let anxiety get in the way of your recovery. You don t need to suffer since treatment is available for anxiety and TBI.  [employer] software last reviewed this educational content on 6/1/2019 2000-2021 [employer]. All rights reserved. This information is not intended as a substitute for professional medical care. Always follow your healthcare professional's instructions.

## 2022-01-13 NOTE — TELEPHONE ENCOUNTER
Patient was seen by Dr. Abel today who added Aricept 5mg qd and Depakote ER 500mg- 2 tabs at bedtime . Patient informed us that PMD needs to be informed due to his restrictions.   Elmira Frye CMA on 1/13/2022 at 12:16 PM

## 2022-01-13 NOTE — NURSING NOTE
Chief Complaint   Patient presents with     Cognitive Decline     Discuss neuropsych results      Elmira Frye CMA on 1/13/2022 at 11:40 AM

## 2022-01-14 LAB
TESTOST FREE SERPL-MCNC: 7.57 NG/DL
TESTOST SERPL-MCNC: 369 NG/DL (ref 240–950)

## 2022-01-16 LAB — VALPROATE FREE SERPL-MCNC: <5 UG/ML

## 2022-01-25 NOTE — TELEPHONE ENCOUNTER
Valproic Acid Free: Patient Communication    Edit Comments  Add Notifications       Dear Rex,   Depakote level is low.  Increase Depakote to 1000 mg at bedtime as instructed during your last visit.  Rest of the labs are normal.  Please let us know if you have any questions or concerns.     Ion Abel MD   Written by Ion Abel MD on 1/16/2022  1:12 PM CST

## 2022-01-25 NOTE — TELEPHONE ENCOUNTER
Rex had not viewed his MyChart results. I called and spoke with him and he is taking Depakote 1000mg at bedtime.  He needs forms filled out for SSDI- he will fax them to our clinic for Dr. Abel to review. Mail back to patient when complete (Will call to find out which address)  Elmira Frye CMA on 1/25/2022 at 1:26 PM

## 2022-01-27 NOTE — PROGRESS NOTES
Behavioral Health Progress Note     Client's Legal Name: Rex Negrete                      Client's Preferred Name: Rex  YOB: 1980  Type of Service: in-person, counseling  Length of Service:   Start time: 2:10PM    End time: 2:50PM   Duration:  minutes  Attendees: patient     Identifying Information and Presenting Problem:  This was a second appointment with Rex, who is a 41 year old male being seen for symptoms of anxiety and trauma in the context of recent heart attack and adjusting to medical complications and TBI from this event. Last visit with this provider was on 1013/21 due to medical treatment needs interfering.      Treatment Objective(s) Addressed in This Session:  Continue to gather information regarding presenting concerns and history      Progress on / Status of Treatment Objective(s) / Homework:  Satisfactory progress      Patient Health Questionnaire - 9:  PHQ 8/10/2021   PHQ-9 Total Score 10   Q9: Thoughts of better off dead/self-harm past 2 weeks Not at all      Generalized Anxiety Disorder - 7:  No flowsheet data found.      Topics Discussed/Interventions Provided:    Rex expressed significant anxiety lately, and he would like to explore anxiety medications. He has taken Xanax in the past, which he found helpful, and likes the idea of PRN medication.     Stressors including trying to get custody of his wife's  11 year old daughter, and dealing with criminal court as been contributing to his anxiety. He has drug possession charges from May 2021 and July 2021 (2 counties). He has an upcoming court date on 2/9/22    Sometimes Rex is so anxious that he feels he cannot breath. He expressed feeling frustrated with some staff at [facility] because they don't seem to understand his TBI. He also describes irritability with his wife sometimes.     Processed impact of memory challenges on his mood.     He said the pain in his feet is really bad and it prevents him from falling and  "staying asleep. Takes until about midnight to fall asleep and wakes up several times. He has been taking melatonin. He also endorses experiencing nightmares every night but he does not remember the content beyond the next day.     Provided empathetic listening.      Assessment:   The patient appeared to be active and engaged in today's session and was receptive to feedback.     Mental Status:   During interaction with the examiner today, Rex was cooperative, open, anxious, engaged and pleasant. Patient was generally alert and oriented to person, place, time, and situation. They were casually dressed and appropriately groomed. Patient's attire was appropriate for the weather and occasion. Eye contact was good; psychomotor functioning  normal or unremarkable. Speech was notable for occasional stuttering, accompanied by some frustration related to stutter but was otherwise WNL; largely coherent and relevant to topic. Mood was \"not great\"; affect was normal. Thought processes were mostly linear and goal-oriented with occasional loss of train of thought but could recover. Thought content was not remarkable with no evidence of psychotic features and no evidence of suicide, homicide, or nonsuicidal self injury related thoughts, intent, urges, planning, behavior/recent attempts. With regard to memory, Rex recalled timeline of events but could not remember details of some events, recent memory appeared grossly intact without being formally evaluated. Insight appeared adequate and judgment good. Patient exhibited good impulse control during the appointment.      Does the patient appear to be at imminent risk of harm to self/others at this time? No     The session was necessary to address symptoms of anxiety  that have been interfering with patient's ability to function in areas related to meaningful activities. Ongoing psychotherapy is necessary to provide counseling.     DSM-5 Diagnosis:  A complete diagnostic was not " feasible at today's visit. Provisional diagnosese of Adjustment Disorder with anxious mood and substance use disorder are being given today pending further assessment.         Plan:  1. Follow up with this provider on 2/9  2. Follow up with Dr. Bahena (PCP) about possible medication for anxiety and disability application   3. After Visit Summary will be reviewed in Marques VAUGHN, PhD     NOTE: Treatment plan due at third session.  Diagnostic assessment due next session .

## 2022-01-27 NOTE — Clinical Note
Lion Flowers,   I saw Rex today and he said he needs refills for Depakote and Lyrica. He has a ride arranged for tomorrow to his pharmacy. He also complained of worsening pain in his feet and was hoping to talk with you sooner than 2/3. Please let me know if you have any questions.   Thanks!  Giuliano

## 2022-02-03 NOTE — Clinical Note
Lion Espinoza, this is Rex Jacques's PCP.  Rex asked me to reach out to you to see if you have any other thoughts about his lower extremity neuropathy pain that has been getting worse.  We settled on starting him on Cymbalta for this in addition to his anxiety today.  Let me know if you have other thoughts when you get a chance.  I appreciate your help with this complex patient.

## 2022-02-03 NOTE — PROGRESS NOTES
Anxiety  Patient reports ongoing anxiety that is poorly controlled.  Some days better than others.  He has been following with behavioral health at [facility] here.  Patient reports that he has tried hydroxyzine as needed in the past and not had success.  Currently requesting Xanax as that has helped in the past.  We discussed that this does not seem to be the safest or most effective option for many cases including his.  We discussed that with patient's anxiety and neuropathy as below that Cymbalta seems to be a reasonable choice with minimal side effects.  Patient agreeable to trying this today.  -cymbalta 60mg daily  -recheck in 1 month    Neuropathy  Patient with ongoing peripheral neuropathy thought to be sequelae of prior TBI that has been poorly controlled since stopping opioids. Reports lyrica does not seem to be helping much.  Patient understandably frustrated with this.  Per chart review had EMG done that confirmed L5 and S1 neuropathy in lower extremities.  Otherwise blood work to this point shows no other sign of potential neuropathy.  I will also reach out to patient's neurologist to see if they have additional thoughts.  In the meantime patient agreeable to trial of Cymbalta as above.  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  Started on donepezil by neurology recently. Using some coping mechanisms and wife helping patient as well. Will also fill out form for social security appeal.     Colovesical fistula  Improving patient now off opioid medication with still some residual discomfort in the abdomen but overall better.     Daytime somnolence  Patient is concerned about fatigue and sexual performance.  I suspect that this is likely related to sequelae of patient's neurologic condition in addition to mental  "health surrounding his condition.  We checked patient's testosterone 1 month ago and was normal.  Patient however is requesting testosterone replacement therapy.  I reiterated that I do not feel comfortable with giving replacement therapy for normal hormone level and his cardiac history.  I offered urology referral for further evaluation but patient would like input of other current care team providers first.    Declined clinic flu and Covid vaccine.    Return to clinic in 4 weeks for reevaluation    Maia Goodman is a 41 year old who presents for the following health issues    HPI   TRT  Patient interested in discussing testosterone replacement as it is helped a friend.  He hopes that this will help his daytime fatigue and sexual performance.    Feet  Just as painful as before starting the Lyrica.  Feels that it has gotten worse since he is off opioids.    TBI questionnaire from [employer] who is helping with social security  Needs filled out.       Review of Systems   Constitutional, HEENT, cardiovascular, pulmonary, gi and gu systems are negative, except as otherwise noted.      Objective    /72   Pulse 71   Temp 98.2  F (36.8  C) (Oral)   Resp 16   Ht 1.854 m (6' 0.99\")   Wt 93.9 kg (207 lb)   SpO2 98%   BMI 27.32 kg/m    Body mass index is 27.32 kg/m .  Physical Exam  Vitals reviewed.   Constitutional:       General: He is not in acute distress.     Appearance: Normal appearance. He is not ill-appearing.   HENT:      Head: Normocephalic and atraumatic.   Eyes:      Conjunctiva/sclera: Conjunctivae normal.   Pulmonary:      Effort: Pulmonary effort is normal. No respiratory distress.   Musculoskeletal:         General: No tenderness (in lower extremities) or deformity. Normal range of motion.      Right lower leg: No edema.      Left lower leg: No edema.   Skin:     General: Skin is warm and dry.   Neurological:      Mental Status: He is alert. Mental status is at baseline.      Sensory: " Sensory deficit ( Poor gross sensation on the bottoms of feet bilaterally) present.      Motor: No weakness.      Gait: Gait normal.   Psychiatric:         Behavior: Behavior normal.         Thought Content: Thought content normal.

## 2022-02-03 NOTE — PROGRESS NOTES
Preceptor Attestation:   Patient seen, evaluated and discussed with the resident. I have verified the content of the note, which accurately reflects my assessment of the patient and the plan of care.   Supervising Physician:  Coleen Plaza MD

## 2022-02-03 NOTE — Clinical Note
Lion Grace.  Rex stated that he only has a few more months left of his current treatment program.  He is concerned that on returning home he is going to need additional help and I think that is reasonable.  Is there any way we can investigate if he qualifies for any kind of home health or home evaluation?  On the known unit chance.  Thanks

## 2022-02-03 NOTE — Clinical Note
Lion Carrizales.  Just wanted to touch base with you about Rex.  His abdominal pain is much better.  However since stopping the opioids he is saying that his leg pain and neuropathy are much worse despite increasing Lyrica recently.  He was also asking for Xanax for his anxiety.  I told him that I do not think this is a good idea and we should try Cymbalta first.  He also has a friend on testosterone therapy and despite a normal testosterone level was asking for hormone replacement.  I told him I do not think this is a good idea either especially given his cardiac history and offered him a referral to urology for second opinion but he wanted me to check with you first. Let me know what your thoughts are when you get a chance.

## 2022-02-03 NOTE — PATIENT INSTRUCTIONS
We will try cymbalta (duloxetine) for your anxiety and nerve pain  I will discuss this as well as testosterone with Dr. Carrizales and get back to you.    Patient Education     Here is the plan from today's visit    1. Anxiety  - DULoxetine (CYMBALTA) 60 MG capsule; Take 1 capsule (60 mg) by mouth daily  Dispense: 30 capsule; Refill: 0    Please call or return to clinic if your symptoms don't go away.    Follow up plan  No follow-ups on file.      Thank you for coming to [facility] today.  Lab Testing:  **If you had lab testing today and your results are reassuring or normal they will be mailed to you or sent through [employer] within 7 days.   **If the lab tests need quick action we will call you with the results.  The phone number we will call with results is # [phone] (home) . If this is not the best number please call our clinic and change the number.  Medication Refills:  If you need any refills please call your pharmacy and they will contact us.   If you need to  your refill at a new pharmacy, please contact the new pharmacy directly. The new pharmacy will help you get your medications transferred faster.   Scheduling:  If you have any concerns about today's visit or wish to schedule another appointment please call our office during normal business hours [phone] (8-5:00 M-F)  If a referral was made to a [facility] and you don't get a call from central scheduling please call [phone].  If a Mammogram was ordered for you at [facility] call [phone] to schedule or change your appointment.  If you had an XRay/CT/Ultrasound/MRI ordered the number is [phone] to schedule or change your radiology appointment.   Medical Concerns:  If you have urgent medical concerns please call [phone] at any time of the day.    Lionel Bahena MD

## 2022-02-09 NOTE — TELEPHONE ENCOUNTER
See MyChart message from patient earlier today with refill request for cymbalta. Patient needs refill sent under approved provider.     Gudelia Davis RN

## 2022-02-09 NOTE — TELEPHONE ENCOUNTER
[facility] phone call message- patient requesting a refill:    Full Medication Name: DULoxetine (CYMBALTA) 60 MG capsule      Pharmacy confirmed as     [id number] - [place], [place] - [address]  [address]  [facility] [address]  Phone: [phone] Fax: [phone]    : Yes    Additional Comments: The patient is restricted to certain doctors; Dr Bahena and Dr Carrizales in particular. There are a few alternative as well. The patient had a prescription refilled but it was Dr CHANDAN Plaza who was the prescribing provider. The insurance won't pay for the prescription and the pharmacy won't release it.  The patient is completely out of this medication.  Order Set Name Order ID    [id number]       Prescribing Provider's NPI: [id number]  Coleen Plaza    Also, the patient and Dr Bahena discussed the patient getting services from a urologist. The patient would like to start the treatment.    OK to leave a message on voice mail? Yes    Primary language: English      needed? No    Call taken on February 9, 2022 at 9:08 AM by Laina Browne

## 2022-02-09 NOTE — TELEPHONE ENCOUNTER
MEDICAL RECORDS REQUEST   [facility]  [address] [place] [address]  PHONE: [phone]  Fax: [phone]        FUTURE VISIT INFORMATION                                                   Rex Negrete, : 1980 scheduled for future visit at [facility]    APPOINTMENT INFORMATION:    Date: 02/10/2022    Provider:  Khalida Villarreal PA    Reason for Visit/Diagnosis: sexual dysfunction    REFERRAL INFORMATION:    Referring provider:  Yennifer Carrizales DO    Referring providers clinic:  [facility]    RECORDS REQUESTED FOR VISIT                                                     NOTES  STATUS/DETAILS   MyChart message from referring provider  yes, 2022, 2022 -- Lionel Bahena MD   MEDICATION LIST  yes   LABS     URINALYSIS (UA)  yes, 2022, 10/28/2021, 07/15/2021     PRE-VISIT CHECKLIST      Record collection complete Yes   Appointment appropriately scheduled           (right time/right provider) Yes   Joint diagnostic appointment coordinated correctly          (ensure right order & amount of time) Yes   MyChart activation Yes   Questionnaire complete If no, please explain pending

## 2022-02-09 NOTE — TELEPHONE ENCOUNTER
See MyChart message. Patient needs duloxetine sent under approved provider under restricted recipient program.     Gudelia Davis RN

## 2022-02-10 NOTE — PROGRESS NOTES
Chief Complaint:   Erectile dysfunction         History of Present Illness:   Rex Negrete is a 41 year old male with a history of depression, substance abuse, and seizure disorder who presents for evaluation of erectile dysfunction.  Patient reports symptoms of low energy, decreased focus, and erectile dysfunction which has been ongoing for the past year.  In regards to his erectile dysfunction, he reports issues with both trying to get an erection and also maintaining an erection.  He was recently  this past year, and this is causing him anxiety.  He has tried sildenafil before with no significant effect and is requesting to try Cialis.  He is also requesting treatment for low testosterone.  Recent lab evaluation showing normal free testosterone 7.57, and total testosterone 369 from 1/12/2022.           Past Medical History:     Past Medical History:   Diagnosis Date     Colovesical fistula      Depression     in the past (not being medicated for sx's)     Diverticulitis of sigmoid colon      Dysthymic disorder      Hx of substance abuse (H)     meth, cocaine     NONSPECIFIC MEDICAL HISTORY      PEA (Pulseless electrical activity) (H) cardiac arrest 05/2021    felt to be secondary to methamphetamine overdose and catacholamine toxicity     Seizures (H)             Past Surgical History:     Past Surgical History:   Procedure Laterality Date     COLONOSCOPY       COLONOSCOPY N/A 12/13/2021    Procedure: COLONOSCOPY intraoperative;  Surgeon: Carola Pastrana MD;  Location:  OR     ENT SURGERY      sinus surgery     GI SURGERY      upper GI     LAPAROSCOPIC ASSISTED SIGMOID COLECTOMY N/A 12/13/2021    Procedure: Intraoperative colonoscopy and laparoscopic sigmoid colectomy with takedown colovesical fistula and coloproctostomy at 16 cm from the anal verge with a 28 EEA stapler.;  Surgeon: Carola Pastrana MD;  Location:  OR     ORTHOPEDIC SURGERY Right 2019    knee scope torn meniscus      OTHER SURGICAL HISTORY      scope to the left shoulder     [facility] ORAL SURGERY PROCEDURE      wisdom teeth            Medications     Current Outpatient Medications   Medication     acetaminophen (TYLENOL) 500 MG tablet     divalproex sodium extended-release (DEPAKOTE ER) 500 MG 24 hr tablet     donepezil (ARICEPT) 5 MG tablet     DULoxetine (CYMBALTA) 60 MG capsule     lidocaine (XYLOCAINE) 2 % external gel     methocarbamol (ROBAXIN) 500 MG tablet     polyethylene glycol (MIRALAX) 17 GM/Dose powder     pregabalin (LYRICA) 100 MG capsule     senna-docusate (SENOKOT-S/PERICOLACE) 8.6-50 MG tablet     No current facility-administered medications for this visit.            Family History:     Family History   Problem Relation Age of Onset     Cancer Mother         mole removed (cancerous)     Alcohol/Drug Father      Diabetes Maternal Grandmother      Arthritis Maternal Grandmother             Social History:     Social History     Socioeconomic History     Marital status:      Spouse name: Not on file     Number of children: Not on file     Years of education: Not on file     Highest education level: Not on file   Occupational History     Not on file   Tobacco Use     Smoking status: Current Some Day Smoker     Packs/day: 0.50     Types: Cigarettes     Smokeless tobacco: Never Used     Tobacco comment: less than a pack   Vaping Use     Vaping Use: Never used   Substance and Sexual Activity     Alcohol use: Not Currently     Drug use: Not Currently     Types: Methamphetamines, Cocaine     Comment: living in sober house at present 11/2021     Sexual activity: Yes     Partners: Female     Birth control/protection: Pill   Other Topics Concern     Parent/sibling w/ CABG, MI or angioplasty before 65F 55M? No   Social History Narrative     Not on file     Social Determinants of Health     Financial Resource Strain: Not on file   Food Insecurity: Not on file   Transportation Needs: Not on file   Physical Activity: Not on  file   Stress: Not on file   Social Connections: Not on file   Intimate Partner Violence: Not on file   Housing Stability: Not on file            Allergies:   Patient has no known allergies.         Review of Systems:  From intake questionnaire   Negative 14 system review except as noted on HPI, nurse's note.         Physical Exam:   Patient is a 41 year old  male    General Appearance Adult: Alert, no acute distress, oriented  Lungs: no respiratory distress, or pursed lip breathing  Heart: No obvious jugular venous distension present  Skin: no suspicious lesions or rashes  Neuro: Alert, oriented, speech and mentation normal  Further examination is deferred due to the nature of our visit.        Labs and Pathology:    I personally reviewed all applicable laboratory data and went over findings with patient  Significant for:    CBC RESULTS:  Recent Labs   Lab Test 12/17/21  0912 12/14/21  0831 12/13/21  1201 10/27/21  0949 03/28/20  1440 03/27/20  0757   WBC  --   --   --  17.3* 9.4 12.3*   HGB  --  11.3* 12.5* 11.1* 15.3 14.9    313 329 422 264 270        BMP RESULTS:  Recent Labs   Lab Test 12/14/21  0831 12/14/21  0631 12/13/21  1818 12/13/21  1201 11/04/21  1100 10/27/21  0949 10/27/21  0949 03/28/20  1440 03/27/20  0757     --   --   --  135  --  132* 134 136   POTASSIUM 3.5  --   --   --  4.3  --  3.7 3.6 3.9   CHLORIDE 105  --   --   --  105  --  98 102 105   CO2 25  --   --   --  26  --  24 29 30   ANIONGAP 6  --   --   --  4  --  10 3 1*   * 117*  --   --  92  --  149* 99 100*   BUN 9  --   --   --  25  --  20 14 16   CR 0.93  --  1.07 1.08 1.50*   < > 2.24* 1.26* 1.18   GFRESTIMATED >90  --  87 86 58*   < > 36* 72 78   GFRESTBLACK  --   --   --   --   --   --   --  84 >90   RANDALL 8.2*  --   --   --  9.0  --  8.4* 8.6 8.5    < > = values in this interval not displayed.       UA RESULTS:   No results for input(s): SG, URINEPH, NITRITE, RBCU, WBCU in the last 80752 hours.    PSA RESULTS  No  results found for: PSA      Imaging:    I personally reviewed all applicable imaging and went over findings with patient.  Significant for:    Results for orders placed or performed during the hospital encounter of 12/13/21   CT Cystogram wo & w Contrast    Narrative    CT CYSTOGRAM WITHOUT AND WITH CONTRAST 12/16/2021 9:14 AM    CLINICAL HISTORY: Diverticulitis, complication suspected.    TECHNIQUE: CT images of the pelvis performed before and after  instillation of dilute contrast into the urinary bladder. No  intravenous contrast. Post emptying images also obtained. Multiplanar  reformats were obtained. Dose reduction techniques were used.    CONTRAST: None.    COMPARISON: None.    FINDINGS:   No extravasation of contrast from the urinary bladder following  instillation via Pandya catheter. No bladder diverticula or fistulas  are demonstrated.    There is a large volume of retained colonic stool. An anastomosis in  the sigmoid colon is visualized and is patent. There is no free  intraperitoneal air. There is trace ascites in the pelvis. Gas is  noted in the soft tissues in the lower anterior subcutaneous fat. No  destructive bone lesions, likely related to recent surgical procedure.      Impression    IMPRESSION:   No evidence of bladder extravasation or colovesicular fistula.    ERIC FUENTES MD         SYSTEM ID:  [id number]            Assessment and Plan:     Assessment: 41 year old male with symptoms of low energy, decreased focus, and erectile dysfunction for the past year.  Recent free and total testosterone were normal from 1/12/2022 but patient continues to remain concerned low testosterone is contributing.  We will plan for repeat total testosterone to be drawn in the morning, along with prolactin given ED.  We will also refer to endocrinology for further evaluation and input regarding utility of testosterone replacement therapy, though patient advised at his time his testosterone level is currently  normal.  In regards to his erectile dysfunction, we discussed treatment options.  Patient has tried sildenafil with no significant effect, but we discuss trialing alternative PDE5 inhibitor with Cialis.  Patient wishes to try the Cialis, and a prescription was provided and he was counseled on dosing instructions.  Patient without a history of CAD and does not take nitroglycerin.  He was advised to follow up should the Cialis be ineffective for treatment of his ED.      Plan:  - Schedule lab only appointment at any [place] [facility] for testosterone and prolactin.   - Referral placed to endocrinology for discussion of testosterone replacement.   - Prescription sent for Cialis 5 mg daily as needed for erectile dysfunction.  May increase the dose as needed with maximum dose 20 mg within 24 hours.       NE Henley  Department of Urology

## 2022-02-10 NOTE — PROGRESS NOTES
Rex is a 41 year old who is being evaluated via a billable video visit.      How would you like to obtain your AVS? Mail a copy  If the video visit is dropped, the invitation should be resent by: Text to cell phone: [phone]  Will anyone else be joining your video visit? No      Video Start Time: 10:43 AM  Video-Visit Details    Type of service:  Video Visit    Video End Time:10:56 AM    Originating Location (pt. Location): Home    Distant Location (provider location):  [facility] UROLOGY [facility]     Platform used for Video Visit: Center'd

## 2022-02-10 NOTE — LETTER
2/10/2022       RE: Rex Negrete  [address]  [place] [address]     Dear Colleague,    Thank you for referring your patient, Rex Negrete, to the [facility]IC [place] [facility]. Please see a copy of my visit note below.    Rex is a 41 year old who is being evaluated via a billable video visit.      How would you like to obtain your AVS? Mail a copy  If the video visit is dropped, the invitation should be resent by: Text to cell phone: [phone]  Will anyone else be joining your video visit? No      Video Start Time: 10:43 AM  Video-Visit Details    Type of service:  Video Visit    Video End Time:10:56 AM    Originating Location (pt. Location): Home    Distant Location (provider location):  [facility] UROLOGY [facility]     Platform used for Video Visit: [employer]           Chief Complaint:   Erectile dysfunction         History of Present Illness:   Rex Negrete is a 41 year old male with a history of depression, substance abuse, and seizure disorder who presents for evaluation of erectile dysfunction.  Patient reports symptoms of low energy, decreased focus, and erectile dysfunction which has been ongoing for the past year.  In regards to his erectile dysfunction, he reports issues with both trying to get an erection and also maintaining an erection.  He was recently  this past year, and this is causing him anxiety.  He has tried sildenafil before with no significant effect and is requesting to try Cialis.  He is also requesting treatment for low testosterone.  Recent lab evaluation showing normal free testosterone 7.57, and total testosterone 369 from 1/12/2022.           Past Medical History:     Past Medical History:   Diagnosis Date     Colovesical fistula      Depression     in the past (not being medicated for sx's)     Diverticulitis of sigmoid colon      Dysthymic disorder      Hx of  substance abuse (H)     meth, cocaine     NONSPECIFIC MEDICAL HISTORY      PEA (Pulseless electrical activity) (H) cardiac arrest 05/2021    felt to be secondary to methamphetamine overdose and catacholamine toxicity     Seizures (H)             Past Surgical History:     Past Surgical History:   Procedure Laterality Date     COLONOSCOPY       COLONOSCOPY N/A 12/13/2021    Procedure: COLONOSCOPY intraoperative;  Surgeon: Carola Pastrana MD;  Location: [facility] OR     ENT SURGERY      sinus surgery     GI SURGERY      upper GI     LAPAROSCOPIC ASSISTED SIGMOID COLECTOMY N/A 12/13/2021    Procedure: Intraoperative colonoscopy and laparoscopic sigmoid colectomy with takedown colovesical fistula and coloproctostomy at 16 cm from the anal verge with a 28 EEA stapler.;  Surgeon: Carola Pastrana MD;  Location: [facility] OR     ORTHOPEDIC SURGERY Right 2019    knee scope torn meniscus     OTHER SURGICAL HISTORY      scope to the left shoulder     [facility] ORAL SURGERY PROCEDURE      wisdom teeth            Medications     Current Outpatient Medications   Medication     acetaminophen (TYLENOL) 500 MG tablet     divalproex sodium extended-release (DEPAKOTE ER) 500 MG 24 hr tablet     donepezil (ARICEPT) 5 MG tablet     DULoxetine (CYMBALTA) 60 MG capsule     lidocaine (XYLOCAINE) 2 % external gel     methocarbamol (ROBAXIN) 500 MG tablet     polyethylene glycol (MIRALAX) 17 GM/Dose powder     pregabalin (LYRICA) 100 MG capsule     senna-docusate (SENOKOT-S/PERICOLACE) 8.6-50 MG tablet     No current facility-administered medications for this visit.            Family History:     Family History   Problem Relation Age of Onset     Cancer Mother         mole removed (cancerous)     Alcohol/Drug Father      Diabetes Maternal Grandmother      Arthritis Maternal Grandmother             Social History:     Social History     Socioeconomic History     Marital status:      Spouse name: Not on file     Number of children: Not on file      Years of education: Not on file     Highest education level: Not on file   Occupational History     Not on file   Tobacco Use     Smoking status: Current Some Day Smoker     Packs/day: 0.50     Types: Cigarettes     Smokeless tobacco: Never Used     Tobacco comment: less than a pack   Vaping Use     Vaping Use: Never used   Substance and Sexual Activity     Alcohol use: Not Currently     Drug use: Not Currently     Types: Methamphetamines, Cocaine     Comment: living in sober house at present 11/2021     Sexual activity: Yes     Partners: Female     Birth control/protection: Pill   Other Topics Concern     Parent/sibling w/ CABG, MI or angioplasty before 65F 55M? No   Social History Narrative     Not on file     Social Determinants of Health     Financial Resource Strain: Not on file   Food Insecurity: Not on file   Transportation Needs: Not on file   Physical Activity: Not on file   Stress: Not on file   Social Connections: Not on file   Intimate Partner Violence: Not on file   Housing Stability: Not on file            Allergies:   Patient has no known allergies.         Review of Systems:  From intake questionnaire   Negative 14 system review except as noted on HPI, nurse's note.         Physical Exam:   Patient is a 41 year old  male    General Appearance Adult: Alert, no acute distress, oriented  Lungs: no respiratory distress, or pursed lip breathing  Heart: No obvious jugular venous distension present  Skin: no suspicious lesions or rashes  Neuro: Alert, oriented, speech and mentation normal  Further examination is deferred due to the nature of our visit.        Labs and Pathology:    I personally reviewed all applicable laboratory data and went over findings with patient  Significant for:    CBC RESULTS:  Recent Labs   Lab Test 12/17/21  0912 12/14/21  0831 12/13/21  1201 10/27/21  0949 03/28/20  1440 03/27/20  0757   WBC  --   --   --  17.3* 9.4 12.3*   HGB  --  11.3* 12.5* 11.1* 15.3 14.9    313  329 422 264 270        [facility] RESULTS:  Recent Labs   Lab Test 12/14/21  0831 12/14/21  0631 12/13/21  1818 12/13/21  1201 11/04/21  1100 10/27/21  0949 10/27/21  0949 03/28/20  1440 03/27/20  0757     --   --   --  135  --  132* 134 136   POTASSIUM 3.5  --   --   --  4.3  --  3.7 3.6 3.9   CHLORIDE 105  --   --   --  105  --  98 102 105   CO2 25  --   --   --  26  --  24 29 30   ANIONGAP 6  --   --   --  4  --  10 3 1*   * 117*  --   --  92  --  149* 99 100*   BUN 9  --   --   --  25  --  20 14 16   CR 0.93  --  1.07 1.08 1.50*   < > 2.24* 1.26* 1.18   GFRESTIMATED >90  --  87 86 58*   < > 36* 72 78   GFRESTBLACK  --   --   --   --   --   --   --  84 >90   RANDALL 8.2*  --   --   --  9.0  --  8.4* 8.6 8.5    < > = values in this interval not displayed.       UA RESULTS:   No results for input(s): SG, URINEPH, NITRITE, RBCU, WBCU in the last 57461 hours.    PSA RESULTS  No results found for: PSA      Imaging:    I personally reviewed all applicable imaging and went over findings with patient.  Significant for:    Results for orders placed or performed during the hospital encounter of 12/13/21   CT Cystogram wo & w Contrast    Narrative    CT CYSTOGRAM WITHOUT AND WITH CONTRAST 12/16/2021 9:14 AM    CLINICAL HISTORY: Diverticulitis, complication suspected.    TECHNIQUE: CT images of the pelvis performed before and after  instillation of dilute contrast into the urinary bladder. No  intravenous contrast. Post emptying images also obtained. Multiplanar  reformats were obtained. Dose reduction techniques were used.    CONTRAST: None.    COMPARISON: None.    FINDINGS:   No extravasation of contrast from the urinary bladder following  instillation via Pandya catheter. No bladder diverticula or fistulas  are demonstrated.    There is a large volume of retained colonic stool. An anastomosis in  the sigmoid colon is visualized and is patent. There is no free  intraperitoneal air. There is trace ascites in the pelvis.  Gas is  noted in the soft tissues in the lower anterior subcutaneous fat. No  destructive bone lesions, likely related to recent surgical procedure.      Impression    IMPRESSION:   No evidence of bladder extravasation or colovesicular fistula.    ERIC FUENTES MD         SYSTEM ID:  [id number]            Assessment and Plan:     Assessment: 41 year old male with symptoms of low energy, decreased focus, and erectile dysfunction for the past year.  Recent free and total testosterone were normal from 1/12/2022 but patient continues to remain concerned low testosterone is contributing.  We will plan for repeat total testosterone to be drawn in the morning, along with prolactin given ED.  We will also refer to endocrinology for further evaluation and input regarding utility of testosterone replacement therapy, though patient advised at his time his testosterone level is currently normal.  In regards to his erectile dysfunction, we discussed treatment options.  Patient has tried sildenafil with no significant effect, but we discuss trialing alternative PDE5 inhibitor with Cialis.  Patient wishes to try the Cialis, and a prescription was provided and he was counseled on dosing instructions.  Patient without a history of CAD and does not take nitroglycerin.  He was advised to follow up should the Cialis be ineffective for treatment of his ED.      Plan:  - Schedule lab only appointment at any [facility] for testosterone and prolactin.   - Referral placed to endocrinology for discussion of testosterone replacement.   - Prescription sent for Cialis 5 mg daily as needed for erectile dysfunction.  May increase the dose as needed with maximum dose 20 mg within 24 hours.       NE Henley  Department of Urology

## 2022-02-10 NOTE — PATIENT INSTRUCTIONS
UROLOGY CLINIC VISIT PATIENT INSTRUCTIONS    - Schedule lab only appointment at any [facility] for testosterone and prolactin.   - Referral placed to endocrinology for discussion of testosterone replacement.   - Prescription sent for Cialis 5 mg daily as needed for erectile dysfunction.  May increase the dose as needed with maximum dose 20 mg within 24 hours.     If you have any issues, questions or concerns in the meantime, do not hesitate to contact us at [phone] or via [employer].     It was a pleasure meeting with you today.  Thank you for allowing me and my team the privilege of caring for you today.  YOU are the reason we are here, and I truly hope we provided you with the excellent service you deserve.  Please let us know if there is anything else we can do for you so that we can be sure you are leaving completely satisfied with your care experience.    Khalida Villarreal PA-C  Department of Urology

## 2022-02-14 NOTE — TELEPHONE ENCOUNTER
[facility]    Phone Message    May a detailed message be left on voicemail: yes     Reason for Call: Requesting Results   Name/type of test: lab work  Date of test: 2/10/22  Was test done at a location other than [facility] (Please fill in the location if not [facility])?: No    The patient called asking to go over results. He scheduled Endocrinology referral but they are out until May. He is wondering if there is another option? Please advise. Thank you.    Action Taken: Message routed to:  [facility] ([facility] Urology    Travel Screening: Not Applicable

## 2022-02-15 NOTE — PROGRESS NOTES
Social Work Phone Call    2/15/2022   09:50 AM    Data/Intervention:  Patient Name:  Rex Negrete  /Age:  1980 (41 year old)    Spoke with: Rex    Reason for Call:  Social Work Supports      Assessment:  SW talked with Rex regarding his Restricted Recipient status and information received from his .     SW also discussed that she would send him a mychart with information about getting services once he is out of treatment.     Plan:  SW will send Mychart Message to patient and will also meet with him on the  when he is at [facility].     LD Montano  Pronouns: She/Her/Hers  , Care Coordination  [facility]  [phone]
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
## 2022-02-15 NOTE — TELEPHONE ENCOUNTER
Liseth Davies LPN  You 1 hour ago (3:05 PM)     ABILIO Ryan,     Could you please let him know below. If he can find an Endocrinologist elsewhere that has openings sooner, we can send the referral to them.     Liseth Davies LPN   Urology Clinic Service    [facility]
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
## 2022-02-15 NOTE — NURSING NOTE
Chief Complaint   Patient presents with     Neurocognitive disorder due to traumatic brain injury     Would like to discuss disability form      Extremity Pain     BLE pain- Shooting pain from feet up the legs      Elmira Frye CMA on 2/15/2022 at 11:26 AM

## 2022-02-16 NOTE — TELEPHONE ENCOUNTER
Left detailed VM to pt that they should try to find another Endocrinologist and we can send a referral over. Pt should call back with this information if they are able to find another so we can send that referral.

## 2022-02-23 NOTE — PROGRESS NOTES
Preceptor Attestation:   Patient seen, evaluated and discussed with the resident. I have verified the content of the note, which accurately reflects my assessment of the patient and the plan of care.   Supervising Physician:  Rogerio Saez MD

## 2022-02-23 NOTE — PATIENT INSTRUCTIONS
Informed Consent Form for Masculinizing Medication (Testosterone)      This form refers to the use of testosterone by persons who wish to masculinize their body. While there are risks associated with taking testosterone, when appropriately prescribed it can greatly improve mental health and quality of life. Please seek another opinion if you want additional perspective on any aspect of your care.    Masculinizing Effects    1. I understand that testosterone may be prescribed to masculinize my body.    2.   I understand that the masculinizing effects of testosterone can take several months to a year to become noticeable, that the rate and degree of change can t be predicted and is different for every person, and that changes may not be complete for 2-5 years after I start testosterone.    3.   I understand that these changes will likely be permanent even if I stop taking testosterone:    Lower voice pitch (i.e., voice becoming deeper).    Increased growth of hair, with thicker/coarser hairs, on arms, legs, chest, back, and abdomen.    Gradual growth of moustache/facial hair.    Hair loss at the temples and crown of the head, with the possibility of becoming completely bald.    Genital changes may or may not be permanent if testosterone is stopped. These include clitoral growth (typically 1-3 cm) and vaginal dryness.    4.   I understand that the following changes are usually not permanent (that is, they will likely reverse if I stop taking testosterone).    Acne, which may be severe and can cause permanent scarring if not treated.     Fat may redistribute to a more masculine pattern (decreased on buttocks/hips/thighs, increased in abdomen - changing from  pear shape  to  apple shape ).    Increased muscle mass and upper body strength.    Increased libido (sex drive).    Menstrual periods typically stop within 3-6 months of starting testosterone.    5.   I understand that it is not known what the effects of  testosterone are on fertility.     Fertility is reduced and I may never be able to get pregnant, even if I stop testosterone.     On the other hand, even if my period stops, it may still be possible for me to get pregnant, and I am aware of birth control options (if applicable).     I have been informed that I CANNOT take testosterone if I am pregnant.     I should consider egg banking if I desire biological children.     6. I understand that there are some aspects of my body that will not be changed by testosterone:    Breasts may appear slightly smaller due to fat loss, but will not substantially shrink.    Although voice pitch will likely drop, other aspects of speech will not become more masculine.      Risks of Testosterone    1. I understand that the medical effects and safety of testosterone are not fully understood, and that there may be long-term risks that are not yet known.    2.   I understand that I am strongly advised not to take more testosterone than I am prescribed, as this increases health risks. I have been informed that taking more will not make masculinization happen more quickly or increase the degree of change.    3.   I understand that testosterone can cause changes that increase my risk of heart disease, including:    decreasing good cholesterol (HDL) and increasing bad cholesterol (LDL)    increasing blood pressure    increasing deposits of fat around my internal organs    4.   I have been advised that my risks of heart disease are greater if people in my family have had heart disease, if I am overweight, or if I smoke.    5.   I have been advised that heart health checkups, including monitoring of my weight and cholesterol levels, should be done periodically as long as I am taking testosterone.    6.   I understand that testosterone can damage the liver, possibly leading to liver disease. I have been advised that I should be monitored for as long as I am taking testosterone.    7.   I  understand that testosterone can increase the amount of red blood cells and hemoglobin in my blood. While the increase is usually only to a normal male range (which does not pose health risks), a high increase can cause potentially life-threatening problems such as stroke and heart attack. I have been advised that my blood should be monitored periodically while I am taking testosterone.    8.   I understand that taking testosterone can increase my risk for diabetes by decreasing my body s response to insulin, causing weight gain, and increasing deposits of fat around my internal organs. I have been advised that my fasting blood glucose should be monitored periodically while I am taking testosterone.    9.   I understand that it is not known if testosterone increases the risk of ovarian, breast, or uterine cancer.    10. I understand that taking testosterone can lead to my cervix and the walls of my vagina becoming more fragile, and that this can lead to tears or abrasions that increase the risk of sexually transmitted infections (including HIV) if I have vaginal sex - no matter what the gender of my partner is. I have been advised that debbie discussion with my doctor about my sexual practices can help determine how best to prevent and monitor for sexually transmitted infections.    11. I have been informed that testosterone can cause headaches or migraines. I understand that if I am frequently having headaches or migraines, or the pain is unusually severe, it is recommended that I talk with my healthcare provider.    12. I understand that testosterone can cause emotional changes, including increased irritability, frustration, and anger. I have been advised that my doctor can assist me in finding resources to explore and cope with these changes.    13. I understand that testosterone will result in changes that will be noticeable by other people, and that some people in similar circumstances have experienced  harassment, discrimination, and violence, while others have lost support of loved ones. I have been advised that my doctor can assist me in finding advocacy and support resources.      Prevention of Medical Complications    1. I agree to take testosterone as prescribed and to tell my doctor if I am not happy with the treatment or am experiencing any problems.    2. I understand that the right dose or type of medication prescribed for me may not be the same as for someone else.    3. I understand that physical examinations and blood tests are needed on a regular basis to check for negative side effects of testosterone.    4. I understand that testosterone can interact with other medications (including other sources of hormones), dietary supplements, herbs, alcohol, and street drugs. I understand that being honest with my doctor about what else I am taking will help prevent medical complications that could be life-threatening. I have been informed that I will continue to get medical care no matter what information I share, and will be kept confidential.    5. I understand that some medical conditions make it dangerous to take testosterone. I agree that I will be checked for risky conditions before the decision to take testosterone is made.    6. I understand that I can choose to stop taking testosterone at any time, and that it is advised that I do this with the help of my doctor to make sure there are no negative reactions to stopping. I understand that my doctor may suggest I reduce or stop taking testosterone if there are severe side effects or health risks that can t be controlled.      My signature below confirms that:      My doctor has talked with me about the benefits and risks of testosterone, the possible or likely consequences of hormone therapy, and potential alternative treatment options.    I understand the risks that may be involved.    I understand that this form covers known effects and risks and that  there may be long-term effects or risks that are not yet known.    I have had sufficient opportunity to discuss treatment options with my doctor. All of my questions have been answered to my satisfaction.    I believe I have adequate knowledge on which to base informed consent to the provision of testosterone therapy.    Based on this:    _____ I wish to begin taking testosterone.    _____ I do not wish to begin taking testosterone at this time.      Whatever your current decision is, please talk with your doctor any time you have questions, concerns, or want to re-evaluate your options.        ____________________________________          __________________   Patient Signature      Date      ____________________________________         __________________  Prescriber Signature      Date                                Patient Education     Here is the plan from today's visit    1. Anxiety   - DULoxetine (CYMBALTA) 60 MG capsule; Take 2 capsules (120 mg) by mouth daily  Dispense: 60 capsule; Refill: 1    2. Testosterone   Review the above consent. Plan to discuss with Dr. Carrizales as well and follow-up in the next couple of weeks.    3. We will see about second opinion regarding neurologist. Plan to discuss more at next visit    Please call or return to clinic if your symptoms don't go away.    Follow up plan  No follow-ups on file.      Thank you for coming to [facility] today.  Lab Testing:  **If you had lab testing today and your results are reassuring or normal they will be mailed to you or sent through [facility] within 7 days.   **If the lab tests need quick action we will call you with the results.  The phone number we will call with results is # [phone] (home) . If this is not the best number please call our clinic and change the number.  Medication Refills:  If you need any refills please call your pharmacy and they will contact us.   If you need to  your refill at a new pharmacy, please contact the  new pharmacy directly. The new pharmacy will help you get your medications transferred faster.   Scheduling:  If you have any concerns about today's visit or wish to schedule another appointment please call our office during normal business hours [phone] (8-5:00 M-F)  If a referral was made to a [facility] and you don't get a call from central scheduling please call [phone].  If a Mammogram was ordered for you at [facility] call [phone] to schedule or change your appointment.  If you had an XRay/CT/Ultrasound/MRI ordered the number is [phone] to schedule or change your radiology appointment.   Medical Concerns:  If you have urgent medical concerns please call [phone] at any time of the day.    Lionel Bahena MD

## 2022-02-23 NOTE — PROGRESS NOTES
KHUSHI met with Rex and contacted his restricted recipient . She sent the form Rex needed to sign to update his pharmacy, as well as let Rex know that if everything continues to go well he would be done with being on the Restricted Recipient Program in May 2022. KHUSHI sent back to . KHUSHI also scheduled Rex for 2 follow up appointments in March with Dr. Bahena.     LD [place]  Pronouns: She/Her/Hers  , Care Coordination  [facility]  [phone]

## 2022-02-23 NOTE — PROGRESS NOTES
Assessment & Plan     Anxiety  Patient reports anxiety somewhat better.  Unclear if this is from lower stress situations or related to starting Cymbalta.  Given that he has noted improvement we will increase the dose to 120 mg daily and follow-up in 4 weeks.  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  Reports that these are only a little bit better since starting Cymbalta and still quite painful and impacts patient's life significantly.  Neurology recommended patient follow-up with physical therapy which I reiterated that this is a good idea.  Patient is interested in second opinion from another neurologist.  We discussed the possibility of reimaging patient's neck versus upper extremity EMG to further address paresthesias in his arms and hands.  Plan to continue these discussions at next follow-up.    Low testosterone in male  Patient interested in testosterone for libido and fatigue.  Was referred to urology at last visit who rechecked a testosterone level which came back low.  Was then referred to endocrine for further evaluation for possibility of hormone replacement.  No endocrine appointments until May or patient is interested in starting this earlier.  Patient was given informed consent with AVS and told to review today.  Otherwise I will discuss with other [facility] providers including Dr. Carrizales as to next steps.  Per urology's follow-up note it would not be unreasonable to start this to get patient up to a more normal level.    Hospital discharge follow-up  Patient was hospitalized from February 6 to the eighth for likely gastritis.  There is also noted to be some gallbladder distention however no signs of acute cholecystitis on imaging.  Patient reports that symptoms have all resolved and have not recurred.  Plan to continue to  monitor this and repeat work-up if returns      Return in about 4 weeks (around 3/23/2022).    Lionel Bahena MD  [facility] CHARLEY Goodman is a 41 year old who presents for the following health issues    HPI   Patient is here to follow-up on low testosterone, anxiety, and paresthesias.  Also here to follow-up on [facility] admission for gastritis.    Review of Systems   Constitutional, HEENT, cardiovascular, pulmonary, gi and gu systems are negative, except as otherwise noted.      Objective    /72   Pulse 74   Temp 98.3  F (36.8  C) (Oral)   Resp 16   Wt 100.7 kg (222 lb)   SpO2 95%   BMI 30.11 kg/m    Body mass index is 30.11 kg/m .  Physical Exam  Vitals reviewed.   Constitutional:       General: He is not in acute distress.     Appearance: Normal appearance. He is not ill-appearing.   HENT:      Head: Normocephalic and atraumatic.   Eyes:      Conjunctiva/sclera: Conjunctivae normal.   Pulmonary:      Effort: Pulmonary effort is normal. No respiratory distress.   Musculoskeletal:         General: No deformity. Normal range of motion.   Skin:     General: Skin is warm and dry.   Neurological:      General: No focal deficit present.      Mental Status: He is alert.      Motor: No weakness.      Comments: Diminished sensation over the ulnar aspect more than others of the upper extremities bilaterally.  Patient has normal gross strength.  Negative Tinel's and Phalen's bilaterally.   Psychiatric:         Behavior: Behavior normal.         Thought Content: Thought content normal.

## 2022-02-23 NOTE — TELEPHONE ENCOUNTER
"Request for medication refill:  pregabalin (LYRICA) 100 MG capsule  Providers if patient needs an appointment and you are willing to give a one month supply please refill for one month and  send a letter/MyChart using \".SMILLIMITEDREFILL\" .smillimited and route chart to \"[facility] \" (Giving one month refill in non controlled medications is strongly recommended before denial)    If refill has been denied, meaning absolutely no refills without visit, please complete the smart phrase \".smirxrefuse\" and route it to the \"[facility] MED REFILLS\"  pool to inform the patient and the pharmacy.    Ramya Aguilar        "

## 2022-02-23 NOTE — Clinical Note
Hi Dr. Carrizales, I just saw luana. He is still very interested in Testosterone therapy and urology rechecked his TT and it came back low and their result message said that it was reasonable to start TRT. He was then referred to endo for this but no appointments until May and he doesn't want to wait that long. I discussed with Dr. Saez and we gave him the informed consent to review at home to reiterate his concerns and I said I would touch base with you to see what you are comfortable with in prescribing given the low T result. Let me know your thoughts when you get a chance. Thanks, Lionel

## 2022-03-02 NOTE — TELEPHONE ENCOUNTER
Placed a call to Rex after he did not check-in for scheduled in-person counseling appointment at 2 PM on 3/2/2022. This is patient's second no-show with this provider.    Reached patient by phone number recorded in medical record ending [id number] at 2:10 PM. Patient apologized and said he totally forgot appointment. Discussed options for rescheduling and patient requested a virtual visit.       Plan:   Primary Care/Referring Provider Dr. Bahena will be alerted to this note for information only.    Patient has a follow up return appointment scheduled 3/11/22 with Dr. Bahena.    Meet with this provider on 3/23 for virtual appointment.     Giuliano Burnham, PhD  She/her/hers  Primary Care Health Behavior Fellow

## 2022-03-03 NOTE — TELEPHONE ENCOUNTER
RECORDS RECEIVED FROM: internal /ce    DATE RECEIVED: 5.9.22    NOTES (FOR ALL VISITS) STATUS DETAILS   OFFICE NOTES from referring provider internal  Khalida Villarreal PA   OFFICE NOTES from other specialist     ED NOTES     OPERATIVE REPORT  (thyroid, pituitary, adrenal, parathyroid)     MEDICATION LIST internal     IMAGING      DEXASCAN     MRI (BRAIN)     XR (Chest) ce   [facility]- 10/27/21  [facility]- 10.20.21  [facility]- 8/18/21, 5.18.21,   More in epic    CT (HEAD/NECK/CHEST/ABDOMEN) internal /ce Internal -3.27.20   [facility] 2.6.22, 12.29.21, 10.28.21, [facility]- 10.20.21  [facility]- 5.17.21  More in [facility]    NUCLEAR      ULTRASOUND (HEAD/NECK)     LABS     DIABETES: HBGA1C, CREATININE, FASTING LIPIDS, MICROALBUMIN URINE, POTASSIUM, TSH, T4    THYROID: TSH, T4, CBC, THYRODLONULIN, TOTAL T3, FREE T4, CALCITONIN, CEA internal /ce  internal /ce

## 2022-03-09 NOTE — TELEPHONE ENCOUNTER
Called to discuss TRT. Per recommendation from urology note, discussion with Dr. Carrizales and review of Endocrine Society Clinical Practice Guidelines for TRT in men with hypogonadism, recommend checking AM fasting T and free T +/- LH and FSH, ideally between 8 and 10 AM. If low, plan to start TRT when labs result. If normal, will discuss with endo e-consult.     Lionel Bahena MD

## 2022-03-11 PROBLEM — I46.9 PEA (PULSELESS ELECTRICAL ACTIVITY) (H): Status: ACTIVE | Noted: 2022-01-01

## 2022-03-11 NOTE — PROGRESS NOTES
"When opening a documentation only encounter, be sure to enter in \"Chief Complaint\" Forms and in \" Comments\" Title of form, description if needed.    Rex is a 41 year old  male  Form received via: Patient Drop Off  Form now resides in: Provider Ready    Hermila Bridges MA        Form has been completed by provider.     Form sent out via: Placed at  for patient  on 3/21/22  Patient informed: Yes, I called the patient and informed them the form was completed and he will pick it up on Monday 3/21/22 during his OV w/ Dr. Bahena. Will place form at  completed ready folders.   Output date: March 15, 2022    Lena Casanova CMA      **Please close the encounter**              "

## 2022-03-11 NOTE — PROGRESS NOTES
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   He also likely has an element of tension headaches related to stress of of his condition.  We will trial diclofenac and reintroduce physical therapy for daily tension headaches and cervical neck muscle spasm.  At this time patient is looking for second neurologic opinion.   I would like for this patient to get connected to a traumatic brain injury clinic.  I have put in a referral for this to be done hopefully through the [facility] due to patient's restriction.  If this is unable to be done we will wait until May for outside assistance when patient's restriction is plan to be lifted.  In the meantime we will reimage patient's brain and C-spine for interval changes.  - Neuro  Referral   - Physical Therapy Referral  - X-ray Cervical spine 2-3 vws  - MR Cervical Spine w/o Contrast  - MR Brain w/o Contrast    Cervical radiculopathy  Strain of neck muscle, subsequent encounter  Patient with ongoing cervical radiculopathy with limited relief after trial of gabapentin, Lyrica, and now trial of Cymbalta.  We will try to get patient connected with a comprehensive brain injury clinic as above in addition to current neurologist.  He also has evidence of a cervical neck muscle strain on exam today.  We will reintroduce physical therapy and diclofenac for this expect  - Neuro  Referral   - Physical Therapy Referral  - X-ray Cervical spine 2-3 vws  - MR Cervical Spine w/o Contrast  - MR Brain w/o Contrast  - diclofenac (VOLTAREN) 75 MG EC tablet  Dispense: 30 tablet; Refill: 0    Low testosterone in male  Patient with 1 afternoon low testosterone and low normal prior post testosterone.  Per  "urology note and discussion with Dr. HIGHTOWER and review of endocrine Society guidelines we will check a morning fasting testosterone in addition to additional labs as below.  If testosterone is still low today we will begin replacement of this.  If testosterone is normal will discuss with endocrinology as patient has upcoming visit in May.  - Testosterone Free and Total  - Prolactin  - TSH with free T4 reflex  - Follicle stimulating hormone  - Luteinizing Hormone, Adult  - Testosterone Free and Total  - Prolactin  - TSH with free T4 reflex  - Follicle stimulating hormone  - Luteinizing Hormone, Adult    PEA (Pulseless electrical activity) (H)  Secondary cardiomyopathy (H)  Patient cleared by cardiology.  I do have concerns about this patient starting testosterone due to his history of cardiomyopathy      No follow-ups on file.    Lionel Bahena MD  [facility] CHARLEY Goodman is a 41 year old who presents for the following health issues     HPI     TRT  -Patient frustrated with length of time that this is taking.    Headaches, neck pain  -Ongoing chronic nothing seems to have helped so far    Numbness/tingling same  -No change         Review of Systems   Constitutional, HEENT, cardiovascular, pulmonary, gi and gu systems are negative, except as otherwise noted.      Objective    /84   Pulse 78   Temp 97.7  F (36.5  C) (Oral)   Ht 1.854 m (6' 1\")   Wt 102.2 kg (225 lb 6.4 oz)   SpO2 97%   BMI 29.74 kg/m    Body mass index is 29.74 kg/m .  Physical Exam   GENERAL: healthy, alert and no distress  EYES: Eyes grossly normal to inspection, PERRL and conjunctivae and sclerae normal  HENT: ear canals and TM's normal, nose and mouth without ulcers or lesions  NECK: no adenopathy, no asymmetry, masses, or scars and thyroid normal to palpation  RESP: lungs clear to auscultation - no rales, rhonchi or wheezes  CV: regular rate and rhythm, normal S1 S2, no S3 or S4, no murmur, click or rub, " no peripheral edema and peripheral pulses strong  ABDOMEN: soft, nontender, no hepatosplenomegaly, no masses and bowel sounds normal  MS: no gross musculoskeletal defects noted, no edema  SKIN: no suspicious lesions or rashes  NEURO: Normal strength and tone and sensory deficit throughout upper and lower extremities. Spurlings negative  PSYCH: affect flat, appearance well groomed and cooperative

## 2022-03-11 NOTE — PATIENT INSTRUCTIONS
Patient Education     Here is the plan from today's visit    1. Chronic post-traumatic headache, not intractable  - Physical Therapy Referral; Future  - X-ray Cervical spine 2-3 vws; Future  - MR Cervical Spine w/o Contrast; Future  - MR Brain w/o Contrast; Future  - diclofenac (VOLTAREN) 75 MG EC tablet; Take 1 tablet (75 mg) by mouth 2 times daily as needed for moderate pain  Dispense: 30 tablet; Refill: 0    2. TRT  Bloodwork to evaluate previous low T level. I will call with results.  - Testosterone Free and Total; Future  - Prolactin; Future  - TSH with free T4 reflex; Future  - Follicle stimulating hormone; Future  - Luteinizing Hormone, Adult; Future        Please call or return to clinic if your symptoms don't go away.    Follow up plan  No follow-ups on file.      Thank you for coming to [facility] today.  Lab Testing:  **If you had lab testing today and your results are reassuring or normal they will be mailed to you or sent through [employer] within 7 days.   **If the lab tests need quick action we will call you with the results.  The phone number we will call with results is # [phone] (home) . If this is not the best number please call our clinic and change the number.  Medication Refills:  If you need any refills please call your pharmacy and they will contact us.   If you need to  your refill at a new pharmacy, please contact the new pharmacy directly. The new pharmacy will help you get your medications transferred faster.   Scheduling:  If you have any concerns about today's visit or wish to schedule another appointment please call our office during normal business hours [phone] (8-5:00 M-F)  If a referral was made to a [facility] and you don't get a call from central scheduling please call [phone].  If a Mammogram was ordered for you at [facility] call [phone] to schedule or change your appointment.  If you had an XRay/CT/Ultrasound/MRI  ordered the number is [phone] to schedule or change your radiology appointment.   Medical Concerns:  If you have urgent medical concerns please call [phone] at any time of the day.    Lionel Bahena MD

## 2022-03-15 NOTE — TELEPHONE ENCOUNTER
[facility] phone call message- general phone call:    Reason for call: The said Dr Bahena was suppose to call him back yesterday. The patient will like Dr Bahena to call him back when the Dr Bahena get a chance.    Return call needed: Yes    OK to leave a message on voice mail? Yes    Primary language: English      needed? No    Call taken on March 15, 2022 at 12:10 PM by Laina Browne

## 2022-03-15 NOTE — TELEPHONE ENCOUNTER
Called and talked with the patient and notified him that his [facility] Medical Opinion form was ready for  and at the FD. The patient verbalized he will pick it up on 3/21/22 during his OV w/ Dr. Bahena. He also stated that he would like Dr. Bahena to call him back regarding his lab results from 3/11/22 before his appt next week if possible. I told the pt I would relay this message to the provider.

## 2022-03-21 NOTE — PROGRESS NOTES
Preceptor Attestation:    I discussed the patient with the resident and evaluated the patient in person. I have verified the content of the note, which accurately reflects my assessment of the patient and the plan of care.   Supervising Physician:  Sara Julian MD.

## 2022-03-21 NOTE — PROGRESS NOTES
Assessment & Plan     Cervical radiculopathy  Bilateral S1 & Left L5 radiculopathy  Patient reporting worsening upper and lower extremity radiculopathy with evidence of DJD in both the cervical and lumbar spines and mild foraminal narrowing.  Given that patient's symptoms seem to be worse encouraged further discussion with neurology.  We will also refer to PM&R for potential additional recommendations as this is reportedly affecting patient's function and mood more marked.  I also reiterated that I believe physical therapy will be helpful with this patient's symptoms.  - Physiatry Referral    Mild neurocognitive disorder due to traumatic brain injury, sequela  Currently following with neurology reported minimal improvement.  We discussed that given her may or may not have thoughts about this but will focus on the pain for now.  Also gave patient resources including the [place] brain injury alliance.  Given patient's frustration with ongoing cognitive impairments, we also discussed that after patient's restriction is up in May he can consider second opinions at outside facilities as well.    Tinea corporis  Patient has extensive tinea of the back and shoulders.  Given that patient is unlikely to be of the reach with topical and the extent of involvement we will treat with weekly fluconazole for 2 to 4 weeks.   - fluconazole (DIFLUCAN) 200 MG tablet  Dispense: 4 tablet; Refill: 0    Strain of neck muscle, subsequent encounter  Refill sent.  - diclofenac (VOLTAREN) 75 MG EC tablet  Dispense: 30 tablet; Refill: 0    Secondary male hypogonadism  Patient with new diagnosis of hypogonadism.  Given low to normal LH and FSH this is likely secondary.  MRI brain without mass lesion.  Patient not tolerating testosterone patches as they keep falling off and will not adhere to patient's skin.  We discussed alternatives and will proceed with weekly subQ injections.  - testosterone cypionate (DEPOTESTOSTERONE) 200 MG/ML  "injection  Dispense: 10 mL; Refill: 0  - Needle, Disp, (BD DISP NEEDLES) 25G X 5/8\" MISC  Dispense: 50 each; Refill: 0  - needle, disp, (BD HYPODERMIC NEEDLE) 18G X 1\" MISC  Dispense: 50 each; Refill: 0  - syringe, disposable, (B-D SYRINGE LUER-SHARLENE) 1 ML MISC  Dispense: 60 each; Refill: 0  - Sharps Container MISC  Dispense: 1 each; Refill: 1          No follow-ups on file.    Lionel Bahena MD  [facility] CHARLEY Goodman is a 41 year old who presents for the following health issues     HPI   Back--splotches, itchy.  Has been there for several months and keeps forgetting to bring up.  Otherwise not affecting patient's day-to-day.    Sleep--unable to sleep more than a couple of hours. Partially pain related.    Testosterone  Does not like patches. Would rather have IM or subQ    Looking for housing for wife and child.  Recently graduated out of treatment program but was able to extend and stay during this transition period.      Review of Systems   Constitutional, HEENT, cardiovascular, pulmonary, gi and gu systems are negative, except as otherwise noted.      Objective    /80 (BP Location: Left arm, Patient Position: Sitting, Cuff Size: Adult Large)   Pulse 87   Temp 98.4  F (36.9  C) (Oral)   Resp 16   Wt 106.1 kg (234 lb)   SpO2 97%   BMI 30.87 kg/m    Body mass index is 30.87 kg/m .  Physical Exam  Vitals reviewed.   Constitutional:       General: He is not in acute distress.     Appearance: Normal appearance. He is not ill-appearing.   HENT:      Head: Normocephalic and atraumatic.   Eyes:      Conjunctiva/sclera: Conjunctivae normal.   Pulmonary:      Effort: Pulmonary effort is normal. No respiratory distress.   Musculoskeletal:         General: No deformity. Normal range of motion.   Skin:     General: Skin is warm and dry.      Comments: Diffuse pruritic hypopigmented areas with some erythema at the edges throughout the entire back and into the shoulders and upper " chest   Neurological:      General: No focal deficit present.      Mental Status: He is alert.      Motor: No weakness.      Gait: Gait normal.   Psychiatric:         Behavior: Behavior normal.         Thought Content: Thought content normal.

## 2022-03-21 NOTE — Clinical Note
Lion Pacheco, other any resources to help find housing for a family?  Rex is graduating from treatment (as his his wife) and they would like to find somewhere for them to live with their child.  He is already working with some people at his treatment on this but asked me to reach out as well.  Thanks, Lionel

## 2022-03-21 NOTE — PATIENT INSTRUCTIONS
Resources for chronic issues related to brain injury:    [place] [facility] [place]  https://www.braininjurymn.org/register/publicrelations.php  Call us at [phone] or [phone].        Other Clinics for second opinion:    [facility]  Concussion and Traumatic Brain Injury Subspecialty  [phone]      [facility]  [facility]  [phone]

## 2022-03-23 NOTE — PROGRESS NOTES
Telemedicine Visit: The patient's condition can be safely assessed and treated via synchronous audio and visual telemedicine encounter.      Reason for Telemedicine Visit: Patient has requested telehealth visit    Originating Site (Patient Location): [facility]     Distant Site (Provider Location): [facility]: [place]    Consent:  The patient/guardian has verbally consented to: the potential risks and benefits of telemedicine (video visit) versus in person care; bill my insurance or make self-payment for services provided; and responsibility for payment of non-covered services.     Mode of Communication:  Video Conference via [employer]    As the provider I attest to compliance with applicable laws and regulations related to telemedicine.      Behavioral Health Progress Note    Client's Legal Name: Rex Negrete    Client's Preferred Name: Rex  YOB: 1980  Type of Service: video, counseling  Length of Service:   Start time: 10:25AM    End time: 11AM   Duration: 35 minutes  Attendees: patient    Identifying Information and Presenting Problem:  Rex is a 41 year old male is a 41 year old male being seen for symptoms of anxiety, depression, and trauma in the context of recent heart attack and adjusting to medical complications and TBI from this event. Last visit with this provider was on 1/27/22 due to medical treatment needs interfering.        Treatment Objective(s) Addressed in This Session:  Anxiety: will experience a reduction in anxiety    Progress on / Status of Treatment Objective(s) / Homework:  Satisfactory progress     Mental Health Screening Questionnaires:  PHQ-9:   PHQ 8/10/2021 2/23/2022 3/11/2022   PHQ-9 Total Score 10 11 12   Q9: Thoughts of better off dead/self-harm past 2 weeks Not at all Not at all Not at all      JAIR-7:   JAIR-7 SCORE 3/11/2022   Total Score 8     Topics Discussed/Interventions Provided:    Substance Use Treatment:  [facility]) and tamir in outpatient treatment at [facility] (M-F, 8:30 -12PM). He denies experiencing any urges.     Housing:  Rex has been staying at his parents' for about 2 weeks but reports that it is not the most supportive environment and he does not feel like he is welcome to stay there much longer. His wife and daughter have been staying at his wife's treatment facility. He and his wife are looking for housing but Rex notes that it has been hard to find anything with a felony on his record, which has been stressful.     Court: Rex reports he has a court hearing this afternoon about past charges and he is feeling quite stressed. He expresses frustration that his  has not been very responsive, so he's not feeling confident that she will adequately defend him.      Mood: reports feeling kind of down and anxious but optimistic. He has been taking Cymbalta but does not feel it's really working.     Sleep: reports that his sleep hasn't been great. He can fall asleep okay but wakes up every hour (can fall back asleep easily) and does not feel well-rested the next day. He notes having bad dreams but often can't remember content. He did have a very distressing dream recently related to near death experience. He shared that he had visions while he was in a coma. Denied wanted to discuss further at this time because he was trying to stay focused on court in the afternoon.     Assessment:   The patient appeared to be active and engaged in today's session and was receptive to feedback.     Mental Status:   During interaction with the examiner today, Rex was cooperative, open, anxious, engaged and pleasant. Patient was generally alert and oriented to person, place, time, and situation. They were casually dressed and appropriately groomed. Patient's attire was appropriate for the weather and occasion. Eye contact was good; psychomotor  "functioning  normal or unremarkable. Speech was notable for occasional stuttering, accompanied by some frustration related to stutter but was otherwise WNL; largely coherent and relevant to topic. Mood was \"kind of down and anxious\"; affect was somewhat irritable and blunted. Thought processes were mostly linear and goal-oriented with occasional loss of train of thought but could recover. Thought content was not remarkable with no evidence of psychotic features and no evidence of suicide, homicide, or nonsuicidal self injury related thoughts, intent, urges, planning, behavior/recent attempts. With regard to memory, Rex recalled timeline of events but could not remember details of some events, recent memory appeared grossly intact without being formally evaluated. Insight appeared adequate and judgment good. Patient exhibited good impulse control during the appointment.      Does the patient appear to be at imminent risk of harm to self/others at this time? No     The session was necessary to address symptoms of anxiety  that have been interfering with patient's ability to function in areas related to meaningful activities. Ongoing psychotherapy is necessary to provide counseling.    DSM-5 Diagnosis:  A complete diagnostic was not feasible at today's visit. Provisional diagnosese of Adjustment Disorder with anxiety and depressed mood and substance use disorder are being given today pending further assessment.     Plan:  1. Follow up with this provider on 4/13  2. Follow up with Dr. Bahena on 4/14   3. Collaborate with KHUSHI Claros) on providing support around housing and legal  4. Consider facilitating connection to speciality program for TBI   5. Consider connection to behavioral health home   6. After Visit Summary will be reviewed in Marques VAUGHN, PhD    NOTE: Treatment plan due next session.  Diagnostic assessment due next session.     "

## 2022-03-23 NOTE — Clinical Note
Scot Flowers, I was also curious about how Rex might do with adding Prazosin? I think he's experiencing nightmares but given his TBI and lack of consciousness during his life threatening event, I think some of the trauma symptoms are manifesting a bit differently than we might see with a typical PTSD.

## 2022-03-23 NOTE — Clinical Note
GABRIEL - Also, Tara, I know you've been really instrumental in supporting Rex and I saw that you already shared some housing resources with him. I also saw that Dr. Bahena provided some resources for TBI specific treatment centers at his last visit with Rex. Given that he is restricted status, do you know if he can still go to a clinic in [facility] at this time, if it's specific for the TBI (I.e. [facility])? Or maybe Lionel has already looked into this?

## 2022-04-14 NOTE — PROGRESS NOTES
"  Assessment & Plan     Mild neurocognitive disorder due to traumatic brain injury, sequela (H)  Patient reports somewhat improved concentration.  Refill sent today.  Has follow-up with neurology next month.  - donepezil (ARICEPT) 10 MG tablet  Dispense: 30 tablet; Refill: 11    Partial symptomatic epilepsy with complex partial seizures, not intractable, without status epilepticus (H)  No seizure activity reported.  Has follow-up with neurology coming up next month.  - divalproex sodium extended-release (DEPAKOTE ER) 500 MG 24 hr tablet  Dispense: 60 tablet; Refill: 11    Secondary male hypogonadism  Patient reports good compliance and happy with increased libido and also reports increased focus and concentration.  Still feels like energy level relatively low compared to what it should be.  We discussed that we can continue this current dose and would recommend a recheck of blood-work and T levels in 2 months (the 3 months total of testosterone therapy).  Otherwise also encourage patient to attend endocrinology appointment next month for second opinion and for further evaluation for other causes of hypogonadism.  - testosterone cypionate (DEPOTESTOSTERONE) 200 MG/ML injection  Dispense: 10 mL; Refill: 1  - syringe, disposable, (B-D SYRINGE LUER-SHARLENE) 1 ML MISC  Dispense: 60 each; Refill: 0  - needle, disp, (BD HYPODERMIC NEEDLE) 18G X 1\" MISC  Dispense: 50 each; Refill: 0  - Needle, Disp, (BD DISP NEEDLES) 25G X 5/8\" MISC  Dispense: 50 each; Refill: 0    Tinea corporis  Patient with good relief but incomplete so far after 4 weeks of fluconazole.  Though I expect some of the hypopigmentation to persist for a longer period of time patient is still having pruritic symptoms as well.  We will try a week of itraconazole.  Otherwise will refer to dermatology for further work-up.  Patient is also interested in having moles checked.  - Adult Dermatology Referral  - itraconazole (ONMEL) 200 MG TABS tablet  Dispense: 7 tablet; " "Refill: 0    Sleep disorder  Posttraumatic stress disorder  Patient reporting ongoing sleep disturbance.  We discussed sleep hygiene techniques today.  Patient taking melatonin 5 mg at home on his own right before bed which is helped with falling asleep.  However patient is continuing to wake up at night with frequent PTSD related nightmares.  We discussed prazosin as an option for the nightmares however patient wants to work on sleep hygiene first.  Also discussed that if patient is not having trouble falling asleep can consider taking melatonin either in conjunction with this ongoing down or after waking up at night.      Return in about 2 months (around 6/14/2022) for Medication f/u.    MD AGUSTO Jacques [facility] CHARLEY Goodman is a 41 year old who presents for the following health issues    HPI   Patient is here for medication recheck.  Overall feels that mood, concentration, libido are better after starting testosterone.   Still has lower than expected energy for himself.  Relates that this may be due to sleep disturbances.  Patient is currently out of inpatient treatment and has moved home with parents temporarily while finding housing for himself and his family.  He is continuing due to outpatient treatment and plans to continue to follow there in the future.    Review of Systems   Constitutional, HEENT, cardiovascular, pulmonary, gi and gu systems are negative, except as otherwise noted.      Objective    /81   Pulse 71   Temp 97.7  F (36.5  C) (Oral)   Resp 16   Ht 1.87 m (6' 1.62\")   Wt 107.2 kg (236 lb 6.4 oz)   SpO2 97%   BMI 30.66 kg/m    Body mass index is 30.66 kg/m .  Physical Exam  Vitals reviewed.   Constitutional:       General: He is not in acute distress.     Appearance: Normal appearance. He is not ill-appearing.   HENT:      Head: Normocephalic and atraumatic.   Eyes:      Conjunctiva/sclera: Conjunctivae normal.   Pulmonary:      Effort: " Pulmonary effort is normal. No respiratory distress.   Musculoskeletal:         General: No deformity. Normal range of motion.   Skin:     General: Skin is warm and dry.      Comments: Diffuse pruritic hypopigmented areas with some erythema at the edges throughout the entire back and into the shoulders and upper chest    Neurological:      General: No focal deficit present.      Mental Status: He is alert.      Motor: No weakness.      Gait: Gait normal.   Psychiatric:         Behavior: Behavior normal.         Thought Content: Thought content normal.

## 2022-04-14 NOTE — TELEPHONE ENCOUNTER
[facility] Prior Authorization Team Request    Medication: Itraconazole 100mg  Dosing: take 2 capsules by mouth once daily for 7 days  NDC (required for Medicaid members): [address]-24    Insurance [facility]  BIN: [id number]  PCN: MA  Grp: [id number]  ID: [id number]    CoverMyMeds Key (if applicable):     Additional documentation:       Cory Carolina @  [facility]:[place]   Phone Number: [phone]  Contact:  [employer] PA ([id number]) please send all responses to this pool.  Pharmacy NPI (required for Medicaid members):[id number]

## 2022-04-14 NOTE — PATIENT INSTRUCTIONS
Housing Resources  Public Housing Benefits  [facility] authority- Section 8 and public housing [phone]     [facility] Authority- Section 8 and Public Housing [phone]     Housing Support (Formerly Group Residential Housing)- people who have GA or SSI may qualify for this supportive housing situation. https://[place].hb101.org/a/9/ or talk to your GA      [facility] Emergency Assistance: [phone]. Assists with temporary shelter, housing costs (rent, deposit, repairs & utility bills), foreclosure prevention, moving costs & transportation to relocate     Nonprofit housing  [employer]: 211 for other housing resources at 303-542-1861.     Front Door: [phone]. [facility] coordinated entry for housing programs for families.     Adult Shelter Connect: To access the [place] shelter system. [phone] [facility]: [address] [place] [address]- Mon - Fri: 10:00a.m. - 5:00p.m.  Weekends and Holidays: 1:00 - 5:00pm     Day One: 9-283-584-7041 crisis and housing resource for people seeking shelter from violence & abuse     [employer]- Has multiple different housing options. Some including living support. See website for phone numbers and program details https://www.[employer].org/locations/?services=Housing      [employer] Housing: affordable housing-locations listed on: http://properties.[employer].org/      [facility] Housing Collaborative: Has a list of low-rent/subsidized housing. Some locations are a part of supportive living programs. http://www.[employer].org/what-we-do/housing-window     Housinglink.org- Local rental listings. Can search by location, size, and for subsidized housing      [employer] Housing: Non-profit affordable apartments https://management.aeon.org/     Legal help related to housing  HOME line- legal housing assistance-- nonprofit tenant advocacy organization: 808.674.4736       [facility]  Legal Services) 4-679-862-8614- [facility]  [phone]- [facility]     Volunteer  [facility]: 15-20 minutes of legal advice or brief services on housing law issues. People with court that day are seen first, then first-come, first-served.  [facility] Court Clinic: [facility] cases are first priority  Hours: Tuesday, Wednesday, Friday -- 8:30 am-4:00 pm; Monday, Thursday -- 12:30 pm-4:00 pm  Location: [address] [address]: On the 3rd floor, outside Housing Court.   [facility] Housing Court Clinic: Only people with housing hearings in [facility] are served  Hours: Tuesday, Thursday -- 8:00 am-11:00 am Location: [address] [address] Clinic is outside Courtroom [address]A

## 2022-04-14 NOTE — PROGRESS NOTES
Preceptor Attestation:   Patient seen, evaluated and discussed with the resident. I have verified the content of the note, which accurately reflects my assessment of the patient and the plan of care.   Supervising Physician:  Demetrice Hinson, DO

## 2022-04-19 NOTE — TELEPHONE ENCOUNTER
Central Prior Authorization Team   Phone: [phone]      PA Initiation    Medication: Pa required on itraconazole  Insurance Company: [employer] - Phone [phone] Fax [phone]  Pharmacy Filling the Rx: [facility] [place], [place] - [address]  Filling Pharmacy Phone: [phone]  Filling Pharmacy Fax:    Start Date: 4/14/2022

## 2022-04-20 NOTE — PROGRESS NOTES
Telemedicine Visit: The patient's condition can be safely assessed and treated via synchronous audio and visual telemedicine encounter.      Reason for Telemedicine Visit: Patient has requested telehealth visit    Originating Site (Patient Location): [facility]     Distant Site (Provider Location): [facility] Outpatient Setting: [facility]    Consent:  The patient/guardian has verbally consented to: the potential risks and benefits of telemedicine (video visit) versus in person care; bill my insurance or make self-payment for services provided; and responsibility for payment of non-covered services.     Mode of Communication:  Video Conference via [email or web address]    As the provider I attest to compliance with applicable laws and regulations related to telemedicine.    Behavioral Health Progress Note    Client's Legal Name: Rex Negrete    Client's Preferred Name: Rex  YOB: 1980  Type of Service: video, counseling  Length of Service:   Start time: 1:25AM    End time: 1:50AM   Duration: 25 minutes  Attendees: patient    Identifying Information and Presenting Problem:  Rex is a 41 year old male is a 41 year old male being seen for symptoms of anxiety, depression, and trauma in the context of recent heart attack and adjusting to medical complications and TBI from this event.      Treatment Objective(s) Addressed in This Session:  Anxiety: will experience a reduction in anxiety    Progress on / Status of Treatment Objective(s) / Homework:  Satisfactory progress     Mental Health Screening Questionnaires:  PHQ-9:   PHQ 8/10/2021 2/23/2022 3/11/2022   PHQ-9 Total Score 10 11 12   Q9: Thoughts of better off dead/self-harm past 2 weeks Not at all Not at all Not at all      JAIR-7:   JAIR-7 SCORE 3/11/2022   Total Score 8     Topics Discussed/Interventions Provided:    Substance UseTreatment: Plans to finish outpatient treatment program near the end of May and then start alum  "program at some point.     Housing:  Rex continues to stay at his parents and struggle to find housing, which has been a major stressor. He misses his family and is eager to find something. He has been approved for an [facility] worker and is hopeful      Therapy: discussed course and timing of therapy. Rex expressed that he is having a difficult time engaging in therapy at this time. He shares that it is hard to fit therapy appointments in among CD treatment, several medical appointments, court dates, and trying to find housing.     Discussed options for therapy: provider shared potential goals to work on during therapy, including trauma processing, as well as benefits for managing anxiety and mood. Provided psychoeducation regarding trauma processing. Gave option to re-start therapy in a couple of months. Rex expressed interest in this option, as he doesn't feel in a position process emotions at this time. Made plan for provider to reach out in 2 months.     Assessment:   The patient appeared to be active and engaged in today's session and was receptive to feedback.     Mental Status:   During interaction with the examiner today, Rex was cooperative and engaged. Patient was generally alert and oriented to person, place, time, and situation. They were casually dressed and appropriately groomed. Patient's attire was appropriate for the weather and occasion. Eye contact was good; psychomotor functioning  normal or unremarkable. Speech was notable for occasional stuttering, accompanied by some frustration related to stutter but was otherwise WNL; largely coherent and relevant to topic. Mood was \"crabby\"; affect was euthymic. Thought processes were mostly linear and goal-oriented with occasional loss of train of thought but could recover. Thought content was not remarkable with no evidence of psychotic features and no evidence of suicide, homicide, or nonsuicidal self injury related thoughts, intent, urges, planning, " behavior/recent attempts. With regard to memory, Rex recalled timeline of events but could not remember details of some events, recent memory appeared grossly intact without being formally evaluated. Insight appeared adequate and judgment good. Patient exhibited good impulse control during the appointment.      Does the patient appear to be at imminent risk of harm to self/others at this time? No     The session was necessary to address symptoms of anxiety  that have been interfering with patient's ability to function in areas related to meaningful activities. Ongoing psychotherapy is necessary to provide counseling.    DSM-5 Diagnosis:  A complete diagnostic was not feasible at today's visit. Provisional diagnosese of Adjustment Disorder with anxiety and depressed mood and substance use disorder are being given today pending further assessment.     Plan:  1. Provider will reach out to patient in 2 months   2. Encouraged to reach out sooner, as needed   3. After Visit Summary will be reviewed in Marques VAUGHN, PhD    NOTE: Treatment plan due next session.  Diagnostic assessment due next session.

## 2022-04-20 NOTE — TELEPHONE ENCOUNTER
PA denied.  Reason given:Patient must have a history of trial & failure to at least 2 of the formulary alternative(s) or have a contraindication or intolerance to the formulary alternatives: Fluconazole, Nystatin, Terbinafine HCL

## 2022-04-20 NOTE — TELEPHONE ENCOUNTER
Terbinafine sent as alternative. Already incomplete treatment with full course of fluconazole. Next step would be to try itraconazole vs derm referral.   Lionel Bahena MD

## 2022-04-20 NOTE — TELEPHONE ENCOUNTER
PRIOR AUTHORIZATION DENIED    Medication: itraconazole    Denial Date: 4/19/2022    Denial Rational: Patient must have a history of trial & failure to at least 2 of the formulary alternative(s) or have a contraindication or intolerance to the formulary alternatives: Fluconazole, Nystatin, Terbinafine HCL          Appeal Information:     If provider would like to appeal please provide a letter of medical necessity stating why formulary alternatives would not be clinically appropriate for patient and route back to the PA team.
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
## 2022-05-09 NOTE — TELEPHONE ENCOUNTER
Called to make Follow-up Appt for Dr. Sierra and pt noted needed to callback to schedule.   Return in about 6 months (around 11/9/2022) for fasting morning labs.
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
## 2022-05-09 NOTE — TELEPHONE ENCOUNTER
Please resend as his patient is restricted by insurance to Josef or All    Thanks!  Jony LONDONO CPhT @    [facility]  [address] [place] [address]  Phone: [phone]  Fax: [phone]

## 2022-05-13 NOTE — TELEPHONE ENCOUNTER
T refill ordered for 1 month. Has follow-up at which time we will check mid-cycle levels and plan to have endo labs done as well.  Lionel Bahena MD

## 2022-05-13 NOTE — PROGRESS NOTES
[facility]  Heart Care Clinic Follow-up Note    Assessment & Plan        (Z86.74) H/O cardiac arrest  (primary encounter diagnosis)  Comment: Appears to be PEA in the face of a mild cardiomyopathy.    Troponin was significantly elevated at [facility], subsequent stress echo shows no ischemia and subsequent echo shows preserved ejection fraction without any significant valvular issues.  Given possible recurrent fatigue will recheck echocardiogram.  This was May 2021.    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  Recheck echo as above.    (M54.16) Bilateral S1 & Left L5 radiculopathy  Comment: Secondary to cardiac arrest, neuropathy did not improve with gabapentin and he is now on duloxetine and defer to primary physician.    (G44.329) Chronic post-traumatic headache, not intractable  Comment: Doing fairly well on Depakote.    Insomnia- he states he is able to fall back asleep but has frequent bouts of waking up.  Will defer to primary for sleep study to rule out sleep apnea or possibly restless leg syndrome.    Plan  1.  Limited echo for ejection fraction.  2.  If echo unremarkable, follow-up with me in about 18 months or sooner if needed.    Subjective  CC: 41-year-old gentleman here for follow-up visit.  Since I seen him he finished some treatment for mental health issues, also had diverticular surgery.  He comes in complaining of neuropathic pain involving his right lower extremity.  There is no chest pains anymore, palpitations, shortness of breath, PND, orthopnea or peripheral edema, syncope or dizziness.  He is hoping to get his old job back, he is currently working out at the gym but doing more weights than truly cardiac.    Medications  Current Outpatient Medications   Medication Sig Dispense Refill     acetaminophen (TYLENOL) 500 MG tablet Take 1,000 mg by mouth       divalproex sodium extended-release (DEPAKOTE  "ER) 500 MG 24 hr tablet Take 2 tablets (1,000 mg) by mouth At Bedtime 60 tablet 11     donepezil (ARICEPT) 10 MG tablet Take 1 tablet (10 mg) by mouth At Bedtime 30 tablet 11     DULoxetine (CYMBALTA) 60 MG capsule Take 2 capsules (120 mg) by mouth daily 60 capsule 1     Needle, Disp, (BD DISP NEEDLES) 25G X 5/8\" MISC 1 Units once a week 50 each 0     needle, disp, (BD HYPODERMIC NEEDLE) 18G X 1\" MISC 1 each once a week 50 each 0     Sharps Container MISC 1 Units every 30 days 1 each 1     syringe, disposable, (B-D SYRINGE LUER-SHARLENE) 1 ML MISC 1 Units once a week 60 each 0     testosterone cypionate (DEPOTESTOSTERONE) 200 MG/ML injection Inject 0.5 mLs (100 mg) Subcutaneous once a week 10 mL 1     diclofenac (VOLTAREN) 75 MG EC tablet Take 1 tablet (75 mg) by mouth 2 times daily as needed for moderate pain (Patient not taking: Reported on 5/13/2022) 30 tablet 0       Objective  /80 (BP Location: Right arm, Patient Position: Sitting, Cuff Size: Adult Large)   Pulse 87   Resp 20   Wt 107.1 kg (236 lb 1.6 oz)   BMI 30.63 kg/m      General Appearance:    Alert, cooperative, no distress, appears stated age   Head:    Normocephalic, without obvious abnormality, atraumatic   Throat:   Lips, mucosa, and tongue normal; teeth and gums normal   Neck:   Supple, symmetrical, trachea midline, no adenopathy;        thyroid:  No enlargement/tenderness/nodules; no carotid    bruit or JVD   Back:     Symmetric, no curvature, ROM normal, no CVA tenderness   Lungs:     Clear to auscultation bilaterally, respirations unlabored   Chest wall:    No tenderness or deformity   Heart:    Regular rate and rhythm, S1 and S2 normal, no murmur, rub   or gallop   Abdomen:     Soft, non-tender, bowel sounds active all four quadrants,     no masses, no organomegaly   Extremities:   Normal, atraumatic, no cyanosis or edema   Pulses:   2+ and symmetric all extremities   Skin:   Skin color, texture, turgor normal, no rashes or lesions "     Results    Lab Results personally reviewed   Lab Results   Component Value Date    CHOL 172 01/12/2022     Lab Results   Component Value Date    HDL 45 01/12/2022     No components found for: LDLCALC  Lab Results   Component Value Date    TRIG 174 (H) 01/12/2022     Lab Results   Component Value Date    WBC 17.3 (H) 10/27/2021    HGB 11.3 (L) 12/14/2021    HCT 33.5 10/27/2021     12/17/2021     Lab Results   Component Value Date    BUN 9 12/14/2021     12/14/2021    CO2 25 12/14/2021

## 2022-05-13 NOTE — PATIENT INSTRUCTIONS
Rex PIERCE Negrete,  I enjoyed visiting with you again today.  I am glad to hear you are doing well.  Per our conversation let us get the echo of the heart to see how function is after a year.  I will plan on seeing you 1 year.  Ryne Oconnor

## 2022-05-13 NOTE — LETTER
5/13/2022    Lionel Bahena MD  [facility] [address] [address]    RE: Rex Negrete       Dear Colleague,     I had the pleasure of seeing Rex Negrete in the [facility].      [facility]  Heart Care Clinic Follow-up Note    Assessment & Plan        (Z86.74) H/O cardiac arrest  (primary encounter diagnosis)  Comment: Appears to be PEA in the face of a mild cardiomyopathy.    Troponin was significantly elevated at [facility], subsequent stress echo shows no ischemia and subsequent echo shows preserved ejection fraction without any significant valvular issues.  Given possible recurrent fatigue will recheck echocardiogram.  This was May 2021.    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  Recheck echo as above.    (M54.16) Bilateral S1 & Left L5 radiculopathy  Comment: Secondary to cardiac arrest, neuropathy did not improve with gabapentin and he is now on duloxetine and defer to primary physician.    (G44.329) Chronic post-traumatic headache, not intractable  Comment: Doing fairly well on Depakote.    Insomnia- he states he is able to fall back asleep but has frequent bouts of waking up.  Will defer to primary for sleep study to rule out sleep apnea or possibly restless leg syndrome.    Plan  1.  Limited echo for ejection fraction.  2.  If echo unremarkable, follow-up with me in about 18 months or sooner if needed.    Subjective  CC: 41-year-old gentleman here for follow-up visit.  Since I seen him he finished some treatment for mental health issues, also had diverticular surgery.  He comes in complaining of neuropathic pain involving his right lower extremity.  There is no chest pains anymore, palpitations, shortness of breath, PND, orthopnea or peripheral edema, syncope or dizziness.  He is hoping to get his old job back, he is currently working out at the gym but  "doing more weights than truly cardiac.    Medications  Current Outpatient Medications   Medication Sig Dispense Refill     acetaminophen (TYLENOL) 500 MG tablet Take 1,000 mg by mouth       divalproex sodium extended-release (DEPAKOTE ER) 500 MG 24 hr tablet Take 2 tablets (1,000 mg) by mouth At Bedtime 60 tablet 11     donepezil (ARICEPT) 10 MG tablet Take 1 tablet (10 mg) by mouth At Bedtime 30 tablet 11     DULoxetine (CYMBALTA) 60 MG capsule Take 2 capsules (120 mg) by mouth daily 60 capsule 1     Needle, Disp, (BD DISP NEEDLES) 25G X 5/8\" MISC 1 Units once a week 50 each 0     needle, disp, (BD HYPODERMIC NEEDLE) 18G X 1\" MISC 1 each once a week 50 each 0     Sharps Container MISC 1 Units every 30 days 1 each 1     syringe, disposable, (B-D SYRINGE LUER-SHARLENE) 1 ML MISC 1 Units once a week 60 each 0     testosterone cypionate (DEPOTESTOSTERONE) 200 MG/ML injection Inject 0.5 mLs (100 mg) Subcutaneous once a week 10 mL 1     diclofenac (VOLTAREN) 75 MG EC tablet Take 1 tablet (75 mg) by mouth 2 times daily as needed for moderate pain (Patient not taking: Reported on 5/13/2022) 30 tablet 0       Objective  /80 (BP Location: Right arm, Patient Position: Sitting, Cuff Size: Adult Large)   Pulse 87   Resp 20   Wt 107.1 kg (236 lb 1.6 oz)   BMI 30.63 kg/m      General Appearance:    Alert, cooperative, no distress, appears stated age   Head:    Normocephalic, without obvious abnormality, atraumatic   Throat:   Lips, mucosa, and tongue normal; teeth and gums normal   Neck:   Supple, symmetrical, trachea midline, no adenopathy;        thyroid:  No enlargement/tenderness/nodules; no carotid    bruit or JVD   Back:     Symmetric, no curvature, ROM normal, no CVA tenderness   Lungs:     Clear to auscultation bilaterally, respirations unlabored   Chest wall:    No tenderness or deformity   Heart:    Regular rate and rhythm, S1 and S2 normal, no murmur, rub   or gallop   Abdomen:     Soft, non-tender, bowel sounds " active all four quadrants,     no masses, no organomegaly   Extremities:   Normal, atraumatic, no cyanosis or edema   Pulses:   2+ and symmetric all extremities   Skin:   Skin color, texture, turgor normal, no rashes or lesions     Results    Lab Results personally reviewed   Lab Results   Component Value Date    CHOL 172 01/12/2022     Lab Results   Component Value Date    HDL 45 01/12/2022     No components found for: LDLCALC  Lab Results   Component Value Date    TRIG 174 (H) 01/12/2022     Lab Results   Component Value Date    WBC 17.3 (H) 10/27/2021    HGB 11.3 (L) 12/14/2021    HCT 33.5 10/27/2021     12/17/2021     Lab Results   Component Value Date    BUN 9 12/14/2021     12/14/2021    CO2 25 12/14/2021               Thank you for allowing me to participate in the care of your patient.      Sincerely,     TOPHER OCNONOR MD     [facility]  cc:   Topher Oconnor MD  [address] [place] [phone]

## 2022-05-17 NOTE — TELEPHONE ENCOUNTER
10:34a.m Forwarded message/referral request to  (PCP).      Margarita Garnett  Pronouns: She/Her/Hers  Care Coordinator  [facility]  [phone]

## 2022-05-17 NOTE — TELEPHONE ENCOUNTER
[facility] phone call message - order or referral request for patient:     Order or referral being requested: Pa from [facility] called looking for a referral for this patient to be seen at [facility].       Additional Comments: Pa is requesting a call back if you have any questions or when the referral has been submitted. She can be reached at [phone].    OK to leave a message on voice mail? Yes    Primary language: English      needed? No    Call taken on May 17, 2022 at 10:24 AM by Anna Williamson
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
## 2022-05-18 NOTE — TELEPHONE ENCOUNTER
[facility] phone call message- patient requesting a refill:    Full Medication Name: lisdexamfetamine (VYVANSE) 30 MG capsule    Dose: Take 1 capsule (30 mg) by mouth every morning     Pharmacy confirmed as   [facility] [place], [place] - [address]  [facility] [address]  Phone: [phone] Fax: [phone]  : Yes    Additional Comments: Patient called and said that he needed Dr. Bahena and Dr. Carrizales to approve this medication. Patient requested a call back to find out if this was approved or not.    OK to leave a message on voice mail? Yes    Primary language: English      needed? No    Call taken on May 18, 2022 at 12:14 PM by Anna Williamson

## 2022-05-18 NOTE — TELEPHONE ENCOUNTER
SW completed [facility] Referral and Left with [facility] coordinator for Dr. Carrizales to sign on 5/19/22. [facility] Care Coordinator will then fax to [facility] restricted recipient program.     LD Montano  Pronouns: She/Her/Hers  , Care Coordination  [facility]  [phone]

## 2022-05-18 NOTE — TELEPHONE ENCOUNTER
Patient saw neurology today, they refilled lisdexamfetamine. Patient unable to pick-up as this is prescribed by a provider outside restricted recipient program. Routing to PCP and Dr. Carrizales to advise.     Gudelia Davis RN

## 2022-05-18 NOTE — TELEPHONE ENCOUNTER
SW completed [facility] Referral and Left with [facility] coordinator for Dr. Carrizales to sign on 5/19/22. [facility] Care Coordinator will then fax to [facility] restricted recipient program.     LD Montano  Pronouns: She/Her/Hers  , Care Coordination  [facility]  [phone]

## 2022-05-19 NOTE — TELEPHONE ENCOUNTER
5/19/22  signed/dated MRRP forms, they were successfully faxed to [phone].    Margarita Garnett  Pronouns: She/Her/Hers  Care Coordinator  [facility]  [phone]

## 2022-05-25 NOTE — TELEPHONE ENCOUNTER
"Pa from [facility] ([phone]) called to state that \"the one restricted referral was denied due to no Provider name, or NPI number\" Pa gave CC Provider name (Ryne Oconnor/Cardiology) and NPI # [id number]. CC re-faxed referral to [facility] [phone] with all needed information. Successful send.      Margarita Garnett  Care Coordinator  [facility]  [phone]    "

## 2022-05-27 NOTE — PROGRESS NOTES
"When opening a documentation only encounter, be sure to enter in \"Chief Complaint\" Forms and in \" Comments\" Title of form, description if needed.    Rex is a 41 year old  male  Form received via: Fax  Form now resides in: Provider Ready    Shayne Nino MA    Form has been completed by provider.     Form sent out via: Fax to [facility] at Fax Number: [phone]  Patient informed: n/a  Output date: May 27, 2022    Shayne Nino MA      **Please close the encounter**                  "

## 2022-06-07 PROBLEM — E29.1 SECONDARY MALE HYPOGONADISM: Status: ACTIVE | Noted: 2022-01-01

## 2022-06-07 NOTE — PROGRESS NOTES
"  Assessment & Plan     Mild neurocognitive disorder due to traumatic brain injury, sequela (H)  Overall patient with ongoing residual deficits but feels that since starting Vyvanse these symptoms are somewhat better.  Patient asking for Vyvanse refill today however I encouraged patient to reach out to neurologist for refills and that he is likely not due until another week.  Patient said that this makes sense as he does have some left but is worried about running out.    Secondary male hypogonadism  Patient so far happy with the effect of testosterone and has establish care with endocrinology who has ordered further work-up for potential etiologies.  We will refill testosterone today and check the blood work which was ordered on 5/9/2022 and adjust as needed.  - testosterone cypionate (DEPOTESTOSTERONE) 200 MG/ML injection  Dispense: 10 mL; Refill: 0    Anxiety  Refill sent.  Overall patient feels like in a better place but still having ongoing issues mostly concerning around housing situation  - DULoxetine (CYMBALTA) 60 MG capsule  Dispense: 60 capsule; Refill: 1        Return in about 3 months (around 9/7/2022) for Medication f/u.    Lionel Bahena MD  [facility] CHARLEY Goodman is a 41 year old who presents for the following health issues    HPI   Looking for housing which has been stressful.   Otherwise overall feeling better.  Reports energy level and libido are improved since starting testosterone and is started Vyvanse with neurology which patient feels like this helps with his concentration and focus.      Review of Systems   Constitutional, HEENT, cardiovascular, pulmonary, gi and gu systems are negative, except as otherwise noted.      Objective    /80   Pulse 96   Temp 98.2  F (36.8  C) (Oral)   Resp 16   Ht 1.87 m (6' 1.62\")   Wt 107 kg (236 lb)   SpO2 97%   BMI 30.61 kg/m    Body mass index is 30.61 kg/m .  Physical Exam  Vitals reviewed.   Constitutional:  "      General: He is not in acute distress.     Appearance: Normal appearance. He is not ill-appearing.   HENT:      Head: Normocephalic and atraumatic.   Eyes:      Conjunctiva/sclera: Conjunctivae normal.   Pulmonary:      Effort: Pulmonary effort is normal. No respiratory distress.   Musculoskeletal:         General: No deformity. Normal range of motion.   Skin:     General: Skin is warm and dry.   Neurological:      General: No focal deficit present.      Mental Status: He is alert.      Motor: No weakness.      Gait: Gait normal.   Psychiatric:         Behavior: Behavior normal.         Thought Content: Thought content normal.

## 2022-06-07 NOTE — PATIENT INSTRUCTIONS
Call Regi (concussion Dr. For refill of vyvanse)  Get blood-work done Friday morning at [facility] for testosterone levels.  Make appointment with [facility] to follow-up after specialty appointments.

## 2022-06-08 NOTE — PROGRESS NOTES
Preceptor Attestation:   Patient seen, evaluated and discussed with the resident. I have verified the content of the note, which accurately reflects my assessment of the patient and the plan of care.   Supervising Physician:  Tien Sheridan MD

## 2022-06-20 NOTE — TELEPHONE ENCOUNTER
Prior Authorization Retail Medication Request    Medication/Dose: lisdexamfetamine (VYVANSE) 40 MG capsule  ICD code (if different than what is on RX):  N/A  Previously Tried and Failed:  N/A  Rationale:  N/A    Insurance Name:  [facility]  Insurance ID:  [id number]      Pharmacy Information (if different than what is on RX)  Name:  [place], [place] - [address]  Phone:  [phone]

## 2022-06-20 NOTE — TELEPHONE ENCOUNTER
"Per Regi's message, \"Script sent for 40 mg, it is asking for a prior auth, please make sure one has been started.\"    I called the pt to let him know that we had to put in a prior auth per Regi for the Vyvanse 40 mg but then per pt said that he was able to  his Rx today for the Vyvanse 40 mg without problems.     I have already put in a request to the PA Team for this med. Will leave the PA request as is just in case.    FAITH Flores on 6/20/2022 at 4:36 PM    "

## 2022-06-20 NOTE — TELEPHONE ENCOUNTER
[facility] Call Center    Phone Message    May a detailed message be left on voicemail: yes     Reason for Call: Medication Refill Request    Has the patient contacted the pharmacy for the refill? Yes   Name of medication being requested:lisdexamfetamine (VYVANSE) 60 MG capsule  Provider who prescribed the medication:Amaya  Pharmacy:[facility] [place], [place] - [address]  Date medication is needed: ASAP    Action Taken: Message routed to:  [facility] neurology    Travel Screening: Not Applicable

## 2022-06-20 NOTE — TELEPHONE ENCOUNTER
Refill request for lisdexamfetamine (VYVANSE) 30 MG capsule. Pt was last seen on 5/18/22. Pt's next appt is on 7/13/2022 .    Medication T'd for review and signature    FAITH Flores on 6/20/2022 at 11:03 AM

## 2022-06-22 NOTE — PATIENT INSTRUCTIONS
Proper skin care from [facility]:    -Eliminate harsh soaps as they strip the natural oils from the skin, often resulting in dry itchy skin ( i.e. Dial, Zest, Ruba Spring)  -Use mild soaps such as Cetaphil or Dove Sensitive Skin in the shower. You do not need to use soap on arms, legs, and trunk every time you shower unless visibly soiled.   -Avoid hot or cold showers.  -After showering, lightly dry off and apply moisturizing within 2-3 minutes. This will help trap moisture in the skin.   -Aggressive use of a moisturizer at least 1-2 times a day to the entire body (including -Vanicream, Cetaphil, Aquaphor or Cerave) and moisturize hands after every washing.  -We recommend using moisturizers that come in a tub that needs to be scooped out, not a pump. This has more of an oil base. It will hold moisture in your skin much better than a water base moisturizer. The above recommended are non-pore clogging.      Wear a sunscreen with at least SPF 30 on your face, ears, neck and V of the chest daily. Wear sunscreen on other areas of the body if those areas are exposed to the sun throughout the day. Sunscreens can contain physical and/or chemical blockers. Physical blockers are less likely to clog pores, these include zinc oxide and titanium dioxide. Reapply every two hour and after swimming.     Sunscreen examples: https://www.ewg.org/sunscreen/    UV radiation  UVA radiation remains constant throughout the day and throughout the year. It is a longer wavelength than UVB and therefore penetrates deeper into the skin leading to immediate and delayed tanning, photoaging, and skin cancer. 70-80% of UVA and UVB radiation occurs between the hours of 10am-2pm.  UVB radiation  UVB radiation causes the most harmful effects and is more significant during the summer months. However, snow and ice can reflect UVB radiation leading to skin damage during the winter months as well. UVB radiation is responsible for tanning,  burning, inflammation, delayed erythema (pinkness), pigmentation (brown spots), and skin cancer.     I recommend self monthly full body exams and yearly full body exams with a dermatology provider. If you develop a new or changing lesion please follow up for examination. Most skin cancers are pink and scaly or pink and pearly. However, we do see blue/brown/black skin cancers.  Consider the ABCDEs of melanoma when giving yourself your monthly full body exam ( don't forget the groin, buttocks, feet, toes, etc). A-asymmetry, B-borders, C-color, D-diameter, E-elevation or evolving. If you see any of these changes please follow up in clinic. If you cannot see your back I recommend purchasing a hand held mirror to use with a larger wall mirror.                    Wound Care Instructions     FOR SUPERFICIAL WOUNDS     [facility] or     [facility] [phone]          AFTER 24 HOURS YOU SHOULD REMOVE THE BANDAGE AND BEGIN DAILY DRESSING CHANGES AS FOLLOWS:     1) Remove Dressing.     2) Clean and dry the area with tap water using a Q-tip or sterile gauze pad.     3) Apply Vaseline, Aquaphor, Polysporin ointment or Bacitracin ointment over entire wound.  Do NOT use Neosporin ointment.     4) Cover the wound with a band-aid, or a sterile non-stick gauze pad and micropore paper tape      REPEAT THESE INSTRUCTIONS AT LEAST ONCE A DAY UNTIL THE WOUND HAS COMPLETELY HEALED.    It is an old wives tale that a wound heals better when it is exposed to air and allowed to dry out. The wound will heal faster with a better cosmetic result if it is kept moist with ointment and covered with a bandage.    **Do not let the wound dry out.**      Supplies Needed:      *Cotton tipped applicators (Q-tips)    *Polysporin Ointment or Bacitracin Ointment (NOT NEOSPORIN)    *Band-aids or non-stick gauze pads and micropore paper tape.      PATIENT INFORMATION:    During the healing process you will notice a number of  changes. All wounds develop a small halo of redness surrounding the wound.  This means healing is occurring. Severe itching with extensive redness usually indicates sensitivity to the ointment or bandage tape used to dress the wound.  You should call our office if this develops.      Swelling  and/or discoloration around your surgical site is common, particularly when performed around the eye.    All wounds normally drain.  The larger the wound the more drainage there will be.  After 7-10 days, you will notice the wound beginning to shrink and new skin will begin to grow.  The wound is healed when you can see skin has formed over the entire area.  A healed wound has a healthy, shiny look to the surface and is red to dark pink in color to normalize.  Wounds may take approximately 4-6 weeks to heal.  Larger wounds may take 6-8 weeks.  After the wound is healed you may discontinue dressing changes.    You may experience a sensation of tightness as your wound heals. This is normal and will gradually subside.    Your healed wound may be sensitive to temperature changes. This sensitivity improves with time, but if you re having a lot of discomfort, try to avoid temperature extremes.    Patients frequently experience itching after their wound appears to have healed because of the continue healing under the skin.  Plain Vaseline will help relieve the itching.        POSSIBLE COMPLICATIONS    BLEEDING:    Leave the bandage in place.  Use tightly rolled up gauze or a cloth to apply direct pressure over the bandage for 30  minutes.  Reapply pressure for an additional 30 minutes if necessary  Use additional gauze and tape to maintain pressure once the bleeding has stopped. WOUND CARE INSTRUCTIONS  FOR CRYOSURGERY  For questions please call [phone]        This area treated with liquid nitrogen will form a blister. You do not need to bandage the area until after the blister forms and breaks (which may be a few days).  When  the blister breaks, begin daily dressing changes as follows:    1) Clean and dry the area with tap water using clean Q-tip or sterile gauze pad.    2) Apply Vaseline or Aquaphor over entire wound. Other options include Polysporin ointment or Bacitracin ointment over entire wound.  Do NOT use Neosporin ointment.    3) Cover the wound with a band-aid or sterile non-stick gauze pad and micropore paper tape.      REPEAT THESE INSTRUCTIONS AT LEAST ONCE A DAY UNTIL THE WOUND HAS COMPLETELY HEALED.        It is an old wives tale that a wound heals better when it is exposed to air and allowed to dry out. The wound will heal faster with a better cosmetic result if it is kept moist with ointment and covered with a bandage.  Do not let the wound dry out.      Supplies Needed:     *Cotton tipped applicators (Q-tips)   *Polysporin ointment or Bacitracin ointment (NOT NEOSPORIN)   *Band-aids, or non stick gauze pads and micropore paper tape    PATIENT INFORMATION    During the healing process you will notice a number of changes. All wounds develop a small halo of redness surrounding the wound.  This means healing is occurring. Severe itching with extensive redness usually indicates sensitivity to the ointment or bandage tape used to dress the wound.  You should call our office if this develops.      Swelling and/or discoloration around your surgical site is common, particularly when performed around the eye.    All wounds normally drain.  The larger the wound the more drainage there will be.  After 7-10 days, you will notice the wound beginning to shrink and new skin will begin to grow.  The wound is healed when you can see skin has formed over the entire area.  A healed wound has a healthy, shiny look to the surface and is red to dark pink in color to normalize.  Wounds may take approximately 4-6 weeks to heal.  Larger wounds may take 6-8 weeks.  After the wound is healed you may discontinue dressing changes.    You may  experience a sensation of tightness as your wound heals. This is normal and will gradually subside.    Your healed wound may be sensitive to temperature changes. This sensitivity improves with time, but if you re having a lot of discomfort, try to avoid temperature extremes.    Patients frequently experience itching after their wound appears to have healed because of the continue healing under the skin.  Plain Vaseline will help relieve the itching.

## 2022-06-22 NOTE — PROGRESS NOTES
[facility] Dermatology Note  Encounter Date: Jun 22, 2022  Office Visit     Reviewed patients past medical history and pertinent chart review prior to patients visit today.     Dermatology Problem List:  Condyloma of penis  Tinea versicolor  Irritated nevi shave removed 06/22/22 from abdomen x2    ____________________________________________    Assessment & Plan:     # tinea versicolor  Discussed diagnosis and treatment options with patient.at this point I think that his tinea versicolor is likely fully treated but the discoloration is persisting.  I discussed that this can take many months for the discoloration to fade and that diligent sun protection will help the lesions not be as noticeable.  I recommended restarting  selsan blue OTC shampoo about 3 times weekly as a body wash to the neck, chest and back to prevent recurrence.  Shampoo may be drying, okay to moisturize after bathing or throughout the day for irritation. Sun exposure/tan skin will make rash more obvious as affected areas may stay hypopigmented. Advised not to wear shirts more than once without washing while rash is present.     # Neoplasm of uncertain behavior: Left superior abdomen, right superior abdomen DDx includes irritated nevus rule out dysplasia. Shave biopsy today.    Procedure Note: Biopsy by shave technique  The risks and benefits of the procedure were described to the patient. These include but are not limited to bleeding, infection, scar, incomplete removal, and non-diagnostic biopsy. Verbal informed consent was obtained. The above site(s) was cleansed with an alcohol pad and injected with 1% lidocaine with epinephrine. Once anesthesia was obtained, a biopsy(ies) was performed with Gilette blade. The tissue(s) was placed in a labeled container(s) with formalin and sent to pathology. Hemostasis was achieved with aluminum chloride. Vaseline and a bandage were applied to the wound(s). The patient tolerated the  procedure well and was given post biopsy care instructions.     # condyloma acuminata, penis. Discussed with patient that this is a sexually transmitted disease. It can be transmitted to others through sexual contact even if warts are not visible. Most likely to transmit them when warts are visibly present but can also be spread when no warts are present. It is spread by HPV virus and women who come in contact with HPV should have regular pap smears to check for HPV infection which can cause cervical cancer. Patient advised to wear condoms with sexual contact to decrease risk of spreading the HPV virus.      Procedures Performed:   - Cryotherapy procedure note, location(s): shaft of penis. After verbal consent and discussion of risks and benefits including, but not limited to, dyspigmentation/scar, blister, and pain, and incomplete treatment,  1 lesion(s) was(were) treated with 1-2 mm freeze border for 2 cycles with liquid nitrogen. Post cryotherapy instructions were provided.    Follow-up: 1 month(s) in-person for recheck, or earlier for new or changing lesions      Virginie Pratt, APRN CNP on 6/22/2022 at 7:58 AM   _______________________________________    CC: Derm Problem (Pt would like two moles removed on chest and a spot on groin area)    HPI:  Mr. Rex Negrete is a(n) 41 year old male who presents today as a new patient for treatment of a bump on the penis.  He says this was biopsied several years ago at an outside clinic.  He does not remember where it was biopsied or exactly what the results showed but he said it was either a skin tag or a genital wart.  He thought it was gone but it slowly has grown back.  It is asymptomatic.  He has never had any other occurrences of genital warts.  He also has 2 moles on his abdomen that he thinks is changed in color and are irritated.  He would like them removed.  He also has a rash on his back.  He says he has had 2 rounds of oral medication for this from  "primary care but the discoloration has continued.  It used to be more scaly and that has gone away.    Patient says that he has had some brain damage and does not remember some details about his past medical history.    From his  medication history it looks like he was given ketoconazole 200 mg tablets once daily for 7 days given on 4/14/2022, and Diflucan 200 mg tablets once a week x4 given on 3/21/2022    Patient is otherwise feeling well, without additional skin concerns.      Physical Exam:  Vitals: There were no vitals taken for this visit.  SKIN: Focused examination of genitals, abdomen, chest, back was performed.  -On his back he has several hypopigmented macular areas without scale or erythema  -Left superior abdomen and right superior abdomen are about 8 mm oval-shaped skin colored raised papules with some speckled light to medium brown pigmentation within  -On the ventral shaft of the penis, about of 4 mm skin colored slightly verrucous appearing papule    - No other lesions of concern on areas examined.     Medications:  Current Outpatient Medications   Medication     divalproex sodium extended-release (DEPAKOTE ER) 500 MG 24 hr tablet     donepezil (ARICEPT) 10 MG tablet     DULoxetine (CYMBALTA) 60 MG capsule     lisdexamfetamine (VYVANSE) 40 MG capsule     Needle, Disp, (BD DISP NEEDLES) 25G X 5/8\" MISC     needle, disp, (BD HYPODERMIC NEEDLE) 18G X 1\" MISC     Sharps Container MISC     syringe, disposable, (B-D SYRINGE LUER-SHARLENE) 1 ML MISC     testosterone cypionate (DEPOTESTOSTERONE) 200 MG/ML injection     No current facility-administered medications for this visit.      Past Medical History:   Patient Active Problem List   Diagnosis     Erosive esophagitis     Anxiety     Chronic post-traumatic headache, not intractable     Dysthymic disorder     Mild TBI (traumatic brain injury) (H)     Posttraumatic stress disorder     H/O cardiac arrest     Secondary cardiomyopathy (H)     Methamphetamine use (H) "     Cocaine use     Bilateral S1 & Left L5 radiculopathy     Nonintractable epilepsy with complex partial seizures (H)     IVONNE (acute kidney injury) (H)     Hx of substance abuse (H)     Postoperative pain     S/P laparoscopic colectomy w/ colovesical fistula takedown     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)     PEA (Pulseless electrical activity) (H)     Secondary male hypogonadism     Past Medical History:   Diagnosis Date     Colovesical fistula      Depression     in the past (not being medicated for sx's)     Diverticulitis of sigmoid colon      Dysthymic disorder      Hx of substance abuse (H)     meth, cocaine     NONSPECIFIC MEDICAL HISTORY      PEA (Pulseless electrical activity) (H) cardiac arrest 05/2021    felt to be secondary to methamphetamine overdose and catacholamine toxicity     Seizures (H)        CC Lionel Bahena MD  [facility]  [address] [address] on close of this encounter.

## 2022-06-22 NOTE — LETTER
6/22/2022         RE: Rex Negrete  [address]  [place] [address]        Dear Colleague,    Thank you for referring your patient, Rex Negrete, to the [facility]. Please see a copy of my visit note below.    [facility] Dermatology Note  Encounter Date: Jun 22, 2022  Office Visit     Reviewed patients past medical history and pertinent chart review prior to patients visit today.     Dermatology Problem List:  Condyloma of penis  Tinea versicolor  Irritated nevi shave removed 06/22/22 from abdomen x2    ____________________________________________    Assessment & Plan:     # tinea versicolor  Discussed diagnosis and treatment options with patient.at this point I think that his tinea versicolor is likely fully treated but the discoloration is persisting.  I discussed that this can take many months for the discoloration to fade and that diligent sun protection will help the lesions not be as noticeable.  I recommended restarting  selsan blue OTC shampoo about 3 times weekly as a body wash to the neck, chest and back to prevent recurrence.  Shampoo may be drying, okay to moisturize after bathing or throughout the day for irritation. Sun exposure/tan skin will make rash more obvious as affected areas may stay hypopigmented. Advised not to wear shirts more than once without washing while rash is present.     # Neoplasm of uncertain behavior: Left superior abdomen, right superior abdomen DDx includes irritated nevus rule out dysplasia. Shave biopsy today.    Procedure Note: Biopsy by shave technique  The risks and benefits of the procedure were described to the patient. These include but are not limited to bleeding, infection, scar, incomplete removal, and non-diagnostic biopsy. Verbal informed consent was obtained. The above site(s) was cleansed with an alcohol pad and injected with 1% lidocaine with epinephrine. Once anesthesia was obtained, a  biopsy(ies) was performed with Gilette blade. The tissue(s) was placed in a labeled container(s) with formalin and sent to pathology. Hemostasis was achieved with aluminum chloride. Vaseline and a bandage were applied to the wound(s). The patient tolerated the procedure well and was given post biopsy care instructions.     # condyloma acuminata, penis. Discussed with patient that this is a sexually transmitted disease. It can be transmitted to others through sexual contact even if warts are not visible. Most likely to transmit them when warts are visibly present but can also be spread when no warts are present. It is spread by HPV virus and women who come in contact with HPV should have regular pap smears to check for HPV infection which can cause cervical cancer. Patient advised to wear condoms with sexual contact to decrease risk of spreading the HPV virus.      Procedures Performed:   - Cryotherapy procedure note, location(s): shaft of penis. After verbal consent and discussion of risks and benefits including, but not limited to, dyspigmentation/scar, blister, and pain, and incomplete treatment,  1 lesion(s) was(were) treated with 1-2 mm freeze border for 2 cycles with liquid nitrogen. Post cryotherapy instructions were provided.    Follow-up: 1 month(s) in-person for recheck, or earlier for new or changing lesions      Virginie Pratt, LOUANN CNP on 6/22/2022 at 7:58 AM   _______________________________________    CC: Derm Problem (Pt would like two moles removed on chest and a spot on groin area)    HPI:  Mr. Rex Negrete is a(n) 41 year old male who presents today as a new patient for treatment of a bump on the penis.  He says this was biopsied several years ago at an outside clinic.  He does not remember where it was biopsied or exactly what the results showed but he said it was either a skin tag or a genital wart.  He thought it was gone but it slowly has grown back.  It is asymptomatic.  He has never  "had any other occurrences of genital warts.  He also has 2 moles on his abdomen that he thinks is changed in color and are irritated.  He would like them removed.  He also has a rash on his back.  He says he has had 2 rounds of oral medication for this from primary care but the discoloration has continued.  It used to be more scaly and that has gone away.    Patient says that he has had some brain damage and does not remember some details about his past medical history.    From his  medication history it looks like he was given ketoconazole 200 mg tablets once daily for 7 days given on 4/14/2022, and Diflucan 200 mg tablets once a week x4 given on 3/21/2022    Patient is otherwise feeling well, without additional skin concerns.      Physical Exam:  Vitals: There were no vitals taken for this visit.  SKIN: Focused examination of genitals, abdomen, chest, back was performed.  -On his back he has several hypopigmented macular areas without scale or erythema  -Left superior abdomen and right superior abdomen are about 8 mm oval-shaped skin colored raised papules with some speckled light to medium brown pigmentation within  -On the ventral shaft of the penis, about of 4 mm skin colored slightly verrucous appearing papule    - No other lesions of concern on areas examined.     Medications:  Current Outpatient Medications   Medication     divalproex sodium extended-release (DEPAKOTE ER) 500 MG 24 hr tablet     donepezil (ARICEPT) 10 MG tablet     DULoxetine (CYMBALTA) 60 MG capsule     lisdexamfetamine (VYVANSE) 40 MG capsule     Needle, Disp, (BD DISP NEEDLES) 25G X 5/8\" MISC     needle, disp, (BD HYPODERMIC NEEDLE) 18G X 1\" MISC     Sharps Container MISC     syringe, disposable, (B-D SYRINGE LUER-SHARLENE) 1 ML MISC     testosterone cypionate (DEPOTESTOSTERONE) 200 MG/ML injection     No current facility-administered medications for this visit.      Past Medical History:   Patient Active Problem List   Diagnosis     Erosive " esophagitis     Anxiety     Chronic post-traumatic headache, not intractable     Dysthymic disorder     Mild TBI (traumatic brain injury) (H)     Posttraumatic stress disorder     H/O cardiac arrest     Secondary cardiomyopathy (H)     Methamphetamine use (H)     Cocaine use     Bilateral S1 & Left L5 radiculopathy     Nonintractable epilepsy with complex partial seizures (H)     IVONNE (acute kidney injury) (H)     Hx of substance abuse (H)     Postoperative pain     S/P laparoscopic colectomy w/ colovesical fistula takedown     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)     PEA (Pulseless electrical activity) (H)     Secondary male hypogonadism     Past Medical History:   Diagnosis Date     Colovesical fistula      Depression     in the past (not being medicated for sx's)     Diverticulitis of sigmoid colon      Dysthymic disorder      Hx of substance abuse (H)     meth, cocaine     NONSPECIFIC MEDICAL HISTORY      PEA (Pulseless electrical activity) (H) cardiac arrest 05/2021    felt to be secondary to methamphetamine overdose and catacholamine toxicity     Seizures (H)        CC Lionel Bahena MD  [facility]  [address] [place] [address] on close of this encounter.       Again, thank you for allowing me to participate in the care of your patient.        Sincerely,        LOUANN Wallace CNP

## 2022-06-23 NOTE — TELEPHONE ENCOUNTER
Central Prior Authorization Team   Phone: [phone]    PA Initiation    Medication:   Insurance Company: [employer] - Phone [phone] Fax [phone]  Pharmacy Filling the Rx: [facility] [place], [place] - [address]  Filling Pharmacy Phone: [phone]  Filling Pharmacy Fax: [phone]  Start Date: 6/23/2022

## 2022-06-28 NOTE — RESULT ENCOUNTER NOTE
I apologize for the delay.  I just came back from vacation.  The testosterone level is now within normal range.  The brain hormones (FSH and LH) which regulate the testosterone secretion are suppressed, and I do not have an explanation for this.  The number of white blood cells is borderline low and I advise you to follow-up on this with your primary care provider.  All the other lab results are unremarkable.  You can continue to take the same dose of testosterone and schedule a follow-up appointment in 6 months.

## 2022-06-30 NOTE — TELEPHONE ENCOUNTER
----- Message from LOUANN Gonzalez CNP sent at 6/29/2022  8:08 AM CDT -----  Mychart message sent     Maylin Goodman,      Your biopsy results showed two benign harmless moles, great news! Nothing further is needed for these spots. Continue the wound care until the biopsy site is fully healed. If you have any further questions or concerns please send me a message.      Tania Pratt, JUNIOR  Dermatology   Written by LOUANN Gonzalez CNP on 6/29/2022  8:08 AM CDT

## 2022-06-30 NOTE — TELEPHONE ENCOUNTER
Spoke w/ patient and gave him Tania's message below. Patient expressed understanding & had no questions.     Juana MIJARES RN  [facility] [place]  [phone]
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
## 2022-07-06 NOTE — PATIENT INSTRUCTIONS
#. Your MRI of your cervical spine does show arthritis and a disc bulge that is close to the spinal cord. However, you examination and symptoms are otherwise reassuring. Re-iterated physical symptoms that should prompt ED visit: changes in bowel/bladder, loss strength, changes in sensation (ie new or worsening numbness/tingling), sudden increase in pain.    #. Recommend physical therapy with home exercise program to help with long-lasting meaningful relief. PT will call you to schedule the appointment.    #. Can try a Theracane for self-tissue release or a massage gun.     #. We performed trigger point injections for you today with lidocaine and steroid (kenalog). This can be repeated if helpful    #. Follow up in [facility] x2-3months
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
## 2022-07-12 NOTE — TELEPHONE ENCOUNTER
"Patient stopped into clinic. Patient wondering how he \"get off restricted status\". Interested in switching pharmacy as ours is very limited in hours and not very close to [place].  I did recommend he get a hold of his insurance provider. He thinks PCP may have to fill out paperwork also.Do you have any insight to the restricted program and how we are notified of patient getting off restrictions?    /Addis Kelly  Care Coordinator  [facility]  Phone:[phone]  "

## 2022-07-12 NOTE — TELEPHONE ENCOUNTER
"Call made to [facility] regarding restrictions ~Restrictions ended 5/27/2022, [facility] will review again in 6 months to see if restrictions are needed again per  Blanca [phone].  Blanca states, patient was mailed a letter indicating \"graduating from program\". Blanca will also fax me letter and will be scanned into chart. Per Blanca patient is free to seek provider or pharmacy of choice as long as they accept [facility].    Addis Kelly  Care Coordinator  [facility]  Phone:[phone]    "

## 2022-07-12 NOTE — PROGRESS NOTES
PROCEDURE NOTE: Trigger Point Injection      PROCEDURE DATE: 7/06/2022    PATIENT NAME: Rex Negrete  YOB: 1980    ATTENDING PHYSICIAN: Owen Rizo MD   FELLOW/RESIDENT PHYSICIAN: None    PREOPERATIVE DIAGNOSIS:   1. Trigger point    2. Myofascial pain       POSTOPERATIVE DIAGNOSIS: same    PROCEDURE PERFORMED: Left Rhomboid Trigger Point Injection(s)    INDICATIONS FOR THE PROCEDURE:  Rex Negrete is a 41 year old male who presents with trigger point and myofascial pain.     PROCEDURE AND FINDINGS:  Patient was was greeted in the clinic. The risk, benefits and alternatives to the procedure were again reviewed with him and informed consent was obtained. A time-out was performed. Following review alternatives, benefits and risks, the procedure was carried out under aseptic fashion with alochol.     The injection was completed with a treatment solution of 3mL consisting of 2mL of 1% Lidocaine and 1mL Kenalog (40mg/mL) was injected to the left rhomboid muscle (x4 locations) using a gentle trigger point fanning technique, after no blood was aspirated on pull back. The remainder of the single use vial(s) was discarded     He tolerated the procedure without difficulty and was instructed on post-injection care. If the patient develops severe pain, fevers, redness, swelling, they should call our office or go to ED for evaluation. He was discharged to home in stable condition.     Follow-up will be in clinic     COMPLICATIONS: None    COMMENTS: None
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
## 2022-07-13 NOTE — PATIENT INSTRUCTIONS
Ask spine therapist if they can assess vestibular issues and treat if appropriate    Increased Vyvance, added small dose of Ritalin to be taken twice a day, when going to work one when leaving house and another 3-4 hours later. If staying home take one in am and one at 1 pm    Speech therapy     Paperwork

## 2022-07-17 NOTE — PLAN OF CARE
Pertinent assessments: Pt a/o 4. VSS. On room air. C/o abdominal pain, PRN dilaudid given. Pandya in place, adequate output. Clear liquids. Incision sites 5 clean dry and intact.   Major Shift Events arrived to floor at 1745.  Treatment Plan: manage pain  Bedside Nurse: Khalida Jasso RN          weakness
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
## 2022-08-02 NOTE — PROGRESS NOTES
Standardized Cognitive Evaluation Report    Repeatable Battery for the Assessment of Neuropsychological Status Update   (RBANS  Update)  The Repeatable Battery for the Assessment of Neuropsychological Status Update (RBANS  Update) was administered to Rex Negrete on 8/2/2022.  The RBANS Update was originally developed with a primary focus on assessment of dementia, and measures cognitive decline or improvement across these domains: immediate memory, visuospatial/constructional; language; attention; and delayed memory.  However, special group studies are available for Alzheimer's Disease, Vascular Dementia, HIV Dementia, Troup's Disease, Parkinson's Disease, Depression, Schizophrenia, and Closed Head Injury.   The RBANS can be used by clinicians and neuropsychologists to help screen for deficits in acute-care settings; track recovery during rehabilitation; track progression of neurological disorders; and screen for neurocognitive status in adolescents.  This test is normed for ages 12-89.  The subtest normative data are presented in scaled score units that have a mean of 10 and a standard deviation of 3.          Total Score Scaled Score  Percentile Group       I.  Immediate memory    1.  List Learning   26  9   2.  Story Memory   2  1  Immediate Memory Index Score  = 73 (borderline ext. Low)    II.  Visuospatial/Constructional    3.  Figure copy   17  8  4.  Line Orientation   19     51-75  Visuospatial/Constructional Index Score  = 102 (average)    III.  Language     5.  Picture Naming   10     51-75  6.  Semantic Fluency   11  3  Language Index Score = 80 (low average)    IV.  Attention  7.  Digit Span     12  12  8.  Coding     35  5  Attention Index Score = 91 (average)    V.  Delayed Memory  9.  List recall    0     </=2  10. List Recognition   0     </=2  11. Story Recall   0  1  12. Figure Recall   17  12  Delayed Memory Index Score = 48 (Extremely low)  Sum of Total Scores for Subtest 9+11+12 17    Sum  of Index Scores = 394  Total Scale Score = 73 (4%)      INTERPRETATION OF TEST RESULTS: Pt scores fall below average for his age group. With low average to extremely below average scores in the areas of immediate memory, delayed memory (interpret results with caution-Pt did not complete full recall subtest due to Pt reported inability to recall any details), and language. Pt reported he felt  irritated  and  tired  during testing. These results indicate a moderate cognitive impairment which is impacting Pt s ability to complete personal and occupational tasks.     TIME ADMINISTERING TEST: 19  TIME FOR INTERPRETATION AND PREPARATION OF REPORT: 20  TOTAL TIME: 39 minutes    Repeatable Battery for the Assessment of Neuropsychological Status Update (RBANS Update). Copyright   2012 [facility] [facility] Adapted and reproduced with permission. All rights reserved.    Thank you for referring Rex Negrete to outpatient therapy at [facility]. Please call Damaris Sebastian MA, SLP-CCC at [phone] email [email or web address] with any questions or concerns.      Damaris Sebastian M.A., SLP-CCC  Speech-Language Pathologist

## 2022-08-09 NOTE — TELEPHONE ENCOUNTER
Reviewed previous bloodwork from 6/2022 and endo note from 5/2022. Plan to continue current dose of T and reassess ~Dec 2022.  Lionel Bahena MD

## 2022-08-12 NOTE — TELEPHONE ENCOUNTER
----- Message from LOUANN Waite CNP sent at 8/11/2022  9:39 PM CDT -----  Please email the patient his latest letter from 8/11    [email or web address]

## 2022-09-07 NOTE — NURSING NOTE
Chief Complaint   Patient presents with     Concussion     Follow up visit  Worsening fatigue - will fall asleep in the middle of the day and then sleep all night
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
## 2022-09-16 NOTE — ADDENDUM NOTE
Encounter addended by: Damaris Sebastian, SLP on: 9/16/2022 7:55 AM   Actions taken: Clinical Note Signed, Episode resolved

## 2022-09-16 NOTE — PROGRESS NOTES
[facility]    Outpatient Speech Language Pathology Discharge Note  Patient: Rex Negrete  : 1980    Beginning/End Dates of Reporting Period:  22 to 22    Referring Provider: LOUANN Lange CNP    Therapy Diagnosis: Mild to moderate cognitive linguistic impairments secondary to post concussion syndrome    Client Self Report: Pt is a 42 y.o. male who completed an OP SLP evaluation on 22, please see the EMR for further information. Pt returned for a scheduled treatment session on 22, at that time the RBANS (standardized cognitive assessment) was completed, Pt demonstrated scores falling below average for his age group, with low average to extremely below average scores for immediate and delayed memory as well as language. Ongoing care was recommended, however, Pt has not scheduled further treatment sessions. Based on review of recent medical documentation Pt is reporting improvements and is increasing work hours.         Outcome Measures (most recent score):  Unable to determine if goals have been met as Pt has not returned for ongoing treatment, however, based on medical documentation from Pt's recent visit with neurology, he is reporting improvements in all functional areas and working towards return to PLOF>         Goals:  Goal Identifier LTG 1-Language Verbal Expression   Goal Description Patient will learn, implement, and demonstrate use of 3 word-finding strategies with 80% accuracy during word finding tasks provided with min cues in order to converse with family and friends without an increase in concussion symptoms.   Target Date 10/17/22   Date Met      Progress (detail required for progress note):       Goal Identifier LTG 2-Language Auditory Comprehension   Goal Description Patient will complete moderate to complex level auditory comprehension task (i.e. listen to  paragraph+ and answer questions) with 70% accuracy provided 0-min cues in order to talk on the phone without an increase in concussion symptoms.   Target Date 10/17/22   Date Met      Progress (detail required for progress note):       Goal Identifier LTG 3-Cognition-Memory   Goal Description Patient will learn, implement and demonstrate use of 3 internal or external memory support strategies to complete a moderate recall task (e.g. recall for 24 hours, listen to a 10 minute conversation and recall information to answer questions) with 70% accy and minimal increase in concussion symptoms in order to return  to  work full time as a .   Target Date 10/17/22   Date Met      Progress (detail required for progress note):       Goal Identifier LTG 4-Cognition-Reasoning   Goal Description Pt will complete moderate verbal/written reasoning skills for sequencing/daily planning needs with 70% accuracy and minimal increase in concussion symptoms per Pt report.   Target Date 10/17/22   Date Met      Progress (detail required for progress note):       Goal Identifier LTG 5-Cognition-Attention   Goal Description Pt will complete a moderate sustained attention tasks with a distracter present with 70% accuracy and minimal increase in concussion symptoms per Pt report in order to support RTW.   Target Date 10/17/22   Date Met      Progress (detail required for progress note):     Plan:  Discharge from therapy.    Discharge: Yes    Reason for Discharge: Patient chooses to discontinue therapy.      Discharge Plan: Patient to continue home program.    Thank you for referring Rex Negrete to outpatient therapy at [facility]. Please call Damaris Sebastian MA, SLP-CCC at [phone] email [email or web address] with any questions or concerns.      Damaris Sebastian M.A., SLP-CCC  Speech-Language Pathologist

## 2022-09-19 NOTE — TELEPHONE ENCOUNTER
Please deny Rx. Rx was increase to 10 mg at last visit.     methylphenidate (RITALIN) 10 MG tablet 60 tablet 0 9/7/2022  --   Sig - Route: Take 1 tablet (10 mg) by mouth 2 times daily - Oral
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
## 2022-11-02 NOTE — PATIENT INSTRUCTIONS
Set up appointment for Dr. Gunderson for headache consult    Concussion  will call you and set up your physical therapy eval for back and dizziness    Concussion Consult Overview Information Sheet is located under Letters In My Chart    Please message me through My Chart if you do not hear from any of the people above    Work letters can be found under letters in My Chart, if you need anything added or changed in your letter just message me through My Chart    Talk with pharmacist about a lotion for neuropathy     Message me through My Chart if you have any problems, questions, updates, or concerns    If you have any problems with My Chart please call [phone] and they can help     Schedule with primary
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
## 2022-11-02 NOTE — NURSING NOTE
Chief Complaint   Patient presents with     Concussion     Follow up visit  Having trouble getting an appt with primary care     CONCUSSION SYMPTOMS ASSESSMENT 7/19/2022 11/2/2022   Headache or Pressure In Head 2 - mild to moderate 2 - mild to moderate   Upset Stomach or Throwing Up 0 - none 0 - none   Problems with Balance 3 - moderate 1 - mild   Feeling Dizzy 3 - moderate 2 - mild to moderate   Sensitivity to Light 0 - none 0 - none   Sensitivity to Noise 1 - mild 2 - mild to moderate   Mood Changes 1 - mild 0 - none   Feeling sluggish, hazy, or foggy 4 - moderate to severe 4 - moderate to severe   Trouble Concentrating, Lack of Focus 5 - severe 2 - mild to moderate   Motion Sickness 0 - none 0 - none   Vision Changes 0 - none 0 - none   Memory Problems 6 - excruciating 4 - moderate to severe   Feeling Confused 3 - moderate 1 - mild   Neck Pain 6 - excruciating 4 - moderate to severe   Trouble Sleeping 3 - moderate 4 - moderate to severe   Total Number of Symptoms 11 10   Symptom Severity Score 37 26

## 2022-11-02 NOTE — NURSING NOTE
Chief Complaint   Patient presents with     Concussion     Follow up visit  Having trouble getting an appt with primary care

## 2022-11-09 NOTE — PROGRESS NOTES
[facility]                                                                                   OUTPATIENT PHYSICAL THERAPY FUNCTIONAL EVALUATION  PLAN OF TREATMENT FOR OUTPATIENT REHABILITATION  (COMPLETE FOR INITIAL CLAIMS ONLY)  Patient's Last Name, First Name, M.I.  YOB: 1980  MarychuyRex sánchez     Provider's Name   [facility]   Medical Record No.  [id number]     Start of Care Date:  11/07/22   Onset Date:  11/02/22 (date of order)   Type:     _X__PT   ____OT  ____SLP Medical Diagnosis:  Post concussion syndrome     PT Diagnosis:  Dizziness, Headache, Neck Pain, Balance Problems Visits from SOC:  1                              __________________________________________________________________________________  Plan of Treatment/Functional Goals:  balance training, motor coordination training, joint mobilization, neuromuscular re-education, ROM, strengthening, stretching, manual therapy, other (see comments) (vestibular rehab)     GOALS  HEP  In order to facilitate gains in balance and postural stability, cervical ROM/flexibility/strength and decrease motion sensitivity, neck pain and headaches outside of physical therapy, patient will demonstrate independence with a HEP.  01/05/23    DVA  The patient will score within 2 lines of SVA with performance of DVA to demonstrate that his vestibular system is functioning optimally and is able to support gaze stability within a functional range.  01/05/23    CSA  The patient will score 10 or less on CSA to demonstrate a significant improvement in concussion symptom severity and utilization of management techniques.  01/05/23    Neck Disability Index  Patient will report a reduction in neck pain as well as headaches by scoring 15 or less on the neck disability index in order to increase tolerance and ease for daily activities  and mobility.  01/05/23    Modified CTSIB  Patient will score normal on modified CTSIB demonstrating improvement in balance and minimized risk for falls.  01/05/23    Therapy Frequency:  1 time/week (decrease frequency as needed)   Predicted Duration of Therapy Intervention:  60 days    Ann Jesus, PT                                    I CERTIFY THE NEED FOR THESE SERVICES FURNISHED UNDER        THIS PLAN OF TREATMENT AND WHILE UNDER MY CARE     (Physician co-signature of this document indicates review and certification of the therapy plan).                Certification Date From:  11/07/22   Certification Date To:  01/05/23    Referring Provider:  Regi Conde APRN CNP    Initial Assessment  See Epic Evaluation- Start of Care Date: 11/07/22
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
## 2022-11-14 NOTE — PROGRESS NOTES
Updated order placed per provider and PT request for PT Eval and treat with addition of Pain PT added to referral.     FATUMA oBrdenN RN Care Coordinator  [facility]   [facility] # [phone]

## 2022-11-18 NOTE — PROGRESS NOTES
Assessment & Plan     Secondary male hypogonadism  Abnormal finding on thyroid function test  Due for recheck of hormone levels for HRT as well as thyroid as below unclear per my work-up and review of endocrinology's note by patient's hormones have been suppressed in this particular pattern given that patient reports that he did not previously start testosterone prior to these labs.  If these continue to be abnormal I would defer back to endocrinology and discuss their thoughts about how to go about diagnosing and correcting these abnormalities  - Testosterone total  - Follicle stimulating hormone  - Luteinizing Hormone, Adult  - Comprehensive metabolic panel  - CBC with platelets  - Lipid panel  - Hemoglobin A1c  - TSH  - T4 free    Dyspnea on exertion  Secondary cardiomyopathy (H)  H/O cardiac arrest  Erectile dysfunction, unspecified erectile dysfunction type  Patient with reported ongoing dyspnea especially.se will cause him to lose erections.  He reports that this is an ongoing issue over the last year since his cardiac arrest.  Patient was previously cleared by cardiology for 18mo follow-up after echo with return to normal ejection fraction and normal stress test 1 year ago.  We discussed repeating a stress test given the symptoms are ongoing however patient would like to defer so I will discuss further with cardiology if there is a need for sooner visit for additional work-up.  I did consider other etiologies including pulmonary given that patient has a history of smoking however no significant diagnosed lung history.  We will continue to consider PFTs if symptoms worsening and/or work-up unclear.  - tadalafil (CIALIS) 10 MG tablet  Dispense: 30 tablet; Refill: 0      No follow-ups on file.    Lionel Bahena MD  [facility] CHARLEY Goodman is a 42 year old, presenting for the following health issues:  RECHECK (Follow-up on lab work)      HPI   Patient presents due to abnormal  "T4 level on blood work done by endocrinology earlier this year.  Overall things have been better however patient reports ongoing intermittent sweating in the palms as well as concerned about erections and shortness of breath on exertion especially in the bedroom which has not improved since his cardiac arrest.    Review of Systems   Constitutional, HEENT, cardiovascular, pulmonary, gi and gu systems are negative, except as otherwise noted.      Objective    BP (!) 164/102   Pulse 100   Temp 98.4  F (36.9  C) (Oral)   Resp 16   Ht 1.86 m (6' 1.23\")   Wt 107.5 kg (237 lb)   SpO2 96%   BMI 31.07 kg/m    Body mass index is 31.07 kg/m .  Physical Exam  Vitals reviewed.   Constitutional:       General: He is not in acute distress.     Appearance: Normal appearance. He is not ill-appearing.   HENT:      Head: Normocephalic and atraumatic.   Eyes:      Conjunctiva/sclera: Conjunctivae normal.   Cardiovascular:      Rate and Rhythm: Normal rate and regular rhythm.      Heart sounds: No murmur heard.  Pulmonary:      Effort: Pulmonary effort is normal. No respiratory distress.      Breath sounds: Normal breath sounds. No wheezing.   Musculoskeletal:         General: No deformity. Normal range of motion.   Skin:     General: Skin is warm and dry.   Neurological:      General: No focal deficit present.      Mental Status: He is alert.      Motor: No weakness.      Gait: Gait normal.   Psychiatric:         Behavior: Behavior normal.         Thought Content: Thought content normal.          "
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
## 2022-12-14 NOTE — NURSING NOTE
Chief Complaint   Patient presents with     Concussion     Follow up      CONCUSSION SYMPTOMS ASSESSMENT 11/2/2022 11/7/2022 12/14/2022   Headache or Pressure In Head 2 - mild to moderate 2 - mild to moderate 1 - mild   Upset Stomach or Throwing Up 0 - none 0 - none 0 - none   Problems with Balance 1 - mild 2 - mild to moderate 1 - mild   Feeling Dizzy 2 - mild to moderate 3 - moderate 0 - none   Sensitivity to Light 0 - none 0 - none 0 - none   Sensitivity to Noise 2 - mild to moderate 1 - mild 0 - none   Mood Changes 0 - none 0 - none 0 - none   Feeling sluggish, hazy, or foggy 4 - moderate to severe 2 - mild to moderate 2 - mild to moderate   Trouble Concentrating, Lack of Focus 2 - mild to moderate 1 - mild 2 - mild to moderate   Motion Sickness 0 - none 0 - none 0 - none   Vision Changes 0 - none 0 - none 0 - none   Memory Problems 4 - moderate to severe 4 - moderate to severe 3 - moderate   Feeling Confused 1 - mild 0 - none 0 - none   Neck Pain 4 - moderate to severe 6 - excruciating 4 - moderate to severe   Trouble Sleeping 4 - moderate to severe 3 - moderate 5 - severe   Total Number of Symptoms 10 9 7   Symptom Severity Score 26 24 18
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
## 2022-12-19 NOTE — NURSING NOTE
Chief Complaint   Patient presents with     Seizure Disorder     Headaches, dizziness  Referred by FAITH Sheffield on 12/19/2022 at 8:18 AM

## 2022-12-19 NOTE — PATIENT INSTRUCTIONS
"I am not necessarily certain your events are linked with your prior EEG findings of left temporal injury, but I do think defining those events is necessarily to answer your question of \"can I come off of seizure medications\".  To do this, it will take a few months and likely a referral into a seizure speciality group for long term monitoring.  In the meantime, please continue with your Depakote as you are taking it already.    - [facility] referral for consideration of long term monitoring for capturing event  - CMP, Ammonia, Depakote free and total  - Depakote 500 mg QHS  "
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
## 2022-12-21 NOTE — TELEPHONE ENCOUNTER
"Last seen 11/18/2022    Request for medication refill:  Needle, Disp, (BD DISP NEEDLES) 25G X 5/8\" MISC  Providers if patient needs an appointment and you are willing to give a one month supply please refill for one month and  send a letter/MyChart using \".SMILLIMITEDREFILL\" .smillimited and route chart to \"P SMI \" (Giving one month refill in non controlled medications is strongly recommended before denial)    If refill has been denied, meaning absolutely no refills without visit, please complete the smart phrase \".smirxrefuse\" and route it to the \"P SMI MED REFILLS\"  pool to inform the patient and the pharmacy.    Laura Penyn RN        "

## 2023-01-01 ENCOUNTER — APPOINTMENT (OUTPATIENT)
Dept: ULTRASOUND IMAGING | Facility: CLINIC | Age: 43
End: 2023-01-01
Attending: INTERNAL MEDICINE
Payer: COMMERCIAL

## 2023-01-01 ENCOUNTER — APPOINTMENT (OUTPATIENT)
Dept: ULTRASOUND IMAGING | Facility: CLINIC | Age: 43
End: 2023-01-01
Attending: NURSE PRACTITIONER
Payer: COMMERCIAL

## 2023-01-01 ENCOUNTER — APPOINTMENT (OUTPATIENT)
Dept: NEUROLOGY | Facility: CLINIC | Age: 43
End: 2023-01-01
Attending: INTERNAL MEDICINE
Payer: COMMERCIAL

## 2023-01-01 ENCOUNTER — APPOINTMENT (OUTPATIENT)
Dept: GENERAL RADIOLOGY | Facility: CLINIC | Age: 43
End: 2023-01-01
Attending: INTERNAL MEDICINE
Payer: COMMERCIAL

## 2023-01-01 ENCOUNTER — VIRTUAL VISIT (OUTPATIENT)
Dept: FAMILY MEDICINE | Facility: CLINIC | Age: 43
End: 2023-01-01
Payer: COMMERCIAL

## 2023-01-01 ENCOUNTER — HOSPITAL ENCOUNTER (INPATIENT)
Facility: CLINIC | Age: 43
LOS: 3 days | End: 2023-01-18
Admitting: STUDENT IN AN ORGANIZED HEALTH CARE EDUCATION/TRAINING PROGRAM
Payer: COMMERCIAL

## 2023-01-01 ENCOUNTER — APPOINTMENT (OUTPATIENT)
Dept: GENERAL RADIOLOGY | Facility: CLINIC | Age: 43
End: 2023-01-01
Payer: COMMERCIAL

## 2023-01-01 ENCOUNTER — APPOINTMENT (OUTPATIENT)
Dept: CARDIOLOGY | Facility: CLINIC | Age: 43
End: 2023-01-01
Payer: COMMERCIAL

## 2023-01-01 ENCOUNTER — APPOINTMENT (OUTPATIENT)
Dept: CT IMAGING | Facility: CLINIC | Age: 43
End: 2023-01-01
Attending: STUDENT IN AN ORGANIZED HEALTH CARE EDUCATION/TRAINING PROGRAM
Payer: COMMERCIAL

## 2023-01-01 ENCOUNTER — APPOINTMENT (OUTPATIENT)
Dept: CARDIOLOGY | Facility: CLINIC | Age: 43
End: 2023-01-01
Attending: STUDENT IN AN ORGANIZED HEALTH CARE EDUCATION/TRAINING PROGRAM
Payer: COMMERCIAL

## 2023-01-01 ENCOUNTER — APPOINTMENT (OUTPATIENT)
Dept: CT IMAGING | Facility: CLINIC | Age: 43
End: 2023-01-01
Attending: INTERNAL MEDICINE
Payer: COMMERCIAL

## 2023-01-01 ENCOUNTER — HOSPITAL ENCOUNTER (OUTPATIENT)
Dept: PHYSICAL THERAPY | Facility: CLINIC | Age: 43
Discharge: HOME | End: 2023-01-03
Payer: COMMERCIAL

## 2023-01-01 ENCOUNTER — TELEPHONE (OUTPATIENT)
Dept: FAMILY MEDICINE | Facility: CLINIC | Age: 43
End: 2023-01-01

## 2023-01-01 ENCOUNTER — APPOINTMENT (OUTPATIENT)
Dept: NUCLEAR MEDICINE | Facility: CLINIC | Age: 43
End: 2023-01-01
Attending: NURSE PRACTITIONER
Payer: COMMERCIAL

## 2023-01-01 VITALS
OXYGEN SATURATION: 95 % | SYSTOLIC BLOOD PRESSURE: 68 MMHG | TEMPERATURE: 98.4 F | HEIGHT: 74 IN | HEART RATE: 89 BPM | BODY MASS INDEX: 33.9 KG/M2 | RESPIRATION RATE: 12 BRPM | DIASTOLIC BLOOD PRESSURE: 30 MMHG | WEIGHT: 264.11 LBS

## 2023-01-01 DIAGNOSIS — F07.81 POST CONCUSSION SYNDROME: ICD-10-CM

## 2023-01-01 DIAGNOSIS — R41.840 ATTENTION AND CONCENTRATION DEFICIT: ICD-10-CM

## 2023-01-01 DIAGNOSIS — R79.89 ABNORMAL SERUM THYROXINE (T4) LEVEL: ICD-10-CM

## 2023-01-01 DIAGNOSIS — M54.16 LUMBAR RADICULOPATHY: ICD-10-CM

## 2023-01-01 DIAGNOSIS — R42 DIZZINESS: Primary | ICD-10-CM

## 2023-01-01 DIAGNOSIS — R26.89 BALANCE PROBLEMS: ICD-10-CM

## 2023-01-01 DIAGNOSIS — N52.9 ERECTILE DYSFUNCTION, UNSPECIFIED ERECTILE DYSFUNCTION TYPE: ICD-10-CM

## 2023-01-01 DIAGNOSIS — M25.529 ELBOW PAIN, UNSPECIFIED LATERALITY: Primary | ICD-10-CM

## 2023-01-01 DIAGNOSIS — R06.09 DYSPNEA ON EXERTION: ICD-10-CM

## 2023-01-01 DIAGNOSIS — I46.9 CARDIAC ARREST (H): ICD-10-CM

## 2023-01-01 LAB
ABO/RH(D): NORMAL
ABO/RH(D): NORMAL
ACT BLD: 122 SECONDS (ref 74–150)
ACT BLD: 126 SECONDS (ref 74–150)
ACT BLD: 131 SECONDS (ref 74–150)
ACT BLD: 131 SECONDS (ref 74–150)
ACT BLD: 139 SECONDS (ref 74–150)
ACT BLD: 143 SECONDS (ref 74–150)
ACT BLD: 160 SECONDS (ref 74–150)
ACT BLD: 169 SECONDS (ref 74–150)
ACT BLD: 182 SECONDS (ref 74–150)
ACT BLD: 182 SECONDS (ref 74–150)
ACT BLD: 186 SECONDS (ref 74–150)
ACT BLD: 190 SECONDS (ref 74–150)
ACT BLD: 199 SECONDS (ref 74–150)
ACT BLD: 203 SECONDS (ref 74–150)
ACT BLD: 207 SECONDS (ref 74–150)
ACT BLD: 211 SECONDS (ref 74–150)
ACT BLD: 216 SECONDS (ref 74–150)
ACT BLD: 220 SECONDS (ref 74–150)
ACT BLD: 224 SECONDS (ref 74–150)
ACT BLD: 250 SECONDS (ref 74–150)
ALBUMIN SERPL BCG-MCNC: 1.7 G/DL (ref 3.5–5.2)
ALBUMIN SERPL BCG-MCNC: 1.8 G/DL (ref 3.5–5.2)
ALBUMIN SERPL BCG-MCNC: 1.8 G/DL (ref 3.5–5.2)
ALBUMIN SERPL BCG-MCNC: 2 G/DL (ref 3.5–5.2)
ALBUMIN SERPL BCG-MCNC: 2 G/DL (ref 3.5–5.2)
ALBUMIN SERPL BCG-MCNC: 2.1 G/DL (ref 3.5–5.2)
ALBUMIN SERPL BCG-MCNC: 2.1 G/DL (ref 3.5–5.2)
ALBUMIN SERPL BCG-MCNC: 2.4 G/DL (ref 3.5–5.2)
ALBUMIN SERPL BCG-MCNC: 2.6 G/DL (ref 3.5–5.2)
ALBUMIN SERPL BCG-MCNC: 2.7 G/DL (ref 3.5–5.2)
ALBUMIN SERPL BCG-MCNC: 2.9 G/DL (ref 3.5–5.2)
ALBUMIN SERPL BCG-MCNC: 3 G/DL (ref 3.5–5.2)
ALBUMIN SERPL BCG-MCNC: 3.1 G/DL (ref 3.5–5.2)
ALBUMIN SERPL BCG-MCNC: 3.6 G/DL (ref 3.5–5.2)
ALBUMIN UR-MCNC: 200 MG/DL
ALLEN'S TEST: ABNORMAL
ALLEN'S TEST: NO
ALLEN'S TEST: NO
ALP SERPL-CCNC: 23 U/L (ref 40–129)
ALP SERPL-CCNC: 24 U/L (ref 40–129)
ALP SERPL-CCNC: 25 U/L (ref 40–129)
ALP SERPL-CCNC: 26 U/L (ref 40–129)
ALP SERPL-CCNC: 30 U/L (ref 40–129)
ALP SERPL-CCNC: 32 U/L (ref 40–129)
ALP SERPL-CCNC: 38 U/L (ref 40–129)
ALP SERPL-CCNC: 38 U/L (ref 40–129)
ALP SERPL-CCNC: 42 U/L (ref 40–129)
ALP SERPL-CCNC: 59 U/L (ref 40–129)
ALP SERPL-CCNC: 96 U/L (ref 40–129)
ALT SERPL W P-5'-P-CCNC: 134 U/L (ref 10–50)
ALT SERPL W P-5'-P-CCNC: 135 U/L (ref 10–50)
ALT SERPL W P-5'-P-CCNC: 143 U/L (ref 10–50)
ALT SERPL W P-5'-P-CCNC: 153 U/L (ref 10–50)
ALT SERPL W P-5'-P-CCNC: 156 U/L (ref 10–50)
ALT SERPL W P-5'-P-CCNC: 159 U/L (ref 10–50)
ALT SERPL W P-5'-P-CCNC: 175 U/L (ref 10–50)
ALT SERPL W P-5'-P-CCNC: 1968 U/L (ref 10–50)
ALT SERPL W P-5'-P-CCNC: 215 U/L (ref 10–50)
ALT SERPL W P-5'-P-CCNC: 4423 U/L (ref 10–50)
ALT SERPL W P-5'-P-CCNC: 6432 U/L (ref 10–50)
ALT SERPL W P-5'-P-CCNC: 852 U/L (ref 10–50)
ALT SERPL W P-5'-P-CCNC: >7000 U/L (ref 10–50)
ALT SERPL W P-5'-P-CCNC: >7000 U/L (ref 10–50)
AMPHETAMINES UR QL SCN: ABNORMAL
ANION GAP SERPL CALCULATED.3IONS-SCNC: 10 MMOL/L (ref 7–15)
ANION GAP SERPL CALCULATED.3IONS-SCNC: 12 MMOL/L (ref 7–15)
ANION GAP SERPL CALCULATED.3IONS-SCNC: 13 MMOL/L (ref 7–15)
ANION GAP SERPL CALCULATED.3IONS-SCNC: 14 MMOL/L (ref 7–15)
ANION GAP SERPL CALCULATED.3IONS-SCNC: 16 MMOL/L (ref 7–15)
ANION GAP SERPL CALCULATED.3IONS-SCNC: 17 MMOL/L (ref 7–15)
ANION GAP SERPL CALCULATED.3IONS-SCNC: 18 MMOL/L (ref 7–15)
ANION GAP SERPL CALCULATED.3IONS-SCNC: 19 MMOL/L (ref 7–15)
ANION GAP SERPL CALCULATED.3IONS-SCNC: 20 MMOL/L (ref 7–15)
ANION GAP SERPL CALCULATED.3IONS-SCNC: 20 MMOL/L (ref 7–15)
ANION GAP SERPL CALCULATED.3IONS-SCNC: 22 MMOL/L (ref 7–15)
ANION GAP SERPL CALCULATED.3IONS-SCNC: 26 MMOL/L (ref 7–15)
ANION GAP SERPL CALCULATED.3IONS-SCNC: 27 MMOL/L (ref 7–15)
ANION GAP SERPL CALCULATED.3IONS-SCNC: 27 MMOL/L (ref 7–15)
ANION GAP SERPL CALCULATED.3IONS-SCNC: 8 MMOL/L (ref 7–15)
ANION GAP SERPL CALCULATED.3IONS-SCNC: 8 MMOL/L (ref 7–15)
ANION GAP SERPL CALCULATED.3IONS-SCNC: 9 MMOL/L (ref 7–15)
ANTIBODY SCREEN: NEGATIVE
ANTIBODY SCREEN: NEGATIVE
APPEARANCE UR: CLEAR
APTT PPP: 110 SECONDS (ref 22–38)
APTT PPP: 122 SECONDS (ref 22–38)
APTT PPP: 144 SECONDS (ref 22–38)
APTT PPP: 176 SECONDS (ref 22–38)
APTT PPP: 194 SECONDS (ref 22–38)
APTT PPP: 22 SECONDS (ref 22–38)
APTT PPP: 30 SECONDS (ref 22–38)
APTT PPP: 37 SECONDS (ref 22–38)
APTT PPP: 41 SECONDS (ref 22–38)
APTT PPP: 44 SECONDS (ref 22–38)
APTT PPP: 45 SECONDS (ref 22–38)
APTT PPP: 45 SECONDS (ref 22–38)
APTT PPP: 46 SECONDS (ref 22–38)
APTT PPP: 68 SECONDS (ref 22–38)
APTT PPP: >240 SECONDS (ref 22–38)
AST SERPL W P-5'-P-CCNC: 138 U/L (ref 10–50)
AST SERPL W P-5'-P-CCNC: 1401 U/L (ref 10–50)
AST SERPL W P-5'-P-CCNC: 146 U/L (ref 10–50)
AST SERPL W P-5'-P-CCNC: 160 U/L (ref 10–50)
AST SERPL W P-5'-P-CCNC: 180 U/L (ref 10–50)
AST SERPL W P-5'-P-CCNC: 183 U/L (ref 10–50)
AST SERPL W P-5'-P-CCNC: 184 U/L (ref 10–50)
AST SERPL W P-5'-P-CCNC: 203 U/L (ref 10–50)
AST SERPL W P-5'-P-CCNC: 3260 U/L (ref 10–50)
AST SERPL W P-5'-P-CCNC: 7627 U/L (ref 10–50)
AST SERPL W P-5'-P-CCNC: 9800 U/L (ref 10–50)
AST SERPL W P-5'-P-CCNC: ABNORMAL U/L
AT III ACT/NOR PPP CHRO: 105 % (ref 85–135)
AT III ACT/NOR PPP CHRO: 117 % (ref 85–135)
AT III ACT/NOR PPP CHRO: 37 % (ref 85–135)
AT III ACT/NOR PPP CHRO: 56 % (ref 85–135)
ATRIAL RATE - MUSE: 17 BPM
ATRIAL RATE - MUSE: 39 BPM
ATRIAL RATE - MUSE: 57 BPM
ATRIAL RATE - MUSE: 94 BPM
BACTERIA SPT CULT: NORMAL
BARBITURATES UR QL SCN: ABNORMAL
BASE EXCESS BLDA CALC-SCNC: -0.2 MMOL/L (ref -9–1.8)
BASE EXCESS BLDA CALC-SCNC: -0.2 MMOL/L (ref -9–1.8)
BASE EXCESS BLDA CALC-SCNC: -0.5 MMOL/L (ref -9.6–2)
BASE EXCESS BLDA CALC-SCNC: -0.5 MMOL/L (ref -9–1.8)
BASE EXCESS BLDA CALC-SCNC: -0.6 MMOL/L (ref -9–1.8)
BASE EXCESS BLDA CALC-SCNC: -0.7 MMOL/L (ref -9–1.8)
BASE EXCESS BLDA CALC-SCNC: -0.8 MMOL/L (ref -9–1.8)
BASE EXCESS BLDA CALC-SCNC: -0.9 MMOL/L (ref -9–1.8)
BASE EXCESS BLDA CALC-SCNC: -0.9 MMOL/L (ref -9–1.8)
BASE EXCESS BLDA CALC-SCNC: -1 MMOL/L (ref -9–1.8)
BASE EXCESS BLDA CALC-SCNC: -1.1 MMOL/L (ref -9–1.8)
BASE EXCESS BLDA CALC-SCNC: -1.5 MMOL/L (ref -9–1.8)
BASE EXCESS BLDA CALC-SCNC: -1.5 MMOL/L (ref -9–1.8)
BASE EXCESS BLDA CALC-SCNC: -1.7 MMOL/L (ref -9–1.8)
BASE EXCESS BLDA CALC-SCNC: -1.9 MMOL/L (ref -9–1.8)
BASE EXCESS BLDA CALC-SCNC: -1.9 MMOL/L (ref -9–1.8)
BASE EXCESS BLDA CALC-SCNC: -10.3 MMOL/L (ref -9–1.8)
BASE EXCESS BLDA CALC-SCNC: -10.6 MMOL/L (ref -9–1.8)
BASE EXCESS BLDA CALC-SCNC: -10.7 MMOL/L (ref -9–1.8)
BASE EXCESS BLDA CALC-SCNC: -11.5 MMOL/L (ref -9–1.8)
BASE EXCESS BLDA CALC-SCNC: -12.8 MMOL/L (ref -9–1.8)
BASE EXCESS BLDA CALC-SCNC: -13.1 MMOL/L (ref -9–1.8)
BASE EXCESS BLDA CALC-SCNC: -13.2 MMOL/L (ref -9–1.8)
BASE EXCESS BLDA CALC-SCNC: -13.3 MMOL/L (ref -9–1.8)
BASE EXCESS BLDA CALC-SCNC: -14 MMOL/L (ref -9–1.8)
BASE EXCESS BLDA CALC-SCNC: -2 MMOL/L (ref -9–1.8)
BASE EXCESS BLDA CALC-SCNC: -2.1 MMOL/L (ref -9–1.8)
BASE EXCESS BLDA CALC-SCNC: -2.6 MMOL/L (ref -9–1.8)
BASE EXCESS BLDA CALC-SCNC: -2.6 MMOL/L (ref -9–1.8)
BASE EXCESS BLDA CALC-SCNC: -3.3 MMOL/L (ref -9–1.8)
BASE EXCESS BLDA CALC-SCNC: -3.6 MMOL/L (ref -9–1.8)
BASE EXCESS BLDA CALC-SCNC: -4.3 MMOL/L (ref -9–1.8)
BASE EXCESS BLDA CALC-SCNC: -4.5 MMOL/L (ref -9–1.8)
BASE EXCESS BLDA CALC-SCNC: -7.2 MMOL/L (ref -9–1.8)
BASE EXCESS BLDA CALC-SCNC: -7.2 MMOL/L (ref -9–1.8)
BASE EXCESS BLDA CALC-SCNC: -7.3 MMOL/L (ref -9–1.8)
BASE EXCESS BLDA CALC-SCNC: -7.4 MMOL/L (ref -9–1.8)
BASE EXCESS BLDA CALC-SCNC: -7.5 MMOL/L (ref -9–1.8)
BASE EXCESS BLDA CALC-SCNC: -7.7 MMOL/L (ref -9–1.8)
BASE EXCESS BLDA CALC-SCNC: -8 MMOL/L (ref -9–1.8)
BASE EXCESS BLDA CALC-SCNC: -8.2 MMOL/L (ref -9–1.8)
BASE EXCESS BLDA CALC-SCNC: -8.2 MMOL/L (ref -9–1.8)
BASE EXCESS BLDA CALC-SCNC: -8.5 MMOL/L (ref -9–1.8)
BASE EXCESS BLDA CALC-SCNC: 0 MMOL/L (ref -9–1.8)
BASE EXCESS BLDA CALC-SCNC: 0.4 MMOL/L (ref -9–1.8)
BASE EXCESS BLDV CALC-SCNC: -0.5 MMOL/L (ref -7.7–1.9)
BASE EXCESS BLDV CALC-SCNC: -0.6 MMOL/L (ref -8.1–1.9)
BASE EXCESS BLDV CALC-SCNC: -1.8 MMOL/L (ref -7.7–1.9)
BASE EXCESS BLDV CALC-SCNC: -12.8 MMOL/L (ref -7.7–1.9)
BASE EXCESS BLDV CALC-SCNC: -2.2 MMOL/L (ref -7.7–1.9)
BASE EXCESS BLDV CALC-SCNC: -6.3 MMOL/L (ref -7.7–1.9)
BASE EXCESS BLDV CALC-SCNC: -6.7 MMOL/L (ref -7.7–1.9)
BASE EXCESS BLDV CALC-SCNC: 0 MMOL/L (ref -7.7–1.9)
BASE EXCESS BLDV CALC-SCNC: 0.4 MMOL/L (ref -7.7–1.9)
BASOPHILS # BLD AUTO: 0 10E3/UL (ref 0–0.2)
BASOPHILS NFR BLD AUTO: 0 %
BENZODIAZ UR QL SCN: ABNORMAL
BILIRUB DIRECT SERPL-MCNC: 1.69 MG/DL (ref 0–0.3)
BILIRUB SERPL-MCNC: 0.4 MG/DL
BILIRUB SERPL-MCNC: 0.5 MG/DL
BILIRUB SERPL-MCNC: 0.6 MG/DL
BILIRUB SERPL-MCNC: 0.7 MG/DL
BILIRUB SERPL-MCNC: 0.7 MG/DL
BILIRUB SERPL-MCNC: 0.8 MG/DL
BILIRUB SERPL-MCNC: 1 MG/DL
BILIRUB SERPL-MCNC: 1.4 MG/DL
BILIRUB SERPL-MCNC: 1.8 MG/DL
BILIRUB SERPL-MCNC: 2 MG/DL
BILIRUB SERPL-MCNC: 2.3 MG/DL
BILIRUB UR QL STRIP: NEGATIVE
BLD PROD TYP BPU: NORMAL
BLOOD COMPONENT TYPE: NORMAL
BUN SERPL-MCNC: 19.6 MG/DL (ref 6–20)
BUN SERPL-MCNC: 22.2 MG/DL (ref 6–20)
BUN SERPL-MCNC: 25.7 MG/DL (ref 6–20)
BUN SERPL-MCNC: 26 MG/DL (ref 6–20)
BUN SERPL-MCNC: 26 MG/DL (ref 6–20)
BUN SERPL-MCNC: 26.4 MG/DL (ref 8–23)
BUN SERPL-MCNC: 26.6 MG/DL (ref 6–20)
BUN SERPL-MCNC: 26.8 MG/DL (ref 6–20)
BUN SERPL-MCNC: 27.2 MG/DL (ref 6–20)
BUN SERPL-MCNC: 27.2 MG/DL (ref 6–20)
BUN SERPL-MCNC: 27.5 MG/DL (ref 6–20)
BUN SERPL-MCNC: 28.3 MG/DL (ref 6–20)
BUN SERPL-MCNC: 30.7 MG/DL (ref 6–20)
BUN SERPL-MCNC: 30.7 MG/DL (ref 6–20)
BUN SERPL-MCNC: 31.3 MG/DL (ref 6–20)
BUN SERPL-MCNC: 31.4 MG/DL (ref 6–20)
BUN SERPL-MCNC: 31.7 MG/DL (ref 6–20)
BZE UR QL SCN: ABNORMAL
CA-I BLD-MCNC: 3.8 MG/DL (ref 4.4–5.2)
CA-I BLD-MCNC: 4 MG/DL (ref 4.4–5.2)
CA-I BLD-MCNC: 4.1 MG/DL (ref 4.4–5.2)
CA-I BLD-MCNC: 4.2 MG/DL (ref 4.4–5.2)
CA-I BLD-MCNC: 4.2 MG/DL (ref 4.4–5.2)
CA-I BLD-MCNC: 4.3 MG/DL (ref 4.4–5.2)
CA-I BLD-MCNC: 4.4 MG/DL (ref 4.4–5.2)
CA-I BLD-MCNC: 4.5 MG/DL (ref 4.4–5.2)
CA-I BLD-MCNC: 4.5 MG/DL (ref 4.4–5.2)
CA-I BLD-MCNC: 4.6 MG/DL (ref 4.4–5.2)
CALCIUM SERPL-MCNC: 6.9 MG/DL (ref 8.2–9.6)
CALCIUM SERPL-MCNC: 7.1 MG/DL (ref 8.6–10)
CALCIUM SERPL-MCNC: 7.1 MG/DL (ref 8.6–10)
CALCIUM SERPL-MCNC: 7.3 MG/DL (ref 8.6–10)
CALCIUM SERPL-MCNC: 7.6 MG/DL (ref 8.6–10)
CALCIUM SERPL-MCNC: 7.6 MG/DL (ref 8.6–10)
CALCIUM SERPL-MCNC: 7.7 MG/DL (ref 8.6–10)
CALCIUM SERPL-MCNC: 7.7 MG/DL (ref 8.6–10)
CALCIUM SERPL-MCNC: 7.8 MG/DL (ref 8.6–10)
CALCIUM SERPL-MCNC: 7.9 MG/DL (ref 8.6–10)
CALCIUM SERPL-MCNC: 7.9 MG/DL (ref 8.6–10)
CALCIUM SERPL-MCNC: 8 MG/DL (ref 8.6–10)
CALCIUM SERPL-MCNC: 8.2 MG/DL (ref 8.6–10)
CALCIUM SERPL-MCNC: 8.4 MG/DL (ref 8.2–9.6)
CALCIUM SERPL-MCNC: 8.4 MG/DL (ref 8.6–10)
CANNABINOIDS UR QL SCN: ABNORMAL
CF REDUC 30M P MA P HEP LENFR BLD TEG: 0.2 % (ref 0–8)
CF REDUC 30M P MA P HEP LENFR BLD TEG: 2.4 % (ref 0–8)
CF REDUC 60M P MA P HEPASE LENFR BLD TEG: 2.5 % (ref 0–15)
CF REDUC 60M P MA P HEPASE LENFR BLD TEG: 6.7 % (ref 0–15)
CFT P HPASE BLD TEG: 1.5 MINUTE (ref 1–3)
CFT P HPASE BLD TEG: 2.4 MINUTE (ref 1–3)
CHLORIDE SERPL-SCNC: 102 MMOL/L (ref 98–107)
CHLORIDE SERPL-SCNC: 103 MMOL/L (ref 98–107)
CHLORIDE SERPL-SCNC: 103 MMOL/L (ref 98–107)
CHLORIDE SERPL-SCNC: 104 MMOL/L (ref 98–107)
CHLORIDE SERPL-SCNC: 105 MMOL/L (ref 98–107)
CHLORIDE SERPL-SCNC: 106 MMOL/L (ref 98–107)
CHLORIDE SERPL-SCNC: 106 MMOL/L (ref 98–107)
CHLORIDE SERPL-SCNC: 107 MMOL/L (ref 98–107)
CHLORIDE SERPL-SCNC: 108 MMOL/L (ref 98–107)
CHLORIDE SERPL-SCNC: 108 MMOL/L (ref 98–107)
CI (COAGULATION INDEX)(Z): -3.2 (ref -3–3)
CI (COAGULATION INDEX)(Z): 0.1 (ref -3–3)
CK SERPL-CCNC: 1543 U/L (ref 39–308)
CK SERPL-CCNC: 5928 U/L (ref 39–308)
CLOT ANGLE P HPASE BLD TEG: 57.3 DEGREES (ref 53–72)
CLOT ANGLE P HPASE BLD TEG: 71.2 DEGREES (ref 53–72)
CLOT INIT P HPASE BLD TEG: 7.8 MINUTE (ref 5–10)
CLOT INIT P HPASE BLD TEG: 8.4 MINUTE (ref 5–10)
CLOT STRENGTH P HPASE BLD TEG: 5.9 KD/SC (ref 4.5–11)
CLOT STRENGTH P HPASE BLD TEG: 9.8 KD/SC (ref 4.5–11)
CODING SYSTEM: NORMAL
COLOR UR AUTO: YELLOW
CREAT SERPL-MCNC: 1.36 MG/DL (ref 0.67–1.17)
CREAT SERPL-MCNC: 1.44 MG/DL (ref 0.67–1.17)
CREAT SERPL-MCNC: 1.44 MG/DL (ref 0.67–1.17)
CREAT SERPL-MCNC: 1.58 MG/DL (ref 0.67–1.17)
CREAT SERPL-MCNC: 1.85 MG/DL (ref 0.67–1.17)
CREAT SERPL-MCNC: 2.11 MG/DL (ref 0.67–1.17)
CREAT SERPL-MCNC: 2.48 MG/DL (ref 0.67–1.17)
CREAT SERPL-MCNC: 2.71 MG/DL (ref 0.67–1.17)
CREAT SERPL-MCNC: 2.8 MG/DL (ref 0.67–1.17)
CREAT SERPL-MCNC: 2.91 MG/DL (ref 0.67–1.17)
CREAT SERPL-MCNC: 3.24 MG/DL (ref 0.67–1.17)
CREAT SERPL-MCNC: 3.3 MG/DL (ref 0.67–1.17)
CREAT SERPL-MCNC: 3.34 MG/DL (ref 0.67–1.17)
CREAT SERPL-MCNC: 3.34 MG/DL (ref 0.67–1.17)
CREAT SERPL-MCNC: 3.79 MG/DL (ref 0.67–1.17)
CREAT SERPL-MCNC: 3.81 MG/DL (ref 0.67–1.17)
CREAT SERPL-MCNC: 3.89 MG/DL (ref 0.67–1.17)
CROSSMATCH: NORMAL
CRP SERPL-MCNC: 152 MG/L
CRP SERPL-MCNC: 318 MG/L
CRP SERPL-MCNC: 389 MG/L
CRP SERPL-MCNC: <3 MG/L
D DIMER PPP FEU-MCNC: 0.57 UG/ML FEU (ref 0–0.5)
D DIMER PPP FEU-MCNC: 0.88 UG/ML FEU (ref 0–0.5)
D DIMER PPP FEU-MCNC: 1.41 UG/ML FEU (ref 0–0.5)
D DIMER PPP FEU-MCNC: 10.65 UG/ML FEU (ref 0–0.5)
D DIMER PPP FEU-MCNC: 2.16 UG/ML FEU (ref 0–0.5)
D DIMER PPP FEU-MCNC: 2.23 UG/ML FEU (ref 0–0.5)
D DIMER PPP FEU-MCNC: 4.56 UG/ML FEU (ref 0–0.5)
DEPRECATED HCO3 PLAS-SCNC: 11 MMOL/L (ref 22–29)
DEPRECATED HCO3 PLAS-SCNC: 11 MMOL/L (ref 22–29)
DEPRECATED HCO3 PLAS-SCNC: 12 MMOL/L (ref 22–29)
DEPRECATED HCO3 PLAS-SCNC: 13 MMOL/L (ref 22–29)
DEPRECATED HCO3 PLAS-SCNC: 15 MMOL/L (ref 22–29)
DEPRECATED HCO3 PLAS-SCNC: 16 MMOL/L (ref 22–29)
DEPRECATED HCO3 PLAS-SCNC: 18 MMOL/L (ref 22–29)
DEPRECATED HCO3 PLAS-SCNC: 19 MMOL/L (ref 22–29)
DEPRECATED HCO3 PLAS-SCNC: 20 MMOL/L (ref 22–29)
DEPRECATED HCO3 PLAS-SCNC: 20 MMOL/L (ref 22–29)
DEPRECATED HCO3 PLAS-SCNC: 21 MMOL/L (ref 22–29)
DIASTOLIC BLOOD PRESSURE - MUSE: NORMAL MMHG
EOSINOPHIL # BLD AUTO: 0.1 10E3/UL (ref 0–0.7)
EOSINOPHIL NFR BLD AUTO: 1 %
ERYTHROCYTE [DISTWIDTH] IN BLOOD BY AUTOMATED COUNT: 13.1 % (ref 10–15)
ERYTHROCYTE [DISTWIDTH] IN BLOOD BY AUTOMATED COUNT: 13.2 % (ref 10–15)
ERYTHROCYTE [DISTWIDTH] IN BLOOD BY AUTOMATED COUNT: 13.4 % (ref 10–15)
ERYTHROCYTE [DISTWIDTH] IN BLOOD BY AUTOMATED COUNT: 13.5 % (ref 10–15)
ERYTHROCYTE [DISTWIDTH] IN BLOOD BY AUTOMATED COUNT: 13.9 % (ref 10–15)
ERYTHROCYTE [DISTWIDTH] IN BLOOD BY AUTOMATED COUNT: 14 % (ref 10–15)
ERYTHROCYTE [DISTWIDTH] IN BLOOD BY AUTOMATED COUNT: 14 % (ref 10–15)
ERYTHROCYTE [DISTWIDTH] IN BLOOD BY AUTOMATED COUNT: 14.1 % (ref 10–15)
ERYTHROCYTE [DISTWIDTH] IN BLOOD BY AUTOMATED COUNT: 14.2 % (ref 10–15)
ERYTHROCYTE [DISTWIDTH] IN BLOOD BY AUTOMATED COUNT: 14.4 % (ref 10–15)
ERYTHROCYTE [DISTWIDTH] IN BLOOD BY AUTOMATED COUNT: 15 % (ref 10–15)
ERYTHROCYTE [DISTWIDTH] IN BLOOD BY AUTOMATED COUNT: 15.5 % (ref 10–15)
ERYTHROCYTE [DISTWIDTH] IN BLOOD BY AUTOMATED COUNT: 15.8 % (ref 10–15)
ERYTHROCYTE [DISTWIDTH] IN BLOOD BY AUTOMATED COUNT: 16.1 % (ref 10–15)
ERYTHROCYTE [DISTWIDTH] IN BLOOD BY AUTOMATED COUNT: 16.1 % (ref 10–15)
ERYTHROCYTE [DISTWIDTH] IN BLOOD BY AUTOMATED COUNT: 16.2 % (ref 10–15)
ERYTHROCYTE [SEDIMENTATION RATE] IN BLOOD BY WESTERGREN METHOD: 1 MM/HR (ref 0–20)
ERYTHROCYTE [SEDIMENTATION RATE] IN BLOOD BY WESTERGREN METHOD: 19 MM/HR (ref 0–15)
ERYTHROCYTE [SEDIMENTATION RATE] IN BLOOD BY WESTERGREN METHOD: 5 MM/HR (ref 0–15)
ERYTHROCYTE [SEDIMENTATION RATE] IN BLOOD BY WESTERGREN METHOD: 69 MM/HR (ref 0–15)
ETHANOL UR QL SCN: ABNORMAL
FIBRINOGEN PPP-MCNC: 137 MG/DL (ref 170–490)
FIBRINOGEN PPP-MCNC: 151 MG/DL (ref 170–490)
FIBRINOGEN PPP-MCNC: 295 MG/DL (ref 170–490)
FIBRINOGEN PPP-MCNC: 381 MG/DL (ref 170–490)
FIBRINOGEN PPP-MCNC: 397 MG/DL (ref 170–490)
FIBRINOGEN PPP-MCNC: 507 MG/DL (ref 170–490)
FIBRINOGEN PPP-MCNC: 520 MG/DL (ref 170–490)
GFR SERPL CREATININE-BSD FRML MDRD: 19 ML/MIN/1.73M2
GFR SERPL CREATININE-BSD FRML MDRD: 20 ML/MIN/1.73M2
GFR SERPL CREATININE-BSD FRML MDRD: 20 ML/MIN/1.73M2
GFR SERPL CREATININE-BSD FRML MDRD: 23 ML/MIN/1.73M2
GFR SERPL CREATININE-BSD FRML MDRD: 24 ML/MIN/1.73M2
GFR SERPL CREATININE-BSD FRML MDRD: 27 ML/MIN/1.73M2
GFR SERPL CREATININE-BSD FRML MDRD: 28 ML/MIN/1.73M2
GFR SERPL CREATININE-BSD FRML MDRD: 29 ML/MIN/1.73M2
GFR SERPL CREATININE-BSD FRML MDRD: 33 ML/MIN/1.73M2
GFR SERPL CREATININE-BSD FRML MDRD: 34 ML/MIN/1.73M2
GFR SERPL CREATININE-BSD FRML MDRD: 40 ML/MIN/1.73M2
GFR SERPL CREATININE-BSD FRML MDRD: 47 ML/MIN/1.73M2
GFR SERPL CREATININE-BSD FRML MDRD: 63 ML/MIN/1.73M2
GFR SERPL CREATININE-BSD FRML MDRD: 63 ML/MIN/1.73M2
GFR SERPL CREATININE-BSD FRML MDRD: 67 ML/MIN/1.73M2
GLUCOSE BLD-MCNC: 105 MG/DL (ref 70–99)
GLUCOSE BLD-MCNC: 105 MG/DL (ref 70–99)
GLUCOSE BLD-MCNC: 112 MG/DL (ref 70–99)
GLUCOSE BLD-MCNC: 121 MG/DL (ref 70–99)
GLUCOSE BLD-MCNC: 123 MG/DL (ref 70–99)
GLUCOSE BLD-MCNC: 137 MG/DL (ref 70–99)
GLUCOSE BLD-MCNC: 138 MG/DL (ref 70–99)
GLUCOSE BLD-MCNC: 146 MG/DL (ref 70–99)
GLUCOSE BLD-MCNC: 74 MG/DL (ref 70–99)
GLUCOSE BLD-MCNC: 78 MG/DL (ref 70–99)
GLUCOSE BLD-MCNC: 89 MG/DL (ref 70–99)
GLUCOSE BLD-MCNC: 94 MG/DL (ref 70–99)
GLUCOSE BLD-MCNC: 95 MG/DL (ref 70–99)
GLUCOSE BLD-MCNC: 97 MG/DL (ref 70–99)
GLUCOSE BLD-MCNC: 98 MG/DL (ref 70–99)
GLUCOSE BLDC GLUCOMTR-MCNC: 100 MG/DL (ref 70–99)
GLUCOSE BLDC GLUCOMTR-MCNC: 104 MG/DL (ref 70–99)
GLUCOSE BLDC GLUCOMTR-MCNC: 104 MG/DL (ref 70–99)
GLUCOSE BLDC GLUCOMTR-MCNC: 105 MG/DL (ref 70–99)
GLUCOSE BLDC GLUCOMTR-MCNC: 107 MG/DL (ref 70–99)
GLUCOSE BLDC GLUCOMTR-MCNC: 108 MG/DL (ref 70–99)
GLUCOSE BLDC GLUCOMTR-MCNC: 113 MG/DL (ref 70–99)
GLUCOSE BLDC GLUCOMTR-MCNC: 114 MG/DL (ref 70–99)
GLUCOSE BLDC GLUCOMTR-MCNC: 116 MG/DL (ref 70–99)
GLUCOSE BLDC GLUCOMTR-MCNC: 118 MG/DL (ref 70–99)
GLUCOSE BLDC GLUCOMTR-MCNC: 119 MG/DL (ref 70–99)
GLUCOSE BLDC GLUCOMTR-MCNC: 122 MG/DL (ref 70–99)
GLUCOSE BLDC GLUCOMTR-MCNC: 123 MG/DL (ref 70–99)
GLUCOSE BLDC GLUCOMTR-MCNC: 129 MG/DL (ref 70–99)
GLUCOSE BLDC GLUCOMTR-MCNC: 130 MG/DL (ref 70–99)
GLUCOSE BLDC GLUCOMTR-MCNC: 134 MG/DL (ref 70–99)
GLUCOSE BLDC GLUCOMTR-MCNC: 134 MG/DL (ref 70–99)
GLUCOSE BLDC GLUCOMTR-MCNC: 135 MG/DL (ref 70–99)
GLUCOSE BLDC GLUCOMTR-MCNC: 137 MG/DL (ref 70–99)
GLUCOSE BLDC GLUCOMTR-MCNC: 149 MG/DL (ref 70–99)
GLUCOSE BLDC GLUCOMTR-MCNC: 156 MG/DL (ref 70–99)
GLUCOSE BLDC GLUCOMTR-MCNC: 186 MG/DL (ref 70–99)
GLUCOSE BLDC GLUCOMTR-MCNC: 191 MG/DL (ref 70–99)
GLUCOSE BLDC GLUCOMTR-MCNC: 72 MG/DL (ref 70–99)
GLUCOSE BLDC GLUCOMTR-MCNC: 82 MG/DL (ref 70–99)
GLUCOSE BLDC GLUCOMTR-MCNC: 83 MG/DL (ref 70–99)
GLUCOSE BLDC GLUCOMTR-MCNC: 86 MG/DL (ref 70–99)
GLUCOSE BLDC GLUCOMTR-MCNC: 87 MG/DL (ref 70–99)
GLUCOSE BLDC GLUCOMTR-MCNC: 87 MG/DL (ref 70–99)
GLUCOSE BLDC GLUCOMTR-MCNC: 90 MG/DL (ref 70–99)
GLUCOSE BLDC GLUCOMTR-MCNC: 91 MG/DL (ref 70–99)
GLUCOSE BLDC GLUCOMTR-MCNC: 94 MG/DL (ref 70–99)
GLUCOSE BLDC GLUCOMTR-MCNC: 99 MG/DL (ref 70–99)
GLUCOSE SERPL-MCNC: 100 MG/DL (ref 70–99)
GLUCOSE SERPL-MCNC: 105 MG/DL (ref 70–99)
GLUCOSE SERPL-MCNC: 106 MG/DL (ref 70–99)
GLUCOSE SERPL-MCNC: 110 MG/DL (ref 70–99)
GLUCOSE SERPL-MCNC: 113 MG/DL (ref 70–99)
GLUCOSE SERPL-MCNC: 115 MG/DL (ref 70–99)
GLUCOSE SERPL-MCNC: 121 MG/DL (ref 70–99)
GLUCOSE SERPL-MCNC: 121 MG/DL (ref 70–99)
GLUCOSE SERPL-MCNC: 123 MG/DL (ref 70–99)
GLUCOSE SERPL-MCNC: 129 MG/DL (ref 70–99)
GLUCOSE SERPL-MCNC: 145 MG/DL (ref 70–99)
GLUCOSE SERPL-MCNC: 183 MG/DL (ref 70–99)
GLUCOSE SERPL-MCNC: 183 MG/DL (ref 70–99)
GLUCOSE SERPL-MCNC: 75 MG/DL (ref 70–99)
GLUCOSE SERPL-MCNC: 82 MG/DL (ref 70–99)
GLUCOSE SERPL-MCNC: 85 MG/DL (ref 70–99)
GLUCOSE SERPL-MCNC: 93 MG/DL (ref 70–99)
GLUCOSE UR STRIP-MCNC: NEGATIVE MG/DL
GRAM STAIN RESULT: NORMAL
HCO3 BLD-SCNC: 14 MMOL/L (ref 21–28)
HCO3 BLD-SCNC: 15 MMOL/L (ref 21–28)
HCO3 BLD-SCNC: 16 MMOL/L (ref 21–28)
HCO3 BLD-SCNC: 16 MMOL/L (ref 21–28)
HCO3 BLD-SCNC: 17 MMOL/L (ref 21–28)
HCO3 BLD-SCNC: 17 MMOL/L (ref 21–28)
HCO3 BLD-SCNC: 18 MMOL/L (ref 21–28)
HCO3 BLD-SCNC: 19 MMOL/L (ref 21–28)
HCO3 BLD-SCNC: 20 MMOL/L (ref 21–28)
HCO3 BLD-SCNC: 22 MMOL/L (ref 21–28)
HCO3 BLD-SCNC: 22 MMOL/L (ref 21–28)
HCO3 BLD-SCNC: 23 MMOL/L (ref 21–28)
HCO3 BLD-SCNC: 23 MMOL/L (ref 21–28)
HCO3 BLD-SCNC: 24 MMOL/L (ref 21–28)
HCO3 BLD-SCNC: 25 MMOL/L (ref 21–28)
HCO3 BLD-SCNC: 26 MMOL/L (ref 21–28)
HCO3 BLDA-SCNC: 15 MMOL/L (ref 21–28)
HCO3 BLDA-SCNC: 19 MMOL/L (ref 21–28)
HCO3 BLDA-SCNC: 20 MMOL/L (ref 21–28)
HCO3 BLDA-SCNC: 23 MMOL/L (ref 21–28)
HCO3 BLDA-SCNC: 24 MMOL/L (ref 21–28)
HCO3 BLDA-SCNC: 24 MMOL/L (ref 21–28)
HCO3 BLDA-SCNC: 25 MMOL/L (ref 21–28)
HCO3 BLDA-SCNC: 25 MMOL/L (ref 21–28)
HCO3 BLDV-SCNC: 16 MMOL/L (ref 21–28)
HCO3 BLDV-SCNC: 21 MMOL/L (ref 21–28)
HCO3 BLDV-SCNC: 22 MMOL/L (ref 21–28)
HCO3 BLDV-SCNC: 25 MMOL/L (ref 21–28)
HCO3 BLDV-SCNC: 25 MMOL/L (ref 21–28)
HCO3 BLDV-SCNC: 26 MMOL/L (ref 21–28)
HCO3 BLDV-SCNC: 27 MMOL/L (ref 21–28)
HCT VFR BLD AUTO: 18.4 % (ref 40–53)
HCT VFR BLD AUTO: 20.3 % (ref 40–53)
HCT VFR BLD AUTO: 22.5 % (ref 40–53)
HCT VFR BLD AUTO: 23.1 % (ref 40–53)
HCT VFR BLD AUTO: 25.6 % (ref 40–53)
HCT VFR BLD AUTO: 27.1 % (ref 40–53)
HCT VFR BLD AUTO: 28.6 % (ref 40–53)
HCT VFR BLD AUTO: 30.1 % (ref 40–53)
HCT VFR BLD AUTO: 32.9 % (ref 40–53)
HCT VFR BLD AUTO: 34.2 % (ref 40–53)
HCT VFR BLD AUTO: 36.9 % (ref 40–53)
HCT VFR BLD AUTO: 37.1 % (ref 40–53)
HCT VFR BLD AUTO: 37.3 % (ref 40–53)
HCT VFR BLD AUTO: 38.9 % (ref 40–53)
HCT VFR BLD AUTO: 40.2 % (ref 40–53)
HCT VFR BLD AUTO: 45.1 % (ref 40–53)
HGB BLD-MCNC: 10.8 G/DL (ref 13.3–17.7)
HGB BLD-MCNC: 11.2 G/DL (ref 13.3–17.7)
HGB BLD-MCNC: 11.9 G/DL (ref 13.3–17.7)
HGB BLD-MCNC: 12 G/DL (ref 13.3–17.7)
HGB BLD-MCNC: 12.2 G/DL (ref 13.3–17.7)
HGB BLD-MCNC: 13 G/DL (ref 13.3–17.7)
HGB BLD-MCNC: 13.3 G/DL (ref 13.3–17.7)
HGB BLD-MCNC: 14.6 G/DL (ref 13.3–17.7)
HGB BLD-MCNC: 14.7 G/DL (ref 13.3–17.7)
HGB BLD-MCNC: 15.1 G/DL (ref 13.3–17.7)
HGB BLD-MCNC: 6.1 G/DL (ref 13.3–17.7)
HGB BLD-MCNC: 6.7 G/DL (ref 13.3–17.7)
HGB BLD-MCNC: 7.2 G/DL (ref 13.3–17.7)
HGB BLD-MCNC: 7.5 G/DL (ref 13.3–17.7)
HGB BLD-MCNC: 7.6 G/DL (ref 13.3–17.7)
HGB BLD-MCNC: 8.1 G/DL (ref 13.3–17.7)
HGB BLD-MCNC: 8.7 G/DL (ref 13.3–17.7)
HGB BLD-MCNC: 9.3 G/DL (ref 13.3–17.7)
HGB BLD-MCNC: 9.9 G/DL (ref 13.3–17.7)
HGB FREE PLAS-MCNC: 40 MG/DL
HGB FREE PLAS-MCNC: 40 MG/DL
HGB FREE PLAS-MCNC: 60 MG/DL
HGB FREE PLAS-MCNC: 70 MG/DL
HGB UR QL STRIP: ABNORMAL
HOLD SPECIMEN: NORMAL
IMM GRANULOCYTES # BLD: 0 10E3/UL
IMM GRANULOCYTES NFR BLD: 1 %
INR PPP: 1.16 (ref 0.85–1.15)
INR PPP: 1.18 (ref 0.85–1.15)
INR PPP: 1.19 (ref 0.85–1.15)
INR PPP: 1.2 (ref 0.85–1.15)
INR PPP: 1.2 (ref 0.85–1.15)
INR PPP: 1.36 (ref 0.85–1.15)
INR PPP: 1.38 (ref 0.85–1.15)
INR PPP: 1.53 (ref 0.85–1.15)
INR PPP: 1.62 (ref 0.85–1.15)
INR PPP: 1.82 (ref 0.85–1.15)
INR PPP: 2.44 (ref 0.85–1.15)
INR PPP: 2.98 (ref 0.85–1.15)
INR PPP: 3.87 (ref 0.85–1.15)
INR PPP: 4.33 (ref 0.85–1.15)
INR PPP: 4.83 (ref 0.85–1.15)
INTERPRETATION ECG - MUSE: NORMAL
INTERPRETATION TEGPIA: NORMAL
ISSUE DATE AND TIME: NORMAL
KETONES UR STRIP-MCNC: NEGATIVE MG/DL
LACTATE BLD-SCNC: 1 MMOL/L
LACTATE BLD-SCNC: 2.4 MMOL/L
LACTATE SERPL-SCNC: 1 MMOL/L (ref 0.7–2)
LACTATE SERPL-SCNC: 1.1 MMOL/L (ref 0.7–2)
LACTATE SERPL-SCNC: 1.2 MMOL/L (ref 0.7–2)
LACTATE SERPL-SCNC: 1.3 MMOL/L (ref 0.7–2)
LACTATE SERPL-SCNC: 1.6 MMOL/L (ref 0.7–2)
LACTATE SERPL-SCNC: 1.6 MMOL/L (ref 0.7–2)
LACTATE SERPL-SCNC: 1.7 MMOL/L (ref 0.7–2)
LACTATE SERPL-SCNC: 1.8 MMOL/L (ref 0.7–2)
LACTATE SERPL-SCNC: 1.8 MMOL/L (ref 0.7–2)
LACTATE SERPL-SCNC: 10.2 MMOL/L (ref 0.7–2)
LACTATE SERPL-SCNC: 11.3 MMOL/L (ref 0.7–2)
LACTATE SERPL-SCNC: 11.5 MMOL/L (ref 0.7–2)
LACTATE SERPL-SCNC: 11.7 MMOL/L (ref 0.7–2)
LACTATE SERPL-SCNC: 12 MMOL/L (ref 0.7–2)
LACTATE SERPL-SCNC: 12.2 MMOL/L (ref 0.7–2)
LACTATE SERPL-SCNC: 12.2 MMOL/L (ref 0.7–2)
LACTATE SERPL-SCNC: 12.9 MMOL/L (ref 0.7–2)
LACTATE SERPL-SCNC: 13 MMOL/L (ref 0.7–2)
LACTATE SERPL-SCNC: 13.9 MMOL/L (ref 0.7–2)
LACTATE SERPL-SCNC: 13.9 MMOL/L (ref 0.7–2)
LACTATE SERPL-SCNC: 14.7 MMOL/L (ref 0.7–2)
LACTATE SERPL-SCNC: 14.8 MMOL/L (ref 0.7–2)
LACTATE SERPL-SCNC: 2 MMOL/L (ref 0.7–2)
LACTATE SERPL-SCNC: 2 MMOL/L (ref 0.7–2)
LACTATE SERPL-SCNC: 2.1 MMOL/L (ref 0.7–2)
LACTATE SERPL-SCNC: 2.7 MMOL/L (ref 0.7–2)
LACTATE SERPL-SCNC: 6.9 MMOL/L (ref 0.7–2)
LACTATE SERPL-SCNC: 7.9 MMOL/L (ref 0.7–2)
LACTATE SERPL-SCNC: 8.8 MMOL/L (ref 0.7–2)
LACTATE SERPL-SCNC: 9.5 MMOL/L (ref 0.7–2)
LDH SERPL L TO P-CCNC: 1119 U/L (ref 0–250)
LDH SERPL L TO P-CCNC: 311 U/L (ref 0–250)
LDH SERPL L TO P-CCNC: 698 U/L (ref 0–250)
LDH SERPL L TO P-CCNC: >2500 U/L (ref 0–250)
LEUKOCYTE ESTERASE UR QL STRIP: NEGATIVE
LIDOCAIN SERPL-MCNC: 2.6 UG/ML
LYMPHOCYTES # BLD AUTO: 1.4 10E3/UL (ref 0.8–5.3)
LYMPHOCYTES NFR BLD AUTO: 24 %
MAGNESIUM SERPL-MCNC: 1.9 MG/DL (ref 1.7–2.3)
MAGNESIUM SERPL-MCNC: 2 MG/DL (ref 1.7–2.3)
MAGNESIUM SERPL-MCNC: 2 MG/DL (ref 1.7–2.3)
MAGNESIUM SERPL-MCNC: 2.1 MG/DL (ref 1.7–2.3)
MAGNESIUM SERPL-MCNC: 2.2 MG/DL (ref 1.7–2.3)
MAGNESIUM SERPL-MCNC: 2.5 MG/DL (ref 1.7–2.3)
MAGNESIUM SERPL-MCNC: 2.5 MG/DL (ref 1.7–2.3)
MAGNESIUM SERPL-MCNC: 2.7 MG/DL (ref 1.7–2.3)
MAGNESIUM SERPL-MCNC: 2.8 MG/DL (ref 1.7–2.3)
MCF P HPASE BLD TEG: 54.2 MM (ref 50–70)
MCF P HPASE BLD TEG: 66.3 MM (ref 50–70)
MCH RBC QN AUTO: 30.5 PG (ref 26.5–33)
MCH RBC QN AUTO: 30.6 PG (ref 26.5–33)
MCH RBC QN AUTO: 30.7 PG (ref 26.5–33)
MCH RBC QN AUTO: 30.7 PG (ref 26.5–33)
MCH RBC QN AUTO: 30.8 PG (ref 26.5–33)
MCH RBC QN AUTO: 30.9 PG (ref 26.5–33)
MCH RBC QN AUTO: 31.1 PG (ref 26.5–33)
MCH RBC QN AUTO: 31.1 PG (ref 26.5–33)
MCH RBC QN AUTO: 31.3 PG (ref 26.5–33)
MCH RBC QN AUTO: 31.3 PG (ref 26.5–33)
MCH RBC QN AUTO: 31.4 PG (ref 26.5–33)
MCH RBC QN AUTO: 31.4 PG (ref 26.5–33)
MCH RBC QN AUTO: 31.5 PG (ref 26.5–33)
MCH RBC QN AUTO: 31.8 PG (ref 26.5–33)
MCHC RBC AUTO-ENTMCNC: 31.6 G/DL (ref 31.5–36.5)
MCHC RBC AUTO-ENTMCNC: 31.9 G/DL (ref 31.5–36.5)
MCHC RBC AUTO-ENTMCNC: 32 G/DL (ref 31.5–36.5)
MCHC RBC AUTO-ENTMCNC: 32.1 G/DL (ref 31.5–36.5)
MCHC RBC AUTO-ENTMCNC: 32.5 G/DL (ref 31.5–36.5)
MCHC RBC AUTO-ENTMCNC: 32.5 G/DL (ref 31.5–36.5)
MCHC RBC AUTO-ENTMCNC: 32.6 G/DL (ref 31.5–36.5)
MCHC RBC AUTO-ENTMCNC: 32.7 G/DL (ref 31.5–36.5)
MCHC RBC AUTO-ENTMCNC: 32.8 G/DL (ref 31.5–36.5)
MCHC RBC AUTO-ENTMCNC: 32.9 G/DL (ref 31.5–36.5)
MCHC RBC AUTO-ENTMCNC: 33 G/DL (ref 31.5–36.5)
MCHC RBC AUTO-ENTMCNC: 33.1 G/DL (ref 31.5–36.5)
MCHC RBC AUTO-ENTMCNC: 33.2 G/DL (ref 31.5–36.5)
MCHC RBC AUTO-ENTMCNC: 33.4 G/DL (ref 31.5–36.5)
MCV RBC AUTO: 93 FL (ref 78–100)
MCV RBC AUTO: 94 FL (ref 78–100)
MCV RBC AUTO: 94 FL (ref 78–100)
MCV RBC AUTO: 95 FL (ref 78–100)
MCV RBC AUTO: 96 FL (ref 78–100)
MCV RBC AUTO: 97 FL (ref 78–100)
MCV RBC AUTO: 97 FL (ref 78–100)
MONOCYTES # BLD AUTO: 0.4 10E3/UL (ref 0–1.3)
MONOCYTES NFR BLD AUTO: 8 %
MRSA DNA SPEC QL NAA+PROBE: NEGATIVE
MUCOUS THREADS #/AREA URNS LPF: PRESENT /LPF
NEUTROPHILS # BLD AUTO: 3.8 10E3/UL (ref 1.6–8.3)
NEUTROPHILS NFR BLD AUTO: 66 %
NITRATE UR QL: NEGATIVE
NRBC # BLD AUTO: 0 10E3/UL
NRBC BLD AUTO-RTO: 0 /100
NSE SERPL IA-MCNC: 18.1 NG/ML
NSE SERPL IA-MCNC: 33.5 NG/ML
NSE SERPL IA-MCNC: 39.4 NG/ML
O2/TOTAL GAS SETTING VFR VENT: 100 %
O2/TOTAL GAS SETTING VFR VENT: 4 %
O2/TOTAL GAS SETTING VFR VENT: 40 %
O2/TOTAL GAS SETTING VFR VENT: 60 %
O2/TOTAL GAS SETTING VFR VENT: 70 %
O2/TOTAL GAS SETTING VFR VENT: 80 %
OPIATES UR QL SCN: ABNORMAL
OXYHGB MFR BLD: 94 % (ref 92–100)
OXYHGB MFR BLD: 95 % (ref 92–100)
OXYHGB MFR BLD: 96 % (ref 92–100)
OXYHGB MFR BLD: 97 % (ref 92–100)
OXYHGB MFR BLD: 98 % (ref 92–100)
OXYHGB MFR BLDA: 97 % (ref 75–100)
OXYHGB MFR BLDA: 97 % (ref 75–100)
OXYHGB MFR BLDA: 97 % (ref 92–100)
OXYHGB MFR BLDA: 98 % (ref 75–100)
OXYHGB MFR BLDA: 99 % (ref 75–100)
OXYHGB MFR BLDV: 43 %
OXYHGB MFR BLDV: 56 %
OXYHGB MFR BLDV: 57 %
OXYHGB MFR BLDV: 59 %
OXYHGB MFR BLDV: 60 %
OXYHGB MFR BLDV: 63 %
OXYHGB MFR BLDV: 71 % (ref 70–75)
OXYHGB MFR BLDV: 77 %
OXYHGB MFR BLDV: 94 % (ref 92–100)
P AXIS - MUSE: 70 DEGREES
P AXIS - MUSE: NORMAL DEGREES
PA AA BLD-ACNC: 30 %
PA ADP BLD-ACNC: 99 %
PCO2 BLD: 36 MM HG (ref 35–45)
PCO2 BLD: 38 MM HG (ref 35–45)
PCO2 BLD: 39 MM HG (ref 35–45)
PCO2 BLD: 40 MM HG (ref 35–45)
PCO2 BLD: 41 MM HG (ref 35–45)
PCO2 BLD: 42 MM HG (ref 35–45)
PCO2 BLD: 43 MM HG (ref 35–45)
PCO2 BLD: 44 MM HG (ref 35–45)
PCO2 BLD: 46 MM HG (ref 35–45)
PCO2 BLD: 47 MM HG (ref 35–45)
PCO2 BLD: 47 MM HG (ref 35–45)
PCO2 BLD: 48 MM HG (ref 35–45)
PCO2 BLD: 48 MM HG (ref 35–45)
PCO2 BLD: 49 MM HG (ref 35–45)
PCO2 BLD: 49 MM HG (ref 35–45)
PCO2 BLD: 50 MM HG (ref 35–45)
PCO2 BLD: 56 MM HG (ref 35–45)
PCO2 BLDA: 36 MM HG (ref 35–45)
PCO2 BLDA: 38 MM HG (ref 35–45)
PCO2 BLDA: 38 MM HG (ref 35–45)
PCO2 BLDA: 40 MM HG (ref 35–45)
PCO2 BLDA: 40 MM HG (ref 35–45)
PCO2 BLDA: 43 MM HG (ref 35–45)
PCO2 BLDA: 44 MM HG (ref 35–45)
PCO2 BLDA: 44 MM HG (ref 35–45)
PCO2 BLDV: 47 MM HG (ref 40–50)
PCO2 BLDV: 47 MM HG (ref 40–50)
PCO2 BLDV: 49 MM HG (ref 40–50)
PCO2 BLDV: 50 MM HG (ref 40–50)
PCO2 BLDV: 51 MM HG (ref 40–50)
PCO2 BLDV: 52 MM HG (ref 40–50)
PCO2 BLDV: 53 MM HG (ref 40–50)
PCO2 BLDV: 53 MM HG (ref 40–50)
PCO2 BLDV: 56 MM HG (ref 40–50)
PH BLD: 7.1 [PH] (ref 7.35–7.45)
PH BLD: 7.16 [PH] (ref 7.35–7.45)
PH BLD: 7.18 [PH] (ref 7.35–7.45)
PH BLD: 7.19 [PH] (ref 7.35–7.45)
PH BLD: 7.2 [PH] (ref 7.35–7.45)
PH BLD: 7.21 [PH] (ref 7.35–7.45)
PH BLD: 7.24 [PH] (ref 7.35–7.45)
PH BLD: 7.24 [PH] (ref 7.35–7.45)
PH BLD: 7.25 [PH] (ref 7.35–7.45)
PH BLD: 7.26 [PH] (ref 7.35–7.45)
PH BLD: 7.28 [PH] (ref 7.35–7.45)
PH BLD: 7.29 [PH] (ref 7.35–7.45)
PH BLD: 7.3 [PH] (ref 7.35–7.45)
PH BLD: 7.3 [PH] (ref 7.35–7.45)
PH BLD: 7.31 [PH] (ref 7.35–7.45)
PH BLD: 7.31 [PH] (ref 7.35–7.45)
PH BLD: 7.32 [PH] (ref 7.35–7.45)
PH BLD: 7.33 [PH] (ref 7.35–7.45)
PH BLD: 7.34 [PH] (ref 7.35–7.45)
PH BLD: 7.34 [PH] (ref 7.35–7.45)
PH BLD: 7.35 [PH] (ref 7.35–7.45)
PH BLD: 7.36 [PH] (ref 7.35–7.45)
PH BLD: 7.37 [PH] (ref 7.35–7.45)
PH BLD: 7.4 [PH] (ref 7.35–7.45)
PH BLD: 7.41 [PH] (ref 7.35–7.45)
PH BLDA: 7.17 [PH] (ref 7.35–7.45)
PH BLDA: 7.25 [PH] (ref 7.35–7.45)
PH BLDA: 7.3 [PH] (ref 7.35–7.45)
PH BLDA: 7.36 [PH] (ref 7.35–7.45)
PH BLDA: 7.37 [PH] (ref 7.35–7.45)
PH BLDA: 7.37 [PH] (ref 7.35–7.45)
PH BLDA: 7.41 [PH] (ref 7.35–7.45)
PH BLDA: 7.42 [PH] (ref 7.35–7.45)
PH BLDV: 7.1 [PH] (ref 7.32–7.43)
PH BLDV: 7.2 [PH] (ref 7.32–7.43)
PH BLDV: 7.21 [PH] (ref 7.32–7.43)
PH BLDV: 7.3 [PH] (ref 7.32–7.43)
PH BLDV: 7.32 [PH] (ref 7.32–7.43)
PH BLDV: 7.32 [PH] (ref 7.32–7.43)
PH BLDV: 7.33 [PH] (ref 7.32–7.43)
PH BLDV: 7.33 [PH] (ref 7.32–7.43)
PH BLDV: 7.35 [PH] (ref 7.32–7.43)
PH UR STRIP: 7 [PH] (ref 5–7)
PHOSPHATE SERPL-MCNC: 2.8 MG/DL (ref 2.5–4.5)
PHOSPHATE SERPL-MCNC: 4.7 MG/DL (ref 2.5–4.5)
PHOSPHATE SERPL-MCNC: 7.1 MG/DL (ref 2.5–4.5)
PHOSPHATE SERPL-MCNC: 7.1 MG/DL (ref 2.5–4.5)
PHOSPHATE SERPL-MCNC: 8 MG/DL (ref 2.5–4.5)
PHOSPHATE SERPL-MCNC: 9.4 MG/DL (ref 2.5–4.5)
PLATELET # BLD AUTO: 112 10E3/UL (ref 150–450)
PLATELET # BLD AUTO: 133 10E3/UL (ref 150–450)
PLATELET # BLD AUTO: 157 10E3/UL (ref 150–450)
PLATELET # BLD AUTO: 183 10E3/UL (ref 150–450)
PLATELET # BLD AUTO: 205 10E3/UL (ref 150–450)
PLATELET # BLD AUTO: 214 10E3/UL (ref 150–450)
PLATELET # BLD AUTO: 223 10E3/UL (ref 150–450)
PLATELET # BLD AUTO: 247 10E3/UL (ref 150–450)
PLATELET # BLD AUTO: 251 10E3/UL (ref 150–450)
PLATELET # BLD AUTO: 274 10E3/UL (ref 150–450)
PLATELET # BLD AUTO: 35 10E3/UL (ref 150–450)
PLATELET # BLD AUTO: 43 10E3/UL (ref 150–450)
PLATELET # BLD AUTO: 53 10E3/UL (ref 150–450)
PLATELET # BLD AUTO: 63 10E3/UL (ref 150–450)
PLATELET # BLD AUTO: 76 10E3/UL (ref 150–450)
PLATELET # BLD AUTO: 92 10E3/UL (ref 150–450)
PO2 BLD: 104 MM HG (ref 80–105)
PO2 BLD: 104 MM HG (ref 80–105)
PO2 BLD: 111 MM HG (ref 80–105)
PO2 BLD: 115 MM HG (ref 80–105)
PO2 BLD: 119 MM HG (ref 80–105)
PO2 BLD: 119 MM HG (ref 80–105)
PO2 BLD: 121 MM HG (ref 80–105)
PO2 BLD: 122 MM HG (ref 80–105)
PO2 BLD: 122 MM HG (ref 80–105)
PO2 BLD: 124 MM HG (ref 80–105)
PO2 BLD: 126 MM HG (ref 80–105)
PO2 BLD: 130 MM HG (ref 80–105)
PO2 BLD: 133 MM HG (ref 80–105)
PO2 BLD: 140 MM HG (ref 80–105)
PO2 BLD: 145 MM HG (ref 80–105)
PO2 BLD: 154 MM HG (ref 80–105)
PO2 BLD: 157 MM HG (ref 80–105)
PO2 BLD: 160 MM HG (ref 80–105)
PO2 BLD: 163 MM HG (ref 80–105)
PO2 BLD: 165 MM HG (ref 80–105)
PO2 BLD: 168 MM HG (ref 80–105)
PO2 BLD: 172 MM HG (ref 80–105)
PO2 BLD: 182 MM HG (ref 80–105)
PO2 BLD: 203 MM HG (ref 80–105)
PO2 BLD: 203 MM HG (ref 80–105)
PO2 BLD: 228 MM HG (ref 80–105)
PO2 BLD: 257 MM HG (ref 80–105)
PO2 BLD: 262 MM HG (ref 80–105)
PO2 BLD: 272 MM HG (ref 80–105)
PO2 BLD: 284 MM HG (ref 80–105)
PO2 BLD: 290 MM HG (ref 80–105)
PO2 BLD: 294 MM HG (ref 80–105)
PO2 BLD: 294 MM HG (ref 80–105)
PO2 BLD: 295 MM HG (ref 80–105)
PO2 BLD: 324 MM HG (ref 80–105)
PO2 BLD: 347 MM HG (ref 80–105)
PO2 BLD: 354 MM HG (ref 80–105)
PO2 BLD: 361 MM HG (ref 80–105)
PO2 BLD: 98 MM HG (ref 80–105)
PO2 BLDA: 145 MM HG (ref 80–105)
PO2 BLDA: 154 MM HG (ref 80–105)
PO2 BLDA: 160 MM HG (ref 80–105)
PO2 BLDA: 169 MM HG (ref 80–105)
PO2 BLDA: 288 MM HG (ref 80–105)
PO2 BLDA: 314 MM HG (ref 80–105)
PO2 BLDA: 374 MM HG (ref 80–105)
PO2 BLDA: 419 MM HG (ref 80–105)
PO2 BLDV: 102 MM HG (ref 25–47)
PO2 BLDV: 34 MM HG (ref 25–47)
PO2 BLDV: 36 MM HG (ref 25–47)
PO2 BLDV: 38 MM HG (ref 25–47)
PO2 BLDV: 40 MM HG (ref 25–47)
PO2 BLDV: 42 MM HG (ref 25–47)
PO2 BLDV: 42 MM HG (ref 25–47)
PO2 BLDV: 47 MM HG (ref 25–47)
PO2 BLDV: 51 MM HG (ref 25–47)
POTASSIUM BLD-SCNC: 3.6 MMOL/L (ref 3.5–5)
POTASSIUM BLD-SCNC: 4.5 MMOL/L (ref 3.5–5)
POTASSIUM BLD-SCNC: 5.5 MMOL/L (ref 3.4–5.3)
POTASSIUM BLD-SCNC: 5.6 MMOL/L (ref 3.4–5.3)
POTASSIUM BLD-SCNC: 6.3 MMOL/L (ref 3.4–5.3)
POTASSIUM SERPL-SCNC: 3.6 MMOL/L (ref 3.4–5.3)
POTASSIUM SERPL-SCNC: 4.1 MMOL/L (ref 3.4–5.3)
POTASSIUM SERPL-SCNC: 4.3 MMOL/L (ref 3.4–5.3)
POTASSIUM SERPL-SCNC: 4.5 MMOL/L (ref 3.4–5.3)
POTASSIUM SERPL-SCNC: 4.7 MMOL/L (ref 3.4–5.3)
POTASSIUM SERPL-SCNC: 4.7 MMOL/L (ref 3.4–5.3)
POTASSIUM SERPL-SCNC: 4.8 MMOL/L (ref 3.4–5.3)
POTASSIUM SERPL-SCNC: 4.9 MMOL/L (ref 3.4–5.3)
POTASSIUM SERPL-SCNC: 5 MMOL/L (ref 3.4–5.3)
POTASSIUM SERPL-SCNC: 5.1 MMOL/L (ref 3.4–5.3)
POTASSIUM SERPL-SCNC: 5.2 MMOL/L (ref 3.4–5.3)
POTASSIUM SERPL-SCNC: 5.6 MMOL/L (ref 3.4–5.3)
POTASSIUM SERPL-SCNC: 5.8 MMOL/L (ref 3.4–5.3)
POTASSIUM SERPL-SCNC: 5.9 MMOL/L (ref 3.4–5.3)
POTASSIUM SERPL-SCNC: 6 MMOL/L (ref 3.4–5.3)
POTASSIUM SERPL-SCNC: 6.1 MMOL/L (ref 3.4–5.3)
PR INTERVAL - MUSE: NORMAL MS
PROCALCITONIN SERPL IA-MCNC: 0.07 NG/ML
PROCALCITONIN SERPL IA-MCNC: 10.3 NG/ML
PROT SERPL-MCNC: 3.1 G/DL (ref 6.4–8.3)
PROT SERPL-MCNC: 3.3 G/DL (ref 6.4–8.3)
PROT SERPL-MCNC: 3.7 G/DL (ref 6.4–8.3)
PROT SERPL-MCNC: 3.7 G/DL (ref 6.4–8.3)
PROT SERPL-MCNC: 3.8 G/DL (ref 6.4–8.3)
PROT SERPL-MCNC: 4.1 G/DL (ref 6.4–8.3)
PROT SERPL-MCNC: 4.6 G/DL (ref 6.4–8.3)
PROT SERPL-MCNC: 4.8 G/DL (ref 6.4–8.3)
PROT SERPL-MCNC: 4.9 G/DL (ref 6.4–8.3)
PROT SERPL-MCNC: 5 G/DL (ref 6.4–8.3)
PROT SERPL-MCNC: 5 G/DL (ref 6.4–8.3)
PROT SERPL-MCNC: 5.7 G/DL (ref 6.4–8.3)
QRS DURATION - MUSE: 164 MS
QRS DURATION - MUSE: 174 MS
QRS DURATION - MUSE: 176 MS
QRS DURATION - MUSE: 178 MS
QT - MUSE: 402 MS
QT - MUSE: 436 MS
QT - MUSE: 452 MS
QT - MUSE: 464 MS
QTC - MUSE: 489 MS
QTC - MUSE: 502 MS
QTC - MUSE: 535 MS
QTC - MUSE: 549 MS
R AXIS - MUSE: -25 DEGREES
R AXIS - MUSE: -27 DEGREES
R AXIS - MUSE: -72 DEGREES
R AXIS - MUSE: -86 DEGREES
RADIOLOGIST FLAGS: ABNORMAL
RBC # BLD AUTO: 1.99 10E6/UL (ref 4.4–5.9)
RBC # BLD AUTO: 2.17 10E6/UL (ref 4.4–5.9)
RBC # BLD AUTO: 2.34 10E6/UL (ref 4.4–5.9)
RBC # BLD AUTO: 2.39 10E6/UL (ref 4.4–5.9)
RBC # BLD AUTO: 2.65 10E6/UL (ref 4.4–5.9)
RBC # BLD AUTO: 2.85 10E6/UL (ref 4.4–5.9)
RBC # BLD AUTO: 3.01 10E6/UL (ref 4.4–5.9)
RBC # BLD AUTO: 3.16 10E6/UL (ref 4.4–5.9)
RBC # BLD AUTO: 3.44 10E6/UL (ref 4.4–5.9)
RBC # BLD AUTO: 3.55 10E6/UL (ref 4.4–5.9)
RBC # BLD AUTO: 3.87 10E6/UL (ref 4.4–5.9)
RBC # BLD AUTO: 3.88 10E6/UL (ref 4.4–5.9)
RBC # BLD AUTO: 3.92 10E6/UL (ref 4.4–5.9)
RBC # BLD AUTO: 4.14 10E6/UL (ref 4.4–5.9)
RBC # BLD AUTO: 4.25 10E6/UL (ref 4.4–5.9)
RBC # BLD AUTO: 4.72 10E6/UL (ref 4.4–5.9)
RBC URINE: 12 /HPF
S100 CA BINDING PROTEIN B SER-MCNC: 165 NG/L
S100 CA BINDING PROTEIN B SER-MCNC: 181 NG/L
S100 CA BINDING PROTEIN B SER-MCNC: 581 NG/L
SA TARGET DNA: POSITIVE
SARS-COV-2 RNA RESP QL NAA+PROBE: NEGATIVE
SODIUM BLD-SCNC: 136 MMOL/L (ref 133–144)
SODIUM BLD-SCNC: 138 MMOL/L (ref 133–144)
SODIUM SERPL-SCNC: 135 MMOL/L (ref 136–145)
SODIUM SERPL-SCNC: 135 MMOL/L (ref 136–145)
SODIUM SERPL-SCNC: 136 MMOL/L (ref 136–145)
SODIUM SERPL-SCNC: 137 MMOL/L (ref 136–145)
SODIUM SERPL-SCNC: 138 MMOL/L (ref 136–145)
SODIUM SERPL-SCNC: 139 MMOL/L (ref 136–145)
SODIUM SERPL-SCNC: 139 MMOL/L (ref 136–145)
SODIUM SERPL-SCNC: 140 MMOL/L (ref 136–145)
SODIUM SERPL-SCNC: 142 MMOL/L (ref 136–145)
SP GR UR STRIP: 1.03 (ref 1–1.03)
SPECIMEN EXPIRATION DATE: NORMAL
SPECIMEN EXPIRATION DATE: NORMAL
SPERM #/AREA URNS HPF: PRESENT /HPF
SYSTOLIC BLOOD PRESSURE - MUSE: NORMAL MMHG
T AXIS - MUSE: 102 DEGREES
T AXIS - MUSE: 128 DEGREES
T AXIS - MUSE: 79 DEGREES
T AXIS - MUSE: 92 DEGREES
TRANSITIONAL EPI: <1 /HPF
TRIGL SERPL-MCNC: 105 MG/DL
TRIGL SERPL-MCNC: 196 MG/DL
TROPONIN T SERPL HS-MCNC: 1067 NG/L
TROPONIN T SERPL HS-MCNC: 1247 NG/L
TROPONIN T SERPL HS-MCNC: 1569 NG/L
TROPONIN T SERPL HS-MCNC: 287 NG/L
TROPONIN T SERPL HS-MCNC: 327 NG/L
TROPONIN T SERPL HS-MCNC: 406 NG/L
TROPONIN T SERPL HS-MCNC: 50 NG/L
TROPONIN T SERPL HS-MCNC: 617 NG/L
TROPONIN T SERPL HS-MCNC: 669 NG/L
TROPONIN T SERPL HS-MCNC: 713 NG/L
TROPONIN T SERPL HS-MCNC: 831 NG/L
TROPONIN T SERPL HS-MCNC: 925 NG/L
TROPONIN T SERPL HS-MCNC: 942 NG/L
TROPONIN T SERPL HS-MCNC: 954 NG/L
TROPONIN T SERPL HS-MCNC: 969 NG/L
UFH PPP CHRO-ACNC: 0.29 IU/ML
UFH PPP CHRO-ACNC: 0.32 IU/ML
UFH PPP CHRO-ACNC: 0.82 IU/ML
UFH PPP CHRO-ACNC: 0.91 IU/ML
UFH PPP CHRO-ACNC: 0.93 IU/ML
UFH PPP CHRO-ACNC: <0.1 IU/ML
UFH PPP CHRO-ACNC: >1.1 IU/ML
UNIT ABO/RH: NORMAL
UNIT NUMBER: NORMAL
UNIT STATUS: NORMAL
UNIT TYPE ISBT: 5100
UROBILINOGEN UR STRIP-MCNC: NORMAL MG/DL
VALPROATE SERPL-MCNC: 10.9 UG/ML
VENTRICULAR RATE- MUSE: 80 BPM
VENTRICULAR RATE- MUSE: 80 BPM
VENTRICULAR RATE- MUSE: 89 BPM
VENTRICULAR RATE- MUSE: 89 BPM
WBC # BLD AUTO: 12.3 10E3/UL (ref 4–11)
WBC # BLD AUTO: 14.5 10E3/UL (ref 4–11)
WBC # BLD AUTO: 14.9 10E3/UL (ref 4–11)
WBC # BLD AUTO: 15.6 10E3/UL (ref 4–11)
WBC # BLD AUTO: 15.8 10E3/UL (ref 4–11)
WBC # BLD AUTO: 16.2 10E3/UL (ref 4–11)
WBC # BLD AUTO: 16.3 10E3/UL (ref 4–11)
WBC # BLD AUTO: 16.8 10E3/UL (ref 4–11)
WBC # BLD AUTO: 16.9 10E3/UL (ref 4–11)
WBC # BLD AUTO: 16.9 10E3/UL (ref 4–11)
WBC # BLD AUTO: 17.8 10E3/UL (ref 4–11)
WBC # BLD AUTO: 17.8 10E3/UL (ref 4–11)
WBC # BLD AUTO: 18.4 10E3/UL (ref 4–11)
WBC # BLD AUTO: 5.7 10E3/UL (ref 4–11)
WBC # BLD AUTO: 7.3 10E3/UL (ref 4–11)
WBC # BLD AUTO: 9.8 10E3/UL (ref 4–11)
WBC URINE: 6 /HPF

## 2023-01-01 PROCEDURE — 250N000013 HC RX MED GY IP 250 OP 250 PS 637

## 2023-01-01 PROCEDURE — 85730 THROMBOPLASTIN TIME PARTIAL: CPT | Performed by: INTERNAL MEDICINE

## 2023-01-01 PROCEDURE — 03HY32Z INSERTION OF MONITORING DEVICE INTO UPPER ARTERY, PERCUTANEOUS APPROACH: ICD-10-PCS | Performed by: INTERNAL MEDICINE

## 2023-01-01 PROCEDURE — 71045 X-RAY EXAM CHEST 1 VIEW: CPT | Mod: 26 | Performed by: RADIOLOGY

## 2023-01-01 PROCEDURE — 82803 BLOOD GASES ANY COMBINATION: CPT | Performed by: INTERNAL MEDICINE

## 2023-01-01 PROCEDURE — 250N000011 HC RX IP 250 OP 636

## 2023-01-01 PROCEDURE — 85652 RBC SED RATE AUTOMATED: CPT | Performed by: INTERNAL MEDICINE

## 2023-01-01 PROCEDURE — 82805 BLOOD GASES W/O2 SATURATION: CPT | Performed by: INTERNAL MEDICINE

## 2023-01-01 PROCEDURE — 83605 ASSAY OF LACTIC ACID: CPT | Performed by: INTERNAL MEDICINE

## 2023-01-01 PROCEDURE — 83735 ASSAY OF MAGNESIUM: CPT | Performed by: INTERNAL MEDICINE

## 2023-01-01 PROCEDURE — 85520 HEPARIN ASSAY: CPT | Performed by: INTERNAL MEDICINE

## 2023-01-01 PROCEDURE — 99292 CRITICAL CARE ADDL 30 MIN: CPT | Mod: 25 | Performed by: NURSE PRACTITIONER

## 2023-01-01 PROCEDURE — 343N000001 HC RX 343

## 2023-01-01 PROCEDURE — 95714 VEEG EA 12-26 HR UNMNTR: CPT

## 2023-01-01 PROCEDURE — 93925 LOWER EXTREMITY STUDY: CPT | Mod: 26 | Performed by: RADIOLOGY

## 2023-01-01 PROCEDURE — 84155 ASSAY OF PROTEIN SERUM: CPT | Performed by: INTERNAL MEDICINE

## 2023-01-01 PROCEDURE — 999N000157 HC STATISTIC RCP TIME EA 10 MIN

## 2023-01-01 PROCEDURE — 99152 MOD SED SAME PHYS/QHP 5/>YRS: CPT | Mod: GC | Performed by: INTERNAL MEDICINE

## 2023-01-01 PROCEDURE — 70450 CT HEAD/BRAIN W/O DYE: CPT | Mod: 26 | Performed by: RADIOLOGY

## 2023-01-01 PROCEDURE — 33968 REMOVE AORTIC ASSIST DEVICE: CPT

## 2023-01-01 PROCEDURE — 250N000009 HC RX 250: Performed by: INTERNAL MEDICINE

## 2023-01-01 PROCEDURE — 93010 ELECTROCARDIOGRAM REPORT: CPT | Performed by: INTERNAL MEDICINE

## 2023-01-01 PROCEDURE — 250N000011 HC RX IP 250 OP 636: Performed by: INTERNAL MEDICINE

## 2023-01-01 PROCEDURE — 85379 FIBRIN DEGRADATION QUANT: CPT | Performed by: INTERNAL MEDICINE

## 2023-01-01 PROCEDURE — 93005 ELECTROCARDIOGRAM TRACING: CPT

## 2023-01-01 PROCEDURE — 85027 COMPLETE CBC AUTOMATED: CPT | Performed by: INTERNAL MEDICINE

## 2023-01-01 PROCEDURE — 99292 CRITICAL CARE ADDL 30 MIN: CPT | Mod: 25 | Performed by: INTERNAL MEDICINE

## 2023-01-01 PROCEDURE — 272N000555 HC SENSOR NIRS OXIMETER, ADULT

## 2023-01-01 PROCEDURE — 71250 CT THORAX DX C-: CPT | Mod: 26 | Performed by: RADIOLOGY

## 2023-01-01 PROCEDURE — 83051 HEMOGLOBIN PLASMA: CPT | Performed by: INTERNAL MEDICINE

## 2023-01-01 PROCEDURE — 93306 TTE W/DOPPLER COMPLETE: CPT | Mod: 26 | Performed by: INTERNAL MEDICINE

## 2023-01-01 PROCEDURE — 84145 PROCALCITONIN (PCT): CPT | Performed by: INTERNAL MEDICINE

## 2023-01-01 PROCEDURE — 999N000015 HC STATISTIC ARTERIAL MONITORING DAILY

## 2023-01-01 PROCEDURE — 70450 CT HEAD/BRAIN W/O DYE: CPT | Mod: 77

## 2023-01-01 PROCEDURE — P9045 ALBUMIN (HUMAN), 5%, 250 ML: HCPCS | Performed by: STUDENT IN AN ORGANIZED HEALTH CARE EDUCATION/TRAINING PROGRAM

## 2023-01-01 PROCEDURE — 82248 BILIRUBIN DIRECT: CPT | Performed by: INTERNAL MEDICINE

## 2023-01-01 PROCEDURE — 86316 IMMUNOASSAY TUMOR OTHER: CPT | Performed by: INTERNAL MEDICINE

## 2023-01-01 PROCEDURE — 95711 VEEG 2-12 HR UNMONITORED: CPT

## 2023-01-01 PROCEDURE — 84132 ASSAY OF SERUM POTASSIUM: CPT | Performed by: INTERNAL MEDICINE

## 2023-01-01 PROCEDURE — 93325 DOPPLER ECHO COLOR FLOW MAPG: CPT

## 2023-01-01 PROCEDURE — 84484 ASSAY OF TROPONIN QUANT: CPT | Performed by: INTERNAL MEDICINE

## 2023-01-01 PROCEDURE — 83615 LACTATE (LD) (LDH) ENZYME: CPT | Performed by: INTERNAL MEDICINE

## 2023-01-01 PROCEDURE — G0463 HOSPITAL OUTPT CLINIC VISIT: HCPCS

## 2023-01-01 PROCEDURE — 80069 RENAL FUNCTION PANEL: CPT | Performed by: INTERNAL MEDICINE

## 2023-01-01 PROCEDURE — 70450 CT HEAD/BRAIN W/O DYE: CPT

## 2023-01-01 PROCEDURE — 71250 CT THORAX DX C-: CPT

## 2023-01-01 PROCEDURE — 85300 ANTITHROMBIN III ACTIVITY: CPT | Performed by: INTERNAL MEDICINE

## 2023-01-01 PROCEDURE — 85018 HEMOGLOBIN: CPT | Performed by: INTERNAL MEDICINE

## 2023-01-01 PROCEDURE — 93308 TTE F-UP OR LMTD: CPT | Mod: 26 | Performed by: INTERNAL MEDICINE

## 2023-01-01 PROCEDURE — 4A023N7 MEASUREMENT OF CARDIAC SAMPLING AND PRESSURE, LEFT HEART, PERCUTANEOUS APPROACH: ICD-10-PCS | Performed by: INTERNAL MEDICINE

## 2023-01-01 PROCEDURE — 99292 CRITICAL CARE ADDL 30 MIN: CPT | Mod: 25 | Performed by: STUDENT IN AN ORGANIZED HEALTH CARE EDUCATION/TRAINING PROGRAM

## 2023-01-01 PROCEDURE — 82947 ASSAY GLUCOSE BLOOD QUANT: CPT | Performed by: INTERNAL MEDICINE

## 2023-01-01 PROCEDURE — 999N000045 HC STATISTIC DAILY SWAN MONITORING

## 2023-01-01 PROCEDURE — 78606 BRAIN IMAGE W/FLOW 4 + VIEWS: CPT | Mod: 26 | Performed by: RADIOLOGY

## 2023-01-01 PROCEDURE — 80053 COMPREHEN METABOLIC PANEL: CPT | Performed by: INTERNAL MEDICINE

## 2023-01-01 PROCEDURE — 258N000003 HC RX IP 258 OP 636: Performed by: INTERNAL MEDICINE

## 2023-01-01 PROCEDURE — 33947 ECMO/ECLS INITIATION ARTERY: CPT

## 2023-01-01 PROCEDURE — 250N000009 HC RX 250: Performed by: STUDENT IN AN ORGANIZED HEALTH CARE EDUCATION/TRAINING PROGRAM

## 2023-01-01 PROCEDURE — 99152 MOD SED SAME PHYS/QHP 5/>YRS: CPT | Performed by: INTERNAL MEDICINE

## 2023-01-01 PROCEDURE — 85347 COAGULATION TIME ACTIVATED: CPT

## 2023-01-01 PROCEDURE — 99153 MOD SED SAME PHYS/QHP EA: CPT | Performed by: INTERNAL MEDICINE

## 2023-01-01 PROCEDURE — 93926 LOWER EXTREMITY STUDY: CPT | Mod: LT

## 2023-01-01 PROCEDURE — 33949 ECMO/ECLS DAILY MGMT ARTERY: CPT | Performed by: INTERNAL MEDICINE

## 2023-01-01 PROCEDURE — 82330 ASSAY OF CALCIUM: CPT | Performed by: INTERNAL MEDICINE

## 2023-01-01 PROCEDURE — 85610 PROTHROMBIN TIME: CPT | Performed by: INTERNAL MEDICINE

## 2023-01-01 PROCEDURE — 93321 DOPPLER ECHO F-UP/LMTD STD: CPT | Mod: 26 | Performed by: INTERNAL MEDICINE

## 2023-01-01 PROCEDURE — 99291 CRITICAL CARE FIRST HOUR: CPT | Mod: 25 | Performed by: NURSE PRACTITIONER

## 2023-01-01 PROCEDURE — 94003 VENT MGMT INPAT SUBQ DAY: CPT

## 2023-01-01 PROCEDURE — 97112 NEUROMUSCULAR REEDUCATION: CPT | Mod: GP

## 2023-01-01 PROCEDURE — 87040 BLOOD CULTURE FOR BACTERIA: CPT

## 2023-01-01 PROCEDURE — 200N000002 HC R&B ICU UMMC

## 2023-01-01 PROCEDURE — 82247 BILIRUBIN TOTAL: CPT | Performed by: INTERNAL MEDICINE

## 2023-01-01 PROCEDURE — C1894 INTRO/SHEATH, NON-LASER: HCPCS | Performed by: INTERNAL MEDICINE

## 2023-01-01 PROCEDURE — 272N000001 HC OR GENERAL SUPPLY STERILE: Performed by: INTERNAL MEDICINE

## 2023-01-01 PROCEDURE — 410N000003 HC PER-PERFUSION 1ST 30 MIN: Performed by: INTERNAL MEDICINE

## 2023-01-01 PROCEDURE — 82550 ASSAY OF CK (CPK): CPT | Performed by: STUDENT IN AN ORGANIZED HEALTH CARE EDUCATION/TRAINING PROGRAM

## 2023-01-01 PROCEDURE — C1730 CATH, EP, 19 OR FEW ELECT: HCPCS | Performed by: INTERNAL MEDICINE

## 2023-01-01 PROCEDURE — U0003 INFECTIOUS AGENT DETECTION BY NUCLEIC ACID (DNA OR RNA); SEVERE ACUTE RESPIRATORY SYNDROME CORONAVIRUS 2 (SARS-COV-2) (CORONAVIRUS DISEASE [COVID-19]), AMPLIFIED PROBE TECHNIQUE, MAKING USE OF HIGH THROUGHPUT TECHNOLOGIES AS DESCRIBED BY CMS-2020-01-R: HCPCS | Performed by: INTERNAL MEDICINE

## 2023-01-01 PROCEDURE — 85384 FIBRINOGEN ACTIVITY: CPT | Performed by: STUDENT IN AN ORGANIZED HEALTH CARE EDUCATION/TRAINING PROGRAM

## 2023-01-01 PROCEDURE — 33967 INSERT I-AORT PERCUT DEVICE: CPT | Mod: GC | Performed by: INTERNAL MEDICINE

## 2023-01-01 PROCEDURE — 99233 SBSQ HOSP IP/OBS HIGH 50: CPT | Mod: FS | Performed by: CLINICAL NURSE SPECIALIST

## 2023-01-01 PROCEDURE — 5A02210 ASSISTANCE WITH CARDIAC OUTPUT USING BALLOON PUMP, CONTINUOUS: ICD-10-PCS | Performed by: INTERNAL MEDICINE

## 2023-01-01 PROCEDURE — 250N000011 HC RX IP 250 OP 636: Performed by: STUDENT IN AN ORGANIZED HEALTH CARE EDUCATION/TRAINING PROGRAM

## 2023-01-01 PROCEDURE — P9016 RBC LEUKOCYTES REDUCED: HCPCS | Performed by: INTERNAL MEDICINE

## 2023-01-01 PROCEDURE — 80307 DRUG TEST PRSMV CHEM ANLYZR: CPT | Performed by: INTERNAL MEDICINE

## 2023-01-01 PROCEDURE — 5A1D90Z PERFORMANCE OF URINARY FILTRATION, CONTINUOUS, GREATER THAN 18 HOURS PER DAY: ICD-10-PCS | Performed by: STUDENT IN AN ORGANIZED HEALTH CARE EDUCATION/TRAINING PROGRAM

## 2023-01-01 PROCEDURE — 95720 EEG PHY/QHP EA INCR W/VEEG: CPT | Performed by: PSYCHIATRY & NEUROLOGY

## 2023-01-01 PROCEDURE — 85384 FIBRINOGEN ACTIVITY: CPT | Performed by: INTERNAL MEDICINE

## 2023-01-01 PROCEDURE — 250N000009 HC RX 250: Performed by: CLINICAL NURSE SPECIALIST

## 2023-01-01 PROCEDURE — 36415 COLL VENOUS BLD VENIPUNCTURE: CPT | Performed by: INTERNAL MEDICINE

## 2023-01-01 PROCEDURE — 33949 ECMO/ECLS DAILY MGMT ARTERY: CPT

## 2023-01-01 PROCEDURE — 258N000003 HC RX IP 258 OP 636

## 2023-01-01 PROCEDURE — 87077 CULTURE AEROBIC IDENTIFY: CPT | Performed by: INTERNAL MEDICINE

## 2023-01-01 PROCEDURE — 999N000065 XR CHEST PORT 1 VIEW

## 2023-01-01 PROCEDURE — 410N000004: Performed by: INTERNAL MEDICINE

## 2023-01-01 PROCEDURE — 83605 ASSAY OF LACTIC ACID: CPT

## 2023-01-01 PROCEDURE — 85018 HEMOGLOBIN: CPT

## 2023-01-01 PROCEDURE — 95700 EEG CONT REC W/VID EEG TECH: CPT | Performed by: PSYCHIATRY & NEUROLOGY

## 2023-01-01 PROCEDURE — 258N000003 HC RX IP 258 OP 636: Performed by: STUDENT IN AN ORGANIZED HEALTH CARE EDUCATION/TRAINING PROGRAM

## 2023-01-01 PROCEDURE — 86140 C-REACTIVE PROTEIN: CPT | Performed by: INTERNAL MEDICINE

## 2023-01-01 PROCEDURE — 95718 EEG PHYS/QHP 2-12 HR W/VEEG: CPT | Performed by: PSYCHIATRY & NEUROLOGY

## 2023-01-01 PROCEDURE — 85610 PROTHROMBIN TIME: CPT | Performed by: STUDENT IN AN ORGANIZED HEALTH CARE EDUCATION/TRAINING PROGRAM

## 2023-01-01 PROCEDURE — 999N000185 HC STATISTIC TRANSPORT TIME EA 15 MIN

## 2023-01-01 PROCEDURE — 999N000077 HC STATISTIC INSERT IABP

## 2023-01-01 PROCEDURE — 80176 ASSAY OF LIDOCAINE: CPT

## 2023-01-01 PROCEDURE — 87205 SMEAR GRAM STAIN: CPT | Performed by: INTERNAL MEDICINE

## 2023-01-01 PROCEDURE — 999N000075 HC STATISTIC IABP MONITORING

## 2023-01-01 PROCEDURE — C1751 CATH, INF, PER/CENT/MIDLINE: HCPCS | Performed by: INTERNAL MEDICINE

## 2023-01-01 PROCEDURE — 78606 BRAIN IMAGE W/FLOW 4 + VIEWS: CPT

## 2023-01-01 PROCEDURE — 85396 CLOTTING ASSAY WHOLE BLOOD: CPT | Performed by: INTERNAL MEDICINE

## 2023-01-01 PROCEDURE — 93306 TTE W/DOPPLER COMPLETE: CPT

## 2023-01-01 PROCEDURE — 36415 COLL VENOUS BLD VENIPUNCTURE: CPT

## 2023-01-01 PROCEDURE — 82550 ASSAY OF CK (CPK): CPT | Performed by: CLINICAL NURSE SPECIALIST

## 2023-01-01 PROCEDURE — 250N000009 HC RX 250

## 2023-01-01 PROCEDURE — 5A1945Z RESPIRATORY VENTILATION, 24-96 CONSECUTIVE HOURS: ICD-10-PCS | Performed by: STUDENT IN AN ORGANIZED HEALTH CARE EDUCATION/TRAINING PROGRAM

## 2023-01-01 PROCEDURE — B2111ZZ FLUOROSCOPY OF MULTIPLE CORONARY ARTERIES USING LOW OSMOLAR CONTRAST: ICD-10-PCS | Performed by: INTERNAL MEDICINE

## 2023-01-01 PROCEDURE — C9113 INJ PANTOPRAZOLE SODIUM, VIA: HCPCS | Performed by: INTERNAL MEDICINE

## 2023-01-01 PROCEDURE — 82803 BLOOD GASES ANY COMBINATION: CPT

## 2023-01-01 PROCEDURE — 84100 ASSAY OF PHOSPHORUS: CPT | Performed by: INTERNAL MEDICINE

## 2023-01-01 PROCEDURE — 94002 VENT MGMT INPAT INIT DAY: CPT

## 2023-01-01 PROCEDURE — 86901 BLOOD TYPING SEROLOGIC RH(D): CPT

## 2023-01-01 PROCEDURE — 258N000001 HC RX 258

## 2023-01-01 PROCEDURE — 82805 BLOOD GASES W/O2 SATURATION: CPT

## 2023-01-01 PROCEDURE — 82330 ASSAY OF CALCIUM: CPT

## 2023-01-01 PROCEDURE — 84132 ASSAY OF SERUM POTASSIUM: CPT

## 2023-01-01 PROCEDURE — 99291 CRITICAL CARE FIRST HOUR: CPT | Mod: 25 | Performed by: INTERNAL MEDICINE

## 2023-01-01 PROCEDURE — 87147 CULTURE TYPE IMMUNOLOGIC: CPT | Performed by: INTERNAL MEDICINE

## 2023-01-01 PROCEDURE — 36556 INSERT NON-TUNNEL CV CATH: CPT | Mod: 59 | Performed by: INTERNAL MEDICINE

## 2023-01-01 PROCEDURE — 85014 HEMATOCRIT: CPT | Performed by: INTERNAL MEDICINE

## 2023-01-01 PROCEDURE — 258N000001 HC RX 258: Performed by: NURSE PRACTITIONER

## 2023-01-01 PROCEDURE — 74176 CT ABD & PELVIS W/O CONTRAST: CPT | Mod: 26 | Performed by: RADIOLOGY

## 2023-01-01 PROCEDURE — 82310 ASSAY OF CALCIUM: CPT | Performed by: INTERNAL MEDICINE

## 2023-01-01 PROCEDURE — 71045 X-RAY EXAM CHEST 1 VIEW: CPT

## 2023-01-01 PROCEDURE — 99214 OFFICE O/P EST MOD 30 MIN: CPT | Mod: 95 | Performed by: STUDENT IN AN ORGANIZED HEALTH CARE EDUCATION/TRAINING PROGRAM

## 2023-01-01 PROCEDURE — 99223 1ST HOSP IP/OBS HIGH 75: CPT | Mod: GC | Performed by: INTERNAL MEDICINE

## 2023-01-01 PROCEDURE — 33952 ECMO/ECLS INSJ PRPH CANNULA: CPT | Mod: GC | Performed by: INTERNAL MEDICINE

## 2023-01-01 PROCEDURE — 3E033XZ INTRODUCTION OF VASOPRESSOR INTO PERIPHERAL VEIN, PERCUTANEOUS APPROACH: ICD-10-PCS | Performed by: STUDENT IN AN ORGANIZED HEALTH CARE EDUCATION/TRAINING PROGRAM

## 2023-01-01 PROCEDURE — 80053 COMPREHEN METABOLIC PANEL: CPT

## 2023-01-01 PROCEDURE — 33949 ECMO/ECLS DAILY MGMT ARTERY: CPT | Performed by: STUDENT IN AN ORGANIZED HEALTH CARE EDUCATION/TRAINING PROGRAM

## 2023-01-01 PROCEDURE — 5A1522G EXTRACORPOREAL OXYGENATION, MEMBRANE, PERIPHERAL VENO-ARTERIAL: ICD-10-PCS | Performed by: INTERNAL MEDICINE

## 2023-01-01 PROCEDURE — 92950 HEART/LUNG RESUSCITATION CPR: CPT | Performed by: INTERNAL MEDICINE

## 2023-01-01 PROCEDURE — 99291 CRITICAL CARE FIRST HOUR: CPT | Mod: 25 | Performed by: STUDENT IN AN ORGANIZED HEALTH CARE EDUCATION/TRAINING PROGRAM

## 2023-01-01 PROCEDURE — C1769 GUIDE WIRE: HCPCS | Performed by: INTERNAL MEDICINE

## 2023-01-01 PROCEDURE — 84478 ASSAY OF TRIGLYCERIDES: CPT

## 2023-01-01 PROCEDURE — 93321 DOPPLER ECHO F-UP/LMTD STD: CPT

## 2023-01-01 PROCEDURE — 87040 BLOOD CULTURE FOR BACTERIA: CPT | Performed by: INTERNAL MEDICINE

## 2023-01-01 PROCEDURE — 33210 INSERT ELECTRD/PM CATH SNGL: CPT | Performed by: INTERNAL MEDICINE

## 2023-01-01 PROCEDURE — 272N000237 HC CARDIOHELP CIRCUIT

## 2023-01-01 PROCEDURE — 87641 MR-STAPH DNA AMP PROBE: CPT

## 2023-01-01 PROCEDURE — 80164 ASSAY DIPROPYLACETIC ACD TOT: CPT

## 2023-01-01 PROCEDURE — 70450 CT HEAD/BRAIN W/O DYE: CPT | Mod: 26 | Performed by: STUDENT IN AN ORGANIZED HEALTH CARE EDUCATION/TRAINING PROGRAM

## 2023-01-01 PROCEDURE — 82330 ASSAY OF CALCIUM: CPT | Performed by: STUDENT IN AN ORGANIZED HEALTH CARE EDUCATION/TRAINING PROGRAM

## 2023-01-01 PROCEDURE — 272N000057 HC CATH BALLOON IABP

## 2023-01-01 PROCEDURE — A9521 TC99M EXAMETAZIME: HCPCS

## 2023-01-01 PROCEDURE — 82810 BLOOD GASES O2 SAT ONLY: CPT

## 2023-01-01 PROCEDURE — 99232 SBSQ HOSP IP/OBS MODERATE 35: CPT | Performed by: NURSE PRACTITIONER

## 2023-01-01 PROCEDURE — 33967 INSERT I-AORT PERCUT DEVICE: CPT | Performed by: INTERNAL MEDICINE

## 2023-01-01 PROCEDURE — 93925 LOWER EXTREMITY STUDY: CPT

## 2023-01-01 PROCEDURE — 81003 URINALYSIS AUTO W/O SCOPE: CPT | Performed by: INTERNAL MEDICINE

## 2023-01-01 PROCEDURE — 93458 L HRT ARTERY/VENTRICLE ANGIO: CPT | Performed by: INTERNAL MEDICINE

## 2023-01-01 PROCEDURE — 87070 CULTURE OTHR SPECIMN AEROBIC: CPT

## 2023-01-01 PROCEDURE — 93010 ELECTROCARDIOGRAM REPORT: CPT | Mod: 59 | Performed by: INTERNAL MEDICINE

## 2023-01-01 PROCEDURE — 93454 CORONARY ARTERY ANGIO S&I: CPT | Mod: 26 | Performed by: INTERNAL MEDICINE

## 2023-01-01 PROCEDURE — 86901 BLOOD TYPING SEROLOGIC RH(D): CPT | Performed by: INTERNAL MEDICINE

## 2023-01-01 PROCEDURE — 33952 ECMO/ECLS INSJ PRPH CANNULA: CPT | Performed by: INTERNAL MEDICINE

## 2023-01-01 PROCEDURE — 85730 THROMBOPLASTIN TIME PARTIAL: CPT | Performed by: STUDENT IN AN ORGANIZED HEALTH CARE EDUCATION/TRAINING PROGRAM

## 2023-01-01 PROCEDURE — 93926 LOWER EXTREMITY STUDY: CPT | Mod: 26 | Performed by: RADIOLOGY

## 2023-01-01 PROCEDURE — 85027 COMPLETE CBC AUTOMATED: CPT | Performed by: STUDENT IN AN ORGANIZED HEALTH CARE EDUCATION/TRAINING PROGRAM

## 2023-01-01 PROCEDURE — 99222 1ST HOSP IP/OBS MODERATE 55: CPT | Mod: GC | Performed by: PSYCHIATRY & NEUROLOGY

## 2023-01-01 PROCEDURE — 6A4Z0ZZ HYPOTHERMIA, SINGLE: ICD-10-PCS | Performed by: INTERNAL MEDICINE

## 2023-01-01 PROCEDURE — 93325 DOPPLER ECHO COLOR FLOW MAPG: CPT | Mod: 26 | Performed by: INTERNAL MEDICINE

## 2023-01-01 PROCEDURE — 86923 COMPATIBILITY TEST ELECTRIC: CPT | Performed by: INTERNAL MEDICINE

## 2023-01-01 PROCEDURE — 33947 ECMO/ECLS INITIATION ARTERY: CPT | Mod: GC | Performed by: INTERNAL MEDICINE

## 2023-01-01 PROCEDURE — 99222 1ST HOSP IP/OBS MODERATE 55: CPT | Performed by: NURSE PRACTITIONER

## 2023-01-01 PROCEDURE — 83605 ASSAY OF LACTIC ACID: CPT | Performed by: STUDENT IN AN ORGANIZED HEALTH CARE EDUCATION/TRAINING PROGRAM

## 2023-01-01 PROCEDURE — 99233 SBSQ HOSP IP/OBS HIGH 50: CPT | Mod: 25 | Performed by: CLINICAL NURSE SPECIALIST

## 2023-01-01 PROCEDURE — 36556 INSERT NON-TUNNEL CV CATH: CPT | Performed by: INTERNAL MEDICINE

## 2023-01-01 PROCEDURE — 84295 ASSAY OF SERUM SODIUM: CPT | Performed by: STUDENT IN AN ORGANIZED HEALTH CARE EDUCATION/TRAINING PROGRAM

## 2023-01-01 PROCEDURE — 82947 ASSAY GLUCOSE BLOOD QUANT: CPT

## 2023-01-01 RX ORDER — MAGNESIUM SULFATE HEPTAHYDRATE 40 MG/ML
2 INJECTION, SOLUTION INTRAVENOUS EVERY 8 HOURS PRN
Status: DISCONTINUED | OUTPATIENT
Start: 2023-01-01 | End: 2023-01-01

## 2023-01-01 RX ORDER — PROPOFOL 10 MG/ML
INJECTION, EMULSION INTRAVENOUS CONTINUOUS PRN
Status: DISCONTINUED | OUTPATIENT
Start: 2023-01-01 | End: 2023-01-01

## 2023-01-01 RX ORDER — MAGNESIUM SULFATE HEPTAHYDRATE 40 MG/ML
2 INJECTION, SOLUTION INTRAVENOUS DAILY PRN
Status: DISCONTINUED | OUTPATIENT
Start: 2023-01-01 | End: 2023-01-01

## 2023-01-01 RX ORDER — POTASSIUM CHLORIDE 29.8 MG/ML
80 INJECTION INTRAVENOUS ONCE
Status: COMPLETED | OUTPATIENT
Start: 2023-01-01 | End: 2023-01-01

## 2023-01-01 RX ORDER — TESTOSTERONE CYPIONATE 200 MG/ML
100 INJECTION, SOLUTION INTRAMUSCULAR WEEKLY
COMMUNITY

## 2023-01-01 RX ORDER — NALOXONE HYDROCHLORIDE 0.4 MG/ML
0.4 INJECTION, SOLUTION INTRAMUSCULAR; INTRAVENOUS; SUBCUTANEOUS
Status: DISCONTINUED | OUTPATIENT
Start: 2023-01-01 | End: 2023-01-01 | Stop reason: HOSPADM

## 2023-01-01 RX ORDER — METHYLPHENIDATE HYDROCHLORIDE 10 MG/1
10 TABLET ORAL 3 TIMES DAILY
COMMUNITY

## 2023-01-01 RX ORDER — DIVALPROEX SODIUM 500 MG/1
500 TABLET, DELAYED RELEASE ORAL AT BEDTIME
COMMUNITY

## 2023-01-01 RX ORDER — LISDEXAMFETAMINE DIMESYLATE 40 MG/1
40 CAPSULE ORAL 2 TIMES DAILY
COMMUNITY

## 2023-01-01 RX ORDER — ESMOLOL HYDROCHLORIDE 20 MG/ML
200 INJECTION, SOLUTION INTRAVENOUS CONTINUOUS
Status: DISPENSED | OUTPATIENT
Start: 2023-01-01 | End: 2023-01-01

## 2023-01-01 RX ORDER — LISDEXAMFETAMINE DIMESYLATE 40 MG/1
40 CAPSULE ORAL 2 TIMES DAILY
Qty: 60 CAPSULE | Refills: 0 | Status: SHIPPED | OUTPATIENT
Start: 2023-01-01

## 2023-01-01 RX ORDER — MAGNESIUM SULFATE HEPTAHYDRATE 500 MG/ML
INJECTION, SOLUTION INTRAMUSCULAR; INTRAVENOUS CONTINUOUS PRN
Status: DISCONTINUED | OUTPATIENT
Start: 2023-01-01 | End: 2023-01-01

## 2023-01-01 RX ORDER — HEPARIN SODIUM 1000 [USP'U]/ML
INJECTION, SOLUTION INTRAVENOUS; SUBCUTANEOUS
Status: DISCONTINUED | OUTPATIENT
Start: 2023-01-01 | End: 2023-01-01

## 2023-01-01 RX ORDER — PIPERACILLIN SODIUM, TAZOBACTAM SODIUM 4; .5 G/20ML; G/20ML
4.5 INJECTION, POWDER, LYOPHILIZED, FOR SOLUTION INTRAVENOUS EVERY 6 HOURS
Status: DISCONTINUED | OUTPATIENT
Start: 2023-01-01 | End: 2023-01-01

## 2023-01-01 RX ORDER — NICOTINE POLACRILEX 4 MG
15-30 LOZENGE BUCCAL
Status: DISCONTINUED | OUTPATIENT
Start: 2023-01-01 | End: 2023-01-01 | Stop reason: HOSPADM

## 2023-01-01 RX ORDER — MAGNESIUM SULFATE HEPTAHYDRATE 40 MG/ML
2 INJECTION, SOLUTION INTRAVENOUS EVERY 8 HOURS PRN
Status: DISCONTINUED | OUTPATIENT
Start: 2023-01-01 | End: 2023-01-01 | Stop reason: HOSPADM

## 2023-01-01 RX ORDER — CALCIUM CHLORIDE, MAGNESIUM CHLORIDE, DEXTROSE MONOHYDRATE, LACTIC ACID, SODIUM CHLORIDE, SODIUM BICARBONATE AND POTASSIUM CHLORIDE 5.15; 2.03; 22; 5.4; 6.46; 3.09; .157 G/L; G/L; G/L; G/L; G/L; G/L; G/L
INJECTION INTRAVENOUS CONTINUOUS
Status: DISCONTINUED | OUTPATIENT
Start: 2023-01-01 | End: 2023-01-01

## 2023-01-01 RX ORDER — POTASSIUM CHLORIDE 29.8 MG/ML
20 INJECTION INTRAVENOUS EVERY 8 HOURS PRN
Status: DISCONTINUED | OUTPATIENT
Start: 2023-01-01 | End: 2023-01-01 | Stop reason: HOSPADM

## 2023-01-01 RX ORDER — LIDOCAINE HYDROCHLORIDE ANHYDROUS AND DEXTROSE MONOHYDRATE .8; 5 G/100ML; G/100ML
INJECTION, SOLUTION INTRAVENOUS CONTINUOUS PRN
Status: DISCONTINUED | OUTPATIENT
Start: 2023-01-01 | End: 2023-01-01

## 2023-01-01 RX ORDER — ESMOLOL HYDROCHLORIDE 10 MG/ML
INJECTION INTRAVENOUS
Status: DISCONTINUED | OUTPATIENT
Start: 2023-01-01 | End: 2023-01-01

## 2023-01-01 RX ORDER — CALCIUM CHLORIDE, MAGNESIUM CHLORIDE, DEXTROSE MONOHYDRATE, LACTIC ACID, SODIUM CHLORIDE, SODIUM BICARBONATE AND POTASSIUM CHLORIDE 5.15; 2.03; 22; 5.4; 6.46; 3.09; .157 G/L; G/L; G/L; G/L; G/L; G/L; G/L
22.5 INJECTION INTRAVENOUS CONTINUOUS
Status: DISCONTINUED | OUTPATIENT
Start: 2023-01-01 | End: 2023-01-01

## 2023-01-01 RX ORDER — PIPERACILLIN SODIUM, TAZOBACTAM SODIUM 3; .375 G/15ML; G/15ML
3.38 INJECTION, POWDER, LYOPHILIZED, FOR SOLUTION INTRAVENOUS EVERY 6 HOURS
Status: DISCONTINUED | OUTPATIENT
Start: 2023-01-01 | End: 2023-01-01

## 2023-01-01 RX ORDER — METHYLPHENIDATE HYDROCHLORIDE 10 MG/1
10 TABLET ORAL 3 TIMES DAILY
Qty: 90 TABLET | Refills: 0 | Status: SHIPPED | OUTPATIENT
Start: 2023-01-01

## 2023-01-01 RX ORDER — ESMOLOL HYDROCHLORIDE 20 MG/ML
INJECTION, SOLUTION INTRAVENOUS CONTINUOUS PRN
Status: DISCONTINUED | OUTPATIENT
Start: 2023-01-01 | End: 2023-01-01

## 2023-01-01 RX ORDER — FENTANYL CITRATE 50 UG/ML
INJECTION, SOLUTION INTRAMUSCULAR; INTRAVENOUS
Status: DISCONTINUED | OUTPATIENT
Start: 2023-01-01 | End: 2023-01-01

## 2023-01-01 RX ORDER — CALCIUM GLUCONATE 20 MG/ML
2 INJECTION, SOLUTION INTRAVENOUS EVERY 8 HOURS PRN
Status: DISCONTINUED | OUTPATIENT
Start: 2023-01-01 | End: 2023-01-01 | Stop reason: HOSPADM

## 2023-01-01 RX ORDER — IOPAMIDOL 755 MG/ML
INJECTION, SOLUTION INTRAVASCULAR
Status: DISCONTINUED | OUTPATIENT
Start: 2023-01-01 | End: 2023-01-01

## 2023-01-01 RX ORDER — HEPARIN SODIUM 10000 [USP'U]/100ML
10-50 INJECTION, SOLUTION INTRAVENOUS CONTINUOUS
Status: DISCONTINUED | OUTPATIENT
Start: 2023-01-01 | End: 2023-01-01

## 2023-01-01 RX ORDER — CALCIUM GLUCONATE 20 MG/ML
2 INJECTION, SOLUTION INTRAVENOUS EVERY 8 HOURS PRN
Status: DISCONTINUED | OUTPATIENT
Start: 2023-01-01 | End: 2023-01-01

## 2023-01-01 RX ORDER — DIVALPROEX SODIUM 125 MG/1
500 CAPSULE, COATED PELLETS ORAL AT BEDTIME
Status: DISCONTINUED | OUTPATIENT
Start: 2023-01-01 | End: 2023-01-01

## 2023-01-01 RX ORDER — NALOXONE HYDROCHLORIDE 0.4 MG/ML
0.2 INJECTION, SOLUTION INTRAMUSCULAR; INTRAVENOUS; SUBCUTANEOUS
Status: DISCONTINUED | OUTPATIENT
Start: 2023-01-01 | End: 2023-01-01 | Stop reason: HOSPADM

## 2023-01-01 RX ORDER — POTASSIUM CHLORIDE 29.8 MG/ML
20 INJECTION INTRAVENOUS EVERY 8 HOURS PRN
Status: DISCONTINUED | OUTPATIENT
Start: 2023-01-01 | End: 2023-01-01

## 2023-01-01 RX ORDER — MIDAZOLAM HCL IN 0.9 % NACL/PF 1 MG/ML
1-8 PLASTIC BAG, INJECTION (ML) INTRAVENOUS CONTINUOUS
Status: DISCONTINUED | OUTPATIENT
Start: 2023-01-01 | End: 2023-01-01

## 2023-01-01 RX ORDER — LIDOCAINE HYDROCHLORIDE ANHYDROUS AND DEXTROSE MONOHYDRATE .8; 5 G/100ML; G/100ML
0.5 INJECTION, SOLUTION INTRAVENOUS CONTINUOUS
Status: DISCONTINUED | OUTPATIENT
Start: 2023-01-01 | End: 2023-01-01

## 2023-01-01 RX ORDER — TADALAFIL 10 MG/1
10 TABLET ORAL DAILY PRN
Qty: 30 TABLET | Refills: 3 | Status: SHIPPED | OUTPATIENT
Start: 2023-01-01

## 2023-01-01 RX ORDER — MIDAZOLAM HCL IN 0.9 % NACL/PF 1 MG/ML
PLASTIC BAG, INJECTION (ML) INTRAVENOUS CONTINUOUS PRN
Status: DISCONTINUED | OUTPATIENT
Start: 2023-01-01 | End: 2023-01-01

## 2023-01-01 RX ORDER — TADALAFIL 10 MG/1
10 TABLET ORAL DAILY PRN
COMMUNITY

## 2023-01-01 RX ORDER — CALCIUM CHLORIDE, MAGNESIUM CHLORIDE, SODIUM CHLORIDE, SODIUM BICARBONATE, POTASSIUM CHLORIDE AND SODIUM PHOSPHATE DIBASIC DIHYDRATE 3.68; 3.05; 6.34; 3.09; .314; .187 G/L; G/L; G/L; G/L; G/L; G/L
12.5 INJECTION INTRAVENOUS CONTINUOUS
Status: DISCONTINUED | OUTPATIENT
Start: 2023-01-01 | End: 2023-01-01

## 2023-01-01 RX ORDER — CALCIUM CHLORIDE, MAGNESIUM CHLORIDE, DEXTROSE MONOHYDRATE, LACTIC ACID, SODIUM CHLORIDE, SODIUM BICARBONATE AND POTASSIUM CHLORIDE 5.15; 2.03; 22; 5.4; 6.46; 3.09; .157 G/L; G/L; G/L; G/L; G/L; G/L; G/L
12.5 INJECTION INTRAVENOUS CONTINUOUS
Status: DISCONTINUED | OUTPATIENT
Start: 2023-01-01 | End: 2023-01-01

## 2023-01-01 RX ORDER — DEXTROSE MONOHYDRATE 25 G/50ML
25-50 INJECTION, SOLUTION INTRAVENOUS
Status: DISCONTINUED | OUTPATIENT
Start: 2023-01-01 | End: 2023-01-01 | Stop reason: HOSPADM

## 2023-01-01 RX ORDER — CHLORHEXIDINE GLUCONATE ORAL RINSE 1.2 MG/ML
15 SOLUTION DENTAL 2 TIMES DAILY
Status: DISCONTINUED | OUTPATIENT
Start: 2023-01-01 | End: 2023-01-01 | Stop reason: HOSPADM

## 2023-01-01 RX ORDER — PROPOFOL 10 MG/ML
INJECTION, EMULSION INTRAVENOUS
Status: DISCONTINUED
Start: 2023-01-01 | End: 2023-01-01 | Stop reason: HOSPADM

## 2023-01-01 RX ORDER — ASPIRIN 81 MG/1
81 TABLET, CHEWABLE ORAL DAILY
Status: DISCONTINUED | OUTPATIENT
Start: 2023-01-01 | End: 2023-01-01

## 2023-01-01 RX ORDER — PROPOFOL 10 MG/ML
5-75 INJECTION, EMULSION INTRAVENOUS CONTINUOUS
Status: DISCONTINUED | OUTPATIENT
Start: 2023-01-01 | End: 2023-01-01

## 2023-01-01 RX ORDER — CALCIUM GLUCONATE 20 MG/ML
2 INJECTION, SOLUTION INTRAVENOUS ONCE
Status: COMPLETED | OUTPATIENT
Start: 2023-01-01 | End: 2023-01-01

## 2023-01-01 RX ORDER — HEPARIN SODIUM 200 [USP'U]/100ML
3 INJECTION, SOLUTION INTRAVENOUS CONTINUOUS
Status: DISCONTINUED | OUTPATIENT
Start: 2023-01-01 | End: 2023-01-01

## 2023-01-01 RX ORDER — CALCIUM CHLORIDE, MAGNESIUM CHLORIDE, DEXTROSE MONOHYDRATE, LACTIC ACID, SODIUM CHLORIDE, SODIUM BICARBONATE AND POTASSIUM CHLORIDE 5.15; 2.03; 22; 5.4; 6.46; 3.09; .157 G/L; G/L; G/L; G/L; G/L; G/L; G/L
17.5 INJECTION INTRAVENOUS CONTINUOUS
Status: DISCONTINUED | OUTPATIENT
Start: 2023-01-01 | End: 2023-01-01

## 2023-01-01 RX ORDER — DEXTROSE MONOHYDRATE 100 MG/ML
INJECTION, SOLUTION INTRAVENOUS CONTINUOUS PRN
Status: DISCONTINUED | OUTPATIENT
Start: 2023-01-01 | End: 2023-01-01 | Stop reason: HOSPADM

## 2023-01-01 RX ORDER — POTASSIUM CHLORIDE 29.8 MG/ML
20 INJECTION INTRAVENOUS
Status: DISCONTINUED | OUTPATIENT
Start: 2023-01-01 | End: 2023-01-01

## 2023-01-01 RX ORDER — DULOXETIN HYDROCHLORIDE 60 MG/1
60 CAPSULE, DELAYED RELEASE ORAL 2 TIMES DAILY
COMMUNITY

## 2023-01-01 RX ORDER — CALCIUM GLUCONATE 94 MG/ML
1 INJECTION, SOLUTION INTRAVENOUS ONCE
Status: DISCONTINUED | OUTPATIENT
Start: 2023-01-01 | End: 2023-01-01

## 2023-01-01 RX ORDER — CALCIUM CHLORIDE, MAGNESIUM CHLORIDE, SODIUM CHLORIDE, SODIUM BICARBONATE, POTASSIUM CHLORIDE AND SODIUM PHOSPHATE DIBASIC DIHYDRATE 3.68; 3.05; 6.34; 3.09; .314; .187 G/L; G/L; G/L; G/L; G/L; G/L
INJECTION INTRAVENOUS CONTINUOUS
Status: DISCONTINUED | OUTPATIENT
Start: 2023-01-01 | End: 2023-01-01

## 2023-01-01 RX ORDER — DEXTROSE MONOHYDRATE 25 G/50ML
25 INJECTION, SOLUTION INTRAVENOUS ONCE
Status: COMPLETED | OUTPATIENT
Start: 2023-01-01 | End: 2023-01-01

## 2023-01-01 RX ORDER — TESTOSTERONE CYPIONATE 1000 MG/10ML
50 INJECTION, SOLUTION INTRAMUSCULAR
Status: ON HOLD | COMMUNITY
End: 2023-01-01

## 2023-01-01 RX ORDER — MAGNESIUM SULFATE HEPTAHYDRATE 40 MG/ML
4 INJECTION, SOLUTION INTRAVENOUS EVERY 4 HOURS PRN
Status: DISCONTINUED | OUTPATIENT
Start: 2023-01-01 | End: 2023-01-01

## 2023-01-01 RX ORDER — PROPOFOL 10 MG/ML
80 INJECTION, EMULSION INTRAVENOUS CONTINUOUS
Status: DISCONTINUED | OUTPATIENT
Start: 2023-01-01 | End: 2023-01-01

## 2023-01-01 RX ORDER — ESMOLOL HYDROCHLORIDE 20 MG/ML
200 INJECTION, SOLUTION INTRAVENOUS CONTINUOUS
Status: DISCONTINUED | OUTPATIENT
Start: 2023-01-01 | End: 2023-01-01

## 2023-01-01 RX ORDER — NOREPINEPHRINE BITARTRATE 0.06 MG/ML
.01-.6 INJECTION, SOLUTION INTRAVENOUS CONTINUOUS
Status: DISCONTINUED | OUTPATIENT
Start: 2023-01-01 | End: 2023-01-01

## 2023-01-01 RX ORDER — LISDEXAMFETAMINE DIMESYLATE 70 MG/1
70 CAPSULE ORAL EVERY MORNING
COMMUNITY

## 2023-01-01 RX ORDER — METOPROLOL TARTRATE 1 MG/ML
INJECTION, SOLUTION INTRAVENOUS
Status: DISCONTINUED | OUTPATIENT
Start: 2023-01-01 | End: 2023-01-01

## 2023-01-01 RX ORDER — LIDOCAINE 40 MG/G
CREAM TOPICAL
Status: DISCONTINUED | OUTPATIENT
Start: 2023-01-01 | End: 2023-01-01 | Stop reason: HOSPADM

## 2023-01-01 RX ORDER — DIVALPROEX SODIUM 500 MG/1
500 TABLET, EXTENDED RELEASE ORAL AT BEDTIME
Status: DISCONTINUED | OUTPATIENT
Start: 2023-01-01 | End: 2023-01-01

## 2023-01-01 RX ADMIN — PHENYLEPHRINE HYDROCHLORIDE 0.54 MCG/KG/MIN: 10 INJECTION INTRAVENOUS at 09:57

## 2023-01-01 RX ADMIN — CALCIUM CHLORIDE, MAGNESIUM CHLORIDE, DEXTROSE MONOHYDRATE, LACTIC ACID, SODIUM CHLORIDE, SODIUM BICARBONATE AND POTASSIUM CHLORIDE 12.5 ML/KG/HR: 5.15; 2.03; 22; 5.4; 6.46; 3.09; .157 INJECTION INTRAVENOUS at 09:52

## 2023-01-01 RX ADMIN — PROPOFOL 50 MCG/KG/MIN: 10 INJECTION, EMULSION INTRAVENOUS at 07:35

## 2023-01-01 RX ADMIN — HEPARIN SODIUM 3 ML/HR: 200 INJECTION, SOLUTION INTRAVENOUS at 07:15

## 2023-01-01 RX ADMIN — PIPERACILLIN SODIUM AND TAZOBACTAM SODIUM 4.5 G: 4; .5 INJECTION, POWDER, LYOPHILIZED, FOR SOLUTION INTRAVENOUS at 07:41

## 2023-01-01 RX ADMIN — CALCIUM CHLORIDE, MAGNESIUM CHLORIDE, DEXTROSE MONOHYDRATE, LACTIC ACID, SODIUM CHLORIDE, SODIUM BICARBONATE AND POTASSIUM CHLORIDE 17.5 ML/KG/HR: 5.15; 2.03; 22; 5.4; 6.46; 3.09; .157 INJECTION INTRAVENOUS at 13:42

## 2023-01-01 RX ADMIN — PROPOFOL 70 MCG/KG/MIN: 10 INJECTION, EMULSION INTRAVENOUS at 13:44

## 2023-01-01 RX ADMIN — CHLORHEXIDINE GLUCONATE 0.12% ORAL RINSE 15 ML: 1.2 LIQUID ORAL at 20:37

## 2023-01-01 RX ADMIN — EXAMETAZIME 25.2 MCI.: KIT at 11:02

## 2023-01-01 RX ADMIN — PHENYLEPHRINE HYDROCHLORIDE 5 MCG/KG/MIN: 10 INJECTION INTRAVENOUS at 03:11

## 2023-01-01 RX ADMIN — PHENYLEPHRINE HYDROCHLORIDE 3 MCG/KG/MIN: 10 INJECTION INTRAVENOUS at 20:27

## 2023-01-01 RX ADMIN — AMIODARONE HYDROCHLORIDE 1 MG/MIN: 50 INJECTION, SOLUTION INTRAVENOUS at 11:57

## 2023-01-01 RX ADMIN — SODIUM CHLORIDE, POTASSIUM CHLORIDE, SODIUM LACTATE AND CALCIUM CHLORIDE 1000 ML: 600; 310; 30; 20 INJECTION, SOLUTION INTRAVENOUS at 22:08

## 2023-01-01 RX ADMIN — DEXTROSE MONOHYDRATE: 100 INJECTION, SOLUTION INTRAVENOUS at 12:17

## 2023-01-01 RX ADMIN — Medication 150 MCG/HR: at 09:34

## 2023-01-01 RX ADMIN — CALCIUM GLUCONATE 2 G: 20 INJECTION, SOLUTION INTRAVENOUS at 13:51

## 2023-01-01 RX ADMIN — CALCIUM CHLORIDE, MAGNESIUM CHLORIDE, DEXTROSE MONOHYDRATE, LACTIC ACID, SODIUM CHLORIDE, SODIUM BICARBONATE AND POTASSIUM CHLORIDE 22.5 ML/KG/HR: 5.15; 2.03; 22; 5.4; 6.46; 3.09; .157 INJECTION INTRAVENOUS at 16:09

## 2023-01-01 RX ADMIN — SODIUM CHLORIDE, POTASSIUM CHLORIDE, SODIUM LACTATE AND CALCIUM CHLORIDE 1000 ML: 600; 310; 30; 20 INJECTION, SOLUTION INTRAVENOUS at 23:57

## 2023-01-01 RX ADMIN — Medication 4 UNITS/HR: at 09:20

## 2023-01-01 RX ADMIN — VANCOMYCIN HYDROCHLORIDE 2000 MG: 1 INJECTION, POWDER, LYOPHILIZED, FOR SOLUTION INTRAVENOUS at 10:20

## 2023-01-01 RX ADMIN — CALCIUM GLUCONATE 2 G: 20 INJECTION, SOLUTION INTRAVENOUS at 22:40

## 2023-01-01 RX ADMIN — PROPOFOL 50 MCG/KG/MIN: 10 INJECTION, EMULSION INTRAVENOUS at 04:56

## 2023-01-01 RX ADMIN — SODIUM BICARBONATE 50 MEQ: 84 INJECTION, SOLUTION INTRAVENOUS at 00:40

## 2023-01-01 RX ADMIN — HUMAN INSULIN 10 UNITS: 100 INJECTION, SOLUTION SUBCUTANEOUS at 09:08

## 2023-01-01 RX ADMIN — PIPERACILLIN AND TAZOBACTAM 3.38 G: 3; .375 INJECTION, POWDER, LYOPHILIZED, FOR SOLUTION INTRAVENOUS at 20:26

## 2023-01-01 RX ADMIN — CALCIUM CHLORIDE, MAGNESIUM CHLORIDE, DEXTROSE MONOHYDRATE, LACTIC ACID, SODIUM CHLORIDE, SODIUM BICARBONATE AND POTASSIUM CHLORIDE 12.5 ML/KG/HR: 5.15; 2.03; 22; 5.4; 6.46; 3.09; .157 INJECTION INTRAVENOUS at 10:50

## 2023-01-01 RX ADMIN — CALCIUM CHLORIDE, MAGNESIUM CHLORIDE, SODIUM CHLORIDE, SODIUM BICARBONATE, POTASSIUM CHLORIDE AND SODIUM PHOSPHATE DIBASIC DIHYDRATE 12.5 ML/KG/HR: 3.68; 3.05; 6.34; 3.09; .314; .187 INJECTION INTRAVENOUS at 20:46

## 2023-01-01 RX ADMIN — CALCIUM CHLORIDE, MAGNESIUM CHLORIDE, DEXTROSE MONOHYDRATE, LACTIC ACID, SODIUM CHLORIDE, SODIUM BICARBONATE AND POTASSIUM CHLORIDE 12.5 ML/KG/HR: 5.15; 2.03; 22; 5.4; 6.46; 3.09; .157 INJECTION INTRAVENOUS at 14:26

## 2023-01-01 RX ADMIN — PROPOFOL 50 MCG/KG/MIN: 10 INJECTION, EMULSION INTRAVENOUS at 17:29

## 2023-01-01 RX ADMIN — SODIUM BICARBONATE: 84 INJECTION, SOLUTION INTRAVENOUS at 12:46

## 2023-01-01 RX ADMIN — CHLORHEXIDINE GLUCONATE 0.12% ORAL RINSE 15 ML: 1.2 LIQUID ORAL at 07:32

## 2023-01-01 RX ADMIN — Medication 0.5 MCG/KG/MIN: at 02:19

## 2023-01-01 RX ADMIN — SODIUM CHLORIDE, POTASSIUM CHLORIDE, SODIUM LACTATE AND CALCIUM CHLORIDE 500 ML: 600; 310; 30; 20 INJECTION, SOLUTION INTRAVENOUS at 08:39

## 2023-01-01 RX ADMIN — SODIUM CHLORIDE, POTASSIUM CHLORIDE, SODIUM LACTATE AND CALCIUM CHLORIDE 1000 ML: 600; 310; 30; 20 INJECTION, SOLUTION INTRAVENOUS at 15:36

## 2023-01-01 RX ADMIN — SODIUM BICARBONATE: 84 INJECTION, SOLUTION INTRAVENOUS at 23:00

## 2023-01-01 RX ADMIN — PHENYLEPHRINE HYDROCHLORIDE 2 MCG/KG/MIN: 10 INJECTION INTRAVENOUS at 06:13

## 2023-01-01 RX ADMIN — Medication 4 UNITS/HR: at 07:29

## 2023-01-01 RX ADMIN — HYDROCORTISONE SODIUM SUCCINATE 50 MG: 100 INJECTION, POWDER, FOR SOLUTION INTRAMUSCULAR; INTRAVENOUS at 02:16

## 2023-01-01 RX ADMIN — Medication 4 UNITS/HR: at 23:12

## 2023-01-01 RX ADMIN — SODIUM CHLORIDE, POTASSIUM CHLORIDE, SODIUM LACTATE AND CALCIUM CHLORIDE 1000 ML: 600; 310; 30; 20 INJECTION, SOLUTION INTRAVENOUS at 03:17

## 2023-01-01 RX ADMIN — CALCIUM CHLORIDE, MAGNESIUM CHLORIDE, DEXTROSE MONOHYDRATE, LACTIC ACID, SODIUM CHLORIDE, SODIUM BICARBONATE AND POTASSIUM CHLORIDE 17.5 ML/KG/HR: 5.15; 2.03; 22; 5.4; 6.46; 3.09; .157 INJECTION INTRAVENOUS at 21:20

## 2023-01-01 RX ADMIN — ESMOLOL HYDROCHLORIDE 200 MCG/KG/MIN: 20 INJECTION INTRAVENOUS at 07:23

## 2023-01-01 RX ADMIN — ESMOLOL HYDROCHLORIDE 2000000 MCG: 20 INJECTION INTRAVENOUS at 07:23

## 2023-01-01 RX ADMIN — PIPERACILLIN AND TAZOBACTAM 3.38 G: 3; .375 INJECTION, POWDER, LYOPHILIZED, FOR SOLUTION INTRAVENOUS at 02:09

## 2023-01-01 RX ADMIN — CALCIUM CHLORIDE, MAGNESIUM CHLORIDE, SODIUM CHLORIDE, SODIUM BICARBONATE, POTASSIUM CHLORIDE AND SODIUM PHOSPHATE DIBASIC DIHYDRATE 12.5 ML/KG/HR: 3.68; 3.05; 6.34; 3.09; .314; .187 INJECTION INTRAVENOUS at 07:42

## 2023-01-01 RX ADMIN — PROPOFOL 50 MCG/KG/MIN: 10 INJECTION, EMULSION INTRAVENOUS at 12:36

## 2023-01-01 RX ADMIN — PROPOFOL 50 MCG/KG/MIN: 10 INJECTION, EMULSION INTRAVENOUS at 07:43

## 2023-01-01 RX ADMIN — PIPERACILLIN AND TAZOBACTAM 3.38 G: 3; .375 INJECTION, POWDER, LYOPHILIZED, FOR SOLUTION INTRAVENOUS at 07:38

## 2023-01-01 RX ADMIN — Medication 0.5 MCG/KG/MIN: at 06:45

## 2023-01-01 RX ADMIN — PIPERACILLIN AND TAZOBACTAM 3.38 G: 3; .375 INJECTION, POWDER, LYOPHILIZED, FOR SOLUTION INTRAVENOUS at 20:37

## 2023-01-01 RX ADMIN — PHENYLEPHRINE HYDROCHLORIDE 6 MCG/KG/MIN: 10 INJECTION INTRAVENOUS at 03:41

## 2023-01-01 RX ADMIN — PROPOFOL 60 MCG/KG/MIN: 10 INJECTION, EMULSION INTRAVENOUS at 15:38

## 2023-01-01 RX ADMIN — PROPOFOL 30 MCG/KG/MIN: 10 INJECTION, EMULSION INTRAVENOUS at 22:15

## 2023-01-01 RX ADMIN — CALCIUM CHLORIDE, MAGNESIUM CHLORIDE, SODIUM CHLORIDE, SODIUM BICARBONATE, POTASSIUM CHLORIDE AND SODIUM PHOSPHATE DIBASIC DIHYDRATE 12.5 ML/KG/HR: 3.68; 3.05; 6.34; 3.09; .314; .187 INJECTION INTRAVENOUS at 17:40

## 2023-01-01 RX ADMIN — ROCURONIUM BROMIDE 100 MG: 50 INJECTION, SOLUTION INTRAVENOUS at 06:00

## 2023-01-01 RX ADMIN — AMIODARONE HYDROCHLORIDE 1 MG/MIN: 50 INJECTION, SOLUTION INTRAVENOUS at 14:40

## 2023-01-01 RX ADMIN — Medication: at 14:12

## 2023-01-01 RX ADMIN — CALCIUM CHLORIDE, MAGNESIUM CHLORIDE, DEXTROSE MONOHYDRATE, LACTIC ACID, SODIUM CHLORIDE, SODIUM BICARBONATE AND POTASSIUM CHLORIDE 22.5 ML/KG/HR: 5.15; 2.03; 22; 5.4; 6.46; 3.09; .157 INJECTION INTRAVENOUS at 18:06

## 2023-01-01 RX ADMIN — Medication 150 MCG/HR: at 12:00

## 2023-01-01 RX ADMIN — SODIUM CHLORIDE, POTASSIUM CHLORIDE, SODIUM LACTATE AND CALCIUM CHLORIDE 1000 ML: 600; 310; 30; 20 INJECTION, SOLUTION INTRAVENOUS at 03:10

## 2023-01-01 RX ADMIN — CALCIUM CHLORIDE, MAGNESIUM CHLORIDE, DEXTROSE MONOHYDRATE, LACTIC ACID, SODIUM CHLORIDE, SODIUM BICARBONATE AND POTASSIUM CHLORIDE 17.5 ML/KG/HR: 5.15; 2.03; 22; 5.4; 6.46; 3.09; .157 INJECTION INTRAVENOUS at 01:37

## 2023-01-01 RX ADMIN — Medication 4 UNITS/HR: at 12:02

## 2023-01-01 RX ADMIN — CHLORHEXIDINE GLUCONATE 0.12% ORAL RINSE 15 ML: 1.2 LIQUID ORAL at 20:26

## 2023-01-01 RX ADMIN — Medication 4 UNITS/HR: at 12:59

## 2023-01-01 RX ADMIN — CALCIUM CHLORIDE, MAGNESIUM CHLORIDE, SODIUM CHLORIDE, SODIUM BICARBONATE, POTASSIUM CHLORIDE AND SODIUM PHOSPHATE DIBASIC DIHYDRATE 12.5 ML/KG/HR: 3.68; 3.05; 6.34; 3.09; .314; .187 INJECTION INTRAVENOUS at 17:30

## 2023-01-01 RX ADMIN — PHENYLEPHRINE HYDROCHLORIDE 0.5 MCG/KG/MIN: 10 INJECTION INTRAVENOUS at 06:56

## 2023-01-01 RX ADMIN — Medication 0.32 MCG/KG/MIN: at 16:47

## 2023-01-01 RX ADMIN — CALCIUM CHLORIDE, MAGNESIUM CHLORIDE, DEXTROSE MONOHYDRATE, LACTIC ACID, SODIUM CHLORIDE, SODIUM BICARBONATE AND POTASSIUM CHLORIDE 12.5 ML/KG/HR: 5.15; 2.03; 22; 5.4; 6.46; 3.09; .157 INJECTION INTRAVENOUS at 06:05

## 2023-01-01 RX ADMIN — CALCIUM CHLORIDE, MAGNESIUM CHLORIDE, DEXTROSE MONOHYDRATE, LACTIC ACID, SODIUM CHLORIDE, SODIUM BICARBONATE AND POTASSIUM CHLORIDE 22.5 ML/KG/HR: 5.15; 2.03; 22; 5.4; 6.46; 3.09; .157 INJECTION INTRAVENOUS at 23:42

## 2023-01-01 RX ADMIN — DEXTROSE MONOHYDRATE 25 G: 25 INJECTION, SOLUTION INTRAVENOUS at 09:08

## 2023-01-01 RX ADMIN — PROPOFOL 50 MCG/KG/MIN: 10 INJECTION, EMULSION INTRAVENOUS at 10:09

## 2023-01-01 RX ADMIN — SODIUM BICARBONATE 50 MEQ: 84 INJECTION, SOLUTION INTRAVENOUS at 00:42

## 2023-01-01 RX ADMIN — HEPARIN SODIUM 500 UNITS/HR: 10000 INJECTION, SOLUTION INTRAVENOUS at 07:26

## 2023-01-01 RX ADMIN — CHLORHEXIDINE GLUCONATE 0.12% ORAL RINSE 15 ML: 1.2 LIQUID ORAL at 07:38

## 2023-01-01 RX ADMIN — PHENYLEPHRINE HYDROCHLORIDE 6 MCG/KG/MIN: 10 INJECTION INTRAVENOUS at 14:28

## 2023-01-01 RX ADMIN — CALCIUM CHLORIDE, MAGNESIUM CHLORIDE, DEXTROSE MONOHYDRATE, LACTIC ACID, SODIUM CHLORIDE, SODIUM BICARBONATE AND POTASSIUM CHLORIDE 22.5 ML/KG/HR: 5.15; 2.03; 22; 5.4; 6.46; 3.09; .157 INJECTION INTRAVENOUS at 21:54

## 2023-01-01 RX ADMIN — THIAMINE HCL TAB 100 MG 100 MG: 100 TAB at 14:38

## 2023-01-01 RX ADMIN — PROPOFOL 80 MCG/KG/MIN: 10 INJECTION, EMULSION INTRAVENOUS at 08:21

## 2023-01-01 RX ADMIN — PANTOPRAZOLE SODIUM 40 MG: 40 INJECTION, POWDER, FOR SOLUTION INTRAVENOUS at 07:32

## 2023-01-01 RX ADMIN — CALCIUM CHLORIDE, MAGNESIUM CHLORIDE, SODIUM CHLORIDE, SODIUM BICARBONATE, POTASSIUM CHLORIDE AND SODIUM PHOSPHATE DIBASIC DIHYDRATE 12.5 ML/KG/HR: 3.68; 3.05; 6.34; 3.09; .314; .187 INJECTION INTRAVENOUS at 21:02

## 2023-01-01 RX ADMIN — CALCIUM CHLORIDE, MAGNESIUM CHLORIDE, SODIUM CHLORIDE, SODIUM BICARBONATE, POTASSIUM CHLORIDE AND SODIUM PHOSPHATE DIBASIC DIHYDRATE 12.5 ML/KG/HR: 3.68; 3.05; 6.34; 3.09; .314; .187 INJECTION INTRAVENOUS at 05:50

## 2023-01-01 RX ADMIN — Medication: at 00:17

## 2023-01-01 RX ADMIN — CALCIUM CHLORIDE, MAGNESIUM CHLORIDE, SODIUM CHLORIDE, SODIUM BICARBONATE, POTASSIUM CHLORIDE AND SODIUM PHOSPHATE DIBASIC DIHYDRATE 12.5 ML/KG/HR: 3.68; 3.05; 6.34; 3.09; .314; .187 INJECTION INTRAVENOUS at 02:19

## 2023-01-01 RX ADMIN — CALCIUM CHLORIDE, MAGNESIUM CHLORIDE, SODIUM CHLORIDE, SODIUM BICARBONATE, POTASSIUM CHLORIDE AND SODIUM PHOSPHATE DIBASIC DIHYDRATE: 3.68; 3.05; 6.34; 3.09; .314; .187 INJECTION INTRAVENOUS at 17:40

## 2023-01-01 RX ADMIN — PIPERACILLIN AND TAZOBACTAM 3.38 G: 3; .375 INJECTION, POWDER, LYOPHILIZED, FOR SOLUTION INTRAVENOUS at 14:57

## 2023-01-01 RX ADMIN — CALCIUM CHLORIDE, MAGNESIUM CHLORIDE, DEXTROSE MONOHYDRATE, LACTIC ACID, SODIUM CHLORIDE, SODIUM BICARBONATE AND POTASSIUM CHLORIDE 17.5 ML/KG/HR: 5.15; 2.03; 22; 5.4; 6.46; 3.09; .157 INJECTION INTRAVENOUS at 07:31

## 2023-01-01 RX ADMIN — Medication 4 UNITS/HR: at 02:16

## 2023-01-01 RX ADMIN — HEPARIN SODIUM 2300 UNITS/HR: 10000 INJECTION, SOLUTION INTRAVENOUS at 22:09

## 2023-01-01 RX ADMIN — ALBUMIN HUMAN 25 G: 0.05 INJECTION, SOLUTION INTRAVENOUS at 17:50

## 2023-01-01 RX ADMIN — PIPERACILLIN SODIUM AND TAZOBACTAM SODIUM 4.5 G: 4; .5 INJECTION, POWDER, LYOPHILIZED, FOR SOLUTION INTRAVENOUS at 20:13

## 2023-01-01 RX ADMIN — POTASSIUM CHLORIDE 80 MEQ: 149 INJECTION, SOLUTION, CONCENTRATE INTRAVENOUS at 07:52

## 2023-01-01 RX ADMIN — CALCIUM CHLORIDE, MAGNESIUM CHLORIDE, SODIUM CHLORIDE, SODIUM BICARBONATE, POTASSIUM CHLORIDE AND SODIUM PHOSPHATE DIBASIC DIHYDRATE 12.5 ML/KG/HR: 3.68; 3.05; 6.34; 3.09; .314; .187 INJECTION INTRAVENOUS at 04:08

## 2023-01-01 RX ADMIN — CALCIUM CHLORIDE, MAGNESIUM CHLORIDE, SODIUM CHLORIDE, SODIUM BICARBONATE, POTASSIUM CHLORIDE AND SODIUM PHOSPHATE DIBASIC DIHYDRATE 12.5 ML/KG/HR: 3.68; 3.05; 6.34; 3.09; .314; .187 INJECTION INTRAVENOUS at 04:07

## 2023-01-01 RX ADMIN — Medication 150 MCG/HR: at 23:03

## 2023-01-01 RX ADMIN — CALCIUM CHLORIDE, MAGNESIUM CHLORIDE, DEXTROSE MONOHYDRATE, LACTIC ACID, SODIUM CHLORIDE, SODIUM BICARBONATE AND POTASSIUM CHLORIDE 22.5 ML/KG/HR: 5.15; 2.03; 22; 5.4; 6.46; 3.09; .157 INJECTION INTRAVENOUS at 01:37

## 2023-01-01 RX ADMIN — PHENYLEPHRINE HYDROCHLORIDE 5 MCG/KG/MIN: 10 INJECTION INTRAVENOUS at 09:29

## 2023-01-01 RX ADMIN — Medication: at 07:43

## 2023-01-01 RX ADMIN — Medication 4 UNITS/HR: at 23:58

## 2023-01-01 RX ADMIN — PROPOFOL 40 MCG/KG/MIN: 10 INJECTION, EMULSION INTRAVENOUS at 18:56

## 2023-01-01 RX ADMIN — CHLORHEXIDINE GLUCONATE 0.12% ORAL RINSE 15 ML: 1.2 LIQUID ORAL at 07:40

## 2023-01-01 RX ADMIN — CALCIUM CHLORIDE, MAGNESIUM CHLORIDE, DEXTROSE MONOHYDRATE, LACTIC ACID, SODIUM CHLORIDE, SODIUM BICARBONATE AND POTASSIUM CHLORIDE: 5.15; 2.03; 22; 5.4; 6.46; 3.09; .157 INJECTION INTRAVENOUS at 06:06

## 2023-01-01 RX ADMIN — CALCIUM CHLORIDE, MAGNESIUM CHLORIDE, SODIUM CHLORIDE, SODIUM BICARBONATE, POTASSIUM CHLORIDE AND SODIUM PHOSPHATE DIBASIC DIHYDRATE 12.5 ML/KG/HR: 3.68; 3.05; 6.34; 3.09; .314; .187 INJECTION INTRAVENOUS at 20:47

## 2023-01-01 RX ADMIN — PROPOFOL 70 MCG/KG/MIN: 10 INJECTION, EMULSION INTRAVENOUS at 11:41

## 2023-01-01 RX ADMIN — CALCIUM CHLORIDE, MAGNESIUM CHLORIDE, DEXTROSE MONOHYDRATE, LACTIC ACID, SODIUM CHLORIDE, SODIUM BICARBONATE AND POTASSIUM CHLORIDE: 5.15; 2.03; 22; 5.4; 6.46; 3.09; .157 INJECTION INTRAVENOUS at 09:53

## 2023-01-01 RX ADMIN — MAGNESIUM SULFATE IN WATER 2 G: 40 INJECTION, SOLUTION INTRAVENOUS at 04:58

## 2023-01-01 RX ADMIN — CALCIUM CHLORIDE, MAGNESIUM CHLORIDE, SODIUM CHLORIDE, SODIUM BICARBONATE, POTASSIUM CHLORIDE AND SODIUM PHOSPHATE DIBASIC DIHYDRATE 12.5 ML/KG/HR: 3.68; 3.05; 6.34; 3.09; .314; .187 INJECTION INTRAVENOUS at 07:41

## 2023-01-01 RX ADMIN — CHLORHEXIDINE GLUCONATE 0.12% ORAL RINSE 15 ML: 1.2 LIQUID ORAL at 20:13

## 2023-01-01 RX ADMIN — Medication 0.5 MCG/KG/MIN: at 09:27

## 2023-01-01 RX ADMIN — CALCIUM CHLORIDE, MAGNESIUM CHLORIDE, SODIUM CHLORIDE, SODIUM BICARBONATE, POTASSIUM CHLORIDE AND SODIUM PHOSPHATE DIBASIC DIHYDRATE 12.5 ML/KG/HR: 3.68; 3.05; 6.34; 3.09; .314; .187 INJECTION INTRAVENOUS at 01:46

## 2023-01-01 RX ADMIN — CALCIUM CHLORIDE, MAGNESIUM CHLORIDE, DEXTROSE MONOHYDRATE, LACTIC ACID, SODIUM CHLORIDE, SODIUM BICARBONATE AND POTASSIUM CHLORIDE 22.5 ML/KG/HR: 5.15; 2.03; 22; 5.4; 6.46; 3.09; .157 INJECTION INTRAVENOUS at 13:42

## 2023-01-01 RX ADMIN — THIAMINE HCL TAB 100 MG 100 MG: 100 TAB at 07:32

## 2023-01-01 RX ADMIN — CALCIUM CHLORIDE, MAGNESIUM CHLORIDE, SODIUM CHLORIDE, SODIUM BICARBONATE, POTASSIUM CHLORIDE AND SODIUM PHOSPHATE DIBASIC DIHYDRATE 12.5 ML/KG/HR: 3.68; 3.05; 6.34; 3.09; .314; .187 INJECTION INTRAVENOUS at 11:04

## 2023-01-01 RX ADMIN — Medication 4 UNITS/HR: at 06:23

## 2023-01-01 RX ADMIN — CALCIUM CHLORIDE, MAGNESIUM CHLORIDE, SODIUM CHLORIDE, SODIUM BICARBONATE, POTASSIUM CHLORIDE AND SODIUM PHOSPHATE DIBASIC DIHYDRATE 12.5 ML/KG/HR: 3.68; 3.05; 6.34; 3.09; .314; .187 INJECTION INTRAVENOUS at 17:31

## 2023-01-01 RX ADMIN — Medication 0.5 MCG/KG/MIN: at 22:04

## 2023-01-01 RX ADMIN — CALCIUM CHLORIDE, MAGNESIUM CHLORIDE, DEXTROSE MONOHYDRATE, LACTIC ACID, SODIUM CHLORIDE, SODIUM BICARBONATE AND POTASSIUM CHLORIDE: 5.15; 2.03; 22; 5.4; 6.46; 3.09; .157 INJECTION INTRAVENOUS at 13:42

## 2023-01-01 RX ADMIN — CALCIUM CHLORIDE, MAGNESIUM CHLORIDE, DEXTROSE MONOHYDRATE, LACTIC ACID, SODIUM CHLORIDE, SODIUM BICARBONATE AND POTASSIUM CHLORIDE 22.5 ML/KG/HR: 5.15; 2.03; 22; 5.4; 6.46; 3.09; .157 INJECTION INTRAVENOUS at 07:33

## 2023-01-01 RX ADMIN — PIPERACILLIN AND TAZOBACTAM 3.38 G: 3; .375 INJECTION, POWDER, LYOPHILIZED, FOR SOLUTION INTRAVENOUS at 13:43

## 2023-01-01 RX ADMIN — SODIUM CHLORIDE, POTASSIUM CHLORIDE, SODIUM LACTATE AND CALCIUM CHLORIDE 1000 ML: 600; 310; 30; 20 INJECTION, SOLUTION INTRAVENOUS at 01:45

## 2023-01-01 RX ADMIN — CALCIUM GLUCONATE 2 G: 20 INJECTION, SOLUTION INTRAVENOUS at 09:58

## 2023-01-01 RX ADMIN — CALCIUM CHLORIDE, MAGNESIUM CHLORIDE, DEXTROSE MONOHYDRATE, LACTIC ACID, SODIUM CHLORIDE, SODIUM BICARBONATE AND POTASSIUM CHLORIDE 22.5 ML/KG/HR: 5.15; 2.03; 22; 5.4; 6.46; 3.09; .157 INJECTION INTRAVENOUS at 20:12

## 2023-01-01 RX ADMIN — SODIUM BICARBONATE 50 MEQ: 84 INJECTION, SOLUTION INTRAVENOUS at 12:28

## 2023-01-01 RX ADMIN — CALCIUM GLUCONATE 2 G: 20 INJECTION, SOLUTION INTRAVENOUS at 05:26

## 2023-01-01 RX ADMIN — AMIODARONE HYDROCHLORIDE 1 MG/MIN: 50 INJECTION, SOLUTION INTRAVENOUS at 12:59

## 2023-01-01 RX ADMIN — CALCIUM CHLORIDE, MAGNESIUM CHLORIDE, DEXTROSE MONOHYDRATE, LACTIC ACID, SODIUM CHLORIDE, SODIUM BICARBONATE AND POTASSIUM CHLORIDE 22.5 ML/KG/HR: 5.15; 2.03; 22; 5.4; 6.46; 3.09; .157 INJECTION INTRAVENOUS at 03:41

## 2023-01-01 RX ADMIN — SODIUM BICARBONATE 50 MEQ: 84 INJECTION, SOLUTION INTRAVENOUS at 05:36

## 2023-01-01 RX ADMIN — PROPOFOL 50 MCG/KG/MIN: 10 INJECTION, EMULSION INTRAVENOUS at 12:21

## 2023-01-01 RX ADMIN — SODIUM BICARBONATE 50 MEQ: 84 INJECTION, SOLUTION INTRAVENOUS at 05:37

## 2023-01-01 RX ADMIN — CALCIUM CHLORIDE, MAGNESIUM CHLORIDE, SODIUM CHLORIDE, SODIUM BICARBONATE, POTASSIUM CHLORIDE AND SODIUM PHOSPHATE DIBASIC DIHYDRATE 12.5 ML/KG/HR: 3.68; 3.05; 6.34; 3.09; .314; .187 INJECTION INTRAVENOUS at 21:03

## 2023-01-01 RX ADMIN — CALCIUM CHLORIDE, MAGNESIUM CHLORIDE, DEXTROSE MONOHYDRATE, LACTIC ACID, SODIUM CHLORIDE, SODIUM BICARBONATE AND POTASSIUM CHLORIDE 17.5 ML/KG/HR: 5.15; 2.03; 22; 5.4; 6.46; 3.09; .157 INJECTION INTRAVENOUS at 18:57

## 2023-01-01 RX ADMIN — CALCIUM CHLORIDE, MAGNESIUM CHLORIDE, SODIUM CHLORIDE, SODIUM BICARBONATE, POTASSIUM CHLORIDE AND SODIUM PHOSPHATE DIBASIC DIHYDRATE: 3.68; 3.05; 6.34; 3.09; .314; .187 INJECTION INTRAVENOUS at 17:30

## 2023-01-01 RX ADMIN — Medication 50 MCG/HR: at 07:13

## 2023-01-01 RX ADMIN — CALCIUM CHLORIDE, MAGNESIUM CHLORIDE, DEXTROSE MONOHYDRATE, LACTIC ACID, SODIUM CHLORIDE, SODIUM BICARBONATE AND POTASSIUM CHLORIDE 12.5 ML/KG/HR: 5.15; 2.03; 22; 5.4; 6.46; 3.09; .157 INJECTION INTRAVENOUS at 14:25

## 2023-01-01 RX ADMIN — HEPARIN SODIUM 2200 UNITS/HR: 10000 INJECTION, SOLUTION INTRAVENOUS at 09:34

## 2023-01-01 RX ADMIN — PIPERACILLIN AND TAZOBACTAM 3.38 G: 3; .375 INJECTION, POWDER, LYOPHILIZED, FOR SOLUTION INTRAVENOUS at 07:57

## 2023-01-01 RX ADMIN — PROPOFOL 80 MCG/KG/MIN: 10 INJECTION, EMULSION INTRAVENOUS at 10:09

## 2023-01-01 RX ADMIN — PANTOPRAZOLE SODIUM 40 MG: 40 INJECTION, POWDER, FOR SOLUTION INTRAVENOUS at 07:38

## 2023-01-01 RX ADMIN — CALCIUM CHLORIDE, MAGNESIUM CHLORIDE, DEXTROSE MONOHYDRATE, LACTIC ACID, SODIUM CHLORIDE, SODIUM BICARBONATE AND POTASSIUM CHLORIDE 17.5 ML/KG/HR: 5.15; 2.03; 22; 5.4; 6.46; 3.09; .157 INJECTION INTRAVENOUS at 23:42

## 2023-01-01 RX ADMIN — CALCIUM CHLORIDE, MAGNESIUM CHLORIDE, SODIUM CHLORIDE, SODIUM BICARBONATE, POTASSIUM CHLORIDE AND SODIUM PHOSPHATE DIBASIC DIHYDRATE 12.5 ML/KG/HR: 3.68; 3.05; 6.34; 3.09; .314; .187 INJECTION INTRAVENOUS at 01:47

## 2023-01-01 RX ADMIN — DIVALPROEX SODIUM 500 MG: 125 CAPSULE, COATED PELLETS ORAL at 21:54

## 2023-01-01 RX ADMIN — CALCIUM GLUCONATE 2 G: 20 INJECTION, SOLUTION INTRAVENOUS at 09:15

## 2023-01-01 RX ADMIN — PANTOPRAZOLE SODIUM 40 MG: 40 INJECTION, POWDER, FOR SOLUTION INTRAVENOUS at 07:57

## 2023-01-01 RX ADMIN — Medication 0.22 MCG/KG/MIN: at 09:30

## 2023-01-01 RX ADMIN — THIAMINE HCL TAB 100 MG 100 MG: 100 TAB at 07:40

## 2023-01-01 RX ADMIN — SODIUM CHLORIDE, POTASSIUM CHLORIDE, SODIUM LACTATE AND CALCIUM CHLORIDE 1000 ML: 600; 310; 30; 20 INJECTION, SOLUTION INTRAVENOUS at 04:00

## 2023-01-01 RX ADMIN — CALCIUM CHLORIDE, MAGNESIUM CHLORIDE, DEXTROSE MONOHYDRATE, LACTIC ACID, SODIUM CHLORIDE, SODIUM BICARBONATE AND POTASSIUM CHLORIDE 17.5 ML/KG/HR: 5.15; 2.03; 22; 5.4; 6.46; 3.09; .157 INJECTION INTRAVENOUS at 09:57

## 2023-01-01 RX ADMIN — CALCIUM CHLORIDE, MAGNESIUM CHLORIDE, DEXTROSE MONOHYDRATE, LACTIC ACID, SODIUM CHLORIDE, SODIUM BICARBONATE AND POTASSIUM CHLORIDE 22.5 ML/KG/HR: 5.15; 2.03; 22; 5.4; 6.46; 3.09; .157 INJECTION INTRAVENOUS at 05:39

## 2023-01-01 RX ADMIN — PROPOFOL 50 MCG/KG/MIN: 10 INJECTION, EMULSION INTRAVENOUS at 01:45

## 2023-01-01 RX ADMIN — Medication 822 UNITS: at 22:07

## 2023-01-01 RX ADMIN — PROPOFOL 50 MCG/KG/MIN: 10 INJECTION, EMULSION INTRAVENOUS at 22:58

## 2023-01-01 RX ADMIN — CALCIUM CHLORIDE, MAGNESIUM CHLORIDE, DEXTROSE MONOHYDRATE, LACTIC ACID, SODIUM CHLORIDE, SODIUM BICARBONATE AND POTASSIUM CHLORIDE 17.5 ML/KG/HR: 5.15; 2.03; 22; 5.4; 6.46; 3.09; .157 INJECTION INTRAVENOUS at 16:30

## 2023-01-01 RX ADMIN — PIPERACILLIN AND TAZOBACTAM 3.38 G: 3; .375 INJECTION, POWDER, LYOPHILIZED, FOR SOLUTION INTRAVENOUS at 01:56

## 2023-01-01 RX ADMIN — PANTOPRAZOLE SODIUM 40 MG: 40 INJECTION, POWDER, FOR SOLUTION INTRAVENOUS at 07:40

## 2023-01-01 RX ADMIN — Medication 4 UNITS/HR: at 14:40

## 2023-01-01 RX ADMIN — PIPERACILLIN AND TAZOBACTAM 3.38 G: 3; .375 INJECTION, POWDER, LYOPHILIZED, FOR SOLUTION INTRAVENOUS at 07:32

## 2023-01-01 RX ADMIN — PHENYLEPHRINE HYDROCHLORIDE 6 MCG/KG/MIN: 10 INJECTION INTRAVENOUS at 23:43

## 2023-01-01 RX ADMIN — PHENYLEPHRINE HYDROCHLORIDE 6 MCG/KG/MIN: 10 INJECTION INTRAVENOUS at 18:57

## 2023-01-01 RX ADMIN — PROPOFOL 50 MCG/KG/MIN: 10 INJECTION, EMULSION INTRAVENOUS at 14:37

## 2023-01-01 RX ADMIN — SODIUM BICARBONATE: 84 INJECTION, SOLUTION INTRAVENOUS at 08:15

## 2023-01-01 RX ADMIN — Medication 4 UNITS/HR: at 16:47

## 2023-01-01 RX ADMIN — LIDOCAINE HYDROCHLORIDE 1 MG/MIN: 8 INJECTION, SOLUTION INTRAVENOUS at 02:09

## 2023-01-01 RX ADMIN — CALCIUM CHLORIDE, MAGNESIUM CHLORIDE, DEXTROSE MONOHYDRATE, LACTIC ACID, SODIUM CHLORIDE, SODIUM BICARBONATE AND POTASSIUM CHLORIDE 17.5 ML/KG/HR: 5.15; 2.03; 22; 5.4; 6.46; 3.09; .157 INJECTION INTRAVENOUS at 04:53

## 2023-01-01 RX ADMIN — CALCIUM GLUCONATE 2 G: 20 INJECTION, SOLUTION INTRAVENOUS at 05:00

## 2023-01-01 RX ADMIN — CALCIUM CHLORIDE, MAGNESIUM CHLORIDE, DEXTROSE MONOHYDRATE, LACTIC ACID, SODIUM CHLORIDE, SODIUM BICARBONATE AND POTASSIUM CHLORIDE 12.5 ML/KG/HR: 5.15; 2.03; 22; 5.4; 6.46; 3.09; .157 INJECTION INTRAVENOUS at 06:06

## 2023-01-01 RX ADMIN — PIPERACILLIN AND TAZOBACTAM 3.38 G: 3; .375 INJECTION, POWDER, LYOPHILIZED, FOR SOLUTION INTRAVENOUS at 14:37

## 2023-01-01 RX ADMIN — PIPERACILLIN SODIUM AND TAZOBACTAM SODIUM 4.5 G: 4; .5 INJECTION, POWDER, LYOPHILIZED, FOR SOLUTION INTRAVENOUS at 01:59

## 2023-01-01 RX ADMIN — CALCIUM CHLORIDE, MAGNESIUM CHLORIDE, SODIUM CHLORIDE, SODIUM BICARBONATE, POTASSIUM CHLORIDE AND SODIUM PHOSPHATE DIBASIC DIHYDRATE 12.5 ML/KG/HR: 3.68; 3.05; 6.34; 3.09; .314; .187 INJECTION INTRAVENOUS at 12:37

## 2023-01-01 RX ADMIN — DIVALPROEX SODIUM 500 MG: 125 CAPSULE, COATED PELLETS ORAL at 22:43

## 2023-01-01 RX ADMIN — PROPOFOL 100 MCG/KG/MIN: 10 INJECTION, EMULSION INTRAVENOUS at 07:17

## 2023-01-01 RX ADMIN — CALCIUM GLUCONATE 2 G: 20 INJECTION, SOLUTION INTRAVENOUS at 04:50

## 2023-01-01 ASSESSMENT — ACTIVITIES OF DAILY LIVING (ADL)
CONCENTRATING,_REMEMBERING_OR_MAKING_DECISIONS_DIFFICULTY: NO
ADLS_ACUITY_SCORE: 43
ADLS_ACUITY_SCORE: 39
ADLS_ACUITY_SCORE: 43
ADLS_ACUITY_SCORE: 43
ADLS_ACUITY_SCORE: 39
ADLS_ACUITY_SCORE: 43
ADLS_ACUITY_SCORE: 35
ADLS_ACUITY_SCORE: 43
ADLS_ACUITY_SCORE: 39
ADLS_ACUITY_SCORE: 43
ADLS_ACUITY_SCORE: 26
ADLS_ACUITY_SCORE: 26
ADLS_ACUITY_SCORE: 43
ADLS_ACUITY_SCORE: 26
ADLS_ACUITY_SCORE: 35
TOILETING_ISSUES: NO
ADLS_ACUITY_SCORE: 39
DIFFICULTY_EATING/SWALLOWING: NO
FALL_HISTORY_WITHIN_LAST_SIX_MONTHS: NO
ADLS_ACUITY_SCORE: 39
ADLS_ACUITY_SCORE: 26
ADLS_ACUITY_SCORE: 39
WALKING_OR_CLIMBING_STAIRS_DIFFICULTY: NO
ADLS_ACUITY_SCORE: 39
ADLS_ACUITY_SCORE: 39
ADLS_ACUITY_SCORE: 43
WEAR_GLASSES_OR_BLIND: NO
ADLS_ACUITY_SCORE: 26
ADLS_ACUITY_SCORE: 39
DRESSING/BATHING_DIFFICULTY: NO
ADLS_ACUITY_SCORE: 43
ADLS_ACUITY_SCORE: 39
ADLS_ACUITY_SCORE: 26
ADLS_ACUITY_SCORE: 47
ADLS_ACUITY_SCORE: 39
ADLS_ACUITY_SCORE: 43
ADLS_ACUITY_SCORE: 39
ADLS_ACUITY_SCORE: 39
ADLS_ACUITY_SCORE: 26
ADLS_ACUITY_SCORE: 43
ADLS_ACUITY_SCORE: 26
DOING_ERRANDS_INDEPENDENTLY_DIFFICULTY: NO
ADLS_ACUITY_SCORE: 43
CHANGE_IN_FUNCTIONAL_STATUS_SINCE_ONSET_OF_CURRENT_ILLNESS/INJURY: NO
ADLS_ACUITY_SCORE: 26

## 2023-01-02 NOTE — PROGRESS NOTES
Rex is a 42 year old who is being evaluated via a billable video visit.      How would you like to obtain your AVS? MyChart  Will anyone else be joining your video visit? No      Assessment & Plan     Elbow pain, unspecified laterality  Difficult to diagnose due to virtual nature of visit and so, unable to do an effective exam, however, history sound that this is likely lateral epicondylitis. Other etiologies/ confounders would be likely triceps tendonitis or a subluxing ulnar nerve or other tendon. We will start with voltaren gel on the area AND AAOS elbow exercises. Patient not interested in hand therapy currently.    Abnormal serum thyroxine (T4) level  Reviewed patients most recent TSH (1.83) and T4 (0.89). Given how close to normal they are and patients multitude of reasons for other symptoms, we will defer to endocrine the need for additional workup or treatment.     Bilateral S1 & Left L5 radiculopathy  Patient reports nerve pain is significantly better with cymbalta and bruno 120mg daily. Plan to continue this.    Dyspnea on exertion  Erectile dysfunction, unspecified erectile dysfunction type  Patient reports improved on days he takes cialis. We reviewed cards recs for PFT which had been ordered, but patient would like to defer for now. I reviewed most recent normal echo from 5/24/22.  - tadalafil (CIALIS) 10 MG tablet  Dispense: 30 tablet; Refill: 3    Hypertension  Patient with ongoing hypertension. Likely related to stimulants and possibly testosterone.   - recheck next visit  - Recheck CMP, microalbumin at next visit   - consider starting antihypertensive    IVONNE  Trans-aminitis  Patient with elevated Cr and LFTs last visit. This may be related to several etiologies including testosterone, the other supplements you had discussed, or higher blood pressures.   - Recheck CMP, microalbumin at next visit   - consider starting antihypertensive    Return in about 2 months (around 3/2/2023).    Lionel  MD Josef  [facility] CHARLEY Goodman is a 42 year old, presenting for the following health issues:  Recheck Medication, Elbow Pain, and Thyroid Problem      HPI   Pain primarily behind elbow and on lateral side that has started last visit. Worst with elbow extension and overhead, bruno behind the head movements. No changes with wrist movement. No worsened numbness/ tingling.    Out of cymbalta recently due to rx issue. Nerve pain much worse at that time, so would like to continue for now.    Breathing difficulty and palpitations better on days where he takes cymblata. Has overall felt improved.    Would like to follow-up on labs, bruno T4    Review of Systems   Constitutional, HEENT, cardiovascular, pulmonary, gi and gu systems are negative, except as otherwise noted.      Objective       Vitals:  No vitals were obtained today due to virtual visit.    Physical Exam   GENERAL: Healthy, alert and no distress  EYES: Eyes grossly normal to inspection.  No discharge or erythema, or obvious scleral/conjunctival abnormalities.  RESP: No audible wheeze, cough, or visible cyanosis.  No visible retractions or increased work of breathing.    SKIN: Visible skin clear. No significant rash, abnormal pigmentation or lesions.  NEURO: Cranial nerves grossly intact.  Mentation and speech appropriate for age.  PSYCH: Mentation appears normal, affect normal/bright, judgement and insight intact, normal speech and appearance well-groomed.        Video-Visit Details    Type of service:  Video Visit   Video Start Time: 3:03 PM  Video End Time:3:33 PM    Originating Location (pt. Location): Other patient's car   Distant Location (provider location):  On-site  Platform used for Video Visit: Stephanie

## 2023-01-02 NOTE — PROGRESS NOTES
Preceptor Attestation:   Patient seen and evaluated via video visit. I discussed the patient with the resident. I have verified the content of the note, which accurately reflects my assessment of the patient and the plan of care.   Supervising Physician:  Erendira Morris DO

## 2023-01-02 NOTE — Clinical Note
Lion Telles, I am the PCP for Rex. I saw you saw him this summer for his low testosterone and it looks like he is due for another appointment. I was hoping your office could assist him in setting this up. His neurologist had also checked his thyroid and noted a normal TSH and low T4. Personally, I wouldn't have checked the T4 if the TSH was normal and I'm not sure if it's contributing to his multitude of symptoms for which he has other good reasons for, but I was hoping to get your thoughts on if we should treat it or continue to monitor given his other hormonal imbalances (presumably from his anoxic brain injury). Let me know if there is anything you need from me, otherwise, I will keep an eye out for the appointment note.  Thank you! Lionel Bahena MD

## 2023-01-02 NOTE — PATIENT INSTRUCTIONS
Tennis elbow:  Though it is hard to say for sure without an exam, it does sound like you have tennis elbow. I would recommend using  - voltaren gel on the area   AND   - these exercises to start: https://orthoinfo.aaos.org/globalassets/pdfs/2022-therapeutic-exercise-program-for-epicondylitis.pdf     T4:  Your thyroid markers are very close to normal and unlikely to be significantly effecting your symptoms, however given your previous hormone imbalances and your T4 still being just below normal, I would recommend checking in with the endocrinologist again (you are due to see them again anyway).  - I will send them a message, but if you do not hear about this appointment, please call [phone] to schedule.    Testosterone:   T levels normal and FSH, LH low as expected.     Your liver and kidneys looked irritated.   Unclear if why specifically this is, but it could be related to testosterone, the other supplements you had discussed, or higher blood pressures. We should recheck them at the next visit.    Cymbalta:  Continue taking 60mg twice daily (daily total of 120mg). (Refill sent after the last Seamless message.)    Blood Pressure:   Continue to monitor. We should discuss this more and recheck the numbers you have been getting at home at the next visit.    Cialis:  Okay to continue intermittent use. Use goodrx.com coupons every time you go to refill them.

## 2023-01-03 NOTE — PROGRESS NOTES
[facility]    OUTPATIENT PHYSICAL THERAPY  PLAN OF TREATMENT FOR OUTPATIENT REHABILITATION AND PROGRESS NOTE           Patient's Last Name, First Name, Rex Sandoval Date of Birth  1980   Provider's Name  [facility] Medical Record No.  [id number]    Onset Date  11/2/2022 (order date) Start of Care Date  11/7/2022   Type:     _X_PT   ___OT   ___SLP Medical Diagnosis  Post concussion syndrome   PT Diagnosis  Dizziness, Headache, Neck Pain, Balance Problems Plan of Treatment  Frequency/Duration: x1/wk  Certification date from 1/3/2023 to 3/4/2023     Goals:  Goal Identifier HEP   Goal Description In order to facilitate gains in balance and postural stability, cervical ROM/flexibility/strength and decrease motion sensitivity, neck pain and headaches outside of physical therapy, patient will demonstrate independence with a HEP.   Target Date 03/04/23   Date Met      Progress (detail required for progress note): 1/3/2022: Pt's HEP remains appropriate, as it produces pt's symptoms. Extended target date.     Goal Identifier DVA   Goal Description The patient will score within 2 lines of SVA with performance of DVA to demonstrate that his vestibular system is functioning optimally and is able to support gaze stability within a functional range.   Target Date 03/04/23   Date Met      Progress (detail required for progress note): 1/3/2023: Pt with loss of 6 lines; VOR remains impaired. Extended target date.     Goal Identifier CSA   Goal Description The patient will score 10 or less on CSA to demonstrate a significant improvement in concussion symptom severity and utilization of management techniques.   Target Date 03/04/23   Date Met      Progress (detail required for progress note): 1/3/2023: Pt scoring 37pts on CSA. He continues to report fluctuation of his  "concussion symptoms. Extended target date.     Goal Identifier Neck Disability Index   Goal Description Patient will report a reduction in neck pain as well as headaches by scoring 15 or less on the neck disability index in order to increase tolerance and ease for daily activities and mobility.   Target Date 03/04/23   Date Met      Progress (detail required for progress note): 1/3/2023: Pt scoring 52% on NDI. He feels this is improved from previous, as he was then unable to answer all questions 2' activity limiations. Extended target date.     Goal Identifier Modified CTSIB   Goal Description Patient will score normal on modified CTSIB demonstrating improvement in balance and minimized risk for falls.   Target Date 03/04/23   Date Met      Progress (detail required for progress note): 1/3/2023: 30s, 30s, 30s, 20s; pt with poor vestibular sensory integration for postural control. Extended target date.     Beginning/End Dates of Progress Note Reporting Period:  11/7/2022 to 1/3/2023    Progress Toward Goals:   Progress this reporting period: Pt demonstrating improvement of dizziness symptoms, tolerance to daily routine, decreased light/noise sensitivity, and improved capacity for return to work. Will continue to progress pt's balance and vestibular sensory integration deficits.     Client Self (Subjective) Report for Progress Note Reporting Period: Pt reporting his dizziness \"typical,\" daily dizziness is getting better. He reports that this corresponds with a reduction of his stress. 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       I CERTIFY THE NEED FOR THESE SERVICES FURNISHED UNDER        THIS PLAN OF TREATMENT AND WHILE UNDER MY CARE     (Physician co-signature of this document indicates review and certification of the therapy plan).                Referring Provider: Regi Conde APRN CNP Emma VanSickle, PT, DPT    "

## 2023-01-05 NOTE — TELEPHONE ENCOUNTER
Prior Authorization Specialty Medication Request    Medication/Dose: tadalafil (CIALIS) 10 MG tablet  ICD code (if different than what is on RX):    Previously Tried and Failed:      Important Lab Values:   Rationale:    Insurance Name: [facility]  Insurance ID:[id number]  Insurance Phone Number:     Pharmacy Information (if different than what is on RX)  Name:  [facility]  Phone:  [phone]    Laura Penny RN

## 2023-01-09 NOTE — TELEPHONE ENCOUNTER
Central Prior Authorization Team - Phone: [phone]     PA Initiation    Medication: Tadalafil 10 MG Oral Tablet (CIALIS)- PA INITIATED  Insurance Company:    Pharmacy Filling the Rx: [id number] [address]  Filling Pharmacy Phone: [phone]  Filling Pharmacy Fax:    Start Date: 1/9/2023

## 2023-01-09 NOTE — TELEPHONE ENCOUNTER
Central Prior Authorization Team - Phone: [phone]     PRIOR AUTHORIZATION DENIED    Medication: Tadalafil 10 MG Oral Tablet (CIALIS)- PA denied    Denial Date: 1/9/2023    Denial Rational: not covered for dianosis        Appeal Information:  If the provider would like to appeal, please provide a letter of medical necessity and route back to the team. Otherwise you can close the encounter. Thank you, Central PA Team

## 2023-01-15 PROBLEM — I46.9 CARDIAC ARREST (H): Status: ACTIVE | Noted: 2023-01-01

## 2023-01-15 NOTE — PROGRESS NOTES
[facility]    ECLS Shift Summary:     ECMO Equipment:  Console Serial Number: [id number]  Circuit Lot Number: [id number]  Oxygenator Lot Number: [id number]    Circuit Assessment: Free of fibrin, clot, and air    Patient remains on V-A ECMO, all equipment is functioning and alarms are appropriately set. RPM's: (S) 3585 with Blood Flow (Circuit) LPM  Av.8 LPM  Min: 4 LPM  Max: 5.24 LPM L/min. Sweep is at 5 LPM and 70 %. Extremities are perfused.     Significant Shift Events: None    Vent settings:  Vent Mode: CMV/AC  (Continuous Mandatory Ventilation/ Assist Control)  FiO2 (%): 40 %  Resp Rate (Set): 12 breaths/min  Tidal Volume (Set, mL): 520 mL  PEEP (cm H2O): 7 cmH2O  Resp: 12      Anticoagulation:  Dose (units/hr) HEParin: 2200 Units/hr ([Action automatically changed])  Rate (mL/hr) HEParin: 22 mL/hr ([Action automatically changed])  Concentration HEParin: 100 Units/mL        Most recent: ACT  (seconds): 143 seconds    Urine output is clear, Cande.  Blood loss was minimal. Product given included: none.       Intake/Output Summary (Last 24 hours) at 1/15/2023 1744  Last data filed at 1/15/2023 1700  Gross per 24 hour   Intake 2235.03 ml   Output 2060.7 ml   Net 174.33 ml       Labs:  Recent Labs   Lab 01/15/23  1610 01/15/23  1420 01/15/23  1221 01/15/23  1006   PH 7.37 7.37 7.31* 7.24*   PCO2 44 43 49* 56*   PO2 119* 203* 154* 130*   HCO3 25 25 25 24   O2PER 70  40  40 60 60 60       Lab Results   Component Value Date    HGB 12.0 (L) 01/15/2023    PHGB 40 (H) 01/15/2023     01/15/2023    FIBR 151 (L) 01/15/2023    INR 1.18 (H) 01/15/2023    PTT 45 (H) 01/15/2023    DD 2.23 (H) 01/15/2023    ANTCH 117 01/15/2023       Plan is to continue VA ECMO.    Vernon Samuel  ECMO Specialist  1/15/2023 5:44 PM

## 2023-01-15 NOTE — PROGRESS NOTES
Patients name per wife and family is  Rex Cantu. ID at home. Chart to be merged. Wife Sanjeev is in waiting room currently [phone] phone number added to chart. Dyana Bhatti RN on 1/15/2023 at 6:41 AM

## 2023-01-15 NOTE — PROGRESS NOTES
[facility]    ECLS Cannulation Note:     Date on: 1/15/2023  Time on: 0316  Cannulating Physician: Gely    Arterial Cannula: 17 Fr. In the Right Femoral Artery      Venous Cannula: 25 Fr. In the Right Femoral Vein      Distal Perfusion Cannula: 8 Fr. In the Right Superficial Femoral Artery    ECMO components include:  Console Serial Number: [id number]  Circuit Lot Number: [id number]  Oxygenator Lot Number: [id number]    Cannulation was performed in the Cath Lab, placement was verified by fluoroscopy, cannula position is good.    Patient's blood type is O, positive.    Regi Tapia, RT  ECMO Specialist  1/15/2023 6:06 AM

## 2023-01-15 NOTE — PROGRESS NOTES
[facility]         Intra-Aortic Balloon Pump Insertion:       Insertion Date: 1/15/2023  Insertion Time: 0410  Insertion Locations: CCL    Insertion Details:  Physician: Dr. Hong  Site: Left Femoral Artery  Catheter Size: 8 Fr.  Balloon Volume: 50 cc  Fiberoptic: YES  Sheath: YES  Position verified with: fluoroscopy    Initial Settings:  Mode: Semi Auto  Assist Ratio: 1:2 ratio  Augmentation: 100%  Machine #: 2    Pt in CCL with persistent VF, with only transient conversion w/ shocks and meds. In VF at time of insertion, initially place on Internal trigger at 80 bpm; at subsequent defibrillation, rhythm converted and was paced, and trigger was immediately switched to ECG. Transported to CT and then to CVICU without incident.    Tri Greco RT  ECMO Specialist  1/15/2023 6:20 AM
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
## 2023-01-15 NOTE — PROGRESS NOTES
"SPIRITUAL HEALTH SERVICES  SPIRITUAL ASSESSMENT Progress Note (Palliative Focus)  [facility] ([place]) 4E    REFERRAL SOURCE: [facility] Follow-Up from Overnight On-Call     Received handoff from overnight on-call  who shared about situation and said that family would appreciate a follow up today.    Checked in with patient's wife Danae and patient's mom Rin to provide emotional support. They appreciate prayer and are trying to take \"one day at a time\". ECMO team is continuing to work with patient and see if they can figure out cause of cardiac arrest.     Plan: Will continue to follow while Palliative Care is consulted.     Nora Farmer MDiv.  Palliative Chaplain Resident  Pager [phone]    [facility] Palliative Care Inpatient Team Consult pager [phone] (M-F 8-4:30)  After-hours Answering Service [phone]      "

## 2023-01-15 NOTE — PROGRESS NOTES
[facility]    ECLS Shift Summary: 8647 - 8475     ECMO Equipment:  Console Serial Number: [id number]  Circuit Lot Number: [id number]  Oxygenator Lot Number: [id number]    Circuit Assessment: Free of fibrin, clot, and air. Delta-Ps are in the 30s.     Patient remains on V-A ECMO, all equipment is functioning and alarms are appropriately set. RPM's: 4230 with Blood Flow (Circuit) LPM  Av.11 LPM  Min: 5.11 LPM  Max: 5.11 LPM L/min. Sweep is at 6 LPM and 100 %. Extremities are cool. Cannula sites are clean, dry, with mild oozing.    Significant Shift Events:     Verbal report from EMS:   Pt was last seen normal by his wife ~0030 tonight (1/15), pt got up from bed, went to bathroom, went back to bed, wife checked on him ~30 min later, found him to be unresponsive. 911 was called, FD arrived on scene first , started CPR ~0103, shocked him x1. Upon EMS arrival, pt was found in a sinus ludin, but devolved into VF, was shocked x2 more before ROSC was obtained. CPR stopped per EMS ~0113 d/t ROSC. Received 300 mg Amio during the arrst, did NOT receive any Epi. Also received Versed - 5 mg + Fentanyl - 100 mcg d/t movement/agitation post-ROSC. Transported with an I-gel in place.         Pt had intermittent VF that converted with shocks during angiogram, had x2 episodes of persistent VF that required 1-2 minutes of CPR for support. Placed on ECMO d/t persistent VF. Received ~40 shocks in total during CCL case, with over half being delivered @ 360J. Multiple anti-arrhythmic boluses + gtts employed.     Hgb adequate, flows maintaining 4 - 5 L/min without need for additional volume.          Vent settings:  Vent Mode: CMV/AC  (Continuous Mandatory Ventilation/ Assist Control)  FiO2 (%): 50 %  Resp Rate (Set): 12 breaths/min  Tidal Volume (Set, mL): 550 mL  PEEP (cm H2O): 7 cmH2O      Anticoagulation:    Most recent: ACT  (seconds): 182 seconds, Heparin to be started pending order  verification from pharmacy.    Volume/Product Status:    Urine output is ~750 since arrival.     Blood loss was minimal.       Intake/Output Summary (Last 24 hours) at 1/15/2023 0623  Last data filed at 1/15/2023 0600  Gross per 24 hour   Intake 100 ml   Output 750 ml   Net -650 ml       Labs:  Recent Labs   Lab 01/15/23  0605 01/15/23  0601 01/15/23  0346 01/15/23  0204   PH 7.37  7.37  --  7.42  --    PCO2 39  39  --  36  --    PO2 294*  294*  --  145*  --    HCO3 22  22  --  24  --    O2PER 100  100 100  100 100.0 100.0       Lab Results   Component Value Date    HGB 14.6 01/15/2023     01/15/2023    INR 1.16 (H) 01/15/2023    PTT 22 01/15/2023    DD 2.16 (H) 01/15/2023       Plan is to continue VA ECO support, titrate sweep/flows as indicated.    Tri Greco, RRT-ACCS  ECMO Specialist  1/15/2023 6:23 AM

## 2023-01-15 NOTE — Clinical Note
Arrhythmia Type: polymorphic VT.   Method of Cardioversion: defibrillated.   The arrhythmia was terminated.   Energy shock delivered: 360 joules.   Time shock delivered: 02:13 CST.   Post cardioversion rhythm: sinus rhythm.

## 2023-01-15 NOTE — PROGRESS NOTES
SPIRITUAL HEALTH SERVICES Progress Note  [facility] ([place]) 4E    Saw pt (Rex) per overnight consult for spiritual/emotional support.      Illness Narrative - The pt was brought in overnight after having experienced a cardiac arrest and spouse calling an EMT.       Distress - Danae (spouse) shared that her  was being treated by medical staff since he was admitted and they were just able to find a rhythm.       Coping - Rin (mother) shared that the Rex was raised Confucianist and that if things were to worsen, they would want him to have last rights. Just before Rex's rhythm, Danae was on the phone with their spence who was making prayer for Rex's wellbeing.       Plan - Both Danae and Rin know how to get in touch with [facility] should they need. [facility] remains available for patient support as needed/requested.     Jacinda Pillai  Chaplain Resident  Pager [phone]    * [facility] remains available 24/7 for emergent requests/referrals, either by having the switchboard page the on-call  or by entering an ASAP/STAT consult in Epic (this will also page the on-call ). Routine Epic consults receive an initial response within 24 hours.*

## 2023-01-15 NOTE — Clinical Note
Potential access sites were evaluated for patency using ultrasound.   The right femoral artery and right femoral vein were selected. Access was obtained under with Sonosite guidance using a standard 18 guage needle with direct visualization of needle entry.

## 2023-01-15 NOTE — PROGRESS NOTES
INITIAL REPORT of Video-EEG Monitoring              DATE OF RECORDIN/15/2023         I reviewed the first 2 hours of video-EEG monitoring of Shanice Ortiz (AKA Rex Negrete).           The EEG during sedated coma was abnormal due to generalized delta-theta slowing, with intermixture of faster alpha-beta activities.  No electrographic seizures and no interictal epileptiform abnormalities were observed.         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  This recording excludes non-convulsive status epilepticus as a possible cause of this encephalopathy.    Jovani Barahona M.D.

## 2023-01-15 NOTE — PROGRESS NOTES
"CRRT STATUS NOTE    DATA:  Time:  4:55 PM  Pressures WNL:  YES  Filter Status:  WDL    Problems Reported/Alarms Noted:  None    Supplies Present:  YES    ASSESSMENT:  Patient Net Fluid Balance: At 17:40, +174 mL (since 00:00)  Vital Signs:  BP (!) 143/63   Pulse 80   Temp 99.3  F (37.4  C)   Resp 12   Ht 1.88 m (6' 2\")   Wt 120 kg (264 lb 8.8 oz)   SpO2 100%   BMI 33.97 kg/m    Labs:  K 4.7 (from 5.0, 6.3), lytes otherwise WNL; Cr 1.36, Hbg 12  Goals of Therapy:  I=O.    INTERVENTIONS:   Initiated 1100 to VA-ECMO access. Switched to all 4K baths at ~17:30.    PLAN:  Continue goals of therapy. Questions/concerns call CRRT resource [phone]  "

## 2023-01-15 NOTE — CONSULTS
Neurocritical Care Consultation    Reason for critical care admission: Cardiac Arrest  Admitting Team: CSI  Date of Service:  01/15/2023  Date of Admission:  1/15/2023  Hospital Day: 1    Assessment/Plan  42 year-old male with a past medical history of PEA arrest 5/21 in setting of daily methamphetamine use as well as other polysubstance abuse and prior TBI who is admitted following cardiac arrest now cannulated for VA ECMO and w/ RV baloon tipped pacemaker. Down time 30 minutes. Cooled to 34 currently.     Neuro  #post-arrest cerebral edema  #vf ARREST  Post arrest day: 1  Length of downtime: 30 min  -Witnessed arrest: Yes  -ROSC: Yes, rhythm: V fib, arrested again and was cannulated of ECMO  -TTM initiated: Yes, target temp time: 36, duration of cooling unknown  -Current temp: 34    Analgesics & sedation  Prior sedation:none  Current sedation: Fent 50, prop 80    Exam findings   -Cough: No  -Gag: No  -Pupils: 3mm and NPIs ~3.5 bilat    Neuroimaging  -Day 1 CTH shows: Initial CT head with preserved gray-white matter differentiation and no evidence of cerebral edema  -Repeat CTH on day 3     Neurophysiology  -start/continue vEEG with VA ECMO  -vEEG shows: Pending    Biomarkers  -Neuron-specific Enolase (NSE) results: Pending    Recommendations  -Avoid hypotonic solutions as they may worsen cerebral edema   -Avoid nitroprusside or nitroglycerin as they may also worsen cerbral edema  -Advise slow correction of hypernatremia if needed  -When safe to do so, please limit use of CNS acting medications such as benzodiazepines, opiates, anticholinergics, which will permit a more accurate neurological assessment  -We will continue to follow, please call [phone] with any questions    Clinically Significant Risk Factors Present on Admission          # Hypocalcemia: Lowest Ca = 8.4 mg/dL in last 2 days, will monitor and replace as appropriate      # Coagulation Defect: INR = 1.16 (Ref range: 0.85 - 1.15) and/or PTT = 22 Seconds  "(Ref range: 22 - 38 Seconds), will monitor for bleeding         # Obesity: Estimated body mass index is 33.97 kg/m  as calculated from the following:    Height as of this encounter: 1.88 m (6' 2\").    Weight as of this encounter: 120 kg (264 lb 8.8 oz).          The patient discussed with the [facility] attending, Dr. Zarate.    Denisa Maurice MD  Neurology PGY-3    History of Present Illness:  42 year-old male with a past medical history of PEA arrest 5/21 in setting of daily methamphetamine use as well as other polysubstance abuse and prior TBI who was in his otherwise usual state of health until this evening.  At 12:30 AM he was using the restroom and was subsequently discovered to be no longer moving.  Wife called 911.  He was found of a shockable rhythm by first responders and EMS did not arrive until minutes later or ROSC was achieved.  Down time 30 minutes.     Patient stable, mild elevated lactic resulting in lactic acidosis.  Initial coronary angiogram demonstrated open coronary arteries.  However, he subsequently went back into refractory V. fib arrest s/p 10 shocks with CPR.  He was subsequently cannulated for VA ECMO.  A temporary balloon tipped pacemaker was placed in the right ventricle for pacing due to continued ventricular fibrillation.       Not on File    PAST MEDICAL HISTORY: No past medical history on file.    PAST SURGICAL HISTORY: No past surgical history on file.    FAMILY HISTORY:   Unable to obtain due to: intubation      SOCIAL HISTORY:   Social History     Tobacco Use     Smoking status: Not on file     Smokeless tobacco: Not on file   Substance Use Topics     Alcohol use: Not on file       ROS: The 10 point Review of Systems is negative other than noted in the HPI or here.     Current Medications:    artificial tears   Both Eyes Q8H     [START ON 1/16/2023] aspirin  81 mg Per Feeding Tube Daily     NONFORMULARY 80 mEq  80 mEq Intravenous Once     pantoprazole  40 mg Intravenous Daily     " "piperacillin-tazobactam  3.375 g Intravenous Q6H     potassium chloride  80 mEq Intravenous Once     propofol         rocuronium         rocuronium         rocuronium  100 mg Intravenous Once       PRN Medications:  amiodarone, calcium chloride, esmolol, esmolol, fentaNYL (PF), fentaNYL, heparin (porcine), heparin ANTICOAGULANT, iopamidol (ISOVUE-370), lidocaine 4%, lidocaine (PF), lidocaine (buffered or not buffered), lidocaine, magnesium sulfate, magnesium sulfate, magnesium sulfate, metoprolol, midazolam, midazolam, midazolam, midazolam, naloxone **OR** naloxone **OR** naloxone **OR** naloxone, potassium chloride, propofol, sodium chloride (PF), sodium chloride (PF), sodium phosphate, sodium phosphate, sodium phosphate, sodium phosphate    Infusions:    amiodarone       amiodarone       EPINEPHrine       esmolol 200 mcg/kg/min (01/15/23 0419)     fentaNYL       HEParin       heparin (PRESSURE BAG) 2 unit/mL in 0.9% NaCl       lidocaine 2 mg/min (01/15/23 0425)     lidocaine       magnesium sulfate       midazolam       midazolam 5 mg/hr (01/15/23 0414)     norepinephrine       phenylephrine       propofol       propofol 30 mcg/kg/min (01/15/23 0417)     vasopressin         Physical Examination:  Vitals: Ht 1.88 m (6' 2\")   Wt 120 kg (264 lb 8.8 oz)   BMI 33.97 kg/m    Neuro: Examined on fent 50 and prop 80, and while cooled to 34   Mental status: Mute.  Does not follow commands.  Obtunded.  Cranial nerves: Pupils equal and reactive by pupillometry.  No cough.  No gag.  No corneal reflex.  No VOR.  No facial grimace noted.  Motor: No movement either spontaneous or to pain stim  Sensory: No withdrawal  Coordination: AMAYA  Gait: AMAYA    Labs:  Recent Labs   Lab 01/15/23  0346 01/15/23  0204 01/15/23  0200    138 139   POTASSIUM 4.5 3.6 3.6   CHLORIDE  --   --  102   CO2  --   --  21*   BUN  --   --  26.4*   CR  --   --  1.58*   RANDALL  --   --  8.4   BILITOTAL  --   --  0.4   ALKPHOS  --   --  38*   ALT  --   --  " 215*   AST  --   --  138*       Recent Labs   Lab 01/15/23  0346 01/15/23  0204 01/15/23  0200   WBC  --   --  5.7   HGB 14.6 15.1 14.7   PLT  --   --  251       Recent Labs   Lab 01/15/23  0346   PH 7.42   PCO2 36   PO2 145*   HCO3 24       All cultures:  No results for input(s): CULTURE in the last 168 hours.    Imaging: All relevant imaging and laboratory values personally reviewed.
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
## 2023-01-15 NOTE — PLAN OF CARE
Major Shift Events:  Pt remains intubated and sedated on VA ECMO. Propofol slowly weaned down from 100. Fent running for pain management. Team wanting pt fully sedated due to cardiac irritation upon arrival. Pupils equal and reactive. EEG placed this afternoon. Vent fio2 weaned to 40%. Pt in asystole this morning. Transvenous paced turned back on and pt was able to be paced at 80 bpm on VVI settings. Throughout the day pt's rhythm has slowly come up with rates in the 50s when pacer was paused this evening. MAPs being maintained above 65 with vaso and phenyl. Lido and amio gtt's running for dysrhythmias. No fluids or products given this shift. Potassium was shifted x1 this morning for a level of 6.3. CRRT started earlier today for high potassium and clearance. Potassium levels now normalized. UOP decreasing since starting CRRT. IABP 1:1. Able to find pulses with a doppler. Pt's wife and mother at bedside this morning and were updated by the team.       Plan: Continue to monitor and update as needed. Try to keep pt I=O with CRRT. Wean pressors as able.     For vital signs and complete assessments, please see documentation flowsheets.

## 2023-01-15 NOTE — PROGRESS NOTES
ECMO Attending Progress Note  1/15/2023    Shanice Ortiz is a 42 year old male who was cannulated for ECMO 1/15/23 due to refractory VF arrest this was witnessed initial ROSC after 8 min and arrival to cath lab with rosc however went into resistant VF in the cath lab which prompted VA ECMO cannulation. Cath without obstructive disease    Cannulation Site:  17 Fr in the R  femoral artery  25 Fr in the R femoral vein  IABP LFA  RIJ temp wire  thermoguard in LFV    Interval events:refractory VF despite lidocaine propofol amiodarone deep sedation paralytics and normal electrolytes. S/p a high bolus dosing of K with eventual ROSC. Imaging with CTH without abnormalities rising LFT's on vasopressin and phenylephrine    Physical Exam:  Temp:  [74.5  F (23.6  C)-97.5  F (36.4  C)] 97.5  F (36.4  C)  Pulse:  [0-196] 79  Resp:  [0-12] 0  BP: (109-143)/(53-63) 143/63  MAP:  [54 mmHg-107 mmHg] 96 mmHg  Arterial Line BP: ()/(42-90) 127/72  FiO2 (%):  [50 %] 50 %  SpO2:  [95 %-100 %] 100 %    Intake/Output Summary (Last 24 hours) at 1/15/2023 0834  Last data filed at 1/15/2023 0800  Gross per 24 hour   Intake 223.24 ml   Output 1305 ml   Net -1081.76 ml    Vent Mode: CMV/AC  (Continuous Mandatory Ventilation/ Assist Control)  FiO2 (%): 50 %  Resp Rate (Set): 12 breaths/min  Tidal Volume (Set, mL): 550 mL  PEEP (cm H2O): 7 cmH2O  Resp: (!) 0       Labs:  Recent Labs   Lab 01/15/23  0605 01/15/23  0601 01/15/23  0554 01/15/23  0346   PH 7.37  7.37  --   --  7.42   PCO2 39  39  --   --  36   PO2 294*  294*  --   --  145*   HCO3 22  22  --   --  24   O2PER 100  100 100  100 100 100.0      Recent Labs   Lab 01/15/23  0556 01/15/23  0346 01/15/23  0204 01/15/23  0200   WBC 9.8  --   --  5.7   HGB 13.3 14.6 15.1 14.7     Creatinine   Date Value Ref Range Status   01/15/2023 1.58 (H) 0.67 - 1.17 mg/dL Final       Blood Flow (Circuit) LPM: 5.04 LPM  Sweep LPM: (S) 5 LPM  Sweep FiO2   %: (S) 80 %  ACT  (seconds): 182  seconds  Arterial Blood Temp  (degrees C): 36.2 C  Pulse Oximetry  (SpO2%): 100 %  Arterial Pressure  mmH mmHg      ECMO Issues including assessments and plan on DOS 1/15/2023:  Neuro: will keep at 37C with avoidance of fever. Sedated for mechanical ventilation & recurrent VF and ECMO- on propofol and fentanyl.  No acute distress.  NIRS stable  b/l  RASS goal:-3-4  CV: Cardiogenic shock.  Hemodynamically stable on vaso and phenyl IABP placed given loss of pulsatility with incessant VF, pulse pressure 0mmHg  Pulm: Keep vent settings at rest settings as above.  FEN/Renal: Electrolytes stable w/ replacement protocols in place, Cr stable, UOP stable  Heme: ACT goal: 180-200, Hemoglobin 13.3.  Minimal oozing around the ECMO cannulas. Noted delta P 30's- partly related to high flow as currently fully dependent on the circuit   ID: Receiving empiric antibiotics, MRSA nares ordered  Cannulae: Position is acceptable on exam and the available imaging.  Distal perfusion cannula is in place and patent.  Extremities are well-perfused.     I have personally reviewed the ECMO flows, oxygenation and CO2 clearance, anticoagulation, and cannula position.  I have also personally assessed the patient's systemic response with hemodynamics, oxygenation, ventilation, and bleeding.       The patient requires continued ECMO support and management in the ICU.     Andie Ayala MD  Cardiology critical care  Pager

## 2023-01-15 NOTE — Clinical Note
IABP inserted in the left femoral artery. IABP inserted with 50 cc balloon volume Lot number is: [id number]

## 2023-01-15 NOTE — PROGRESS NOTES
"CRRT INITIATION NOTE    Consent for CRRT Completed:  YES  Patient s Vascular Access: Catheter              Placement Confirmed: YES  Manufacture:  VA-ECMO  Model:    Length/[place] Size:    Flush Volume:      DATA:  Procedure:  CVVHDF  Start Time:  11:08  Machine#:  [id number]  Filter:    Blood Flow:  200  ML/min  Pre-Replacement Solution:  Prismasol BGK 2/3.5  Post-Replacement Solution:   Prismasol BGK 2/3.5  Dialysate Solution:   Prismasol BGK 2/3.5  Pre-Replacement Solution Rate:  1500 mL/hr  Post-Replacement Solution Rate:  200 mL/hr  Dialysate Flow Rate:  1500 mL/hr   Patient Removal Rate:  0 mL/hr  Anticoagulation Type and Rate:  n/a    ASSESSMENT:  How Patient Tolerated Initiation:   Vital Signs:  BP (!) 143/63   Pulse 79   Temp 99  F (37.2  C)   Resp 12   Ht 1.88 m (6' 2\")   Wt 120 kg (264 lb 8.8 oz)   SpO2 100%   BMI 33.97 kg/m    Initial Pressures:  Access:  187  Filter:  119  Return:  3  TMP:  42  Change in Filter Pressure: 44      INTERVENTIONS:  Initiation at 11:08.     PLAN:  Maintain I=O as tolerated, call CRRT resource at [phone].          "

## 2023-01-15 NOTE — Clinical Note
Arrhythmia Type: polymorphic VT.   Method of Cardioversion: defibrillated.   The arrhythmia was not terminated. Energy shock delivered: 200 joules.   Time shock delivered: 02:12 CST.

## 2023-01-15 NOTE — PHARMACY-VANCOMYCIN DOSING SERVICE
"Pharmacy Vancomycin Initial Note  Date of Service January 15, 2023  Patient's  1898  89 year old, male    Indication: Aspiration Pneumonia    Current estimated CrCl = Estimated Creatinine Clearance: 14.5 mL/min (A) (based on SCr of 1.58 mg/dL (H)).    Creatinine for last 3 days  1/15/2023:  2:00 AM Creatinine 1.58 mg/dL    Recent Vancomycin Level(s) for last 3 days  No results found for requested labs within last 72 hours.      Vancomycin IV Administrations (past 72 hours)      No vancomycin orders with administrations in past 72 hours.                Nephrotoxins and other renal medications (From now, onward)    Start     Dose/Rate Route Frequency Ordered Stop    01/15/23 0800  piperacillin-tazobactam (ZOSYN) 3.375 g vial to attach to  mL bag        Note to Pharmacy: For [facility], [facility] and [facility]: For Zosyn-naive patients, use the \"Zosyn initial dose + extended infusion\" order panel.    3.375 g  over 30 Minutes Intravenous EVERY 6 HOURS 01/15/23 0523 23 0759    01/15/23 0800  vancomycin (VANCOCIN) 2,000 mg in sodium chloride 0.9 % 250 mL intermittent infusion         2,000 mg (central catheter)  over 90 Minutes Intravenous EVERY 24 HOURS 01/15/23 0718 23 0759    01/15/23 0530  norepinephrine (LEVOPHED) 16 mg in  mL infusion MAX CONC CENTRAL LINE         0.01-0.6 mcg/kg/min × 120 kg (Dosing Weight)  1.1-67.5 mL/hr  Intravenous CONTINUOUS 01/15/23 0523      01/15/23 0230  phenylephrine (CED-SYNEPHRINE) 200 mg in sodium chloride 0.9 % 250 mL MAX CONC infusion         0.5-6 mcg/kg/min × 120 kg (Dosing Weight)  4.5-54 mL/hr  Intravenous CONTINUOUS 01/15/23 0208      01/15/23 0230  vasopressin 1 unit/mL MAX Conc (PITRESSIN) infusion         0.5-4 Units/hr  0.5-4 mL/hr  Intravenous CONTINUOUS 01/15/23 0208            Contrast Orders - past 72 hours (72h ago, onward)    Start     Dose/Rate Route Frequency Stop    01/15/23 0415  iopamidol (ISOVUE-370) solution  Status:  Discontinued           ONCE PRN " 01/15/23 0707              Plan:  1. Start vancomycin  2000 mg IV q24h.   2. Vancomycin monitoring method: Trough (Method 2 = manual dose calculation)  3. Vancomycin therapeutic monitoring goal: 15-20 mg/L  4. Pharmacy will check vancomycin levels as appropriate in 3-5 Days.    5. Serum creatinine levels will be ordered daily for the first week of therapy and at least twice weekly for subsequent weeks.      Katerina Gage RPH

## 2023-01-15 NOTE — Clinical Note
Pt c/o 8/10 constant \"aching\" lower abd pain. ABD distended and tender upon palpation. Pt also c/o nausea, no vomiting. Dr. Emani Prado updated. Orders for iv morphine and zofran received and carried out. dry, intact, no bleeding and no hematoma. 6 f venous and art in place R fem.  Thermaguard L fem

## 2023-01-15 NOTE — PROGRESS NOTES
Pt arrived to cath lab via EMS with an LMA in place. CRNA intubated with an 8.0ETT which was secured at 27cm at the lips. Placement verified via color change, and Xray. Pt ultimately transported to CT from cath lab and then to  after ECMO cannulation. Pt is on the following vent settings:    Vent Mode: CMV/AC  (Continuous Mandatory Ventilation/ Assist Control)  FiO2 (%): 50 %  Resp Rate (Set): 12 breaths/min  Tidal Volume (Set, mL): 550 mL  PEEP (cm H2O): 7 cmH2O    1/15/2023  Naida Guerrier, RT

## 2023-01-16 NOTE — PROGRESS NOTES
CRRT STATUS NOTE    DATA:  Time:  4:20 PM  Pressures WNL:  YES  Filter Status:  WDL    Problems Reported/Alarms Noted:  none    Supplies Present:  YES    ASSESSMENT:  Patient Net Fluid Balance:  +46  Vital Signs:  HR 80, RR 12, BP 73/49(60) on phenyl at 2 and vaso at 4, O2 sat 100 on VA ECMO  Labs:  K 4.9, M 2.1, Ph 4.7, ical 4.6 after replacement, hgb 10.8  Goals of Therapy:  I=O    INTERVENTIONS:   none    PLAN:  Continue fluid removal as tolerated per goals of therapy. Check circuit daily and change circuit q72h and prn. Please contact CRRT resource RN at [phone] with any questions/concerns.

## 2023-01-16 NOTE — PROGRESS NOTES
[facility] ([place] [address]  Palliative Care Initial Spiritual Assessment    Patient: Rex Negrete  Date of Admission:  1/15/2023  Reason for consult: patient/family coping      Summary and Recommendations:    Patient Rex Negrete's family name restorative goals for him. They are aware of the seriousness of his condition and concerned about whether or not he will recover.     Rex's role in the recovery community and his Sabianist gali are both important sources of meaning for him, in addition to family relationships.    Family may need more support for daughter and step-daughter if they decide to visit.    Wife Cameron requested prayer for Rex's sense of peace and willingness to rest and accept current situation.     Darian and/or I will follow for spiritual support while Palliative Care is consulted.      Bryanna Shepard  Palliative   Pager [phone]  [facility] Inpatient Team Consult pager [phone] (M-F 8-4:30)  After-hours Answering Service [phone]      Assessments:   Visit with patient Rex Negrete's wife Cameron and mother Rin at his bedside; co-visit with Palliative Care NP Dipesh.    Distress:  Distress in the context of serious illness was assessed today:    Existential/spiritual/emotional distress: Yes; multi-factorial.    Wife Cameron is shocked by events leading to hospitalization, and she and mother both recall Rex's previous hospitalizations and how difficult those times were.    Family is worried about Rex's physical health, while also being concerned for how he will recover emotionally. They told stories of his history of struggling to cope, including past substance use, and they worry that he will try to get better as quickly as possible without leaving space for emotional processing and understanding how events have impacted his life. At the same time, they are proud of him for how fiercely he has worked to recover from past  challenges.    Rex's daughter Lolis (16y) and step-daughter Hanna (11y) are aware of hospitalization but have not visited. Family asked questions about how best to support them. Lolis was in the process of rebuilding relationship with Rex, per Rex's mother Rin. Hanna lives with him and is close to him, per Rex's wife Cameron.      Taoism distress:  No.      Social/economic/relational distress:  Yes; mild. As above, Rex has been in the process of rebuilding relationship with daughter Lolis.    Coping, Meaning, & Spirituality:   Coping, meaning, and/or spirituality in the context of serious illness were assessed today:    Spiritual background and preferences: Sabianism (raised Zoroastrian), [employer] Sabian[employer] Mandaeism, [employer] -- their own  has visited.      Beliefs, rituals, and practices: Prayer, devotional reading, and giving testimony are all important to Rex, per family. Rex is an alumni of both [employer] and [employer], and he has mentored others through testimony in both communities.      Meaning-making: Rex finds meaning in caring for his family, working hard at his job in order to support them, lifting and other physical activities, and in Sabianism gali. He johny with humor when he faces challenges.       Strengths and resources: family, gali and gali community, history of other recovery    Prognosis, Goals, & Planning:     Prognosis, Goals, and/or Advance Care Planning were assessed today: Yes; goals are restorative.      Preferred language: English      Patient's decision making preferences: unable to assess      I have concerns about the patient/family's health literacy today: No      Patient has a completed Health Care Directive: No.       Code status per chart review: Full Code    Interventions:  I offered listening presence, meaning-based exploration of sources of distress and goals of care, and assurance of prayer at wife's  request.

## 2023-01-16 NOTE — PROGRESS NOTES
Nephrology Progress Note  01/16/2023       Rex Negrete is a 42 yom with hx of polysubstance use, TBI with hx of seizures and EF of 40% in 2021 (since recovered to 55-60% 5/2022) who had at home arrest now on ECMO & IABP.  Nephrology consulted for management of IVONNE, started CRRT on 1/15/2023 for hyperkalemia.        Interval History :   Mr Negrete was started on CRRT yesterday afternoon for management of hyperkalemia which has come down nicely with 4k baths.  Planning I=O fluid goal today on 2 pressors, IABP and ECMO.  Cr has continued to rise even post CRRT initiating so I will recheck CK (mildly elevated yesterday), UOP dwindling and is now oliguric.      Assessment & Recommendations:   IVONNE-Baseline Cr ~1.2 but on rise post arrest, started CRRT on 1/15 for hyperkalemia in setting of rewarming, UOP on the decline and now oliguric.  IVONNE due to hypoperfusion, supporting with ECMO and 2 pressors, has reasonable chance of renal recovery depending on recovery from Cardiac standpoint.  Updated family and team, continuing CRRT.    -CRRT, 4k baths, I=O volume goal on 2 pressors.     -Access is ECMO circuit.     Volume status-Minimal edema on exam, stable vent settings.  Matching I=O on 2 pressors and ECMO.      Electrolytes/pH-K 4.8, bicarb 20, Na 137, no acute issue.     Ca/phos/pth-Ca 7.9, Mg 2.2, Phos 4.7      Anemia-Hgb 11.2, acute management per team.     Nutrition-Deferred    Time spent: 30 minutes on this date of encounter for chart review, physical exam, medical decision making and co-ordination of care.     Discussed with Dr Carmen    Recommendations were communicated to primary team via verbal communication.     LOUANN Stewart CNS  Clinical Nurse Specialist  [phone]    Review of Systems:   I reviewed the following systems:  ROS not done due to vent/sedation.     Physical Exam:   I/O last 3 completed shifts:  In: 3870.08 [I.V.:3829.58; Other:10.5; NG/GT:30]  Out: 2658.3 [Urine:1470; Emesis/NG  "output:150; Other:1038.3]   BP (!) 143/63   Pulse 79   Temp 99.1  F (37.3  C)   Resp 12   Ht 1.88 m (6' 2\")   Wt 117.2 kg (258 lb 6.1 oz)   SpO2 100%   BMI 33.17 kg/m       GENERAL APPEARANCE: Intubated, sedated, on ECMO and CRRT.   EYES:  No scleral icterus, pupils equal  HENT: mouth without ulcers or lesions  PULM: lungs clear to auscultation, equal air movement, no cyanosis or clubbing  CV: RRR   GI: soft, non-distended  MS: no evidence of inflammation in joints, no muscle tenderness  NEURO: No focal deficits.   Access: ECMO    Labs:   All labs reviewed by me  Electrolytes/Renal - Recent Labs   Lab Test 01/16/23  1024 01/16/23  1020 01/16/23  0849 01/16/23  0405 01/16/23  0345 01/15/23  2250 01/15/23  2249 01/15/23  2001 01/15/23  1951 01/15/23  1755 01/15/23  1610 01/15/23  1221 01/15/23  1006   NA  --   --   --   --  138  --  136  --   --   --  137  --  135*  135*   POTASSIUM  --   --   --   --  4.7  --  4.5  4.5  --  4.5   < > 4.1   < > 5.0  5.0  5.0   CHLORIDE  --   --   --   --  106  --  105  --   --   --  107  --  108*  108*   CO2  --   --   --   --  20*  --  21*  --   --   --  21*  --  19*  19*   BUN  --   --   --   --  26.6*  --  25.7*  --   --   --  26.8*  --  30.7*  30.7*   CR  --   --   --   --  2.11*  --  1.85*  --   --   --  1.36*  --  1.44*  1.44*   * 98 107*   < > 105*  113*   < > 123*   < >  --    < > 121*  129*   < > 183*  183*   RANDALL  --   --   --   --  7.7*  --  7.8*  --   --   --  7.6*  --  7.1*  7.1*   MAG  --   --   --   --  2.1  --  2.0  --   --   --  2.1  --  2.7*   PHOS  --   --   --   --  4.7*  --   --   --   --   --   --   --  2.8    < > = values in this interval not displayed.       CBC -   Recent Labs   Lab Test 01/16/23  1020 01/16/23  0345 01/15/23  2249   WBC 17.8* 17.8* 15.8*   HGB 11.2* 11.9* 12.2*    214 223       LFTs -   Recent Labs   Lab Test 01/16/23  0345 01/15/23  2249 01/15/23  1610   ALKPHOS 26* 25* 23*   BILITOTAL 0.6 0.7 0.8   * " 156* 159*   * 160* 146*   PROTTOTAL 5.0* 4.9* 4.6*   ALBUMIN 3.0* 3.1* 3.0*       Iron Panel - No lab results found.        Current Medications:    artificial tears   Both Eyes Q8H     chlorhexidine  15 mL Swish & Spit BID     pantoprazole  40 mg Intravenous Daily     piperacillin-tazobactam  3.375 g Intravenous Q6H       amiodarone 1 mg/min (01/16/23 1100)     CRRT replacement solution 12.5 mL/kg/hr (01/16/23 1104)     fentaNYL 150 mcg/hr (01/16/23 1100)     HEParin 2,200 Units/hr (01/16/23 1100)     heparin (PRESSURE BAG) 2 unit/mL in 0.9% NaCl 3 mL/hr (01/16/23 1100)     lidocaine 1 mg/min (01/16/23 1100)     midazolam       niCARdipine       - MEDICATION INSTRUCTIONS -       phenylephrine 2 mcg/kg/min (01/16/23 1100)     CRRT replacement solution 200 mL/hr at 01/15/23 1730     CRRT replacement solution 12.5 mL/kg/hr (01/16/23 1104)     propofol 50 mcg/kg/min (01/16/23 1100)     vasopressin 4 Units/hr (01/16/23 1100)

## 2023-01-16 NOTE — PROGRESS NOTES
"CRRT STATUS NOTE    DATA:  Time:  6:22 AM  Pressures WNL:  YES  Filter Status:  WDL    Problems Reported/Alarms Noted:  None reported by bedside RN.    Supplies Present:  YES    ASSESSMENT:  Patient Net Fluid Balance:  1/15 +678cc and +115cc since midnight  Vital Signs:  BP (!) 143/63   Pulse 80   Temp 99.1  F (37.3  C)   Resp 12   Ht 1.88 m (6' 2\")   Wt 117.2 kg (258 lb 6.1 oz)   SpO2 99%   BMI 33.17 kg/m      Labs:  Na 138. K 4.7, Cr 2.11, Hgb 11.9, Plt 214   Goals of Therapy:  I=O    INTERVENTIONS:   None required overnight.    PLAN:  Continue with plan of care. Call CRRT resource RN with questions or concerns at #[phone].    "
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
## 2023-01-16 NOTE — PLAN OF CARE
Goal Outcome Evaluation:      Plan of Care Reviewed With: patient    Overall Patient Progress: no changeOverall Patient Progress: no change    Outcome Evaluation: Pt remains sedated and intubated with VA ECMO IABP and CRRT.    Major Shift Events:  No major events overnight. Pt remains sedated and intubated with VA ECMO, IABP and CRRT support. Pt is 100% paced at 80 BPM. No fluid or blood product given overnight.   Plan: Continue Plan of Care. Update team with any changes or concerns.  For vital signs and complete assessments, please see documentation flowsheets.
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
## 2023-01-16 NOTE — PLAN OF CARE
Goal Outcome Evaluation:      Plan of Care Reviewed With: parent, spouse    Overall Patient Progress: no changeOverall Patient Progress: no change    Outcome Evaluation: Remained on ECMO, IABP, CRRT. Lidocaine weaned off, sedation weaning as tolerated. Hemodynamically labile, on vaso and eusebio to maintain MAPs >60 per CSI.    D: Remained intubated, heavily sedated on propofol/fentanyl. Hemodynamically labile, ECMO flows increased to 4.5 in addition to vaso/eusebio to keep MAPs > 60 per CSI fellow. Oliguric, on CRRT, goal I = O today. Oozing from ECMO/IABP cannulation sites, ACT goal 200+ per CSI. ETT w/ bloody secretion, CSI fellow notified. IABP positional, repositioned per fellow.  I: As above. Family updated re: status/plan. D/w MD re: plan/labs. Tube feed initiated at trickle rate.  A: Critical.  P: Continue to monitor. Notify MD of changes/concerns.

## 2023-01-16 NOTE — PROGRESS NOTES
"CLINICAL NUTRITION SERVICES - ASSESSMENT NOTE     Nutrition Prescription    RECOMMENDATIONS FOR MDs/PROVIDERS TO ORDER:  Once pt hemodynamically stable, recommend initiating EN. Please consult nutrition: \"Registered Dietitian to Assess and Order TF per Medical Nutrition Therapy Protocol\"    Malnutrition Status:    Patient does not meet two of the established criteria necessary for diagnosing malnutrition    Recommendations already ordered by Registered Dietitian (RD):  100 mg thiamine daily for EF <30%    Future/Additional Recommendations:  Once pt hemodynamically stable and EN becomes nutrition POC, recommend initiating via OGT:  Novasource Renal @ 35 ml/hr (840 ml) + 6 pkt Prosource TF20 provides 2160 kcal (23 kcal/kg), 196 g pro (2.1 g/kg), 154 g CHO, 602 ml free water, and 0 g fiber daily.   - Initiate @ 15 ml/hr and advance by 10 ml q8hr as tolerated  - Do not start or advance until lytes (Mg++,K+) WNL and phos>2.0  - Recommend 30 ml q4hr fluid flushes for tube patency. Additional fluids and/or adjustments per MD.    - Order multivitamin/mineral (15 ml/day via FT) to help ensure micronutrient needs being met with suspected hypermetabolic demands and potential interruptions to TF infusions.  - Elevated HOB with gastric feeds      REASON FOR ASSESSMENT  Rex Negrete is a/an 42 year old male assessed by the dietitian for Nutrition Risk Monitoring    NUTRITION HISTORY  Unable to obtain nutrition hx at this time as pt intubated and sedated.     CURRENT NUTRITION ORDERS  Diet: NPO    LABS  Labs reviewed - hyperphosphatemia, elevated BUN/Cr    MEDICATIONS  Medications reviewed    ANTHROPOMETRICS  Height: 188 cm (6' 2\")  Most Recent Weight: 117.2 kg (258 lb 6.1 oz)    IBW: 86.4 kg  BMI: Obesity Grade I BMI 30-34.9  Weight History: None on file    Dosing Weight: 96 kg (adjusted BW based on IBW and actual lowest BW of 117 kg)    ASSESSED NUTRITION NEEDS  Estimated Energy Needs: 9890-8098 kcals/day (22 - 25 " kcals/kg)  Justification: Obese and Vented  Estimated Protein Needs: 190-240 grams protein/day (2 - 2.5 grams of pro/kg)  Justification: ECMO  Estimated Fluid Needs: 2400 mL/day (1 mL/kcal)   Justification: Maintenance    PHYSICAL FINDINGS  See malnutrition section below.  No abnormal nutrition-related physical findings observed.     MALNUTRITION  % Intake: Unable to assess  % Weight Loss: Unable to assess  Subcutaneous Fat Loss: None observed  Muscle Loss: None observed  Fluid Accumulation/Edema: None noted  Malnutrition Diagnosis: Patient does not meet two of the established criteria necessary for diagnosing malnutrition    NUTRITION DIAGNOSIS  Inadequate oral intake related to intubation as evidenced by NPO status.      INTERVENTIONS  Implementation  Enteral Nutrition - Initiate when pt hemodynamically stable    Goals  Diet adv v nutrition support within 2-3 days.     Monitoring/Evaluation  Progress toward goals will be monitored and evaluated per protocol.    Lena Gage, MS, RD, LD  4E (CVICU) RD pager: [phone]  Ascom: [id number]  Weekend/Holiday RD pager: [phone]

## 2023-01-16 NOTE — PROGRESS NOTES
[facility]    ECLS Shift Summary: 2971-4341     ECMO Equipment:  Console Serial Number: [id number]  Circuit Lot Number: [id number]  Oxygenator Lot Number: [id number]    Circuit Assessment: Free of fibrin, clot, and air    Patient remains on V-A ECMO, all equipment is functioning and alarms are appropriately set. RPM's: 3587 with Blood Flow (Circuit) LPM  Av LPM  Min: 3.88 LPM  Max: 4.03 LPM L/min. Sweep is at 5 LPM and 70 %. Extremities are perfused.     Significant Shift Events: none    Vent settings:  Vent Mode: CMV/AC  (Continuous Mandatory Ventilation/ Assist Control)  FiO2 (%): 40 %  Resp Rate (Set): 12 breaths/min  Tidal Volume (Set, mL): 520 mL  PEEP (cm H2O): 7 cmH2O  Resp: 12      Anticoagulation:  Dose (units/hr) HEParin: 2400 Units/hr  Rate (mL/hr) HEParin: 24 mL/hr  Concentration HEParin: 100 Units/mL        Most recent: ACT  (seconds): 199 seconds    Urine output is clear, Cande.  Blood loss was minimal. Product given included none.       Intake/Output Summary (Last 24 hours) at 2023 0431  Last data filed at 2023 0400  Gross per 24 hour   Intake 3723.16 ml   Output 3102.3 ml   Net 620.86 ml       Labs:  Recent Labs   Lab 23  0345 23  0144 01/15/23  2250 01/15/23  1949   PH 7.37 7.32* 7.34* 7.35   PCO2 43 50* 49* 48*   PO2 163* 98 119* 111*   HCO3 25 26 26 26   O2PER 70  40  40 40 40 40       Lab Results   Component Value Date    HGB 11.9 (L) 2023    PHGB 40 (H) 01/15/2023     2023    FIBR 151 (L) 01/15/2023    INR 1.19 (H) 01/15/2023     (H) 01/15/2023    DD 2.23 (H) 01/15/2023    ANTCH 117 01/15/2023       Plan is Continue with VA ECMO support.    Lary Christensen, RT  ECMO Specialist  2023 4:31 AM
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
## 2023-01-16 NOTE — CONSULTS
Nephrology Initial Consult  January 15, 2023      Shanice Ortiz MRN:[id number] YOB: 1980  Date of Admission:1/15/2023  Primary care provider: No primary care provider on file.  Requesting physician: Invasive, Cardiologist, *    ASSESSMENT AND RECOMMENDATIONS:   1.IVONNE 2/2 ATN from cardiogenic shock: Patient has a baseline creatinine 1.1-1.2.  He has a history of methamphetamine abuse.  Probably because of methamphetamine abuse he had a cardiac arrest has had a refractory V. fib he had to get VA ecmo. patient is making good amount of urine.    2.  Hyperkalemia in the setting of renal failure and iatrogenic  replacement of potassium: Patient came in with a potassium of 4.3 but was in V fib so received 80 mEq of potassium which led to hyperkalemia to 6.3.  Given that he has refractory V. fib and is on VA ECMO we decided to place him on CRRT    3.  Low bicarb secondary to renal failure: Patient had a bicarb of 19 but should improve with CRRT    4.  Cardiac arrest in the setting of methamphetamine abuse leading to cardiogenic shock his U tox was positive for amphetamine and benzos    Plan:  prescription for Dialysate: 12.5 ml/kg/hr with K4 bath, Pre: 12.5 ml/kg/hr with K4 bath, Post: 200 ml/hr with K 4 bath  -Continue monitoring urine ouput  -Accurate I's and O's  -Renally dose medication  -Avoid nephrotoxic medication  -Avoid hypotensive episodes      Recommendations were communicated to primary team via verbally    Seen and discussed with Dr. Chase Bhatt MD   Division of Renal Disease and Hypertension  [facility]      REASON FOR CONSULT: Hyperkalemia and IVONNE    HISTORY OF PRESENT ILLNESS:  Admitting provider and nursing notes reviewed  Shanice Ortiz is a 42 year old past medical history of meth and cocaine use in the past with prior heart failure with reduced EF of 40% since recovered traumatic brain injury on record for history of seizures and  ADHD on daily methylphenidate and lisdexamfetamine, with use of multiple anabolic steroids and testosterone  cymbalta who was found to be breathing abnormally in his sleep by his wife who was awake at the time. Was in VF had rosc with EMS within 8 min and arrived with LMA and a pulse to the cath lab.     His coronary arteries were found to be clean but he had frequent ventricular fibrillation and therefore had to be placed on VA ECMO. patient came with a potassium of 4.2 received 80 mEq potassium which led to hyperkalemia to 6.2.  Patient was making good amount of urine but given that he was having refractory V. fib we decided to put him on CRRT.  Of note in the past patient also had PEA arrest because of cocaine and methamphetamine abuse    Patient is intubated and sedated so cannot obtain history from    PAST MEDICAL HISTORY:    Past Medical History:   Diagnosis Date     ADHD (attention deficit hyperactivity disorder)      Seizures (H)      Substance abuse (H)      Traumatic brain injury        Past Surgical History:   Procedure Laterality Date     COLONOSCOPY          MEDICATIONS:  PTA Meds  Prior to Admission medications    Not on File      Current Meds    artificial tears   Both Eyes Q8H     chlorhexidine  15 mL Swish & Spit BID     pantoprazole  40 mg Intravenous Daily     piperacillin-tazobactam  3.375 g Intravenous Q6H     Infusion Meds    amiodarone 1 mg/min (01/15/23 1800)     CRRT replacement solution 12.5 mL/kg/hr (01/15/23 1730)     fentaNYL 150 mcg/hr (01/15/23 1800)     HEParin 2,200 Units/hr (01/15/23 1800)     heparin (PRESSURE BAG) 2 unit/mL in 0.9% NaCl 6 Units/hr (01/15/23 1800)     lidocaine 1 mg/min (01/15/23 1800)     midazolam       niCARdipine       - MEDICATION INSTRUCTIONS -       phenylephrine 1 mcg/kg/min (01/15/23 1800)     CRRT replacement solution 200 mL/hr at 01/15/23 1730     CRRT replacement solution 12.5 mL/kg/hr (01/15/23 1731)     propofol 50 mcg/kg/min (01/15/23 1800)      "vasopressin 4 Units/hr (01/15/23 1800)       ALLERGIES:    No Known Allergies    REVIEW OF SYSTEMS:  A comprehensive of systems was negative except as noted above.    SOCIAL HISTORY:   Social History     Socioeconomic History     Marital status:      Spouse name: Not on file     Number of children: Not on file     Years of education: Not on file     Highest education level: Not on file   Occupational History     Not on file   Tobacco Use     Smoking status: Some Days     Packs/day: 0.25     Types: Cigarettes     Smokeless tobacco: Never   Substance and Sexual Activity     Alcohol use: Not Currently     Drug use: Yes     Types: Cocaine, Methamphetamines     Sexual activity: Not on file   Other Topics Concern     Not on file   Social History Narrative     Not on file     Social Determinants of Health     Financial Resource Strain: Not on file   Food Insecurity: Not on file   Transportation Needs: Not on file   Physical Activity: Not on file   Stress: Not on file   Social Connections: Not on file   Intimate Partner Violence: Not on file   Housing Stability: Not on file   history of cocaine abuse and methamphetamine could not obtain as intubated and sedated       FAMILY MEDICAL HISTORY:   Family History   Problem Relation Age of Onset     Cancer Mother      Diabetes Maternal Grandmother        PHYSICAL EXAM:   Temp  Av.8  F (36.6  C)  Min: 74.5  F (23.6  C)  Max: 99.5  F (37.5  C)  Arterial Line BP  Min: 68/42  Max: 135/89  Arterial Line MAP (mmHg)  Av.8 mmHg  Min: 54 mmHg  Max: 107 mmHg      Pulse  Av.6  Min: 0  Max: 196 Resp  Av.9  Min: 0  Max: 17  FiO2 (%)  Av %  Min: 40 %  Max: 60 %  SpO2  Av.6 %  Min: 95 %  Max: 100 %       BP (!) 143/63   Pulse 79   Temp 99.5  F (37.5  C)   Resp 12   Ht 1.88 m (6' 2\")   Wt 120 kg (264 lb 8.8 oz)   SpO2 100%   BMI 33.97 kg/m     Date 01/15/23 07 - 23 0659   Shift 0355-8132 2036-5582 2782-6527 24 Hour Total   INTAKE   I.V. 1600.44 " 544.01  2144.45   Other 7.8 0.7  8.5   Shift Total(mL/kg) 1608.24(13.4) 544.71(4.54)  2152.95(17.94)   OUTPUT   Urine 1215 95  1310   Emesis/NG output  50  50   Other 3.4 3.2  6.6   Shift Total(mL/kg) 1218.4(10.15) 148.2(1.24)  1366.6(11.39)   Weight (kg) 120 120 120 120      Admit Weight: 120 kg (264 lb 8.8 oz)     GENERAL APPEARANCE: Patient is intubated and sedated  EYES: No scleral icterus, pupils equal  Lymphatics: no cervical or supraclavicular LAD  Pulmonary: lungs clear to auscultation with equal breath sounds bilaterally, no clubbing  CV: Patient has regular rhythm, normal rate, no rub   - JVD negative   - Edema negative  GI: soft, nontender, normal bowel sounds  MS: no evidence of inflammation in joints, no muscle tenderness  : No alvarez  SKIN: no rash, warm, dry, no cyanosis  NEURO: face symmetric, no asterixis     LABS:   I have reviewed the following labs:  CMP  Recent Labs   Lab 01/15/23  1756 01/15/23  1755 01/15/23  1610 01/15/23  1609 01/15/23  1423 01/15/23  1421 01/15/23  1221 01/15/23  1006 01/15/23  0559 01/15/23  0554 01/15/23  0346 01/15/23  0204 01/15/23  0204 01/15/23  0200   NA  --   --  137  --   --   --   --  135*  135*  --   --  136  --  138 139   POTASSIUM 4.3  --  4.1  --   --  4.7  --  5.0  5.0  5.0   < > 5.2 4.5   < > 3.6 3.6   CHLORIDE  --   --  107  --   --   --   --  108*  108*  --   --   --   --   --  102   CO2  --   --  21*  --   --   --   --  19*  19*  --   --   --   --   --  21*   ANIONGAP  --   --  9  --   --   --   --  8  8  --   --   --   --   --  16*   GLC  --  130* 121*  129* 134*   < >  --    < > 183*  183*   < >  --  137*   < > 138* 145*   BUN  --   --  26.8*  --   --   --   --  30.7*  30.7*  --   --   --   --   --  26.4*   CR  --   --  1.36*  --   --   --   --  1.44*  1.44*  --   --   --   --   --  1.58*   GFRESTIMATED  --   --  67  --   --   --   --  63  63  --   --   --   --   --  34*   RANDALL  --   --  7.6*  --   --   --   --  7.1*  7.1*  --  6.9*  --   --    --  8.4   MAG  --   --  2.1  --   --   --   --  2.7*  --   --   --   --   --  2.0   PHOS  --   --   --   --   --   --   --  2.8  --   --   --   --   --   --    PROTTOTAL  --   --  4.6*  --   --   --   --  4.8*  --   --   --   --   --  5.7*   ALBUMIN  --   --  3.0*  --   --   --   --  3.0*  3.0*  --   --   --   --   --  3.6   BILITOTAL  --   --  0.8  --   --   --   --  0.6  --   --   --   --   --  0.4   ALKPHOS  --   --  23*  --   --   --   --  25*  --   --   --   --   --  38*   AST  --   --  146*  --   --   --   --   --   --   --   --   --   --  138*   ALT  --   --  159*  --   --   --   --  175*  --   --   --   --   --  215*    < > = values in this interval not displayed.     CBC  Recent Labs   Lab 01/15/23  1610 01/15/23  1006 01/15/23  0556 01/15/23  0346 01/15/23  0204 01/15/23  0200   HGB 12.0* 13.0* 13.3 14.6   < > 14.7   WBC 7.3 12.3* 9.8  --   --  5.7   RBC 3.88* 4.14* 4.25*  --   --  4.72   HCT 36.9* 38.9* 40.2  --   --  45.1   MCV 95 94 95  --   --  96   MCH 30.9 31.4 31.3  --   --  31.1   MCHC 32.5 33.4 33.1  --   --  32.6   RDW 13.5 13.4 13.2  --   --  13.1    274 247  --   --  251    < > = values in this interval not displayed.     INR  Recent Labs   Lab 01/15/23  1610 01/15/23  1006 01/15/23  0620 01/15/23  0200   INR 1.18* 1.20* 1.38* 1.16*   PTT 45* 30 >240* 22     ABG  Recent Labs   Lab 01/15/23  1755 01/15/23  1610 01/15/23  1420 01/15/23  1221   PH 7.37 7.37 7.37 7.31*   PCO2 46* 44 43 49*   PO2 121* 119* 203* 154*   HCO3 26 25 25 25   O2PER 40 70  40  40 60 60      URINE STUDIES  Recent Labs   Lab Test 01/15/23  0556   COLOR Yellow   APPEARANCE Clear   URINEGLC Negative   URINEBILI Negative   URINEKETONE Negative   SG 1.027   UBLD Trace*   URINEPH 7.0   PROTEIN 200*   NITRITE Negative   LEUKEST Negative   RBCU 12*   WBCU 6*     No lab results found.  PTH  No lab results found.  IRON STUDIES  No lab results found.    IMAGING:  All imaging studies reviewed by me.     Fermin Bhatt,  MD    I was present with the fellow during the history and exam.  I discussed the case with the fellow and agree with the findings as documented in the assessment and plan.    Jagruti Goodson MD   of Medicine  Department of Nephrology  [facility]

## 2023-01-16 NOTE — PLAN OF CARE
Goal Outcome Evaluation:      Plan of Care Reviewed With: other (see comments) (unable to discuss with pt at this time)    Overall Patient Progress: no changeOverall Patient Progress: no change    Outcome Evaluation: Initiate EN when pt hemodynamically stable

## 2023-01-17 NOTE — PROGRESS NOTES
Pt has been off sedation since 1/17 @0130. CT scan head done this morning. Pupils have been fixed NPI 0; increasing pupil size (now 7-8mm). Pt was switched from pressure control to CMV on ventilator when he wasn't pulling any volumes. Settings now Rate 12, , FiO2 60% and Peep 15. Frothy secretions in ETT and large amounts of clear oral secretions. HR has been paced VVI at 90 bpm. Calcium replaced. CRRT rates changed due to rising K+. Pt received 2L lactated ringers for chugging and 500mL Albumin. 1 amp of Bicarb given and started Sodium Bicarb gtt at 100ml/hr. IABP 1:1, 100%.  Unable to obtain pulses in left DP and PT- vascular ultrasound done. Lower extremities are mottled, pale and nonblanchable in the feet. Left foot cool, right foot warm. Anuric.     Still pending chemistry from 1530       Many family and friends visited at bedside today. CSI team discussed in detail and updated family.

## 2023-01-17 NOTE — SIGNIFICANT EVENT
SPIRITUAL HEALTH SERVICES Significant Event  [employer] Sac[employer]ament of ANOINTING  [facility] 4E    Pt anointed by Father Hortensia per request of pt's mother Rin.    Bryanna Shepard  Palliative   Pager [phone]

## 2023-01-17 NOTE — PROCEDURES
Cardiac ICU Procedure Note  Arterial Line Placement     Service performing procedure: Cardiac ICU  Indication for procedure: Hemodynamic monitoring, frequent ABGs  Diagnosis: Cardiogenic shock  Acuity: Emergent  Procedure date: 01/16/23    A Time Out was performed immediately prior to the procedure to confirm correct patient, procedure, and site (site marked if applicable): Yes     Preparation for Procedure:   Hand hygiene performed prior to insertion: yes   Barrier precautions used (mask, sterile gloves, cap): yes   Skin preparation with chlorhexidine gluconate: yes   Skin preparation agent was allowed to dry: yes   Anesthesia used? Local     Arterial line placed   Side: Right  Site: Radial  Ultrasound used? Yes  Type of catheter placed: Arterial line     The patient tolerated the procedure well.   Immediate complications: NONE    Lauryn Calvin MD,   Cardiovascular Disease Fellow  Pager [phone]

## 2023-01-17 NOTE — PHARMACY-ADMISSION MEDICATION HISTORY
Admission Medication History Completed by Pharmacy    See [facility] Admission Navigator for allergy information, preferred outpatient pharmacy, prior to admission medications and immunization status.     Medication History Sources:     Chart review    Changes made to PTA medication list (reason):    Added: all medications    Deleted: None    Changed: None    Additional Information:    None    Prior to Admission medications    Medication Sig Last Dose Taking? Auth Provider Long Term End Date   divalproex sodium delayed-release (DEPAKOTE) 500 MG DR tablet Take 500 mg by mouth At Bedtime Unknown Yes Unknown, Entered By History     DULoxetine (CYMBALTA) 60 MG capsule Take 60 mg by mouth 2 times daily Unknown Yes Unknown, Entered By History Yes    lisdexamfetamine (VYVANSE) 40 MG capsule Take 40 mg by mouth 2 times daily Unknown Yes Unknown, Entered By History     lisdexamfetamine (VYVANSE) 70 MG capsule Take 70 mg by mouth every morning Unknown Yes Unknown, Entered By History     methylphenidate (RITALIN) 10 MG tablet Take 10 mg by mouth 3 times daily Unknown Yes Unknown, Entered By History     omeprazole (PRILOSEC) 20 MG DR capsule Take 20 mg by mouth daily Unknown Yes Unknown, Entered By History     tadalafil (CIALIS) 10 MG tablet Take 10 mg by mouth daily as needed Unknown Yes Unknown, Entered By History Yes    testosterone cypionate (DEPOTESTOSTERONE) 200 MG/ML injection Inject 100 mg into the muscle once a week Unknown Yes Unknown, Entered By History No        Date completed: 01/16/23    Medication history completed by: [facility]

## 2023-01-17 NOTE — PROGRESS NOTES
[facility]    ECLS Shift Summary:       ECMO Equipment:  Console Serial Number: [id number]  Circuit Lot Number: [id number]  Oxygenator Lot Number: [id number]    Circuit Assessment: Fibrin  Fibrin Location: Oxy,Art connector  Clot Location: post oxy    Patient remains on V-A ECMO, all equipment is functioning and alarms are appropriately set. RPM's: 4000 with Blood Flow (Circuit) LPM  Av.2 LPM  Min: 3.91 LPM  Max: 4.55 LPM L/min. Sweep is at 6 LPM and 100 %. Extremities are cool and mottled.     Significant Shift Events:    BP low, IABP damped waveform. Venous pressures -180. Pt requiring 250 LR to keep from chugging. Multiple times. Pt bleeding from RIJ line steadily requiring replacement of fluid. Suctioning large amts of blood from ETT  0000- pupils non-reactive MD notified.  0045 heparin off and traveled to CT. See report  0130 sedation off.   Frothy bloody secretions now from ETT occluding ETT. Vent changed to PC by RT. VT are 14ml/breath due to the bloody secretions and edema.MD aware  MD order for heparin to remain off. Restart when ACT drops to 160 with new goal of 160-180    Vent settings:  Vent Mode: (S) PCV Plus assist  (Pressure Control Ventilation/ Assist Control)  FiO2 (%): 40 %  Resp Rate (Set): 12 breaths/min  Tidal Volume (Set, mL): 520 mL  PEEP (cm H2O): (S) 10 cmH2O  Inspiratory Pressure (cm H2O) (Drager Becky): (S) 25  Resp: 12      Anticoagulation:  Dose (units/hr) HEParin: 0 Units/hr  Rate (mL/hr) HEParin: 0 mL/hr  Concentration HEParin: 100 Units/mL        Most recent: ACT  (seconds): 169 seconds    Urine output is clear fabby w/o foul odor'.  Blood loss was Large amount. Product given included 2 PRBC and 1 liter of LR .       Intake/Output Summary (Last 24 hours) at 2023 0511  Last data filed at 2023 0400  Gross per 24 hour   Intake 5559.62 ml   Output 2158.7 ml   Net 3400.92 ml       Labs:  Recent Labs   Lab 23  8516  01/17/23  0414 01/17/23  0152 01/17/23  0004 01/16/23  2149   PH 7.25*  --  7.29* 7.36 7.34*   PCO2 44  --  39 36 44   PO2 324*  --  228* 257* 203*   HCO3 19*  --  19* 20* 24   O2PER 100 100  100 40 40 40       Lab Results   Component Value Date    HGB 9.3 (L) 01/17/2023    PHGB 70 (H) 01/16/2023    PLT 92 (L) 01/17/2023    FIBR 520 (H) 01/17/2023    INR 1.82 (H) 01/17/2023    PTT 68 (H) 01/17/2023    DD 0.88 (H) 01/17/2023    ANTCH 105 01/16/2023       Plan is continue VA ECMO support.    Lary Christensen, RT  ECMO Specialist  1/17/2023 5:11 AM

## 2023-01-17 NOTE — PROGRESS NOTES
ECMO Attending Progress Note  1/17/2023    Shanice Ortiz is a 42 year old male who was cannulated for ECMO 1/15/23 due to refractory VF arrest this was witnessed initial ROSC after 8 min and arrival to cath lab with rosc however went into resistant VF in the cath lab which prompted VA ECMO cannulation. Cath without obstructive disease    Cannulation Site:  17 Fr in the R  femoral artery  25 Fr in the R femoral vein  IABP LFA  RIJ temp wire  thermoguard in LFV    Pulsatility: 0 mmHg    Interval events: ct head with multiple infarcts, increased pressors needs, decreased flow in his L lower, increased lfts probable progression to liver failure.    Physical Exam:  Temp:  [98.4  F (36.9  C)-99.3  F (37.4  C)] 98.6  F (37  C)  Pulse:  [64-92] 89  Resp:  [12-18] 12  BP: ()/(28-81) 68/30  MAP:  [46 mmHg-81 mmHg] 48 mmHg  Arterial Line BP: ()/(39-62) 57/41  FiO2 (%):  [40 %-100 %] 60 %  SpO2:  [71 %-100 %] 100 %    Intake/Output Summary (Last 24 hours) at 1/15/2023 0834  Last data filed at 1/15/2023 0800  Gross per 24 hour   Intake 223.24 ml   Output 1305 ml   Net -1081.76 ml    Vent Mode: PCV Plus assist  (Pressure Control Ventilation/ Assist Control)  FiO2 (%): 60 %  Resp Rate (Set): 12 breaths/min  Tidal Volume (Set, mL): 520 mL  PEEP (cm H2O): 10 cmH2O  Inspiratory Pressure (cm H2O) (Drager Becky): 26  Resp: 12       Labs:  Recent Labs   Lab 01/17/23  1528 01/17/23  1354 01/17/23  1147 01/17/23  0928 01/17/23  0819   PH  --  7.10* 7.18* 7.20* 7.24*   PCO2  --  47* 46* 44 44   PO2  --  122* 361* 354* 168*   HCO3  --  15* 17* 17* 19*   O2PER 60 60 60 60 60      Recent Labs   Lab 01/17/23  0927 01/17/23  0414 01/17/23  0005 01/16/23  2149   WBC 14.9* 16.8* 14.5* 18.4*   HGB 8.7* 9.3* 7.6* 9.9*     Creatinine   Date Value Ref Range Status   01/17/2023 3.79 (H) 0.67 - 1.17 mg/dL Final   01/17/2023 3.81 (H) 0.67 - 1.17 mg/dL Final   01/17/2023 3.30 (H) 0.67 - 1.17 mg/dL Final   01/16/2023 3.24 (H) 0.67 -  1.17 mg/dL Final       Blood Flow (Circuit) LPM: 5.04 LPM  Sweep LPM: (S) 5 LPM  Sweep FiO2   %: (S) 80 %  ACT  (seconds): 182 seconds  Arterial Blood Temp  (degrees C): 36.2 C  Pulse Oximetry  (SpO2%): 100 %  Arterial Pressure  mmH mmHg      ECMO Issues including assessments and plan on DOS 2023:  Neuro: multiple strokes likely will progress to brain death/swelling, neurocrit following. Stopped sedation  No acute distress.  NIRS dropped off on LLE, will likely not offer thrombectomy given prognosis will discuss with vascular, family aware, will continue ac  RASS goal-1  CV: Cardiogenic shock.  Hemodynamically volatile on vaso ne and phenyl IABP placed given loss of pulsatility, pulse pressure 0mmHg   Pulm: Keep vent settings at rest settings as above.  FEN/Renal: Electrolytes stable w/ replacement protocols in place, Cr stable, UOP stable  Heme: ACT goal:160s, Hemoglobin 13.3 -->11.9 -->9.3.  Minimal oozing around the ECMO cannulas.   ID: Receiving empiric antibiotics, MRSA nares negative  Cannulae: Position is acceptable on exam and the available imaging.  Distal perfusion cannula is in place and patent.  Extremities are well-perfused.    Very grim prognosis, discussed with family who are hoping for a miracle.     I have personally reviewed the ECMO flows, oxygenation and CO2 clearance, anticoagulation, and cannula position.  I have also personally assessed the patient's systemic response with hemodynamics, oxygenation, ventilation, and bleeding.       The patient requires continued ECMO support and management in the ICU.     Kenneth Carr MD  Cardiology Critical Care  2023

## 2023-01-17 NOTE — PROGRESS NOTES
CRRT STATUS NOTE    DATA:  Time:  1749  Pressures WNL:  YES  Filter Status:  WDL    Problems Reported/Alarms Noted:  None, TMPs up to 400s after increase in dialysate and PBP rates - circuit changed    Supplies Present:  YES    ASSESSMENT:  Patient Net Fluid Balance:  Net positive +6,388ml since 0000. At 0800 pt received total of 2L LR and 500ml albumin.     Vital Signs:  Temp: 99.3  F (37.4  C) Temp src: Bladder BP: (!) 68/30 Pulse: 89   Resp: 13 SpO2: 100 % O2 Device: Mechanical Ventilator       Labs:     K+ rising, increased dialysate and PBP rates. Bicarb dropping, added bicarb gtt and amp of bicarb x1, no bicarb post per renal patricia. Lactic rising, pH falling.     Recent Labs   Lab 01/17/23  1155 01/17/23  1146 01/17/23  0928 01/17/23  0927 01/17/23  0545 01/17/23  0541 01/17/23  0415 01/17/23  0414 01/17/23  0154 01/17/23  0018 01/17/23  0005 01/16/23  2149   WBC  --   --   --  14.9*  --   --   --  16.8*  --   --  14.5* 18.4*   HGB  --   --   --  8.7*  --   --   --  9.3*  --   --  7.6* 9.9*   MCV  --   --   --  95  --   --   --  95  --   --  97 95   PLT  --   --   --  76*  --   --   --  92*  --   --  112* 133*   INR  --   --   --  2.44*  --   --   --   --  1.82*  --   --  1.62*   NA  --  140  --  140  --   --   --  139  --   --   --  140   POTASSIUM  --  6.1*  --  6.1*  --  5.6*  --  6.0*  --   --   --  5.1   CHLORIDE  --  105  --  106  --   --   --  105  --   --   --  105   CO2  --  13*  --  15*  --   --   --  16*  --   --   --  18*   BUN  --  31.3*  --  31.7*  --   --   --  27.2*  --   --   --  28.3*   CR  --  3.79*  --  3.81*  --   --   --  3.30*  --   --   --  3.24*   ANIONGAP  --  22*  --  19*  --   --   --  18*  --   --   --  17*   RANDALL  --  8.2*  --  7.6*  --   --   --  7.3*  --   --   --  7.9*   GLC 72 75 74 82   < >  --    < > 106*  --    < >  --  105*  115*   ALBUMIN  --  2.0*  --  2.0*  --   --   --  2.4*  --   --   --  2.6*   PROTTOTAL  --   --   --  3.8*  --   --   --  4.1*  --   --   --  4.9*    BILITOTAL  --   --   --  1.0  --   --   --  0.7  --   --   --  0.6   ALKPHOS  --   --   --  32*  --   --   --  25*  --   --   --  30*   ALT  --   --   --  1,968*  --   --   --  852*  --   --   --  135*   AST  --   --   --  1,401*  --   --   --   --   --   --   --  203*    < > = values in this interval not displayed.        Goals of Therapy:  I=O, unable to pull d/t worsening pt status, increased pressor needs.    INTERVENTIONS:   Recirc while head CT done. TMP rising to 400s, set taken down at 1231 and restarted at 1409, prolonged restart time d/t issue priming new set. New clots in LLE, new strokes.     PLAN:  Pt w/worsening labs, increased clotting. Check circuit, change Q72hr and PRN.  Call CRRT RN at [phone] w/questions or concerns. See flowsheets for details.

## 2023-01-17 NOTE — PROGRESS NOTES
Worsening potassium levels, change to 2 K bath. Repeat BMP in 4 hrs, if K > 6.2 shift with Insulin dextrose. Calcium gluconate for membrane stabilization.

## 2023-01-17 NOTE — PLAN OF CARE
Major Shift Events:  Pt hemodynamically unstable throughout the night.     A-line dampened and sluggish blood return, BP cuff low Map's 50's and correlating with A-line however IABP BP stable Map 70-80's.  Dr Calvin notified and in to replace A-line, BP via new A-line remains low (40-50's) despite adequate IABP pressure (70-80's).    Per Dr Calvin trend IABP rather than A-line or cuff BP.   ~ @ 2330 Ecmo circuit began consistently chugging, requiring constant fluid infusion.  MAP via IABP down to mid 50's and then correlating with radial [place].       Totals for the shift:  5 L LR given, 2 RBC d/t significant bleeding from Right internal jugular (suture placed per fellow) 4 amps Bicarb (LA 7.9), 4 gm calcium, Levo started up to 0.2, Bradford increased to 5 and Vaso remains at 4.  Ecmo unable to flow higher than 4 LPM, sweep 6 LPM, FiO2 100%.     Midnight pupil check revealed NPI zero, Dr Calvin notified, Stat CT head C/A/P completed, see Epic results.  Sedation stopped and Heparin stopped, ACT goal decreased to 160-180.    Neurologically pt remains unresponsive, no cough, no gag, NPI zero.    Dr Calvin attempted to reach pt's wife, no answer.  Wife returned call this am  0630 and will be updated by MD.    Plan: Potassium 6.0 this am, switching to 2K bath. Continue to closely monitor.   For vital signs and complete assessments, please see documentation flowsheets.     Goal Outcome Evaluation:

## 2023-01-17 NOTE — PROGRESS NOTES
[facility]    ECLS Shift Summary:     ECMO Equipment:  Console Serial Number: [id number]  Circuit Lot Number: [id number]  Oxygenator Lot Number: [id number]    Circuit Assessment: Fibrin, Clot  Fibrin Location: arterial connector  Clot Location: post oxy    Patient remains on V-A ECMO, all equipment is functioning and alarms are appropriately set. RPM's: 4000 with Blood Flow (Circuit) LPM  Av.4 LPM  Min: 4 LPM  Max: 4.63 LPM L/min. Sweep is at 6 LPM and 70 %. Extremities are cool to the touch.     Significant Shift Events: None    Vent settings:  Vent Mode: CMV/AC  (Continuous Mandatory Ventilation/ Assist Control)  FiO2 (%): 40 %  Resp Rate (Set): 12 breaths/min  Tidal Volume (Set, mL): 520 mL  PEEP (cm H2O): 7 cmH2O  Resp: 12      Anticoagulation:  Dose (units/hr) HEParin: 1700 Units/hr  Rate (mL/hr) HEParin: 17 mL/hr  Concentration HEParin: 100 Units/mL        Most recent: ACT  (seconds): 220 seconds    Urine output is clear, Cande.  Blood loss was small. Product given included none.       Intake/Output Summary (Last 24 hours) at 2023 1834  Last data filed at 2023 1800  Gross per 24 hour   Intake 3175.3 ml   Output 2764.7 ml   Net 410.6 ml       Labs:  Recent Labs   Lab 23  1731 23  1523 23  1522 23  1340 23  1204   PH 7.40 7.41  --  7.33* 7.35   PCO2 39 38  --  47* 44   PO2 145* 160*  --  122* 133*   HCO3 24 24  --  25 24   O2PER 40 4 70  40 40 40       Lab Results   Component Value Date    HGB 10.8 (L) 2023    PHGB 70 (H) 2023     2023    FIBR 397 2023    INR 1.53 (H) 2023     (HH) 2023    DD 0.57 (H) 2023    ANTCH 105 2023       Plan is to remain on [facility] ECMO.    Nancy Cárdenas, RT  ECMO Specialist  2023 6:34 PM

## 2023-01-17 NOTE — PROGRESS NOTES
Nephrology Progress Note  01/17/2023       Rex Negrete is a 42 yom with hx of polysubstance use, TBI with hx of seizures and EF of 40% in 2021 (since recovered to 55-60% 5/2022) who had at home arrest now on ECMO & IABP.  Nephrology consulted for management of IVONNE, started CRRT on 1/15/2023 for hyperkalemia.        Interval History :   Mr Negrete continues on CRRT, K was well controlled on 4k baths up until this morning when he was placed on 2k baths with K of 6.0.  Despite this his K increased to 6.1, did have some down-time with CT which unfortunately is showing cerebral edema consistent with hypoxic brain injury, also new ischemic CVA and mediastinal hematoma.  Difficult situation but will increase CRRT dose to try to get a handle on his hyperkalemia but will not treat the underlying cause and ultimately we may not be able to keep up with his metabolic issues.  Not pulling any UF with poor BP's despite ECMO and 3 pressors.      Assessment & Recommendations:   IVONNE-Baseline Cr ~1.2 but on rise post arrest, started CRRT on 1/15 for hyperkalemia in setting of rewarming, UOP on the decline and now oliguric.  IVONNE due to hypoperfusion, supporting with ECMO and 2 pressors, doing worse today with signs of    -CRRT, K up despite 2k baths at standard 25ml/kg/hr dosing so increasing to total dose of 40ml/kg/hr (22.5 dialysate and 17.5 prefilter)   -Access is ECMO circuit.     Volume status-Unstable BP's, not pulling UF currently.      Electrolytes/pH-K 6.1, bicarb 15, increasing dose of CRRT, Na 140, no acute issue.     Ca/phos/pth-Ca 7.9, Mg 2.2, Phos 4.7      Anemia-Hgb 8.7, acute management per team.     Nutrition-Deferred    Time spent: 30 minutes on this date of encounter for chart review, physical exam, medical decision making and co-ordination of care.     Discussed with Dr Carmen    Recommendations were communicated to primary team via verbal communication.     LOUANN Stewart CNS  Clinical Nurse  "Specialist  [phone]    Review of Systems:   I reviewed the following systems:  ROS not done due to vent/sedation.     Physical Exam:   I/O last 3 completed shifts:  In: 8484.52 [I.V.:3361.92; Other:2.6; NG/GT:150; IV Piggyback:4200]  Out: 2021.4 [Urine:124; Other:1897.4]   BP (!) 68/30   Pulse 89   Temp 99.3  F (37.4  C)   Resp 12   Ht 1.88 m (6' 2\")   Wt 119.8 kg (264 lb 1.8 oz)   SpO2 100%   BMI 33.91 kg/m       GENERAL APPEARANCE: Intubated, sedated, on ECMO and CRRT.   EYES:  No scleral icterus, pupils equal  HENT: mouth without ulcers or lesions  PULM: lungs clear to auscultation, equal air movement, no cyanosis or clubbing  CV: RRR   GI: soft, non-distended  MS: no evidence of inflammation in joints, no muscle tenderness  NEURO: No focal deficits.   Access: ECMO    Labs:   All labs reviewed by me  Electrolytes/Renal -   Recent Labs   Lab Test 01/17/23  0928 01/17/23  0927 01/17/23  0822 01/17/23  0545 01/17/23  0541 01/17/23  0415 01/17/23  0414 01/17/23  0018 01/16/23  2149 01/16/23  0405 01/16/23  0345 01/15/23  1221 01/15/23  1006   NA  --  140  --   --   --   --  139  --  140   < > 138   < > 135*  135*   POTASSIUM  --  6.1*  --   --  5.6*  --  6.0*  --  5.1   < > 4.7   < > 5.0  5.0  5.0   CHLORIDE  --  106  --   --   --   --  105  --  105   < > 106   < > 108*  108*   CO2  --  15*  --   --   --   --  16*  --  18*   < > 20*   < > 19*  19*   BUN  --  31.7*  --   --   --   --  27.2*  --  28.3*   < > 26.6*   < > 30.7*  30.7*   CR  --  3.81*  --   --   --   --  3.30*  --  3.24*   < > 2.11*   < > 1.44*  1.44*   GLC 74 82 87   < >  --    < > 106*   < > 105*  115*   < > 105*  113*   < > 183*  183*   RANDALL  --  7.6*  --   --   --   --  7.3*  --  7.9*   < > 7.7*   < > 7.1*  7.1*   MAG  --  2.2  --   --   --   --  2.2  --  2.5*   < > 2.1   < > 2.7*   PHOS  --   --   --   --   --   --  7.1*  --   --   --  4.7*  --  2.8    < > = values in this interval not displayed.       CBC -   Recent Labs   Lab " Test 01/17/23  0927 01/17/23  0414 01/17/23  0005   WBC 14.9* 16.8* 14.5*   HGB 8.7* 9.3* 7.6*   PLT 76* 92* 112*       LFTs -   Recent Labs   Lab Test 01/17/23  0927 01/17/23  0414 01/16/23  2149 01/16/23  1522   ALKPHOS 32* 25* 30* 24*   BILITOTAL 1.0 0.7 0.6 0.5   ALT 1,968* 852* 135* 134*   AST 1,401*  --  203* 183*   PROTTOTAL 3.8* 4.1* 4.9* 4.9*   ALBUMIN 2.0* 2.4* 2.6* 2.7*       Iron Panel - No lab results found.        Current Medications:    chlorhexidine  15 mL Swish & Spit BID     divalproex sodium delayed-release  500 mg Oral At Bedtime     pantoprazole  40 mg Intravenous Daily     piperacillin-tazobactam  3.375 g Intravenous Q6H     sodium bicarbonate         sodium bicarbonate         thiamine  100 mg Oral Daily       amiodarone 1 mg/min (01/17/23 1100)     CRRT replacement solution 12.5 mL/kg/hr (01/17/23 1050)     fentaNYL Stopped (01/17/23 0130)     HEParin 1,000 Units/hr (01/17/23 1100)     heparin (PRESSURE BAG) 2 unit/mL in 0.9% NaCl 3 mL/hr (01/17/23 1100)     lidocaine Stopped (01/16/23 1445)     midazolam       - MEDICATION INSTRUCTIONS -       norepinephrine 0.22 mcg/kg/min (01/17/23 1100)     phenylephrine 5 mcg/kg/min (01/17/23 1100)     CRRT replacement solution 200 mL/hr at 01/17/23 0606     CRRT replacement solution 12.5 mL/kg/hr (01/17/23 1050)     propofol Stopped (01/17/23 0000)     vasopressin 4 Units/hr (01/17/23 1100)

## 2023-01-17 NOTE — PROGRESS NOTES
"CRRT STATUS NOTE    DATA:  Time:  6:36 AM  Pressures WNL:  YES  Filter Status:  WDL    Problems Reported/Alarms Noted:  none    Supplies Present:  YES    ASSESSMENT:  Patient Net Fluid Balance:  +1,453 mL on 1/16. Pt +4,980 mL since midnight. Multiple LR boluses given during shift (4,200mL). 2 unit PRBCs given.  Vital Signs: A line BP 73/55 (63) BP (!) 68/30   Pulse 89   Temp 98.6  F (37  C)   Resp 12   Ht 1.88 m (6' 2\")   Wt 117.2 kg (258 lb 6.1 oz)   SpO2 100%   BMI 33.17 kg/m     On vaso @ 4 units/hr, phenyl @ 5 mcg/kg/min, levo @ .2 mcg/kg/min. Heparin gtt stopped d/t CT results.    Labs:  Monitored and reviewed.    CMPRecent Labs   Lab 01/17/23  0545 01/17/23  0541 01/17/23  0419 01/17/23  0415 01/17/23  0414 01/17/23  0018 01/16/23  2149 01/16/23  1523 01/16/23  1522 01/16/23  1024 01/16/23  1020 01/16/23  0405 01/16/23  0345 01/15/23  1221 01/15/23  1006   NA  --   --   --   --  139  --  140  --  137  --  137  --  138   < > 135*  135*   POTASSIUM  --  5.6*  --   --  6.0*  --  5.1  --  4.9  --  4.8  --  4.7   < > 5.0  5.0  5.0   CHLORIDE  --   --   --   --  105  --  105  --  104  --  104  --  106   < > 108*  108*   CO2  --   --   --   --  16*  --  18*  --  19*  --  20*  --  20*   < > 19*  19*   ANIONGAP  --   --   --   --  18*  --  17*  --  14  --  13  --  12   < > 8  8   GLC 87  --  100* 97 106*   < > 105*  115*   < > 105*   < > 98  110*   < > 105*  113*   < > 183*  183*   BUN  --   --   --   --  27.2*  --  28.3*  --  27.5*  --  27.2*  --  26.6*   < > 30.7*  30.7*   CR  --   --   --   --  3.30*  --  3.24*  --  2.80*  --  2.48*  --  2.11*   < > 1.44*  1.44*   GFRESTIMATED  --   --   --   --  23*  --  24*  --  28*  --  33*  --  40*   < > 63  63   RANDALL  --   --   --   --  7.3*  --  7.9*  --  8.4*  --  7.9*  --  7.7*   < > 7.1*  7.1*   MAG  --   --   --   --  2.2  --  2.5*  --  2.1  --  2.2  --  2.1   < > 2.7*   PHOS  --   --   --   --  7.1*  --   --   --   --   --   --   --  4.7*  --  2.8 "   PROTTOTAL  --   --   --   --  4.1*  --  4.9*  --  4.9*  --  5.0*  --  5.0*   < > 4.8*   ALBUMIN  --   --   --   --  2.4*  --  2.6*  --  2.7*  --  2.9*  --  3.0*   < > 3.0*  3.0*   BILITOTAL  --   --   --   --  0.7  --  0.6  --  0.5  --  0.6  --  0.6   < > 0.6   ALKPHOS  --   --   --   --  25*  --  30*  --  24*  --  25*  --  26*   < > 25*   AST  --   --   --   --   --   --  203*  --  183*  --  184*  --  180*   < >  --    ALT  --   --   --   --  852*  --  135*  --  134*  --  143*  --  153*   < > 175*    < > = values in this interval not displayed.     CBC  Recent Labs   Lab 01/17/23 0414 01/17/23  0005 01/16/23 2149 01/16/23  1522   WBC 16.8* 14.5* 18.4* 16.9*   RBC 3.01* 2.39* 3.16* 3.44*   HGB 9.3* 7.6* 9.9* 10.8*   HCT 28.6* 23.1* 30.1* 32.9*   MCV 95 97 95 96   MCH 30.9 31.8 31.3 31.4   MCHC 32.5 32.9 32.9 32.8   RDW 15.0 14.4 14.2 14.1   PLT 92* 112* 133* 157     INR  Recent Labs   Lab 01/17/23  0154 01/16/23  2149 01/16/23  1522 01/16/23  1020   INR 1.82* 1.62* 1.53* 1.36*     Arterial Blood Gas  Recent Labs   Lab 01/17/23  0541 01/17/23  0415 01/17/23  0414 01/17/23  0152 01/17/23  0004   PH 7.31* 7.25*  --  7.29* 7.36   PCO2 46* 44  --  39 36   PO2 347* 324*  --  228* 257*   HCO3 23 19*  --  19* 20*   O2PER 60 100 100  100 40 40     0400 iCal 4.1 (replaced x2 during shift), Lactic 6.9    Goals of Therapy:  I=O.  Unable to pull fluid since 2200 d/t chugging on ECMO circuit and increased pressor needs.    INTERVENTIONS:   Recirc while pt went for STAT Head and C/A/P CT. Changed CRRT solutions to PrismaSol BGK 2/3.5 from Phoxillum BK4 d/t K+ 6.0.    PLAN:  Continue to monitor. Change CRRT set q 72hrs and PRN. Call CRRT RN @ [phone] with questions or concerns. See flowsheets for details.

## 2023-01-17 NOTE — PROGRESS NOTES
Neurocritical Care Consultation    Reason for critical care admission: Cardiac Arrest  Admitting Team: CSI  Date of Service:  01/17/2023  Date of Admission:  1/15/2023  Hospital Day: 3    Assessment/Plan  42 year-old male with a past medical history of PEA arrest 5/21 in setting of daily methamphetamine use as well as other polysubstance abuse and prior TBI who is admitted following cardiac arrest now cannulated for VA ECMO and w/ RV baloon tipped pacemaker. Down time 30 minutes. Cooled to 34 currently.     24 hour events: CTH obtained overnight which shows right PCA infarct, pupils non-reactive     Neuro  #post-arrest cerebral edema  #encephalopathy   #right PCA infarct   Post arrest day: 1  Length of downtime: 30 min  -Witnessed arrest: Yes  -ROSC: Yes, rhythm: V fib, arrested again and was cannulated of ECMO  -TTM initiated: Yes, target temp time: 36, duration of cooling unknown  -Current temp: 37.3    Analgesics & sedation  Prior sedation: fentanyl, and propofol infusions   Current sedation: none    Exam findings   -Cough: No  -Gag: No  -Pupils: pupillometry L 2.36, NPi 0; R 2.88, NPi 0    Neuroimaging  -Day 1 CTH shows: Initial CT head with preserved gray-white matter differentiation and no evidence of cerebral edema  -Repeat CTH performed today x 2, shows right PCA infarct along with cerebral edema.     Neurophysiology  -continue vEEG with VA ECMO  -vEEG shows: does not demonstrate seizure activity, mod-severe encephalopathy, with EEG flattening around 0630 this morning.     Biomarkers  -Neuron-specific Enolase (NSE) results: Pending    Recommendations  -Avoid hypotonic solutions as they may worsen cerebral edema   -Avoid nitroprusside or nitroglycerin as they may also worsen cerbral edema  -Advise slow correction of hypernatremia if needed  -When safe to do so, please limit use of CNS acting medications such as benzodiazepines, opiates, anticholinergics, which will permit a more accurate neurological  assessment  -We will continue to follow, please call *[phone] with any questions    TIME SPENT ON THIS ENCOUNTER   I spent 51 minutes of critical care time on the unit/floor managing the care of Rex Negrete. Upon evaluation, this patient had a high probability of imminent or life-threatening deterioration due to encephalopathy, which required my direct attention, intervention, and personal management . Greater than 50% of my time was spent at the bedside counseling the patient and/or coordinating care including chart review, history, exam, documentation, and further activities per this note. I have personally reviewed the following data/imaging over the past 24 hours.     The patient was seen and discussed with the NCC attending, Dr. Vignesh Zarate.    LOUANN Do, CNP  Neurocritical Care  *[phone]  Text Page     History of Present Illness:  42 year-old male with a past medical history of PEA arrest 5/21 in setting of daily methamphetamine use as well as other polysubstance abuse and prior TBI who was in his otherwise usual state of health until this evening.  At 12:30 AM he was using the restroom and was subsequently discovered to be no longer moving.  Wife called 911.  He was found of a shockable rhythm by first responders and EMS did not arrive until minutes later or ROSC was achieved.  Down time 30 minutes.     Patient stable, mild elevated lactic resulting in lactic acidosis.  Initial coronary angiogram demonstrated open coronary arteries.  However, he subsequently went back into refractory V. fib arrest s/p 10 shocks with CPR.  He was subsequently cannulated for VA ECMO.  A temporary balloon tipped pacemaker was placed in the right ventricle for pacing due to continued ventricular fibrillation.     No Known Allergies    PAST MEDICAL HISTORY:   Past Medical History:   Diagnosis Date     ADHD (attention deficit hyperactivity disorder)      Seizures (H)      Substance abuse (H)      Traumatic brain  injury      PAST SURGICAL HISTORY:   Past Surgical History:   Procedure Laterality Date     COLONOSCOPY       CV ARTERIAL LINE PLACEMENT N/A 1/15/2023    Procedure: Arterial Line Placement;  Surgeon: Daren Hong MD;  Location: [facility] CARDIAC CATH LAB     CV CENTRAL VENOUS CATHETER PLACEMENT N/A 1/15/2023    Procedure: Central Venous Catheter Placement;  Surgeon: Daren Hong MD;  Location: [facility] CARDIAC CATH LAB     CV CORONARY ANGIOGRAM N/A 1/15/2023    Procedure: Coronary Angiogram;  Surgeon: Daren Hong MD;  Location: [facility] CARDIAC CATH LAB     CV EXTRACORPERAL MEMBRANE OXYGENATION N/A 1/15/2023    Procedure: Extracorporeal Membrance Oxygenation;  Surgeon: Daren Hong MD;  Location: [facility] CARDIAC CATH LAB     CV INTRA AORTIC BALLOON N/A 1/15/2023    Procedure: Intraprocedure Aortic Balloon Pump Insertion;  Surgeon: Daren Hong MD;  Location: [facility] CARDIAC CATH LAB     CV TEMPORARY PACEMAKER INSERTION N/A 1/15/2023    Procedure: Temporary Pacemaker Insertion;  Surgeon: Daren Hong MD;  Location: [facility] CARDIAC CATH LAB     FAMILY HISTORY:   I have reviewed this patient's family history and updated it with pertinent information if needed.  Family History   Problem Relation Age of Onset     Cancer Mother      Diabetes Maternal Grandmother      SOCIAL HISTORY:   Social History     Tobacco Use     Smoking status: Some Days     Packs/day: 0.25     Types: Cigarettes     Smokeless tobacco: Never   Substance Use Topics     Alcohol use: Not Currently     ROS: The 10 point Review of Systems is negative other than noted in the HPI or here.     Current Medications:    chlorhexidine  15 mL Swish & Spit BID     divalproex sodium delayed-release  500 mg Oral At Bedtime     pantoprazole  40 mg Intravenous Daily     piperacillin-tazobactam  3.375 g Intravenous Q6H     sodium bicarbonate         sodium bicarbonate         thiamine  100 mg Oral Daily     PRN  "Medications:  artificial tears, calcium gluconate, calcium gluconate, glucose **OR** dextrose **OR** glucagon, fentaNYL, heparin ANTICOAGULANT, lactated ringers, lidocaine 4%, lidocaine (buffered or not buffered), magnesium sulfate, midazolam, naloxone **OR** naloxone **OR** naloxone **OR** naloxone, - MEDICATION INSTRUCTIONS -, potassium chloride, sodium chloride (PF), sodium chloride (PF), sodium phosphate    Infusions:    amiodarone 1 mg/min (01/17/23 0500)     CRRT replacement solution 12.5 mL/kg/hr (01/17/23 0219)     fentaNYL Stopped (01/17/23 0130)     HEParin Stopped (01/17/23 0045)     heparin (PRESSURE BAG) 2 unit/mL in 0.9% NaCl 3 mL/hr (01/17/23 0500)     lidocaine Stopped (01/16/23 1445)     midazolam       - MEDICATION INSTRUCTIONS -       norepinephrine 0.2 mcg/kg/min (01/17/23 0500)     phenylephrine 5 mcg/kg/min (01/17/23 0500)     CRRT replacement solution 200 mL/hr at 01/16/23 1740     CRRT replacement solution 12.5 mL/kg/hr (01/17/23 0219)     propofol Stopped (01/17/23 0000)     vasopressin 4 Units/hr (01/17/23 0500)     Physical Examination:  Vitals: BP (!) 68/30   Pulse 89   Temp 99.1  F (37.3  C)   Resp 12   Ht 1.88 m (6' 2\")   Wt 119.8 kg (264 lb 1.8 oz)   SpO2 100%   BMI 33.91 kg/m    General: Adult male patient, lying in bed, critically-ill  HEENT: Normocephalic, atraumatic, no icterus, oral cavity/oropharynx pink and moist  Cardiac: RRR, VA-ECMO   Pulm: synchronous with mechanical ventilator  Abdomen: soft, distended  Extremities: cool, edematous  Skin: no rash or lesion on exposed skin  Psych: deferred  Neuro:  Mental status: unresponsive, unable to assess with ETT.  Cranial nerves: Pupils nonreactive, no cough, no gag, no corneals, neg dolls eyes, ?breaths over vent  Motor: Normal bulk and tone. No abnormal movements. Unable to assess strength.  Sensory: not intact to light touch x 4 extremities  Coordination: deferred.  Gait: deferred    Labs and Imaging:     Results for orders " placed or performed during the hospital encounter of 01/15/23 (from the past 24 hour(s))   Blood Culture Peripheral Blood    Specimen: Peripheral Blood   Result Value Ref Range    Culture No growth after 12 hours    Activated clotting time celite, POCT   Result Value Ref Range    Activated Clotting Time (Celite) POCT 216 (H) 74 - 150 seconds   Blood gas arterial and oxyhgb   Result Value Ref Range    pH Arterial 7.35 7.35 - 7.45    pCO2 Arterial 44 35 - 45 mm Hg    pO2 Arterial 133 (H) 80 - 105 mm Hg    Bicarbonate Arterial 24 21 - 28 mmol/L    Oxyhemoglobin Arterial 96 92 - 100 %    Base Excess/Deficit (+/-) -1.7 -9.0 - 1.8 mmol/L    FIO2 40     Maxime's Test Artline    Lactic acid whole blood   Result Value Ref Range    Lactic Acid 1.8 0.7 - 2.0 mmol/L   Ionized Calcium   Result Value Ref Range    Calcium Ionized 4.3 (L) 4.4 - 5.2 mg/dL   Glucose by meter   Result Value Ref Range    GLUCOSE BY METER POCT 105 (H) 70 - 99 mg/dL   Blood gas arterial and oxyhgb   Result Value Ref Range    pH Arterial 7.33 (L) 7.35 - 7.45    pCO2 Arterial 47 (H) 35 - 45 mm Hg    pO2 Arterial 122 (H) 80 - 105 mm Hg    Bicarbonate Arterial 25 21 - 28 mmol/L    Oxyhemoglobin Arterial 96 92 - 100 %    Base Excess/Deficit (+/-) -1.5 -9.0 - 1.8 mmol/L    FIO2 40     Maxime's Test Artline    Lactic acid whole blood   Result Value Ref Range    Lactic Acid 1.8 0.7 - 2.0 mmol/L   Glucose by meter   Result Value Ref Range    GLUCOSE BY METER POCT 107 (H) 70 - 99 mg/dL   Activated clotting time celite, POCT   Result Value Ref Range    Activated Clotting Time (Celite) POCT 250 (H) 74 - 150 seconds   CBC with platelets   Result Value Ref Range    WBC Count 16.9 (H) 4.0 - 11.0 10e3/uL    RBC Count 3.44 (L) 4.40 - 5.90 10e6/uL    Hemoglobin 10.8 (L) 13.3 - 17.7 g/dL    Hematocrit 32.9 (L) 40.0 - 53.0 %    MCV 96 78 - 100 fL    MCH 31.4 26.5 - 33.0 pg    MCHC 32.8 31.5 - 36.5 g/dL    RDW 14.1 10.0 - 15.0 %    Platelet Count 157 150 - 450 10e3/uL    Comprehensive metabolic panel   Result Value Ref Range    Sodium 137 136 - 145 mmol/L    Potassium 4.9 3.4 - 5.3 mmol/L    Chloride 104 98 - 107 mmol/L    Carbon Dioxide (CO2) 19 (L) 22 - 29 mmol/L    Anion Gap 14 7 - 15 mmol/L    Urea Nitrogen 27.5 (H) 6.0 - 20.0 mg/dL    Creatinine 2.80 (H) 0.67 - 1.17 mg/dL    Calcium 8.4 (L) 8.6 - 10.0 mg/dL    Glucose 105 (H) 70 - 99 mg/dL    Alkaline Phosphatase 24 (L) 40 - 129 U/L     (H) 10 - 50 U/L     (H) 10 - 50 U/L    Protein Total 4.9 (L) 6.4 - 8.3 g/dL    Albumin 2.7 (L) 3.5 - 5.2 g/dL    Bilirubin Total 0.5 <=1.2 mg/dL    GFR Estimate 28 (L) >60 mL/min/1.73m2   Heparin Unfractionated Anti Xa Level   Result Value Ref Range    Anti Xa Unfractionated Heparin 0.93 For Reference Range, See Comment IU/mL    Narrative    Therapeutic Range: UFH: 0.25-0.50 IU/mL for low intensity dosing,  0.30-0.70 IU/mL for high intensity dosing DVT and PE.  This test is not validated for other direct factor X inhibitors (e.g. rivaroxaban, apixaban, edoxaban, betrixaban, fondaparinux) and should not be used for monitoring of other medications.   INR   Result Value Ref Range    INR 1.53 (H) 0.85 - 1.15   Partial thromboplastin time   Result Value Ref Range    aPTT 176 (HH) 22 - 38 Seconds   Magnesium   Result Value Ref Range    Magnesium 2.1 1.7 - 2.3 mg/dL   Troponin T, High Sensitivity   Result Value Ref Range    Troponin T, High Sensitivity 831 (HH) <=22 ng/L   D dimer quantitative   Result Value Ref Range    D-Dimer Quantitative 0.57 (H) 0.00 - 0.50 ug/mL FEU    Narrative    This D-dimer assay is intended for use in conjunction with a clinical pretest probability assessment model to exclude pulmonary embolism (PE) and deep venous thrombosis (DVT) in outpatients suspected of PE or DVT. The cut-off value is 0.50 ug/mL FEU.   Blood gas ELS venous   Result Value Ref Range    pH ELS Venous 7.35 7.32 - 7.43    pCO2 ELS Venous 47 40 - 50 mm Hg    pO2 ELS Venous 36 25 - 47 mm Hg     Bicarbonate ELS Venous 26 21 - 28 mmol/L    Base Excess/Deficit (+/-) 0.0 -7.7 - 1.9 mmol/L    FIO2 40     Oxyhemoglobin ELS Venous 60 %   Blood gas ELS arterial   Result Value Ref Range    pH ELS Arterial 7.41 7.35 - 7.45    pCO2 ELS Arterial 38 35 - 45 mm Hg    pO2 ELS Arterial 169 (H) 80 - 105 mm Hg    Bicarbonate ELS Arterial 24 21 - 28 mmol/L    Base Excess/Deficit (+/-) -0.7 -9.0 - 1.8 mmol/L    FIO2 70     Oxyhemoglobin ELS Arterial 98 75 - 100 %   Fibrinogen activity   Result Value Ref Range    Fibrinogen Activity 397 170 - 490 mg/dL   Blood gas arterial and oxyhgb   Result Value Ref Range    pH Arterial 7.41 7.35 - 7.45    pCO2 Arterial 38 35 - 45 mm Hg    pO2 Arterial 160 (H) 80 - 105 mm Hg    Bicarbonate Arterial 24 21 - 28 mmol/L    Oxyhemoglobin Arterial 97 92 - 100 %    Base Excess/Deficit (+/-) -0.8 -9.0 - 1.8 mmol/L    FIO2 4     Maxime's Test Artline    Calcium ionized whole blood   Result Value Ref Range    Calcium Ionized 4.6 4.4 - 5.2 mg/dL   Glucose whole blood   Result Value Ref Range    Glucose 95 70 - 99 mg/dL   Lactic acid whole blood   Result Value Ref Range    Lactic Acid 2.0 0.7 - 2.0 mmol/L   Glucose by meter   Result Value Ref Range    GLUCOSE BY METER POCT 104 (H) 70 - 99 mg/dL   Activated clotting time celite, POCT   Result Value Ref Range    Activated Clotting Time (Celite) POCT 220 (H) 74 - 150 seconds   Blood gas arterial and oxyhgb   Result Value Ref Range    pH Arterial 7.40 7.35 - 7.45    pCO2 Arterial 39 35 - 45 mm Hg    pO2 Arterial 145 (H) 80 - 105 mm Hg    Bicarbonate Arterial 24 21 - 28 mmol/L    Oxyhemoglobin Arterial 97 92 - 100 %    Base Excess/Deficit (+/-) -0.9 -9.0 - 1.8 mmol/L    FIO2 40     Maxime's Test Artline    ABO/Rh type and screen    Narrative    The following orders were created for panel order ABO/Rh type and screen.  Procedure                               Abnormality         Status                     ---------                               -----------          ------                     Adult Type and Screen[[id number]]                            Final result                 Please view results for these tests on the individual orders.   Adult Type and Screen   Result Value Ref Range    ABO/RH(D) O POS     Antibody Screen Negative Negative    SPECIMEN EXPIRATION DATE [id number]    Lactic acid whole blood   Result Value Ref Range    Lactic Acid 2.1 (H) 0.7 - 2.0 mmol/L   Glucose by meter   Result Value Ref Range    GLUCOSE BY METER POCT 107 (H) 70 - 99 mg/dL   Activated clotting time celite, POCT   Result Value Ref Range    Activated Clotting Time (Celite) POCT 224 (H) 74 - 150 seconds   XR Chest Port 1 View    Narrative    EXAM: XR CHEST PORT 1 VIEW  1/16/2023 6:51 PM      HISTORY: IABP placement    COMPARISON: X-ray 1/16/2023    FINDINGS: Single view of the chest. Esophageal temperature probe. The  ET tube projects over the mid thoracic trachea. Right IJ central  venous catheter projects over the brachiocephalic vein. ECMO cannula.  Intra-aortic balloon pump is in similar position at the level of the  devendra. No pneumothorax. Small right pleural effusion. Cardiomegaly.  Perihilar and bibasilar interstitial and airspace opacities, worsened  from prior. No aggressive osseous lesions.      Impression    IMPRESSION:  1. Intra-aortic balloon pump is in similar position at the level of  the devendra.  2. Small right pleural effusion.  3. Perihilar and bibasilar interstitial and airspace opacities,  worsened from prior, likely worsening edema/atelectasis.    I have personally reviewed the examination and initial interpretation  and I agree with the findings.    KARI BROWNING MD         SYSTEM ID:  [id number]   Activated clotting time celite, POCT   Result Value Ref Range    Activated Clotting Time (Celite) POCT 216 (H) 74 - 150 seconds   Blood gas arterial and oxyhgb   Result Value Ref Range    pH Arterial 7.35 7.35 - 7.45    pCO2 Arterial 43 35 - 45 mm Hg    pO2 Arterial  140 (H) 80 - 105 mm Hg    Bicarbonate Arterial 24 21 - 28 mmol/L    Oxyhemoglobin Arterial 97 92 - 100 %    Base Excess/Deficit (+/-) -1.9 -9.0 - 1.8 mmol/L    FIO2 40     Maxime's Test Artline    Glucose by meter   Result Value Ref Range    GLUCOSE BY METER POCT 122 (H) 70 - 99 mg/dL   Glucose by meter   Result Value Ref Range    GLUCOSE BY METER POCT 113 (H) 70 - 99 mg/dL   Activated clotting time celite, POCT   Result Value Ref Range    Activated Clotting Time (Celite) POCT 211 (H) 74 - 150 seconds   Blood gas arterial and oxyhgb   Result Value Ref Range    pH Arterial 7.34 (L) 7.35 - 7.45    pCO2 Arterial 44 35 - 45 mm Hg    pO2 Arterial 203 (H) 80 - 105 mm Hg    Bicarbonate Arterial 24 21 - 28 mmol/L    Oxyhemoglobin Arterial 98 92 - 100 %    Base Excess/Deficit (+/-) -2.1 -9.0 - 1.8 mmol/L    FIO2 40     Maxime's Test Artline    CBC with platelets   Result Value Ref Range    WBC Count 18.4 (H) 4.0 - 11.0 10e3/uL    RBC Count 3.16 (L) 4.40 - 5.90 10e6/uL    Hemoglobin 9.9 (L) 13.3 - 17.7 g/dL    Hematocrit 30.1 (L) 40.0 - 53.0 %    MCV 95 78 - 100 fL    MCH 31.3 26.5 - 33.0 pg    MCHC 32.9 31.5 - 36.5 g/dL    RDW 14.2 10.0 - 15.0 %    Platelet Count 133 (L) 150 - 450 10e3/uL   Comprehensive metabolic panel   Result Value Ref Range    Sodium 140 136 - 145 mmol/L    Potassium 5.1 3.4 - 5.3 mmol/L    Chloride 105 98 - 107 mmol/L    Carbon Dioxide (CO2) 18 (L) 22 - 29 mmol/L    Anion Gap 17 (H) 7 - 15 mmol/L    Urea Nitrogen 28.3 (H) 6.0 - 20.0 mg/dL    Creatinine 3.24 (H) 0.67 - 1.17 mg/dL    Calcium 7.9 (L) 8.6 - 10.0 mg/dL    Glucose 115 (H) 70 - 99 mg/dL    Alkaline Phosphatase 30 (L) 40 - 129 U/L     (H) 10 - 50 U/L     (H) 10 - 50 U/L    Protein Total 4.9 (L) 6.4 - 8.3 g/dL    Albumin 2.6 (L) 3.5 - 5.2 g/dL    Bilirubin Total 0.6 <=1.2 mg/dL    GFR Estimate 24 (L) >60 mL/min/1.73m2   Calcium ionized whole blood   Result Value Ref Range    Calcium Ionized 4.2 (L) 4.4 - 5.2 mg/dL   Glucose whole  blood   Result Value Ref Range    Glucose 105 (H) 70 - 99 mg/dL   Heparin Unfractionated Anti Xa Level   Result Value Ref Range    Anti Xa Unfractionated Heparin 0.82 For Reference Range, See Comment IU/mL    Narrative    Therapeutic Range: UFH: 0.25-0.50 IU/mL for low intensity dosing,  0.30-0.70 IU/mL for high intensity dosing DVT and PE.  This test is not validated for other direct factor X inhibitors (e.g. rivaroxaban, apixaban, edoxaban, betrixaban, fondaparinux) and should not be used for monitoring of other medications.   INR   Result Value Ref Range    INR 1.62 (H) 0.85 - 1.15   Partial thromboplastin time   Result Value Ref Range    aPTT 122 (HH) 22 - 38 Seconds   Lactic acid whole blood   Result Value Ref Range    Lactic Acid 2.7 (H) 0.7 - 2.0 mmol/L   Magnesium   Result Value Ref Range    Magnesium 2.5 (H) 1.7 - 2.3 mg/dL   Troponin T, High Sensitivity   Result Value Ref Range    Troponin T, High Sensitivity 954 (HH) <=22 ng/L   Procalcitonin   Result Value Ref Range    Procalcitonin 10.30 (HH) <0.05 ng/mL   Activated clotting time celite, POCT   Result Value Ref Range    Activated Clotting Time (Celite) POCT 199 (H) 74 - 150 seconds   EEG Video 12-26 hr Unmonitored    Narrative    EEG Video 12-26 hr Unmonitored Result    VIDEO EEG DATE: 23  VIDEO EEG [id number]  VIDEO EEG DAY#: 2  VIDEO EEG SOURCE FILE DURATION: 23 hours and 11 minutes    PATIENT INFORMATION: Rex Negrete is a 42 year old male who presents   with cardiac arrest. Video EEG is being done to evaluate for seizures.      MEDICATIONS:    Depakote   Fentanyl  mcg start 0600   Prop 50-100mcg start 0417   These medications and doses were derived from the medical record at the   time of this procedure.     TECHNICAL SUMMAR TECHNICAL SUMMARY: This is a video-EEG monitoring study.   EEG was recorded from 23 scalp electrodes placed according to the 10-20   international system. Additional electrodes were utilized for referencing,    artifact detection, and recording from other cerebral regions. Qualified   technicians attached EEG electrodes, set up the study, monitored and   reviewed EEG recordings, and disconnected EEG electrodes when appropriate.   Video was continuously recorded. Video was reviewed for clinical   correlation and to assist with EEG interpretations.     BACKGROUND ACTIVITY: Background activity consisted of diffuse generalized   delta/theta slowing with superimposed alpha activity in the midline and   parasagittal chain.  There are segments of EEG attenuation. There are runs   of bifrontal higher voltage theta/alpha activity. Well-formed   parieto-occipital rhythm or sleep architecture is not appreciated. After   1am EEG became more attenuated,      ACTIVATION PROCEDURE: EEG was reactive      INTERICTAL EPILEPTIFORM DISCHARGES: No epileptiform discharges      ICTAL: No clinical events or electrographic seizures were recorded. Video   was reviewed intermittently by EEG technologist and physician for clinical   seizures.      IMPRESSION OF VIDEO EEG DAY # 2: Video EEG day 2 is abnormal due to   presence of generalized delta/theta slowing with EEG attenuation   consistent with a severe encephalopathy.  After 1am EEG became   persistently attenuated concerning for acute changes (sedative medications   should not result in this degree of attenuation).  No electrographic   seizure, paroxysmal behaviors, or epileptiform discharges were seen. NICU   attending was notified. Clinical correlation is advised.      Rosita Torrez MD   Epilepsy Staff    Blood gas arterial and oxyhgb   Result Value Ref Range    pH Arterial 7.36 7.35 - 7.45    pCO2 Arterial 36 35 - 45 mm Hg    pO2 Arterial 257 (H) 80 - 105 mm Hg    Bicarbonate Arterial 20 (L) 21 - 28 mmol/L    Oxyhemoglobin Arterial 98 92 - 100 %    Base Excess/Deficit (+/-) -4.5 -9.0 - 1.8 mmol/L    FIO2 40     Maxime's Test Artline    CBC with platelets   Result Value Ref Range    WBC  Count 14.5 (H) 4.0 - 11.0 10e3/uL    RBC Count 2.39 (L) 4.40 - 5.90 10e6/uL    Hemoglobin 7.6 (L) 13.3 - 17.7 g/dL    Hematocrit 23.1 (L) 40.0 - 53.0 %    MCV 97 78 - 100 fL    MCH 31.8 26.5 - 33.0 pg    MCHC 32.9 31.5 - 36.5 g/dL    RDW 14.4 10.0 - 15.0 %    Platelet Count 112 (L) 150 - 450 10e3/uL   Glucose by meter   Result Value Ref Range    GLUCOSE BY METER POCT 91 70 - 99 mg/dL   CONDITIONAL Prepare red blood cells (unit)   Result Value Ref Range    Blood Component Type Red Blood Cells     Product Code [id number]     Unit Status Transfused     Unit Number [id number]     CROSSMATCH Compatible     CODING SYSTEM [id number]     ISSUE DATE AND TIME [id number]     UNIT ABO/RH O+     UNIT TYPE ISBT 5100    CT Head w/o Contrast   Result Value Ref Range    Radiologist flags Right PCA territory infarct (Urgent)     Narrative    EXAM: CT HEAD W/O CONTRAST  1/17/2023 1:28 AM     HISTORY:  Pupils unreactive s/p cardiac arrest, pls assess for anoxic  injury       COMPARISON:  Head CT 1/15/2023    TECHNIQUE: Using multidetector thin collimation helical acquisition  technique, axial, coronal and sagittal CT images from the skull base  to the vertex were obtained without intravenous contrast.   (topogram) image(s) also obtained and reviewed.    FINDINGS:  New hypodensity along the right PCA vascular territory involving the  occipital lobe, inferior temporal lobe. No acute loss of gray-white  matter differentiation in the cerebral hemispheres. Ventricles are  proportionate to the cerebral sulci. Clear basal cisterns.     The bony calvaria and the bones of the skull base are normal. Layering  fluid within the bilateral maxillary and sphenoid sinuses with partial  opacification of the ethmoid air cells. No mastoid effusion. Grossly  normal orbits.       Impression    IMPRESSION: New infarct along the right PCA vascular territory.    [Urgent Result: Right PCA territory infarct]    Finding was identified on 1/17/2023 1:31  AM.     Dr. Lauryn Calvin was contacted by Dr. Noah Wade at 1/17/2023 1:39 AM  and verbalized understanding of the urgent finding.     I have personally reviewed the examination and initial interpretation  and I agree with the findings.    KG MAHER MD         SYSTEM ID:  [id number]   XR Chest Port 1 View    Narrative    EXAM:  XR CHEST PORT 1 VIEW    INDICATION: ECMO positioning    COMPARISON:  Chest x-ray 1/16/2023, CT CAP 1/17/2023    FINDINGS:  Single AP view of the chest. Endotracheal tube terminates over the  high mid thoracic trachea. Right IJ central venous catheter projects  over the right [facility]. ECMO catheter projects over the low SVC. Stable  positioning of the intra-aortic balloon pump.    Interval increase in size of the cardiomediastinal silhouette better  demonstrated on same-day CT CAP. The bilateral apices and left  costophrenic angle are incompletely visualized. Diffuse bilateral  interstitial and airspace opacities with layering pleural effusions  over the bilateral lung fields. No pneumothorax.   Unremarkable upper  abdomen. No acute bony lesions.      Impression    IMPRESSION:  1.  ECMO catheter projects over the low SVC.  2.  Increased mediastinal widening better demonstrated on same-day CT  CAP.  3.  Diffuse bilateral interstitial and airspace opacities with  layering pleural effusions over the bilateral lung fields. Opacities  likely represents atelectasis, edema, and consolidation.    I have personally reviewed the examination and initial interpretation  and I agree with the findings.    CORAL YATES MD         SYSTEM ID:  [id number]   CONDITIONAL Prepare red blood cells (unit)   Result Value Ref Range    Blood Component Type Red Blood Cells     Product Code [id number]     Unit Status Transfused     Unit Number [id number]     CROSSMATCH Compatible     CODING SYSTEM [id number]     ISSUE DATE AND TIME [id number]     UNIT ABO/RH O+     UNIT TYPE ISBT 5100    CT Chest Abdomen Pelvis w/o  Contrast   Result Value Ref Range    Radiologist flags Extensive mediastinal hematoma (Urgent)     Narrative    EXAMINATION: CT CHEST ABDOMEN PELVIS W/O CONTRAST, 1/17/2023 1:32 AM    INDICATION: Shock, pls assess for intra-abdominal bleeding    COMPARISON STUDY: CT CAP 1/15/2023    TECHNIQUE: CT scan of the chest, abdomen and pelvis was performed on  multidetector CT scanner using volumetric acquisition technique and  images were reconstructed in multiple planes with variable thickness  and reviewed on dedicated workstations. CT scan radiation dose is  optimized to minimum requisite dose using automated dose modulation  techniques.    FINDINGS:  Right femoral venous ECMO cannula terminates within the cavoatrial  junction. Arterial ECMO catheter terminates in the right common iliac  artery. Left femoral central venous catheter terminus within the  hepatic IVC. Right IJ central venous catheter terminates within the  SVC. Intra-aortic balloon pump inferior marker is at the level of the  renal arteries; the superior marker is at the level of the aortic  arch. Endotracheal tube within the mid thoracic trachea.    Chest:   Mediastinum:   Unremarkable thyroid. Unremarkable esophagus. Normal heart size. There  is diffuse hyperintense soft tissue thickening and fat stranding of  the mediastinum which is new since the prior study from 1/15/2023.  Hyperdense soft tissue surrounds the heart, aorta, pulmonary artery  and extends to the superior mediastinum. Mild significant pericardial  effusion. Thoracic aortic and pulmonary artery  caliber appears  reduced compared to prior study likely due to mass effect from the  hematoma. Hypodense blood pool suggestive of anemia. No suspicious  thoracic lymph nodes.    Lungs:    Increased bilateral pleural effusions relative to 1/15/2023 with  increased groundglass and consolidative opacities of the right lower  and superior lobes as well as the left lower and superior  lobes.  Peribronchial groundglass opacities in the anterior right middle lobe.  No pneumothorax. Tracheal secretions     Abdomen and Pelvis:  Liver: No mass. No intrahepatic biliary ductal dilation.    Biliary System: Distended gallbladder without signs of cholecystitis  or cholelithiasis.    Pancreas: No mass or pancreatic ductal dilation.    Adrenal glands: No mass or nodules    Spleen: Normal.    Kidneys/Ureters/Bladder: Left inferior renal hypodensity, likely cyst.   Decompressed bladder with Pandya in place.    Gastrointestinal tract: Stool and gas-filled nondistended large bowel.  Unremarkable appendix.    Mesentery/peritoneum/retroperitoneum: No mass. No free fluid or air.    Lymph nodes: No significant lymphadenopathy.    Vasculature: No significant aortoiliac calcifications.    Pelvis: No focal mass. Pelvic phleboliths.    Osseous structures: Mild thoracolumbar degenerative changes with  endplate irregularity, anterior osteophytic spurring, and disc space  narrowing especially at L5-S1.      Soft tissues: Bilateral gynecomastia.      Impression    IMPRESSION:   1.  New diffuse hyperdense soft tissue thickening and fatty stranding  of the mediastinum suggestive of extensive mediastinal hematoma.   2.  Increased bilateral pleural effusions, now moderate to large, with  increased multifocal groundglass and consolidative opacities  concerning for edema and /or aspiration in the setting of  tracheobronchial secretions.  3.  No acute abnormality in the abdomen and pelvis.      [Urgent Result: Extensive mediastinal hematoma]    Finding was identified on 1/17/2023 1:42 AM.     Dr. Lauryn Calvin was contacted by Dr. Noah Wade at 1/17/2023 2:10 AM  and Dr. Rooney was contacted by Dr. Yates at 8:10 AM on 1/17/23 and  verbalized understanding of the urgent finding.     I have personally reviewed the examination and initial interpretation  and I agree with the findings.    CORAL YATES MD         SYSTEM ID:   [id number]   Blood gas arterial and oxyhgb   Result Value Ref Range    pH Arterial 7.29 (L) 7.35 - 7.45    pCO2 Arterial 39 35 - 45 mm Hg    pO2 Arterial 228 (H) 80 - 105 mm Hg    Bicarbonate Arterial 19 (L) 21 - 28 mmol/L    Oxyhemoglobin Arterial 98 92 - 100 %    Base Excess/Deficit (+/-) -7.5 -9.0 - 1.8 mmol/L    FIO2 40     Maxime's Test Artline    Lactic acid whole blood   Result Value Ref Range    Lactic Acid 7.9 (HH) 0.7 - 2.0 mmol/L   Glucose by meter   Result Value Ref Range    GLUCOSE BY METER POCT 129 (H) 70 - 99 mg/dL   Heparin Unfractionated Anti Xa Level   Result Value Ref Range    Anti Xa Unfractionated Heparin 0.29 For Reference Range, See Comment IU/mL    Narrative    Therapeutic Range: UFH: 0.25-0.50 IU/mL for low intensity dosing,  0.30-0.70 IU/mL for high intensity dosing DVT and PE.  This test is not validated for other direct factor X inhibitors (e.g. rivaroxaban, apixaban, edoxaban, betrixaban, fondaparinux) and should not be used for monitoring of other medications.   INR   Result Value Ref Range    INR 1.82 (H) 0.85 - 1.15   Partial thromboplastin time   Result Value Ref Range    aPTT 68 (H) 22 - 38 Seconds   D dimer quantitative   Result Value Ref Range    D-Dimer Quantitative 0.88 (H) 0.00 - 0.50 ug/mL FEU    Narrative    This D-dimer assay is intended for use in conjunction with a clinical pretest probability assessment model to exclude pulmonary embolism (PE) and deep venous thrombosis (DVT) in outpatients suspected of PE or DVT. The cut-off value is 0.50 ug/mL FEU.   Fibrinogen activity   Result Value Ref Range    Fibrinogen Activity 520 (H) 170 - 490 mg/dL   Antithrombin III   Result Value Ref Range    Antithrombin III 56 (L) 85 - 135 %   Activated clotting time celite, POCT   Result Value Ref Range    Activated Clotting Time (Celite) POCT 186 (H) 74 - 150 seconds   CBC with platelets   Result Value Ref Range    WBC Count 16.8 (H) 4.0 - 11.0 10e3/uL    RBC Count 3.01 (L) 4.40 - 5.90  10e6/uL    Hemoglobin 9.3 (L) 13.3 - 17.7 g/dL    Hematocrit 28.6 (L) 40.0 - 53.0 %    MCV 95 78 - 100 fL    MCH 30.9 26.5 - 33.0 pg    MCHC 32.5 31.5 - 36.5 g/dL    RDW 15.0 10.0 - 15.0 %    Platelet Count 92 (L) 150 - 450 10e3/uL   Comprehensive metabolic panel   Result Value Ref Range    Sodium 139 136 - 145 mmol/L    Potassium 6.0 (H) 3.4 - 5.3 mmol/L    Chloride 105 98 - 107 mmol/L    Carbon Dioxide (CO2) 16 (L) 22 - 29 mmol/L    Anion Gap 18 (H) 7 - 15 mmol/L    Urea Nitrogen 27.2 (H) 6.0 - 20.0 mg/dL    Creatinine 3.30 (H) 0.67 - 1.17 mg/dL    Calcium 7.3 (L) 8.6 - 10.0 mg/dL    Glucose 106 (H) 70 - 99 mg/dL    Alkaline Phosphatase 25 (L) 40 - 129 U/L    AST       (HH) 10 - 50 U/L    Protein Total 4.1 (L) 6.4 - 8.3 g/dL    Albumin 2.4 (L) 3.5 - 5.2 g/dL    Bilirubin Total 0.7 <=1.2 mg/dL    GFR Estimate 23 (L) >60 mL/min/1.73m2   Magnesium   Result Value Ref Range    Magnesium 2.2 1.7 - 2.3 mg/dL   Troponin T, High Sensitivity   Result Value Ref Range    Troponin T, High Sensitivity 1,247 (HH) <=22 ng/L   Blood gas ELS venous   Result Value Ref Range    pH ELS Venous 7.20 (L) 7.32 - 7.43    pCO2 ELS Venous 56 (H) 40 - 50 mm Hg    pO2 ELS Venous 40 25 - 47 mm Hg    Bicarbonate ELS Venous 22 21 - 28 mmol/L    Base Excess/Deficit (+/-) -6.3 -7.7 - 1.9 mmol/L    FIO2 100     Oxyhemoglobin ELS Venous 59 %   Blood gas ELS arterial   Result Value Ref Range    pH ELS Arterial 7.25 (L) 7.35 - 7.45    pCO2 ELS Arterial 44 35 - 45 mm Hg    pO2 ELS Arterial 419 (H) 80 - 105 mm Hg    Bicarbonate ELS Arterial 20 (L) 21 - 28 mmol/L    Base Excess/Deficit (+/-) -7.3 -9.0 - 1.8 mmol/L    FIO2 100     Oxyhemoglobin ELS Arterial 98 75 - 100 %   CRP inflammation   Result Value Ref Range    CRP Inflammation 389.00 (H) <5.00 mg/L   Erythrocyte sedimentation rate auto   Result Value Ref Range    Erythrocyte Sedimentation Rate 19 (H) 0 - 15 mm/hr   Hemoglobin plasma   Result Value Ref Range    Hemoglobin Plasma 60 (H) <30  mg/dL   Lactate Dehydrogenase   Result Value Ref Range    Lactate Dehydrogenase 1,119 (H) 0 - 250 U/L   Phosphorus   Result Value Ref Range    Phosphorus 7.1 (H) 2.5 - 4.5 mg/dL   Triglycerides   Result Value Ref Range    Triglycerides 105 <150 mg/dL   Blood gas arterial and oxyhgb   Result Value Ref Range    pH Arterial 7.25 (L) 7.35 - 7.45    pCO2 Arterial 44 35 - 45 mm Hg    pO2 Arterial 324 (H) 80 - 105 mm Hg    Bicarbonate Arterial 19 (L) 21 - 28 mmol/L    Oxyhemoglobin Arterial 98 92 - 100 %    Base Excess/Deficit (+/-) -7.4 -9.0 - 1.8 mmol/L    FIO2 100     Maxime's Test Artline    Calcium ionized whole blood   Result Value Ref Range    Calcium Ionized 4.1 (L) 4.4 - 5.2 mg/dL   Glucose whole blood   Result Value Ref Range    Glucose 97 70 - 99 mg/dL   Lactic acid whole blood   Result Value Ref Range    Lactic Acid 6.9 (HH) 0.7 - 2.0 mmol/L   Glucose by meter   Result Value Ref Range    GLUCOSE BY METER POCT 100 (H) 70 - 99 mg/dL   Activated clotting time celite, POCT   Result Value Ref Range    Activated Clotting Time (Celite) POCT 169 (H) 74 - 150 seconds   EKG 12-lead, tracing only   Result Value Ref Range    Systolic Blood Pressure  mmHg    Diastolic Blood Pressure  mmHg    Ventricular Rate 89 BPM    Atrial Rate 39 BPM    NY Interval  ms    QRS Duration 176 ms     ms    QTc 549 ms    P Axis  degrees    R AXIS -27 degrees    T Axis 92 degrees    Interpretation ECG       Ventricular-paced rhythm  Abnormal ECG  When compared with ECG of 16-JAN-2023 06:32,  Vent. rate has increased BY   9 BPM     Ionized Calcium   Result Value Ref Range    Calcium Ionized 4.3 (L) 4.4 - 5.2 mg/dL   Blood gas arterial and oxyhgb   Result Value Ref Range    pH Arterial 7.31 (L) 7.35 - 7.45    pCO2 Arterial 46 (H) 35 - 45 mm Hg    pO2 Arterial 347 (H) 80 - 105 mm Hg    Bicarbonate Arterial 23 21 - 28 mmol/L    Oxyhemoglobin Arterial 98 92 - 100 %    Base Excess/Deficit (+/-) -3.3 -9.0 - 1.8 mmol/L    FIO2 60     Maxime's Test  Artline    Potassium whole blood   Result Value Ref Range    Potassium 5.6 (H) 3.4 - 5.3 mmol/L   Glucose by meter   Result Value Ref Range    GLUCOSE BY METER POCT 87 70 - 99 mg/dL   Activated clotting time celite, POCT   Result Value Ref Range    Activated Clotting Time (Celite) POCT 169 (H) 74 - 150 seconds   Glucose by meter   Result Value Ref Range    GLUCOSE BY METER POCT 90 70 - 99 mg/dL   Activated clotting time celite, POCT   Result Value Ref Range    Activated Clotting Time (Celite) POCT 160 (H) 74 - 150 seconds   Blood gas arterial and oxyhgb   Result Value Ref Range    pH Arterial 7.24 (L) 7.35 - 7.45    pCO2 Arterial 44 35 - 45 mm Hg    pO2 Arterial 168 (H) 80 - 105 mm Hg    Bicarbonate Arterial 19 (L) 21 - 28 mmol/L    Oxyhemoglobin Arterial 97 92 - 100 %    Base Excess/Deficit (+/-) -8.2 -9.0 - 1.8 mmol/L    FIO2 60     Maxime's Test Artline     Lactic Acid 8.8 (HH) 0.7 - 2.0 mmol/L   Glucose by meter   Result Value Ref Range    GLUCOSE BY METER POCT 87 70 - 99 mg/dL   CT Head w/o Contrast    Narrative    CT HEAD W/O CONTRAST 1/17/2023 9:09 AM    History: Mental status change, loss of EEG activity     Comparison: 1/17/2023 absent    Technique: Using multidetector thin collimation helical acquisition  technique, axial, coronal and sagittal CT images from the skull base  to the vertex were obtained without intravenous contrast.   (topogram) image(s) also obtained and reviewed.    Findings:  Significantly increased diffuse cerebral edema with diffuse supra and  infratentorial sulcal effacement. Increased effacement of the lateral  and third ventricles. Blurring of gray-white differentiation  throughout both cerebral hemispheres. Similar appearance of confluent  hypodensity in the right temporal and occipital lobes. No intracranial  hemorrhage.    Layering fluid in the paranasal sinuses, nonspecific in the setting of  intubation. Mastoid air cells are clear. Orbits are unremarkable.      Impression     Impression:  1. Significantly increased diffuse cerebral edema since exam performed  earlier today. Associated diffuse sulcal enhancement and effacement of  the lateral and third ventricles. This is compatible with hypoxic  ischemic encephalopathy.  2. Stable infarct centered in the right temporal and occipital lobes.    I have personally reviewed the examination and initial interpretation  and I agree with the findings.    TIFFANY TRINH MD         SYSTEM ID:  [id number]   CBC with platelets   Result Value Ref Range    WBC Count 14.9 (H) 4.0 - 11.0 10e3/uL    RBC Count 2.85 (L) 4.40 - 5.90 10e6/uL    Hemoglobin 8.7 (L) 13.3 - 17.7 g/dL    Hematocrit 27.1 (L) 40.0 - 53.0 %    MCV 95 78 - 100 fL    MCH 30.5 26.5 - 33.0 pg    MCHC 32.1 31.5 - 36.5 g/dL    RDW 15.5 (H) 10.0 - 15.0 %    Platelet Count 76 (L) 150 - 450 10e3/uL   Comprehensive metabolic panel   Result Value Ref Range    Sodium 140 136 - 145 mmol/L    Potassium 6.1 (HH) 3.4 - 5.3 mmol/L    Chloride 106 98 - 107 mmol/L    Carbon Dioxide (CO2) 15 (L) 22 - 29 mmol/L    Anion Gap 19 (H) 7 - 15 mmol/L    Urea Nitrogen 31.7 (H) 6.0 - 20.0 mg/dL    Creatinine 3.81 (H) 0.67 - 1.17 mg/dL    Calcium 7.6 (L) 8.6 - 10.0 mg/dL    Glucose 82 70 - 99 mg/dL    Alkaline Phosphatase 32 (L) 40 - 129 U/L    AST 1,401 (HH) 10 - 50 U/L    ALT 1,968 (HH) 10 - 50 U/L    Protein Total 3.8 (L) 6.4 - 8.3 g/dL    Albumin 2.0 (L) 3.5 - 5.2 g/dL    Bilirubin Total 1.0 <=1.2 mg/dL    GFR Estimate 20 (L) >60 mL/min/1.73m2   Heparin Unfractionated Anti Xa Level   Result Value Ref Range    Anti Xa Unfractionated Heparin <0.10 For Reference Range, See Comment IU/mL    Narrative    Therapeutic Range: UFH: 0.25-0.50 IU/mL for low intensity dosing,  0.30-0.70 IU/mL for high intensity dosing DVT and PE.  This test is not validated for other direct factor X inhibitors (e.g. rivaroxaban, apixaban, edoxaban, betrixaban, fondaparinux) and should not be used for monitoring of other medications.    INR   Result Value Ref Range    INR 2.44 (H) 0.85 - 1.15   Partial thromboplastin time   Result Value Ref Range    aPTT 37 22 - 38 Seconds   Magnesium   Result Value Ref Range    Magnesium 2.2 1.7 - 2.3 mg/dL   Troponin T, High Sensitivity   Result Value Ref Range    Troponin T, High Sensitivity 1,569 (HH) <=22 ng/L   Blood gas arterial and oxyhgb   Result Value Ref Range    pH Arterial 7.20 (L) 7.35 - 7.45    pCO2 Arterial 44 35 - 45 mm Hg    pO2 Arterial 354 (H) 80 - 105 mm Hg    Bicarbonate Arterial 17 (L) 21 - 28 mmol/L    Oxyhemoglobin Arterial 98 92 - 100 %    Base Excess/Deficit (+/-) -10.3 (L) -9.0 - 1.8 mmol/L    FIO2 60     Maxime's Test Artline    Calcium ionized whole blood   Result Value Ref Range    Calcium Ionized 4.2 (L) 4.4 - 5.2 mg/dL   Glucose whole blood   Result Value Ref Range    Glucose 74 70 - 99 mg/dL   Lactic acid whole blood   Result Value Ref Range    Lactic Acid 9.5 (HH) 0.7 - 2.0 mmol/L   Activated clotting time celite, POCT   Result Value Ref Range    Activated Clotting Time (Celite) POCT 186 (H) 74 - 150 seconds       All relevant imaging and laboratory values personally reviewed.

## 2023-01-17 NOTE — PROGRESS NOTES
SPIRITUAL HEALTH SERVICES  SPIRITUAL ASSESSMENT Progress Note (Palliative Focus)  [facility] ([place]) 4E    REFERRAL SOURCE: Palliative care follow up.    Visit with patient Rex Negrete's family at his beside: wife Cameron, parents Rin and Isidro, and mother in law (Cameron's mother) Ashleigh.     Family are deeply worried about his condition, and they requested that a  anoint him. I spoke with Fr. Bazan, who will anoint Rex later today. Family also requested prayers for Rex.     They are appreciative of emotional and spiritual support.    Plan: I will follow for spiritual support while Palliative Care is consulted.    Bryanna Shepard M.Div., [facility]  Palliative Care   Pager [phone]  [facility] Inpatient Team Consult pager [phone] (M-F 8-4:30)  After-hours Answering Service [phone]
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
## 2023-01-17 NOTE — PROGRESS NOTES
ECMO Attending Progress Note  1/16/2023    Shanice Ortiz is a 42 year old male who was cannulated for ECMO 1/15/23 due to refractory VF arrest this was witnessed initial ROSC after 8 min and arrival to cath lab with rosc however went into resistant VF in the cath lab which prompted VA ECMO cannulation. Cath without obstructive disease    Cannulation Site:  17 Fr in the R  femoral artery  25 Fr in the R femoral vein  IABP LFA  RIJ temp wire  thermoguard in LFV    Pulsatility: 0 mmHg    Interval events: no acute events overnight no pulsatility continues  Physical Exam:  Temp:  [97.9  F (36.6  C)-99.5  F (37.5  C)] 99.1  F (37.3  C)  Pulse:  [79-80] 79  Resp:  [11-12] 12  MAP:  [57 mmHg-85 mmHg] 65 mmHg  Arterial Line BP: ()/(41-65) 78/54  FiO2 (%):  [40 %] 40 %  SpO2:  [95 %-100 %] 95 %    Intake/Output Summary (Last 24 hours) at 1/15/2023 0834  Last data filed at 1/15/2023 0800  Gross per 24 hour   Intake 223.24 ml   Output 1305 ml   Net -1081.76 ml    Vent Mode: CMV/AC  (Continuous Mandatory Ventilation/ Assist Control)  FiO2 (%): 40 %  Resp Rate (Set): 12 breaths/min  Tidal Volume (Set, mL): 520 mL  PEEP (cm H2O): 7 cmH2O  Resp: 12       Labs:  Recent Labs   Lab 01/16/23  1731 01/16/23  1523 01/16/23  1522 01/16/23  1340 01/16/23  1204   PH 7.40 7.41  --  7.33* 7.35   PCO2 39 38  --  47* 44   PO2 145* 160*  --  122* 133*   HCO3 24 24  --  25 24   O2PER 40 4 70  40 40 40      Recent Labs   Lab 01/16/23  1522 01/16/23  1020 01/16/23  0345 01/15/23  2249   WBC 16.9* 17.8* 17.8* 15.8*   HGB 10.8* 11.2* 11.9* 12.2*     Creatinine   Date Value Ref Range Status   01/16/2023 2.80 (H) 0.67 - 1.17 mg/dL Final   01/16/2023 2.48 (H) 0.67 - 1.17 mg/dL Final   01/16/2023 2.11 (H) 0.67 - 1.17 mg/dL Final   01/15/2023 1.85 (H) 0.67 - 1.17 mg/dL Final       Blood Flow (Circuit) LPM: 5.04 LPM  Sweep LPM: (S) 5 LPM  Sweep FiO2   %: (S) 80 %  ACT  (seconds): 182 seconds  Arterial Blood Temp  (degrees C): 36.2 C  Pulse  Oximetry  (SpO2%): 100 %  Arterial Pressure  mmH mmHg      ECMO Issues including assessments and plan on DOS 2023:  Neuro: Fever avoidance strategy. Sedated for mechanical ventilation & recurrent VF and ECMO- on propofol and fentanyl.  No acute distress.  NIRS stable  b/l  RASS goal:-3-4  CV: Cardiogenic shock.  Hemodynamically stable on vaso and phenyl IABP placed given loss of pulsatility with incessant VF, pulse pressure 0mmHg wean off lido, weaning, then sedation  Pulm: Keep vent settings at rest settings as above.  FEN/Renal: Electrolytes stable w/ replacement protocols in place, Cr stable, UOP stable  Heme: ACT goal: 200-220, Hemoglobin 13.3 -->11.9.  Minimal oozing around the ECMO cannulas.   ID: Receiving empiric antibiotics, MRSA nares negative  Cannulae: Position is acceptable on exam and the available imaging.  Distal perfusion cannula is in place and patent.  Extremities are well-perfused.     I have personally reviewed the ECMO flows, oxygenation and CO2 clearance, anticoagulation, and cannula position.  I have also personally assessed the patient's systemic response with hemodynamics, oxygenation, ventilation, and bleeding.       The patient requires continued ECMO support and management in the ICU.     Kenneth Carr MD  Cardiology Critical Care  2023
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
## 2023-01-18 NOTE — PLAN OF CARE
Major Shift Events:  Intermittent chugging treated with total of 2 L LR, 1 unit PRBC given this am.  IABP Maps 60-70's, Levo 0.5, Bradford 6, Vaso 4.  Ecmo flows 3.4, unable to flow higher d/t chugging.   Lactic down trending, pH and bicarb slightly improved. Bicarb infusion continues. 4 am chemistry labs still pending at this time. Potassium 6.1 rechecked 5.5.  No fluid removed via CRRT d/t chugging.  Neurologically pupils 8 mm and NPI zero, @ 0400 right pupil oval shaped Left pupil remains round, discussed with Dr Calvin. Pt remains unresponsive, no cough, no gag.  LLE extremity remains without dopplered pulse, mottled and cool.  Goals of care discussion with wife last evening, wife unable to make any decisions regarding code status at this time, pt remains a full code and supportive cares.  Plan: Further discussion about goals of care with wife.   For vital signs and complete assessments, please see documentation flowsheets.     Goal Outcome Evaluation:      Plan of Care Reviewed With: spouse    Overall Patient Progress: decliningOverall Patient Progress: declining

## 2023-01-18 NOTE — PROGRESS NOTES
ECMO Attending Progress Note  1/18/2023    Shanice Ortiz is a 42 year old male who was cannulated for ECMO 1/15/23 due to refractory VF arrest this was witnessed initial ROSC after 8 min and arrival to cath lab with rosc however went into resistant VF in the cath lab which prompted VA ECMO cannulation. Cath without obstructive disease    Cannulation Site:  17 Fr in the R  femoral artery  25 Fr in the R femoral vein  IABP LFA  RIJ temp wire  thermoguard in LFV    Pulsatility: 0 mmHg    Interval events: progressive pressor requirement, bilateral lower ext with poor perfusion, lactate peaked, required fluid and 1 prbc for chugging with low hgb and rising inr today, on bicarb gtt loss of brainstem and dilated pupils bilaterally, brain death testing this am    Physical Exam:  Temp:  [98.2  F (36.8  C)-99.3  F (37.4  C)] 98.4  F (36.9  C)  Pulse:  [89-92] 89  Resp:  [12] 12  MAP:  [43 mmHg-98 mmHg] 44 mmHg  Arterial Line BP: ()/(36-96) 53/36  FiO2 (%):  [60 %] 60 %  SpO2:  [84 %-100 %] 95 %    Intake/Output Summary (Last 24 hours) at 1/15/2023 0834  Last data filed at 1/15/2023 0800  Gross per 24 hour   Intake 223.24 ml   Output 1305 ml   Net -1081.76 ml    Vent Mode: CMV/AC  (Continuous Mandatory Ventilation/ Assist Control)  FiO2 (%): 60 %  Resp Rate (Set): 12 breaths/min  Tidal Volume (Set, mL): 400 mL  PEEP (cm H2O): 15 cmH2O  Inspiratory Pressure (cm H2O) (Drager Becky): 26  Resp: 12       Labs:  Recent Labs   Lab 01/18/23  0907 01/18/23  0747 01/18/23  0600 01/18/23  0353   PH 7.28* 7.28* 7.28* 7.30*   PCO2 38 38 39 38   PO2 262* 295* 272* 290*   HCO3 18* 18* 18* 19*   O2PER 60 60 60 60      Recent Labs   Lab 01/18/23  0907 01/18/23  0352 01/17/23  2153 01/17/23 2010 01/17/23  1528   WBC 16.3* 16.2* 15.6*  --  16.9*   HGB 6.7* 6.1* 7.2* 7.5* 8.1*     Creatinine   Date Value Ref Range Status   01/18/2023 2.71 (H) 0.67 - 1.17 mg/dL Final   01/18/2023 2.91 (H) 0.67 - 1.17 mg/dL Final   01/17/2023 3.34  (H) 0.67 - 1.17 mg/dL Final   2023 3.34 (H) 0.67 - 1.17 mg/dL Final       Blood Flow (Circuit) LPM: 5.04 LPM  Sweep LPM: (S) 5 LPM  Sweep FiO2   %: (S) 80 %  ACT  (seconds): 182 seconds  Arterial Blood Temp  (degrees C): 36.2 C  Pulse Oximetry  (SpO2%): 100 %  Arterial Pressure  mmH mmHg      ECMO Issues including assessments and plan on DOS 2023:  Neuro: multiple strokes likely will progress to brain death/swelling, neurocrit following. Stopped sedation  No acute distress.  NIRS dropped off B, will likely not offer thrombectomy given prognosis will discuss with vascular, family aware brain death testing today  CV: Cardiogenic shock.  Hemodynamically volatile on vaso ne and phenyl IABP placed given loss of pulsatility, pulse pressure 0mmHg   Pulm: Keep vent settings at rest settings as above.  FEN/Renal: hyperkalemia on crrt with two k bath   Heme: ACT goal:160s, Hemoglobin 13.3 -->11.9 -->9.3 --> 6.1.INR 4.33 fibrinogen 381   ID: Receiving empiric antibiotics, MRSA nares negative  Cannulae: Position is acceptable on exam and the available imaging.  Distal perfusion cannula is in place and patent.  Extremities are well-perfused.    Very grim prognosis, discussed with family who are hoping for a miracle.     I have personally reviewed the ECMO flows, oxygenation and CO2 clearance, anticoagulation, and cannula position.  I have also personally assessed the patient's systemic response with hemodynamics, oxygenation, ventilation, and bleeding.       The patient requires continued ECMO support and management in the ICU.     Kenneth Carr MD  Cardiology Critical Care  2023

## 2023-01-18 NOTE — PROGRESS NOTES
Significant Event Note    Time of event: 01/18/23    Description of event:  Formal Neurological Examination for Determination of 'Brain Death'    Exam:  B/P: 68/30, T: 98.4, P: 89, R: 12  No spontaneous breathing above low ventilator settings  No significant derangement of labs that would preclude testing.  Irreversible coma of known cause.  History and imaging supporting of brain death.    Time of 1st Exam: 01/18/23 1045AM     No response to voice or noxious stimuli  Pupils fixed and dilated with no response to light  No Corneal reflex  No Oculocephalic reflex   No Cough reflex to tracheal suctioning  No Gag reflex  No eye movements with cold caloric testing waiting 5-10 minutes between ears  No motor response to noxious stimuli in any extremity    Unable to perform apnea testing due to concerns for medication effects and hemodynamic instability    Nuclear medicine brain perfusion scan performed as ancillary testing to confirm death by neurological criteria (brain death)    Scan completed read and finalized by attending at 1252PM    Date/Time of Death: 01/18/23, 1252PM    Vignesh Zarate MD  Neurocritical Care  Date of Service (when I saw the patient): 01/18/23
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
## 2023-01-18 NOTE — PROGRESS NOTES
Neurocritical Care Consultation    Reason for critical care admission: Cardiac Arrest  Admitting Team: CSI  Date of Service:  01/18/2023  Date of Admission:  1/15/2023  Hospital Day: 4    Assessment/Plan  42 year-old male with a past medical history of PEA arrest 5/21 in setting of daily methamphetamine use as well as other polysubstance abuse and prior TBI who is admitted following cardiac arrest now cannulated for VA ECMO and w/ RV baloon tipped pacemaker. Down time 30 minutes. Previously cooled to 34C    24 hour events: no acute events. Defer to staff note for brain death exam (no cough, gag, pupils dilated and fixed, corneals absent, vestibulo-ocular reflex absent), performed at 1045.     Neuro  #post-arrest cerebral edema, anoxic brain injury   #right PCA infarct  #brain compression    Post arrest day: 1  Length of downtime: 30 min  -Witnessed arrest: Yes  -ROSC: Yes, rhythm: V fib, arrested again and was cannulated of ECMO  -TTM initiated: Yes, target temp time: 36, duration of cooling unknown  -Current temp: 38.3    Analgesics & sedation  Prior sedation: fentanyl, and propofol infusions   Current sedation: none    Exam findings   -Cough: No  -Gag: No  -Pupils: pupillometry L 7.42, NPi 0; R 7.69, NPi 0    Neuroimaging  -Day 1 CTH shows: Initial CT head with preserved gray-white matter differentiation and no evidence of cerebral edema  -Repeat CTH performed today x 2, shows right PCA infarct along with cerebral edema.   -pending nuclear med brain exam    Neurophysiology  -continue vEEG with VA ECMO  -vEEG shows: does not demonstrate seizure activity, mod-severe encephalopathy, with EEG attenuation around 0630 yesterday morning. Discontinue vEEG    Biomarkers  -Neuron-specific Enolase (NSE) results: Pending    Recommendations  -Avoid hypotonic solutions as they may worsen cerebral edema   -Avoid nitroprusside or nitroglycerin as they may also worsen cerbral edema  -Advise slow correction of hypernatremia if  needed  -When safe to do so, please limit use of CNS acting medications such as benzodiazepines, opiates, anticholinergics, which will permit a more accurate neurological assessment  -We will continue to follow, please call [phone] with any questions    TIME SPENT ON THIS ENCOUNTER   I spent 50 minutes of critical care time on the unit/floor managing the care of Rex Negrete. Upon evaluation, this patient had a high probability of imminent or life-threatening deterioration due to encephalopathy, which required my direct attention, intervention, and personal management . Greater than 50% of my time was spent at the bedside counseling the patient and/or coordinating care including chart review, history, exam, documentation, and further activities per this note. I have personally reviewed the following data/imaging over the past 24 hours.     The patient was seen and discussed with the NCC attending, Dr. Vignesh Zarate.    LOUANN Do, CNP  Neurocritical Care  *[phone]  Text Page     History of Present Illness:  42 year-old male with a past medical history of PEA arrest 5/21 in setting of daily methamphetamine use as well as other polysubstance abuse and prior TBI who was in his otherwise usual state of health until this evening.  At 12:30 AM he was using the restroom and was subsequently discovered to be no longer moving.  Wife called 911.  He was found of a shockable rhythm by first responders and EMS did not arrive until minutes later or ROSC was achieved.  Down time 30 minutes.     Patient stable, mild elevated lactic resulting in lactic acidosis.  Initial coronary angiogram demonstrated open coronary arteries.  However, he subsequently went back into refractory V. fib arrest s/p 10 shocks with CPR.  He was subsequently cannulated for VA ECMO.  A temporary balloon tipped pacemaker was placed in the right ventricle for pacing due to continued ventricular fibrillation.     No Known Allergies    PAST  MEDICAL HISTORY:   Past Medical History:   Diagnosis Date     ADHD (attention deficit hyperactivity disorder)      Seizures (H)      Substance abuse (H)      Traumatic brain injury      PAST SURGICAL HISTORY:   Past Surgical History:   Procedure Laterality Date     COLONOSCOPY       CV ARTERIAL LINE PLACEMENT N/A 1/15/2023    Procedure: Arterial Line Placement;  Surgeon: Daren Hong MD;  Location:  [facility]     CV CENTRAL VENOUS CATHETER PLACEMENT N/A 1/15/2023    Procedure: Central Venous Catheter Placement;  Surgeon: Daren Hong MD;  Location:  [facility]     CV CORONARY ANGIOGRAM N/A 1/15/2023    Procedure: Coronary Angiogram;  Surgeon: Daren Hong MD;  Location:  [facility]     CV EXTRACORPERAL MEMBRANE OXYGENATION N/A 1/15/2023    Procedure: Extracorporeal Membrance Oxygenation;  Surgeon: Daren Hong MD;  Location:  [facility]     CV INTRA AORTIC BALLOON N/A 1/15/2023    Procedure: Intraprocedure Aortic Balloon Pump Insertion;  Surgeon: Daren Hong MD;  Location:  [facility]     CV TEMPORARY PACEMAKER INSERTION N/A 1/15/2023    Procedure: Temporary Pacemaker Insertion;  Surgeon: Daren Hong MD;  Location:  [facility]     FAMILY HISTORY:   I have reviewed this patient's family history and updated it with pertinent information if needed.  Family History   Problem Relation Age of Onset     Cancer Mother      Diabetes Maternal Grandmother      SOCIAL HISTORY:   Social History     Tobacco Use     Smoking status: Some Days     Packs/day: 0.25     Types: Cigarettes     Smokeless tobacco: Never   Substance Use Topics     Alcohol use: Not Currently     ROS: The 10 point Review of Systems is negative other than noted in the HPI or here.     Current Medications:    chlorhexidine  15 mL Swish & Spit BID     divalproex sodium delayed-release  500 mg Oral At Bedtime     pantoprazole  40 mg  "Intravenous Daily     piperacillin-tazobactam  4.5 g Intravenous Q6H     thiamine  100 mg Oral Daily     PRN Medications:  artificial tears, calcium gluconate, calcium gluconate, dextrose 10%, glucose **OR** dextrose **OR** glucagon, fentaNYL, heparin ANTICOAGULANT, lactated ringers, lidocaine 4%, lidocaine (buffered or not buffered), magnesium sulfate, midazolam, naloxone **OR** naloxone **OR** naloxone **OR** naloxone, - MEDICATION INSTRUCTIONS -, potassium chloride, sodium chloride (PF), sodium chloride (PF), sodium phosphate    Infusions:    amiodarone 1 mg/min (01/18/23 0500)     dextrose 10% 10 mL/hr at 01/18/23 0500     CRRT replacement solution 22.5 mL/kg/hr (01/18/23 0539)     fentaNYL Stopped (01/17/23 0130)     HEParin Stopped (01/17/23 1405)     heparin (PRESSURE BAG) 2 unit/mL in 0.9% NaCl 3 mL/hr (01/18/23 0500)     midazolam       - MEDICATION INSTRUCTIONS -       norepinephrine 0.5 mcg/kg/min (01/18/23 0500)     phenylephrine 6 mcg/kg/min (01/18/23 0500)     CRRT replacement solution 200 mL/hr at 01/17/23 1342     CRRT replacement solution 17.5 mL/kg/hr (01/18/23 0453)     propofol Stopped (01/17/23 0000)     sodium bicabonate in 5% dextrose for infusion 100 mL/hr at 01/18/23 0500     vasopressin 4 Units/hr (01/18/23 0500)     Physical Examination:  Vitals: BP (!) 68/30   Pulse 89   Temp 98.6  F (37  C)   Resp 12   Ht 1.88 m (6' 2\")   Wt 119.8 kg (264 lb 1.8 oz)   SpO2 96%   BMI 33.91 kg/m    General: Adult male patient, lying in bed, critically-ill  HEENT: Normocephalic, atraumatic, no icterus, oral cavity/oropharynx pink and moist  Cardiac: RRR, VA-ECMO   Pulm: synchronous with mechanical ventilator  Abdomen: soft, distended  Extremities: cool, edematous  Skin: no rash or lesion on exposed skin  Psych: deferred  Neuro:  Mental status: unresponsive, unable to assess with ETT.  Cranial nerves: Pupils nonreactive, no cough, no gag, no corneals, neg dolls eyes, no breathing over vent.  Motor: " Normal bulk and tone. No abnormal movements. Unable to assess strength.  Sensory: not intact to light touch x 4 extremities  Coordination: deferred.  Gait: deferred    Labs and Imaging:     Results for orders placed or performed during the hospital encounter of 01/15/23 (from the past 24 hour(s))   Glucose by meter   Result Value Ref Range    GLUCOSE BY METER POCT 90 70 - 99 mg/dL   Activated clotting time celite, POCT   Result Value Ref Range    Activated Clotting Time (Celite) POCT 160 (H) 74 - 150 seconds   Blood gas arterial and oxyhgb   Result Value Ref Range    pH Arterial 7.24 (L) 7.35 - 7.45    pCO2 Arterial 44 35 - 45 mm Hg    pO2 Arterial 168 (H) 80 - 105 mm Hg    Bicarbonate Arterial 19 (L) 21 - 28 mmol/L    Oxyhemoglobin Arterial 97 92 - 100 %    Base Excess/Deficit (+/-) -8.2 -9.0 - 1.8 mmol/L    FIO2 60     Maxime's Test Artline     Lactic Acid 8.8 (HH) 0.7 - 2.0 mmol/L   Glucose by meter   Result Value Ref Range    GLUCOSE BY METER POCT 87 70 - 99 mg/dL   CT Head w/o Contrast    Narrative    CT HEAD W/O CONTRAST 1/17/2023 9:09 AM    History: Mental status change, loss of EEG activity     Comparison: 1/17/2023 absent    Technique: Using multidetector thin collimation helical acquisition  technique, axial, coronal and sagittal CT images from the skull base  to the vertex were obtained without intravenous contrast.   (topogram) image(s) also obtained and reviewed.    Findings:  Significantly increased diffuse cerebral edema with diffuse supra and  infratentorial sulcal effacement. Increased effacement of the lateral  and third ventricles. Blurring of gray-white differentiation  throughout both cerebral hemispheres. Similar appearance of confluent  hypodensity in the right temporal and occipital lobes. No intracranial  hemorrhage.    Layering fluid in the paranasal sinuses, nonspecific in the setting of  intubation. Mastoid air cells are clear. Orbits are unremarkable.      Impression     Impression:  1. Significantly increased diffuse cerebral edema since exam performed  earlier today. Associated diffuse sulcal enhancement and effacement of  the lateral and third ventricles. This is compatible with hypoxic  ischemic encephalopathy.  2. Stable infarct centered in the right temporal and occipital lobes.    I have personally reviewed the examination and initial interpretation  and I agree with the findings.    TIFFANY TRINH MD         SYSTEM ID:  [id number]   CBC with platelets   Result Value Ref Range    WBC Count 14.9 (H) 4.0 - 11.0 10e3/uL    RBC Count 2.85 (L) 4.40 - 5.90 10e6/uL    Hemoglobin 8.7 (L) 13.3 - 17.7 g/dL    Hematocrit 27.1 (L) 40.0 - 53.0 %    MCV 95 78 - 100 fL    MCH 30.5 26.5 - 33.0 pg    MCHC 32.1 31.5 - 36.5 g/dL    RDW 15.5 (H) 10.0 - 15.0 %    Platelet Count 76 (L) 150 - 450 10e3/uL   Comprehensive metabolic panel   Result Value Ref Range    Sodium 140 136 - 145 mmol/L    Potassium 6.1 (HH) 3.4 - 5.3 mmol/L    Chloride 106 98 - 107 mmol/L    Carbon Dioxide (CO2) 15 (L) 22 - 29 mmol/L    Anion Gap 19 (H) 7 - 15 mmol/L    Urea Nitrogen 31.7 (H) 6.0 - 20.0 mg/dL    Creatinine 3.81 (H) 0.67 - 1.17 mg/dL    Calcium 7.6 (L) 8.6 - 10.0 mg/dL    Glucose 82 70 - 99 mg/dL    Alkaline Phosphatase 32 (L) 40 - 129 U/L    AST 1,401 (HH) 10 - 50 U/L    ALT 1,968 (HH) 10 - 50 U/L    Protein Total 3.8 (L) 6.4 - 8.3 g/dL    Albumin 2.0 (L) 3.5 - 5.2 g/dL    Bilirubin Total 1.0 <=1.2 mg/dL    GFR Estimate 20 (L) >60 mL/min/1.73m2   Heparin Unfractionated Anti Xa Level   Result Value Ref Range    Anti Xa Unfractionated Heparin <0.10 For Reference Range, See Comment IU/mL    Narrative    Therapeutic Range: UFH: 0.25-0.50 IU/mL for low intensity dosing,  0.30-0.70 IU/mL for high intensity dosing DVT and PE.  This test is not validated for other direct factor X inhibitors (e.g. rivaroxaban, apixaban, edoxaban, betrixaban, fondaparinux) and should not be used for monitoring of other medications.    INR   Result Value Ref Range    INR 2.44 (H) 0.85 - 1.15   Partial thromboplastin time   Result Value Ref Range    aPTT 37 22 - 38 Seconds   Magnesium   Result Value Ref Range    Magnesium 2.2 1.7 - 2.3 mg/dL   Troponin T, High Sensitivity   Result Value Ref Range    Troponin T, High Sensitivity 1,569 (HH) <=22 ng/L   Blood gas arterial and oxyhgb   Result Value Ref Range    pH Arterial 7.20 (L) 7.35 - 7.45    pCO2 Arterial 44 35 - 45 mm Hg    pO2 Arterial 354 (H) 80 - 105 mm Hg    Bicarbonate Arterial 17 (L) 21 - 28 mmol/L    Oxyhemoglobin Arterial 98 92 - 100 %    Base Excess/Deficit (+/-) -10.3 (L) -9.0 - 1.8 mmol/L    FIO2 60     Maxime's Test Artline    Calcium ionized whole blood   Result Value Ref Range    Calcium Ionized 4.2 (L) 4.4 - 5.2 mg/dL   Glucose whole blood   Result Value Ref Range    Glucose 74 70 - 99 mg/dL   Lactic acid whole blood   Result Value Ref Range    Lactic Acid 9.5 (HH) 0.7 - 2.0 mmol/L   Activated clotting time celite, POCT   Result Value Ref Range    Activated Clotting Time (Celite) POCT 186 (H) 74 - 150 seconds   Echo Limited    Narrative    [id number]  [id number]  [id number]  [id number]^[place]^DENISE     [facility],[place]  Echocardiography Laboratory  [address] [place] [id number]     Name: MELANIE CRAMER  MRN: [id number]  : 1980  Study Date: 2023 09:41 AM  Age: 42 yrs  Gender: Male  Patient Location: [facility]  Reason For Study: ASSESS FOR LV THROMBUS  Ordering Physician: DENISE CRUZ  Performed By: Delfina Hahn     BSA: 2.4 m2  Height: 74 in  Weight: 264 lb  HR: 90  ______________________________________________________________________________  ______________________________________________________________________________  Interpretation Summary  Based on the non contrast enhanced images, there is no protruding or mobile LV  thrombus noted. A laminar thrombus cannot be excluded.  Severe left ventricular dysfunction present with  septal dyssynchrony from LBBB  present.  ______________________________________________________________________________  ______________________________________________________________________________  Report approved by: Lois Lester 01/17/2023 11:40 AM     ______________________________________________________________________________      Magnesium CRRT   Result Value Ref Range    Magnesium 2.2 1.7 - 2.3 mg/dL   Renal panel CRRT   Result Value Ref Range    Sodium 140 136 - 145 mmol/L    Potassium 6.1 (HH) 3.4 - 5.3 mmol/L    Chloride 105 98 - 107 mmol/L    Carbon Dioxide (CO2) 13 (L) 22 - 29 mmol/L    Anion Gap 22 (H) 7 - 15 mmol/L    Glucose 75 70 - 99 mg/dL    Urea Nitrogen 31.3 (H) 6.0 - 20.0 mg/dL    Creatinine 3.79 (H) 0.67 - 1.17 mg/dL    GFR Estimate 20 (L) >60 mL/min/1.73m2    Calcium 8.2 (L) 8.6 - 10.0 mg/dL    Albumin 2.0 (L) 3.5 - 5.2 g/dL    Phosphorus 9.4 (H) 2.5 - 4.5 mg/dL   Blood gas arterial and oxyhgb   Result Value Ref Range    pH Arterial 7.18 (LL) 7.35 - 7.45    pCO2 Arterial 46 (H) 35 - 45 mm Hg    pO2 Arterial 361 (H) 80 - 105 mm Hg    Bicarbonate Arterial 17 (L) 21 - 28 mmol/L    Oxyhemoglobin Arterial 98 92 - 100 %    Base Excess/Deficit (+/-) -10.7 (L) -9.0 - 1.8 mmol/L    FIO2 60     Maxime's Test Artline     Lactic Acid 10.2 (HH) 0.7 - 2.0 mmol/L   Calcium Ionized CRRT   Result Value Ref Range    Calcium Ionized 4.5 4.4 - 5.2 mg/dL   Activated clotting time celite, POCT   Result Value Ref Range    Activated Clotting Time (Celite) POCT 190 (H) 74 - 150 seconds   Blood Culture Arm, Right    Specimen: Arm, Right; Blood   Result Value Ref Range    Culture No growth after 12 hours     Narrative    Only an Aerobic Blood Culture Bottle was collected, interpret results with caution.       Glucose by meter   Result Value Ref Range    GLUCOSE BY METER POCT 72 70 - 99 mg/dL   US Low Extremity Arterial Duplex Left Port   Result Value Ref Range    Radiologist flags (Urgent)     Narrative     ULTRASOUND LEFT LOWER EXTREMITY ARTERIAL DUPLEX 1/17/2023 12:53 PM    CLINICAL HISTORY: pls eval for flow, loss of pulses, c/f ischemia, on  ecmo.     COMPARISONS: None available.    REFERRING PROVIDER: PARESH HOLDEN    TECHNIQUE: Left lower extremity arteries evaluated with color Doppler  and Doppler waveform ultrasound    FINDINGS:     LEFT:       Common femoral artery: OBSCURED       Profundus femoral artery: OBSCURED         Superficial femoral artery, proximal: 8 cm second       Superficial femoral artery, mid thigh: 6 cm/s       Superficial femoral artery, distal thigh: 4 cm/s         Popliteal artery: 7 cm/s         Posterior tibial artery, ankle: 2 cm/s, flat monophasic       Anterior tibial artery, ankle: No flow detected      Impression    IMPRESSION:  1. 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   2. Sluggish flow in the remaining left lower extremity arteries,  particularly in the posterior tibial artery ankle where there is flat  monophasic flow at 2 cm/s.      [Access Center:   1. 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     2. Sluggish flow in the remaining left lower extremity arteries,  particularly in the posterior tibial artery ankle where there is flat  monophasic flow at 2 cm/s.]    This report will be copied to the [facility] to ensure a  provider acknowledges the finding. Access Center is available Monday  through Friday 8am-3:30 pm.     I have personally reviewed the examination and initial interpretation  and I agree with the findings.    BULL MUNOZ MD         SYSTEM ID:  [id number]   Blood gas arterial and oxyhgb   Result Value Ref Range    pH Arterial 7.10 (LL) 7.35 - 7.45    pCO2 Arterial 47 (H) 35 - 45 mm Hg    pO2 Arterial 122 (H) 80 - 105 mm Hg    Bicarbonate Arterial 15 (L) 21 - 28 mmol/L    Oxyhemoglobin Arterial 95 92 - 100 %    Base Excess/Deficit (+/-) -14.0 (L) -9.0 - 1.8 mmol/L    FIO2 60     Maxime's Test Artline     Lactic Acid 13.0 (HH) 0.7 - 2.0 mmol/L    Glucose by meter   Result Value Ref Range    GLUCOSE BY METER POCT 83 70 - 99 mg/dL   Activated clotting time celite, POCT   Result Value Ref Range    Activated Clotting Time (Celite) POCT 203 (H) 74 - 150 seconds   CBC with platelets   Result Value Ref Range    WBC Count 16.9 (H) 4.0 - 11.0 10e3/uL    RBC Count 2.65 (L) 4.40 - 5.90 10e6/uL    Hemoglobin 8.1 (L) 13.3 - 17.7 g/dL    Hematocrit 25.6 (L) 40.0 - 53.0 %    MCV 97 78 - 100 fL    MCH 30.6 26.5 - 33.0 pg    MCHC 31.6 31.5 - 36.5 g/dL    RDW 16.1 (H) 10.0 - 15.0 %    Platelet Count 63 (L) 150 - 450 10e3/uL   Comprehensive metabolic panel   Result Value Ref Range    Sodium 142 136 - 145 mmol/L    Potassium 5.9 (H) 3.4 - 5.3 mmol/L    Chloride 104 98 - 107 mmol/L    Carbon Dioxide (CO2) 12 (L) 22 - 29 mmol/L    Anion Gap 26 (H) 7 - 15 mmol/L    Urea Nitrogen 31.4 (H) 6.0 - 20.0 mg/dL    Creatinine 3.89 (H) 0.67 - 1.17 mg/dL    Calcium 8.0 (L) 8.6 - 10.0 mg/dL    Glucose 93 70 - 99 mg/dL    Alkaline Phosphatase 38 (L) 40 - 129 U/L    AST 3,260 (HH) 10 - 50 U/L    ALT 4,423 (HH) 10 - 50 U/L    Protein Total 3.7 (L) 6.4 - 8.3 g/dL    Albumin 1.8 (L) 3.5 - 5.2 g/dL    Bilirubin Total 1.4 (H) <=1.2 mg/dL    GFR Estimate 19 (L) >60 mL/min/1.73m2   Heparin Unfractionated Anti Xa Level   Result Value Ref Range    Anti Xa Unfractionated Heparin <0.10 For Reference Range, See Comment IU/mL    Narrative    Therapeutic Range: UFH: 0.25-0.50 IU/mL for low intensity dosing,  0.30-0.70 IU/mL for high intensity dosing DVT and PE.  This test is not validated for other direct factor X inhibitors (e.g. rivaroxaban, apixaban, edoxaban, betrixaban, fondaparinux) and should not be used for monitoring of other medications.   INR   Result Value Ref Range    INR 2.98 (H) 0.85 - 1.15   Partial thromboplastin time   Result Value Ref Range    aPTT 41 (H) 22 - 38 Seconds   Magnesium   Result Value Ref Range    Magnesium 2.5 (H) 1.7 - 2.3 mg/dL   Troponin T, High Sensitivity   Result  Value Ref Range    Troponin T, High Sensitivity 1,067 (HH) <=22 ng/L   D dimer quantitative   Result Value Ref Range    D-Dimer Quantitative 4.56 (H) 0.00 - 0.50 ug/mL FEU    Narrative    This D-dimer assay is intended for use in conjunction with a clinical pretest probability assessment model to exclude pulmonary embolism (PE) and deep venous thrombosis (DVT) in outpatients suspected of PE or DVT. The cut-off value is 0.50 ug/mL FEU.   Blood gas ELS venous   Result Value Ref Range    pH ELS Venous 7.10 (LL) 7.32 - 7.43    pCO2 ELS Venous 53 (H) 40 - 50 mm Hg    pO2 ELS Venous 42 25 - 47 mm Hg    Bicarbonate ELS Venous 16 (L) 21 - 28 mmol/L    Base Excess/Deficit (+/-) -12.8 (L) -7.7 - 1.9 mmol/L    FIO2 60     Oxyhemoglobin ELS Venous 56 %   Blood gas ELS arterial   Result Value Ref Range    pH ELS Arterial 7.17 (LL) 7.35 - 7.45    pCO2 ELS Arterial 40 35 - 45 mm Hg    pO2 ELS Arterial 154 (H) 80 - 105 mm Hg    Bicarbonate ELS Arterial 15 (L) 21 - 28 mmol/L    Base Excess/Deficit (+/-) -13.1 (L) -9.0 - 1.8 mmol/L    FIO2 70     Oxyhemoglobin ELS Arterial 97 75 - 100 %   Fibrinogen activity   Result Value Ref Range    Fibrinogen Activity 507 (H) 170 - 490 mg/dL   Blood gas arterial and oxyhgb   Result Value Ref Range    pH Arterial 7.16 (LL) 7.35 - 7.45    pCO2 Arterial 41 35 - 45 mm Hg    pO2 Arterial 157 (H) 80 - 105 mm Hg    Bicarbonate Arterial 14 (L) 21 - 28 mmol/L    Oxyhemoglobin Arterial 97 92 - 100 %    Base Excess/Deficit (+/-) -13.3 (L) -9.0 - 1.8 mmol/L    FIO2 60     Maxime's Test Artline    Calcium ionized whole blood   Result Value Ref Range    Calcium Ionized 4.4 4.4 - 5.2 mg/dL   Glucose whole blood   Result Value Ref Range    Glucose 89 70 - 99 mg/dL   Lactic acid whole blood   Result Value Ref Range    Lactic Acid 13.9 (HH) 0.7 - 2.0 mmol/L   Glucose by meter   Result Value Ref Range    GLUCOSE BY METER POCT 94 70 - 99 mg/dL   Activated clotting time celite, POCT   Result Value Ref Range     Activated Clotting Time (Celite) POCT 199 (H) 74 - 150 seconds   Blood gas arterial and oxyhgb   Result Value Ref Range    pH Arterial 7.16 (LL) 7.35 - 7.45    pCO2 Arterial 41 35 - 45 mm Hg    pO2 Arterial 104 80 - 105 mm Hg    Bicarbonate Arterial 15 (L) 21 - 28 mmol/L    Oxyhemoglobin Arterial 94 92 - 100 %    Base Excess/Deficit (+/-) -13.2 (L) -9.0 - 1.8 mmol/L    FIO2 60     Maxime's Test Artline     Lactic Acid 14.7 (HH) 0.7 - 2.0 mmol/L   Glucose by meter   Result Value Ref Range    GLUCOSE BY METER POCT 113 (H) 70 - 99 mg/dL   Activated clotting time celite, POCT   Result Value Ref Range    Activated Clotting Time (Celite) POCT 203 (H) 74 - 150 seconds   Activated clotting time celite, POCT   Result Value Ref Range    Activated Clotting Time (Celite) POCT 199 (H) 74 - 150 seconds   Glucose by meter   Result Value Ref Range    GLUCOSE BY METER POCT 119 (H) 70 - 99 mg/dL   Hemoglobin   Result Value Ref Range    Hemoglobin 7.5 (L) 13.3 - 17.7 g/dL   Blood gas arterial and oxyhgb   Result Value Ref Range    pH Arterial 7.16 (LL) 7.35 - 7.45    pCO2 Arterial 42 35 - 45 mm Hg    pO2 Arterial 104 80 - 105 mm Hg    Bicarbonate Arterial 15 (L) 21 - 28 mmol/L    Oxyhemoglobin Arterial 94 92 - 100 %    Base Excess/Deficit (+/-) -12.8 (L) -9.0 - 1.8 mmol/L    FIO2 60     Maxime's Test Artline     Lactic Acid 14.8 (HH) 0.7 - 2.0 mmol/L   Blood gas arterial and oxyhgb   Result Value Ref Range    pH Arterial 7.19 (LL) 7.35 - 7.45    pCO2 Arterial 41 35 - 45 mm Hg    pO2 Arterial 115 (H) 80 - 105 mm Hg    Bicarbonate Arterial 16 (L) 21 - 28 mmol/L    Oxyhemoglobin Arterial 95 92 - 100 %    Base Excess/Deficit (+/-) -11.5 (L) -9.0 - 1.8 mmol/L    FIO2 60     Maxime's Test Artline    Calcium ionized whole blood   Result Value Ref Range    Calcium Ionized 4.4 4.4 - 5.2 mg/dL   Glucose whole blood   Result Value Ref Range    Glucose 123 (H) 70 - 99 mg/dL   Lactic acid whole blood   Result Value Ref Range    Lactic Acid 13.9 (HH)  0.7 - 2.0 mmol/L   Renal panel CRRT   Result Value Ref Range    Sodium 140 136 - 145 mmol/L    Potassium 6.1 (HH) 3.4 - 5.3 mmol/L    Chloride 102 98 - 107 mmol/L    Carbon Dioxide (CO2) 11 (L) 22 - 29 mmol/L    Anion Gap 27 (H) 7 - 15 mmol/L    Glucose 121 (H) 70 - 99 mg/dL    Urea Nitrogen 26.0 (H) 6.0 - 20.0 mg/dL    Creatinine 3.34 (H) 0.67 - 1.17 mg/dL    GFR Estimate 23 (L) >60 mL/min/1.73m2    Calcium 8.0 (L) 8.6 - 10.0 mg/dL    Albumin 2.1 (L) 3.5 - 5.2 g/dL    Phosphorus 8.0 (H) 2.5 - 4.5 mg/dL   CBC with platelets   Result Value Ref Range    WBC Count 15.6 (H) 4.0 - 11.0 10e3/uL    RBC Count 2.34 (L) 4.40 - 5.90 10e6/uL    Hemoglobin 7.2 (L) 13.3 - 17.7 g/dL    Hematocrit 22.5 (L) 40.0 - 53.0 %    MCV 96 78 - 100 fL    MCH 30.8 26.5 - 33.0 pg    MCHC 32.0 31.5 - 36.5 g/dL    RDW 16.1 (H) 10.0 - 15.0 %    Platelet Count 53 (L) 150 - 450 10e3/uL   Comprehensive metabolic panel   Result Value Ref Range    Sodium 140 136 - 145 mmol/L    Potassium 6.1 (HH) 3.4 - 5.3 mmol/L    Chloride 102 98 - 107 mmol/L    Carbon Dioxide (CO2) 11 (L) 22 - 29 mmol/L    Anion Gap 27 (H) 7 - 15 mmol/L    Urea Nitrogen 26.0 (H) 6.0 - 20.0 mg/dL    Creatinine 3.34 (H) 0.67 - 1.17 mg/dL    Calcium 8.0 (L) 8.6 - 10.0 mg/dL    Glucose 121 (H) 70 - 99 mg/dL    Alkaline Phosphatase 42 40 - 129 U/L    AST      ALT 6,432 (HH) 10 - 50 U/L    Protein Total 3.7 (L) 6.4 - 8.3 g/dL    Albumin 2.1 (L) 3.5 - 5.2 g/dL    Bilirubin Total 1.8 (H) <=1.2 mg/dL    GFR Estimate 23 (L) >60 mL/min/1.73m2   Heparin Unfractionated Anti Xa Level   Result Value Ref Range    Anti Xa Unfractionated Heparin <0.10 For Reference Range, See Comment IU/mL    Narrative    Therapeutic Range: UFH: 0.25-0.50 IU/mL for low intensity dosing,  0.30-0.70 IU/mL for high intensity dosing DVT and PE.  This test is not validated for other direct factor X inhibitors (e.g. rivaroxaban, apixaban, edoxaban, betrixaban, fondaparinux) and should not be used for monitoring of other  medications.   INR   Result Value Ref Range    INR 3.87 (H) 0.85 - 1.15   Partial thromboplastin time   Result Value Ref Range    aPTT 45 (H) 22 - 38 Seconds   Magnesium   Result Value Ref Range    Magnesium 2.2 1.7 - 2.3 mg/dL   Troponin T, High Sensitivity   Result Value Ref Range    Troponin T, High Sensitivity 969 (HH) <=22 ng/L   Glucose by meter   Result Value Ref Range    GLUCOSE BY METER POCT 134 (H) 70 - 99 mg/dL   Activated clotting time celite, POCT   Result Value Ref Range    Activated Clotting Time (Celite) POCT 207 (H) 74 - 150 seconds   Blood gas arterial and oxyhgb   Result Value Ref Range    pH Arterial 7.21 (L) 7.35 - 7.45    pCO2 Arterial 41 35 - 45 mm Hg    pO2 Arterial 124 (H) 80 - 105 mm Hg    Bicarbonate Arterial 16 (L) 21 - 28 mmol/L    Oxyhemoglobin Arterial 95 92 - 100 %    Base Excess/Deficit (+/-) -10.6 (L) -9.0 - 1.8 mmol/L    FIO2 60     Maxime's Test Artline     Lactic Acid 12.9 (HH) 0.7 - 2.0 mmol/L   Glucose by meter   Result Value Ref Range    GLUCOSE BY METER POCT 135 (H) 70 - 99 mg/dL   Activated clotting time celite, POCT   Result Value Ref Range    Activated Clotting Time (Celite) POCT 203 (H) 74 - 150 seconds   Activated clotting time celite, POCT   Result Value Ref Range    Activated Clotting Time (Celite) POCT 211 (H) 74 - 150 seconds   Glucose by meter   Result Value Ref Range    GLUCOSE BY METER POCT 108 (H) 70 - 99 mg/dL   Blood gas arterial and oxyhgb   Result Value Ref Range    pH Arterial 7.26 (L) 7.35 - 7.45    pCO2 Arterial 40 35 - 45 mm Hg    pO2 Arterial 284 (H) 80 - 105 mm Hg    Bicarbonate Arterial 18 (L) 21 - 28 mmol/L    Oxyhemoglobin Arterial 97 92 - 100 %    Base Excess/Deficit (+/-) -8.5 -9.0 - 1.8 mmol/L    FIO2 60     Maxime's Test Artline     Lactic Acid 11.7 (HH) 0.7 - 2.0 mmol/L   Potassium whole blood   Result Value Ref Range    Potassium 5.5 (H) 3.4 - 5.3 mmol/L   EKG 12-lead, tracing only   Result Value Ref Range    Systolic Blood Pressure  mmHg     Diastolic Blood Pressure  mmHg    Ventricular Rate 89 BPM    Atrial Rate 94 BPM    UT Interval  ms    QRS Duration 164 ms     ms    QTc 489 ms    P Axis  degrees    R AXIS -25 degrees    T Axis 128 degrees    Interpretation ECG       Ventricular-paced rhythm  Abnormal ECG  When compared with ECG of 17-JAN-2023 04:31, (unconfirmed)  No significant change was found     CBC with platelets   Result Value Ref Range    WBC Count 16.2 (H) 4.0 - 11.0 10e3/uL    RBC Count 1.99 (L) 4.40 - 5.90 10e6/uL    Hemoglobin 6.1 (LL) 13.3 - 17.7 g/dL    Hematocrit 18.4 (L) 40.0 - 53.0 %    MCV 93 78 - 100 fL    MCH 30.7 26.5 - 33.0 pg    MCHC 33.2 31.5 - 36.5 g/dL    RDW 16.2 (H) 10.0 - 15.0 %    Platelet Count 43 (LL) 150 - 450 10e3/uL   Calcium ionized whole blood   Result Value Ref Range    Calcium Ionized 4.3 (L) 4.4 - 5.2 mg/dL   Heparin Unfractionated Anti Xa Level   Result Value Ref Range    Anti Xa Unfractionated Heparin <0.10 For Reference Range, See Comment IU/mL    Narrative    Therapeutic Range: UFH: 0.25-0.50 IU/mL for low intensity dosing,  0.30-0.70 IU/mL for high intensity dosing DVT and PE.  This test is not validated for other direct factor X inhibitors (e.g. rivaroxaban, apixaban, edoxaban, betrixaban, fondaparinux) and should not be used for monitoring of other medications.   INR   Result Value Ref Range    INR 4.33 (H) 0.85 - 1.15   Partial thromboplastin time   Result Value Ref Range    aPTT 44 (H) 22 - 38 Seconds   Magnesium   Result Value Ref Range    Magnesium 1.9 1.7 - 2.3 mg/dL   Troponin T, High Sensitivity   Result Value Ref Range    Troponin T, High Sensitivity 925 (HH) <=22 ng/L   D dimer quantitative   Result Value Ref Range    D-Dimer Quantitative 10.65 (H) 0.00 - 0.50 ug/mL FEU    Narrative    This D-dimer assay is intended for use in conjunction with a clinical pretest probability assessment model to exclude pulmonary embolism (PE) and deep venous thrombosis (DVT) in outpatients suspected of PE  or DVT. The cut-off value is 0.50 ug/mL FEU.   Blood gas ELS venous   Result Value Ref Range    pH ELS Venous 7.21 (L) 7.32 - 7.43    pCO2 ELS Venous 52 (H) 40 - 50 mm Hg    pO2 ELS Venous 34 25 - 47 mm Hg    Bicarbonate ELS Venous 21 21 - 28 mmol/L    Base Excess/Deficit (+/-) -6.7 -7.7 - 1.9 mmol/L    FIO2 60     Oxyhemoglobin ELS Venous 43 %   Blood gas ELS arterial   Result Value Ref Range    pH ELS Arterial 7.30 (L) 7.35 - 7.45    pCO2 ELS Arterial 38 35 - 45 mm Hg    pO2 ELS Arterial 314 (H) 80 - 105 mm Hg    Bicarbonate ELS Arterial 19 (L) 21 - 28 mmol/L    Base Excess/Deficit (+/-) -7.2 -9.0 - 1.8 mmol/L    FIO2 80     Oxyhemoglobin ELS Arterial 98 75 - 100 %   Fibrinogen activity   Result Value Ref Range    Fibrinogen Activity 381 170 - 490 mg/dL   CRP inflammation   Result Value Ref Range    CRP Inflammation 318.00 (H) <5.00 mg/L   Erythrocyte sedimentation rate auto   Result Value Ref Range    Erythrocyte Sedimentation Rate 69 (H) 0 - 15 mm/hr   Bilirubin direct   Result Value Ref Range    Bilirubin Direct 1.69 (H) 0.00 - 0.30 mg/dL   Phosphorus   Result Value Ref Range    Phosphorus 7.1 (H) 2.5 - 4.5 mg/dL   Blood gas arterial and oxyhgb   Result Value Ref Range    pH Arterial 7.30 (L) 7.35 - 7.45    pCO2 Arterial 38 35 - 45 mm Hg    pO2 Arterial 290 (H) 80 - 105 mm Hg    Bicarbonate Arterial 19 (L) 21 - 28 mmol/L    Oxyhemoglobin Arterial 98 92 - 100 %    Base Excess/Deficit (+/-) -7.2 -9.0 - 1.8 mmol/L    FIO2 60     Maxime's Test Artline    Glucose whole blood   Result Value Ref Range    Glucose 94 70 - 99 mg/dL   Lactic acid whole blood   Result Value Ref Range    Lactic Acid 11.3 (HH) 0.7 - 2.0 mmol/L   Calcium Ionized CRRT   Result Value Ref Range    Calcium Ionized 4.4 4.4 - 5.2 mg/dL   Glucose by meter   Result Value Ref Range    GLUCOSE BY METER POCT 99 70 - 99 mg/dL   Activated clotting time celite, POCT   Result Value Ref Range    Activated Clotting Time (Celite) POCT 216 (H) 74 - 150 seconds        All relevant imaging and laboratory values personally reviewed.

## 2023-01-18 NOTE — PROGRESS NOTES
[facility]    ECLS Discontinuation Note:     ECLS was discontinued 1/18/2023 at 1328.    Fco Connor, RT  ECMO Specialist  1/18/2023 2:08 PM

## 2023-01-18 NOTE — PROGRESS NOTES
[facility]    ECLS Shift Summary:     ECMO Equipment:  Console Serial Number: [id number]  Circuit Lot Number: [id number]  Oxygenator Lot Number: [id number]    Circuit Assessment: Fibrin  Fibrin Location: scant at connectors    Arterial ECMO Cannula: 17 Fr in the Right Femoral Artery  Venous ECMO Cannula: 25 Fr in the Right Femoral Vein  ECMO Cannula Arterial Right femoral artery-Site Assessment: Oozing- stable  ECMO Cannula Venous;Drain Right femoral vein-Site Assessment: Oozing- stable  ECMO Cannula Arterial Right femoral artery-Site Intervention: No intervention needed  ECMO Cannula Venous;Drain Right femoral vein-Site Intervention: No intervention needed    Patient remains on V-A ECMO, all equipment is functioning and alarms are appropriately set. RPM's: 3800 (chugging) with Blood Flow (Circuit) LPM  Av LPM  Min: 3.79 LPM  Max: 4.52 LPM L/min. Sweep is at 7 LPM and 60 %. Extremities are cool, dusky, and mottled.     Significant Shift Events: Patient was transported to head CT without incident. Able to flow ~4.5 L for a couple of hours; however, now requiring continuous fluid infusion to maintain flow ~ 4L. Patient has received a total of 2L of LR and 500 ml Albumin for chugging this shift. Heparin was restarted for , and then stopped again for ACT >200. Goal 160-180.    Vent settings:  Vent Mode: (S) CMV/AC  (Continuous Mandatory Ventilation/ Assist Control) (Changed by MD)  FiO2 (%): 60 %  Resp Rate (Set): 12 breaths/min  Tidal Volume (Set, mL): 400 mL  PEEP (cm H2O): 15 cmH2O  Inspiratory Pressure (cm H2O) (Drager Becky): 26  Resp: 12      Anticoagulation:  Dose (units/hr) HEParin: 0 Units/hr  Most recent: ACT  (seconds): 203 seconds    Urine output is minimal on CRRT.  Blood loss was none. Product given included none.       Intake/Output Summary (Last 24 hours) at 2023 1803  Last data filed at 2023 1800  Gross per 24 hour   Intake 18036.85 ml    Output 377.8 ml   Net 20183.05 ml       Labs:  Recent Labs   Lab 01/17/23  1529 01/17/23  1528 01/17/23  1354 01/17/23  1147 01/17/23  0928   PH 7.16*  --  7.10* 7.18* 7.20*   PCO2 41  --  47* 46* 44   PO2 157*  --  122* 361* 354*   HCO3 14*  --  15* 17* 17*   O2PER 60 70  60 60 60 60       Lab Results   Component Value Date    HGB 8.1 (L) 01/17/2023    PHGB 60 (H) 01/17/2023    PLT 63 (L) 01/17/2023    FIBR 507 (H) 01/17/2023    INR 2.98 (H) 01/17/2023    PTT 41 (H) 01/17/2023    DD 4.56 (H) 01/17/2023    ANTCH 56 (L) 01/17/2023       Plan is to continue ECMO support, titrate sweep as needed.    Karen Lugo, RT  ECMO Specialist  1/17/2023 6:03 PM

## 2023-01-18 NOTE — PROGRESS NOTES
CRRT STATUS NOTE    DATA:  Time:  0640    Pressures WNL:  YES    Filter Status:  WDL    Problems Reported/Alarms Noted:  No    Supplies Present:  YES    ASSESSMENT:  Patient Net Fluid Balance:    Date 01/17/2023  I/O= +11,278.67cc    Vital Signs 0400:   T=98.4 (bladder), HR=89, RR=12, Assisted systolic=64 and assisted diastolic=49 and Pump mean=71 via IABP, IIK4=930% on FIO2 60% via vent.    Gtts:  On levophed, vasopressin, phenylephrine, bicarb, and amiodarone gtts.    Labs:    Labs monitored and reviewed.  ROUTINE ICU LABS (Last four results)  CMPRecent Labs   Lab 01/18/23  0605 01/18/23  0400 01/18/23  0353 01/18/23  0352 01/18/23  0154 01/18/23  0152 01/17/23  2158 01/17/23  2153 01/17/23  1529 01/17/23  1528 01/17/23  1155 01/17/23  1146 01/17/23  0928 01/17/23  0927 01/17/23  0415 01/17/23  0414 01/17/23  0018 01/16/23  2149 01/16/23  1523 01/16/23  1522   NA  --   --   --   --   --   --   --  140  140  --  142  --  140  --  140  --  139  --  140  --  137   POTASSIUM  --   --   --   --  5.5*  --   --  6.1*  6.1*  --  5.9*  --  6.1*  --  6.1*   < > 6.0*  --  5.1  --  4.9   CHLORIDE  --   --   --   --   --   --   --  102  102  --  104  --  105  --  106  --  105  --  105  --  104   CO2  --   --   --   --   --   --   --  11*  11*  --  12*  --  13*  --  15*  --  16*  --  18*  --  19*   ANIONGAP  --   --   --   --   --   --   --  27*  27*  --  26*  --  22*  --  19*  --  18*  --  17*  --  14   GLC 82 99 94  --   --  108*   < > 121*  121*   < > 93   < > 75   < > 82   < > 106*   < > 105*  115*   < > 105*   BUN  --   --   --   --   --   --   --  26.0*  26.0*  --  31.4*  --  31.3*  --  31.7*  --  27.2*  --  28.3*  --  27.5*   CR  --   --   --   --   --   --   --  3.34*  3.34*  --  3.89*  --  3.79*  --  3.81*  --  3.30*  --  3.24*  --  2.80*   GFRESTIMATED  --   --   --   --   --   --   --  23*  23*  --  19*  --  20*  --  20*  --  23*  --  24*  --  28*   RANDALL  --   --   --   --   --   --   --  8.0*  8.0*  --   8.0*  --  8.2*  --  7.6*  --  7.3*  --  7.9*  --  8.4*   MAG  --   --   --  1.9  --   --   --  2.2  --  2.5*  --  2.2  --  2.2  --  2.2  --  2.5*  --  2.1   PHOS  --   --   --  7.1*  --   --   --  8.0*  --   --   --  9.4*  --   --   --  7.1*  --   --   --   --    PROTTOTAL  --   --   --   --   --   --   --  3.7*  --  3.7*  --   --   --  3.8*  --  4.1*  --  4.9*  --  4.9*   ALBUMIN  --   --   --   --   --   --   --  2.1*  2.1*  --  1.8*  --  2.0*  --  2.0*  --  2.4*  --  2.6*  --  2.7*   BILITOTAL  --   --   --   --   --   --   --  1.8*  --  1.4*  --   --   --  1.0  --  0.7  --  0.6  --  0.5   ALKPHOS  --   --   --   --   --   --   --  42  --  38*  --   --   --  32*  --  25*  --  30*  --  24*   AST  --   --   --   --   --   --   --   --   --  3,260*  --   --   --  1,401*  --   --   --  203*  --  183*   ALT  --   --   --   --   --   --   --  6,432*  --  4,423*  --   --   --  1,968*  --  852*  --  135*  --  134*    < > = values in this interval not displayed.     CBC  Recent Labs   Lab 01/18/23  0352 01/17/23 2153 01/17/23 2010 01/17/23  1528 01/17/23  0927   WBC 16.2* 15.6*  --  16.9* 14.9*   RBC 1.99* 2.34*  --  2.65* 2.85*   HGB 6.1* 7.2* 7.5* 8.1* 8.7*   HCT 18.4* 22.5*  --  25.6* 27.1*   MCV 93 96  --  97 95   MCH 30.7 30.8  --  30.6 30.5   MCHC 33.2 32.0  --  31.6 32.1   RDW 16.2* 16.1*  --  16.1* 15.5*   PLT 43* 53*  --  63* 76*     INR  Recent Labs   Lab 01/18/23  0352 01/17/23  2153 01/17/23  1528 01/17/23  0927   INR 4.33* 3.87* 2.98* 2.44*     Arterial Blood Gas  Recent Labs   Lab 01/18/23  0600 01/18/23  0353 01/18/23  0352 01/18/23  0154 01/17/23  2357   PH 7.28* 7.30*  --  7.26* 7.21*   PCO2 39 38  --  40 41   PO2 272* 290*  --  284* 124*   HCO3 18* 19*  --  18* 16*   O2PER 60 60 80  60 60 60     At 0640 not all of the 0400 chemistry results are resulted yet in EPIC    0353 ICa=4.3, LA=11.3  0600 LA=11.5    Goals of Therapy:  I=O  Unable to achieve I=O due to increased pressor needs and fluids given  overnight.    INTERVENTIONS:   None    PLAN:  Continue to monitor.  Plan today is to go to Nuclear Med for a brain scan.  Change CRRT set q72hrs and PRN  Call CRRT RN at [phone] with questions.  See flowsheets for details.

## 2023-01-18 NOTE — PROGRESS NOTES
Multiple members of the family and friends at bedside.    Nuclear medicine test done at 1100. Time of Death 1316    Withdrew ECMO, IABP and medication support at 1328

## 2023-01-18 NOTE — PROGRESS NOTES
[facility]         Intra-Aortic Balloon Pump Discontinuation:     IABP support discontinued 1/18/2023 at 13:28.    FATUMA Falcon, RRT  ECMO Specialist  1/18/2023 2:40 PM

## 2023-01-18 NOTE — PROGRESS NOTES
SPIRITUAL HEALTH SERVICES  SPIRITUAL ASSESSMENT Progress Note (Palliative Focus)  [facility] ([place]) 4E    REFERRAL SOURCE: Palliative care follow up.    Visit with patient Rex Negrete's mother Rin at his bedside. She is keeping perez with him and feels more at peace at his bedside. Family are grieving, anticipating that Rex will die, appreciative of emotional/spiritual support.     Plan: I will follow for spiritual support while Palliative Care is consulted.    Bryanna Shepard M.Div., [facility]  Palliative Care   Pager [phone]  [facility] Inpatient Team Consult pager [phone] (M-F 8-4:30)  After-hours Answering Service [phone]

## 2023-01-18 NOTE — PROGRESS NOTES
[facility]    ECLS Shift Summary:     ECMO Equipment:  Console Serial Number: [id number]  Circuit Lot Number: [id number]  Oxygenator Lot Number: [id number]    Circuit Assessment: Fibrin  Fibrin Location: connectors  Clot Location: Oxy, DPC stopcock    ECMO Cannula Arterial Right femoral artery-Site Assessment: Bleeding  ECMO Cannula Venous;Drain Right femoral vein-Site Assessment: Bleeding  ECMO Cannula Arterial Right femoral artery-Site Intervention: No intervention needed  ECMO Cannula Venous;Drain Right femoral vein-Site Intervention: No intervention needed    Patient remains on V-A ECMO, all equipment is functioning and alarms are appropriately set. RPM's: 3400 (chugging) with Blood Flow (Circuit) LPM  Avg: 3.8 LPM  Min: 3.32 LPM  Max: 4.1 LPM L/min. Sweep is at 7 LPM and 80 %. Extremities are cool to touch. LLE mottled, cool, and stiff.    Significant Shift Events: Hgb has gone down slowly throughout the shift from 7.0 to 5.8.  MD obtained blood consent from wife and agreed to give one unit.  2L of LR given for circuit chugging over night.    Vent settings:  Vent Mode: CMV/AC  (Continuous Mandatory Ventilation/ Assist Control)  FiO2 (%): 60 %  Resp Rate (Set): 12 breaths/min  Tidal Volume (Set, mL): 400 mL  PEEP (cm H2O): 15 cmH2O  Inspiratory Pressure (cm H2O) (Drager Becky): 26  Resp: 12      Anticoagulation:  Dose (units/hr) HEParin: 0 Units/hr  Rate (mL/hr) HEParin: 0 mL/hr  Concentration HEParin: 100 Units/mL        Most recent: ACT  (seconds): 203 seconds    Urine output is clear fabby w/o foul odor.  Blood loss was minimal.   Product given included 1 unit PRBC this morning.       Intake/Output Summary (Last 24 hours) at 1/18/2023 0626  Last data filed at 1/18/2023 0600  Gross per 24 hour   Intake 35773.17 ml   Output 28.7 ml   Net 64612.47 ml       Labs:  Recent Labs   Lab 01/18/23  0600 01/18/23  0353 01/18/23  0352 01/18/23  0154 01/17/23  2357   PH 7.28*  7.30*  --  7.26* 7.21*   PCO2 39 38  --  40 41   PO2 272* 290*  --  284* 124*   HCO3 18* 19*  --  18* 16*   O2PER 60 60 80  60 60 60       Lab Results   Component Value Date    HGB 6.1 (LL) 01/18/2023    PHGB 60 (H) 01/17/2023    PLT 43 (LL) 01/18/2023    FIBR 381 01/18/2023    INR 4.33 (H) 01/18/2023    PTT 44 (H) 01/18/2023    DD 10.65 (H) 01/18/2023    ANTCH 56 (L) 01/17/2023       Plan is to possibly have a nuclear medicine scan and family care conference to determine whether or not to continue with ECMO.    Viktor Chowdhury, RT  ECMO Specialist  1/18/2023 6:26 AM

## 2023-01-19 LAB
BACTERIA SPT CULT: ABNORMAL
GRAM STAIN RESULT: ABNORMAL
GRAM STAIN RESULT: ABNORMAL

## 2023-01-19 NOTE — PROGRESS NOTES
SPIRITUAL HEALTH SERVICES  SPIRITUAL ASSESSMENT Progress Note (Palliative Focus)  [facility] ([facility]) 4E    REFERRAL SOURCE: Palliative care follow up.    Brief supportive visit with patient Rex Negrete's mother Rin following his death. Rin is grieving, supported by extended family, and grateful for the time she was able to spend with Rex. She remains appreciative of grief support and listening presence.     Plan: No plans to follow unless further bereavement support is requested.    Bryanna Shepard M.Div., [facility]  Palliative Care   Pager [phone]  [facility] Inpatient Team Consult pager [phone] (M-F 8-4:30)  After-hours Answering Service [phone]

## 2023-01-20 LAB — BACTERIA BLD CULT: NO GROWTH

## 2023-01-21 LAB — BACTERIA BLD CULT: NO GROWTH

## 2023-01-22 LAB — BACTERIA BLD CULT: NO GROWTH

## 2023-01-24 LAB
AMPHET BLD CFM-MCNC: NORMAL NG/ML
APAP BLD-MCNC: NORMAL UG/ML
BARBITURATES SPEC-MCNC: NORMAL UG/ML
BENZODIAZ SPEC-MCNC: NORMAL NG/ML
BUPRENORPHINE SERPL-MCNC: NORMAL NG/ML
BZE BLD CFM-MCNC: NORMAL NG/ML
CARBOXYTHC BLD-MCNC: NORMAL NG/ML
CARISOPRODOL IA: NORMAL UG/ML
DECLARED MEDICATIONS: NORMAL
DRUGS BLD SCN NOM: NORMAL
DRUGS FLD: NORMAL
ETHANOL BLD-MCNC: NORMAL GM/DL
FENTANYL IA: NORMAL NG/ML
GABAPENTIN IA: NORMAL UG/ML
MEPERIDINE SERPLBLD-MCNC: NORMAL NG/ML
METHADONE SAL CFM-MCNC: NORMAL NG/ML
OPIATES SPEC-MCNC: NORMAL NG/ML
OXYCODONE SERPLBLD SCN-MCNC: NORMAL NG/ML
PCP SPEC-MCNC: NORMAL NG/ML
PROPOXYPH SPEC-MCNC: NORMAL NG/ML
TRAMADOL BLD-MCNC: NORMAL NG/ML

## 2023-02-13 NOTE — ADDENDUM NOTE
Encounter addended by: Ekta Fernandez, PT on: 2/13/2023 7:50 AM   Actions taken: Clinical Note Signed, Episode resolved

## 2023-02-13 NOTE — PROGRESS NOTES
"[facility]    Outpatient Physical Therapy Discharge Note  Patient: Rex Negrete  : 1980    Beginning/End Dates of Reporting Period:  2022 to 1/3/2023    Referring Provider: Regi Conde APRN CNP    Therapy Diagnosis: dizziness, headache, neck pain, balance problems     Client Self Report: Pt reporting his dizziness \"typical,\" daily dizziness is getting better. He reports that this corresponds with a reduction of his stress. 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    Objective Measurements:  Objective Measure: mCTSIB  Details: 30s, 30s, 30s, 20s; see goal above.     Goals:  Goal Identifier HEP   Goal Description In order to facilitate gains in balance and postural stability, cervical ROM/flexibility/strength and decrease motion sensitivity, neck pain and headaches outside of physical therapy, patient will demonstrate independence with a HEP.   Target Date 23   Date Met      Progress (detail required for progress note): 1/3/2022: Pt's HEP remains appropriate, as it produces pt's symptoms. Extended target date.     Goal Identifier DVA   Goal Description The patient will score within 2 lines of SVA with performance of DVA to demonstrate that his vestibular system is functioning optimally and is able to support gaze stability within a functional range.   Target Date 23   Date Met      Progress (detail required for progress note): 1/3/2023: Pt with loss of 6 lines; VOR remains impaired. Extended target date.     Goal Identifier CSA   Goal Description The patient will score 10 or less on CSA to demonstrate a significant improvement in concussion symptom severity and utilization of management techniques.   Target Date 23   Date Met      Progress (detail required for progress note): 1/3/2023: Pt scoring 37pts on CSA. He " continues to report fluctuation of his concussion symptoms. Extended target date.     Goal Identifier Neck Disability Index   Goal Description Patient will report a reduction in neck pain as well as headaches by scoring 15 or less on the neck disability index in order to increase tolerance and ease for daily activities and mobility.   Target Date 23   Date Met      Progress (detail required for progress note): 1/3/2023: Pt scoring 52% on NDI. He feels this is improved from previous, as he was then unable to answer all questions 2' activity limiations. Extended target date.     Goal Identifier Modified CTSIB   Goal Description Patient will score normal on modified CTSIB demonstrating improvement in balance and minimized risk for falls.   Target Date 23   Date Met      Progress (detail required for progress note): 1/3/2023: 30s, 30s, 30s, 20s; pt with poor vestibular sensory integration for postural control. Extended target date.     Plan:  Discharge from therapy.    Discharge:    Reason for Discharge: Change in medical status; pt .

## 2023-02-21 RX ORDER — DONEPEZIL HYDROCHLORIDE 5 MG/1
TABLET, FILM COATED ORAL
Qty: 60 TABLET | OUTPATIENT
Start: 2023-02-21

## (undated) DEVICE — CATH ANGIO INFINITI 3DRC 6FRX100CM 534676T

## (undated) DEVICE — ESU LIGASURE LAPAROSCOPIC BLUNT TIP SEALER 5MMX37CM LF1837

## (undated) DEVICE — SYSTEM CLEARIFY VISUALIZATION 21-345

## (undated) DEVICE — SU VICRYL 3-0 SH 27" J316H

## (undated) DEVICE — CATH ANGIO SUPERTORQUE PLUS JL4 6FRX100CM 533620

## (undated) DEVICE — TUBING PRESSURE 30"

## (undated) DEVICE — ESU GROUND PAD ADULT W/CORD E7507

## (undated) DEVICE — MANIFOLD KIT ANGIO AUTOMATED 014613

## (undated) DEVICE — KIT SIGMOIDOSCOPE W/OBTURATOR 18FR SUCTION KI521/10

## (undated) DEVICE — SPONGE LAP 18X18" X8435

## (undated) DEVICE — SUCTION IRR STRYKERFLOW II W/TIP 250-070-520

## (undated) DEVICE — DRAPE IOBAN INCISE 23X17" 6650EZ

## (undated) DEVICE — 0.035IN X 150CM, EMERALD DIAGNOSTIC GUIDEWIRE, FIXED-CORE PTFE COATED, EXTRA FIRM, 3MM J-TIP (EA/1)

## (undated) DEVICE — DRAPE LEGGINGS 8421

## (undated) DEVICE — ESU ELEC BLADE 6" COATED/INSULATED E1455-6

## (undated) DEVICE — SU PROLENE 2-0 SHDA 48" 8533H

## (undated) DEVICE — CANNULA ART 17FR 3/8IN 23CM 2 SH BIOLINE 70105.3082

## (undated) DEVICE — STPL POWERED ECHELON 60MM PSEE60A

## (undated) DEVICE — INTRO SHEATH 6FRX25CM PINNACLE RSS606

## (undated) DEVICE — ESU PENCIL W/HOLSTER E2350H

## (undated) DEVICE — LINEN HALF SHEET 5512

## (undated) DEVICE — INTRO SHEATH 9FRX10CM PINNACLE RSS902

## (undated) DEVICE — KIT ARTERIAL LINE 2GA 12CM INTRO NDL ASK-04510-HF

## (undated) DEVICE — SOL NACL 0.9% IRRIG 1000ML BOTTLE 2F7124

## (undated) DEVICE — ENDO TROCAR SLEEVE KII Z-THREADED 05X100MM CTS02

## (undated) DEVICE — STPL SINGLE USE 28MM W/3.5MM EEA2835

## (undated) DEVICE — INTRO SHEATH 8FRX10CM PINNACLE RSS802

## (undated) DEVICE — LINEN TOWEL PACK X10 5473

## (undated) DEVICE — Device

## (undated) DEVICE — INTRODUCER SHEATH FAST-CATH CATH-LOCK 7FRX12CM 406702

## (undated) DEVICE — LINEN POUCH DBL 5427

## (undated) DEVICE — STPL RELOAD REG TISSUE ECHELON 60 X 3.6MM BLUE GST60B

## (undated) DEVICE — LINEN FULL SHEET 5511

## (undated) DEVICE — CATH ANGIO INFINITI PIGTAIL 145 6 SH 6FRX110CM  534-652S

## (undated) DEVICE — TAPE CLOTH ADHESIVE 3" LATEX FREE

## (undated) DEVICE — CATH TRAY FOLEY COUDE SURESTEP 16FR W/DRN BAG LATEX A304416A

## (undated) DEVICE — DRAPE MAYO STAND 23X54 8337

## (undated) DEVICE — SU MONOCRYL 4-0 PS-2 27" UND Y426H

## (undated) DEVICE — CANNULA VENOUS 25FR LONG

## (undated) DEVICE — INTRO SHTH INTRO SHTH 8FR X 11

## (undated) DEVICE — CATH EP PACEL 5FRX110CM 1MM RIGHT HEART CURVE 401763

## (undated) DEVICE — COVER FOOTSWITCH URO

## (undated) DEVICE — SUCTION CANISTER MEDIVAC LINER 3000ML W/LID 65651-530

## (undated) DEVICE — INTRO SHEATH 7FRX10CM PINNACLE RSS702

## (undated) DEVICE — GOWN XLG DISP 9545

## (undated) DEVICE — SU PDS II 0 CTX 60" Z990G

## (undated) DEVICE — SUCTION TIP YANKAUER W/O VENT K86

## (undated) DEVICE — PROTECTOR ARM ONE-STEP TRENDELENBURG 40418

## (undated) DEVICE — WIRE GUIDE 0.035"X145CM AMPLATZ XSTIFF J THSCF-35-145-3-AES

## (undated) DEVICE — SUCTION MANIFOLD NEPTUNE 2 SYS 4 PORT 0702-020-000

## (undated) DEVICE — KIT HAND CONTROL ACIST 016795

## (undated) DEVICE — SYR BULB IRRIG 50ML LATEX FREE 0035280

## (undated) DEVICE — PREP CHLORAPREP 26ML TINTED HI-LITE ORANGE 930815

## (undated) DEVICE — KIT PROCEDURE W/CLEAN-A-SCOPE LINERS V2 200800

## (undated) DEVICE — SLEEVE REPOSITIONING W/CATH LOCK 60CM 406503

## (undated) DEVICE — CATH QUATTRO 9.3FR  FEM INSTRN

## (undated) DEVICE — ADH SKIN CLOSURE PREMIERPRO EXOFIN 1.0ML 3470

## (undated) DEVICE — INTRO SHEATH MICRO PLATINUM TIP 4FRX40CM 7274

## (undated) DEVICE — ENDO TROCAR BLUNT TIP KII BALLOON 12X100MM C0R47

## (undated) DEVICE — PACK HEART LEFT CUSTOM

## (undated) DEVICE — TUBING SUCTION MEDI-VAC SOFT 3/16"X20' N520A

## (undated) DEVICE — WIRE GUIDE 0.035"X150CM EMERALD J TIP 502521

## (undated) DEVICE — SUCTION TIP POOLE K770

## (undated) DEVICE — LINEN DRAPE 54X72" 5467

## (undated) DEVICE — KIT PATIENT POSITIONING PIGAZZI LATEX FREE 40580

## (undated) DEVICE — SOL NACL 0.9% IRRIG 3000ML BAG 2B7477

## (undated) DEVICE — ENDO TROCAR FIRST ENTRY KII FIOS Z-THRD 05X100MM CTF03

## (undated) DEVICE — BAG CLEAR TRASH 1.3M 39X33" P4040C

## (undated) DEVICE — PAD CHUX UNDERPAD 30X36" P3036C

## (undated) DEVICE — BLADE KNIFE SURG 10 371110

## (undated) DEVICE — GLOVE PROTEXIS W/NEU-THERA 7.0  2D73TE70

## (undated) DEVICE — SU VICRYL 0 UR-6 27" J603H

## (undated) DEVICE — SU VICRYL 2-0 TIE 12X18" J905T

## (undated) DEVICE — BLADE CLIPPER 3M 9670

## (undated) DEVICE — ESU CORD MONOPOLAR 10'  E0510

## (undated) RX ORDER — GLYCOPYRROLATE 0.2 MG/ML
INJECTION INTRAMUSCULAR; INTRAVENOUS
Status: DISPENSED
Start: 2021-12-13

## (undated) RX ORDER — LIDOCAINE HYDROCHLORIDE 10 MG/ML
INJECTION, SOLUTION EPIDURAL; INFILTRATION; INTRACAUDAL; PERINEURAL
Status: DISPENSED
Start: 2022-01-01

## (undated) RX ORDER — FENTANYL CITRATE 50 UG/ML
INJECTION, SOLUTION INTRAMUSCULAR; INTRAVENOUS
Status: DISPENSED
Start: 2021-12-13

## (undated) RX ORDER — OXYCODONE HYDROCHLORIDE 5 MG/1
TABLET ORAL
Status: DISPENSED
Start: 2021-12-13

## (undated) RX ORDER — METOPROLOL TARTRATE 1 MG/ML
INJECTION, SOLUTION INTRAVENOUS
Status: DISPENSED
Start: 2023-01-01

## (undated) RX ORDER — ALVIMOPAN 12 MG/1
CAPSULE ORAL
Status: DISPENSED
Start: 2021-12-13

## (undated) RX ORDER — FENTANYL CITRATE-0.9 % NACL/PF 10 MCG/ML
PLASTIC BAG, INJECTION (ML) INTRAVENOUS
Status: DISPENSED
Start: 2021-12-13

## (undated) RX ORDER — PROPOFOL 10 MG/ML
INJECTION, EMULSION INTRAVENOUS
Status: DISPENSED
Start: 2023-01-01

## (undated) RX ORDER — ACETAMINOPHEN 325 MG/1
TABLET ORAL
Status: DISPENSED
Start: 2021-12-13

## (undated) RX ORDER — FENTANYL CITRATE 50 UG/ML
INJECTION, SOLUTION INTRAMUSCULAR; INTRAVENOUS
Status: DISPENSED
Start: 2023-01-01

## (undated) RX ORDER — NEOSTIGMINE METHYLSULFATE 1 MG/ML
VIAL (ML) INJECTION
Status: DISPENSED
Start: 2021-12-13

## (undated) RX ORDER — CEFAZOLIN SODIUM/WATER 2 G/20 ML
SYRINGE (ML) INTRAVENOUS
Status: DISPENSED
Start: 2021-12-13

## (undated) RX ORDER — HEPARIN SODIUM 1000 [USP'U]/ML
INJECTION, SOLUTION INTRAVENOUS; SUBCUTANEOUS
Status: DISPENSED
Start: 2023-01-01

## (undated) RX ORDER — MIDAZOLAM HCL IN 0.9 % NACL/PF 1 MG/ML
PLASTIC BAG, INJECTION (ML) INTRAVENOUS
Status: DISPENSED
Start: 2023-01-01

## (undated) RX ORDER — TRIAMCINOLONE ACETONIDE 40 MG/ML
INJECTION, SUSPENSION INTRA-ARTICULAR; INTRAMUSCULAR
Status: DISPENSED
Start: 2022-01-01

## (undated) RX ORDER — HYDROMORPHONE HYDROCHLORIDE 1 MG/ML
INJECTION, SOLUTION INTRAMUSCULAR; INTRAVENOUS; SUBCUTANEOUS
Status: DISPENSED
Start: 2021-12-13

## (undated) RX ORDER — PROPOFOL 10 MG/ML
INJECTION, EMULSION INTRAVENOUS
Status: DISPENSED
Start: 2021-12-13

## (undated) RX ORDER — HEPARIN SODIUM 5000 [USP'U]/.5ML
INJECTION, SOLUTION INTRAVENOUS; SUBCUTANEOUS
Status: DISPENSED
Start: 2021-12-13